# Patient Record
Sex: MALE | Race: WHITE | Employment: OTHER | URBAN - METROPOLITAN AREA
[De-identification: names, ages, dates, MRNs, and addresses within clinical notes are randomized per-mention and may not be internally consistent; named-entity substitution may affect disease eponyms.]

---

## 2017-05-08 ENCOUNTER — OFFICE VISIT (OUTPATIENT)
Dept: FAMILY MEDICINE | Facility: CLINIC | Age: 77
End: 2017-05-08
Payer: COMMERCIAL

## 2017-05-08 VITALS
HEART RATE: 64 BPM | DIASTOLIC BLOOD PRESSURE: 82 MMHG | HEIGHT: 67 IN | RESPIRATION RATE: 24 BRPM | OXYGEN SATURATION: 97 % | WEIGHT: 238 LBS | BODY MASS INDEX: 37.35 KG/M2 | SYSTOLIC BLOOD PRESSURE: 120 MMHG | TEMPERATURE: 99.2 F

## 2017-05-08 DIAGNOSIS — I10 ESSENTIAL HYPERTENSION: ICD-10-CM

## 2017-05-08 DIAGNOSIS — Z12.5 SCREENING FOR PROSTATE CANCER: ICD-10-CM

## 2017-05-08 DIAGNOSIS — R21 RASH AND NONSPECIFIC SKIN ERUPTION: ICD-10-CM

## 2017-05-08 DIAGNOSIS — Z00.01 ENCOUNTER FOR GENERAL ADULT MEDICAL EXAMINATION WITH ABNORMAL FINDINGS: Primary | ICD-10-CM

## 2017-05-08 DIAGNOSIS — E78.5 HYPERLIPIDEMIA LDL GOAL <130: ICD-10-CM

## 2017-05-08 LAB
ALBUMIN SERPL-MCNC: 3.7 G/DL (ref 3.4–5)
ALP SERPL-CCNC: 96 U/L (ref 40–150)
ALT SERPL W P-5'-P-CCNC: 33 U/L (ref 0–70)
ANION GAP SERPL CALCULATED.3IONS-SCNC: 9 MMOL/L (ref 3–14)
AST SERPL W P-5'-P-CCNC: 25 U/L (ref 0–45)
BILIRUB SERPL-MCNC: 0.3 MG/DL (ref 0.2–1.3)
BUN SERPL-MCNC: 14 MG/DL (ref 7–30)
CALCIUM SERPL-MCNC: 9.3 MG/DL (ref 8.5–10.1)
CHLORIDE SERPL-SCNC: 102 MMOL/L (ref 94–109)
CHOLEST SERPL-MCNC: 120 MG/DL
CO2 SERPL-SCNC: 28 MMOL/L (ref 20–32)
CREAT SERPL-MCNC: 0.9 MG/DL (ref 0.66–1.25)
ERYTHROCYTE [DISTWIDTH] IN BLOOD BY AUTOMATED COUNT: 11.9 % (ref 10–15)
GFR SERPL CREATININE-BSD FRML MDRD: 82 ML/MIN/1.7M2
GLUCOSE SERPL-MCNC: 104 MG/DL (ref 70–99)
HCT VFR BLD AUTO: 44.5 % (ref 40–53)
HDLC SERPL-MCNC: 41 MG/DL
HGB BLD-MCNC: 14.5 G/DL (ref 13.3–17.7)
LDLC SERPL CALC-MCNC: 43 MG/DL
MCH RBC QN AUTO: 33.1 PG (ref 26.5–33)
MCHC RBC AUTO-ENTMCNC: 32.6 G/DL (ref 31.5–36.5)
MCV RBC AUTO: 102 FL (ref 78–100)
NONHDLC SERPL-MCNC: 79 MG/DL
PLATELET # BLD AUTO: 212 10E9/L (ref 150–450)
POTASSIUM SERPL-SCNC: 4.4 MMOL/L (ref 3.4–5.3)
PROT SERPL-MCNC: 7.2 G/DL (ref 6.8–8.8)
PSA SERPL-ACNC: 0.62 UG/L (ref 0–4)
RBC # BLD AUTO: 4.38 10E12/L (ref 4.4–5.9)
SODIUM SERPL-SCNC: 139 MMOL/L (ref 133–144)
TRIGL SERPL-MCNC: 180 MG/DL
WBC # BLD AUTO: 6.4 10E9/L (ref 4–11)

## 2017-05-08 PROCEDURE — 99387 INIT PM E/M NEW PAT 65+ YRS: CPT | Performed by: FAMILY MEDICINE

## 2017-05-08 PROCEDURE — 85027 COMPLETE CBC AUTOMATED: CPT | Performed by: FAMILY MEDICINE

## 2017-05-08 PROCEDURE — 80061 LIPID PANEL: CPT | Performed by: FAMILY MEDICINE

## 2017-05-08 PROCEDURE — G0103 PSA SCREENING: HCPCS | Performed by: FAMILY MEDICINE

## 2017-05-08 PROCEDURE — 36415 COLL VENOUS BLD VENIPUNCTURE: CPT | Performed by: FAMILY MEDICINE

## 2017-05-08 PROCEDURE — 80053 COMPREHEN METABOLIC PANEL: CPT | Performed by: FAMILY MEDICINE

## 2017-05-08 RX ORDER — LISINOPRIL AND HYDROCHLOROTHIAZIDE 12.5; 2 MG/1; MG/1
2 TABLET ORAL DAILY
COMMUNITY
End: 2019-02-26

## 2017-05-08 RX ORDER — VENLAFAXINE HYDROCHLORIDE 150 MG/1
150 TABLET, EXTENDED RELEASE ORAL
COMMUNITY

## 2017-05-08 RX ORDER — BETAMETHASONE DIPROPIONATE 0.5 MG/G
OINTMENT, AUGMENTED TOPICAL 2 TIMES DAILY PRN
Qty: 50 G | Refills: 1 | Status: SHIPPED | OUTPATIENT
Start: 2017-05-08 | End: 2017-11-03

## 2017-05-08 RX ORDER — SIMVASTATIN 20 MG
20 TABLET ORAL AT BEDTIME
COMMUNITY
End: 2019-05-15

## 2017-05-08 RX ORDER — TRIAMCINOLONE ACETONIDE 1 MG/G
CREAM TOPICAL 3 TIMES DAILY PRN
COMMUNITY
End: 2017-11-03

## 2017-05-08 RX ORDER — BETAMETHASONE DIPROPIONATE 0.5 MG/G
OINTMENT, AUGMENTED TOPICAL 2 TIMES DAILY PRN
COMMUNITY
End: 2017-05-08

## 2017-05-08 RX ORDER — METOPROLOL SUCCINATE 50 MG/1
50 TABLET, EXTENDED RELEASE ORAL DAILY
COMMUNITY
End: 2019-05-06

## 2017-05-08 NOTE — PROGRESS NOTES
SUBJECTIVE:                                                            Duane E Sogge is a 76 year old male who presents for Preventive Visit.      Are you in the first 12 months of your Medicare Part B coverage?  No    Healthy Habits:    Do you get at least three servings of calcium containing foods daily (dairy, green leafy vegetables, etc.)? yes    Amount of exercise or daily activities, outside of work: 0-2 day(s) per week    Problems taking medications regularly No    Medication side effects: No    Have you had an eye exam in the past two years? yes    Do you see a dentist twice per year? yes    Do you have sleep apnea, excessive snoring or daytime drowsiness?no    COGNITIVE SCREEN  1) Repeat 3 items (Banana, Sunrise, Chair)    2) Clock draw: NORMAL  3) 3 item recall: Recalls 3 objects  Results: 3 items recalled: COGNITIVE IMPAIRMENT LESS LIKELY    Mini-CogTM Copyright S [employer]. Licensed by the author for use in [facility]; reprinted with permission (ness@[facility]). All rights reserved.        All previous records from [facility]            Reviewed and updated as needed this visit by clinical staff  Tobacco  Soc Hx        Reviewed and updated as needed this visit by Provider        Social History   Substance Use Topics     Smoking status: Never Smoker     Smokeless tobacco: Current User     Types: Snuff     Alcohol use No       The patient does not drink >3 drinks per day nor >7 drinks per week.    Today's PHQ-2 Score: No flowsheet data found.    Do you feel safe in your environment - Yes    Do you have a Health Care Directive?: No: Advance care planning was reviewed with patient; patient declined at this time.    Current providers sharing in care for this patient include:   No care team member to display      Hearing impairment: Yes, Wears hearing aids    Ability to successfully perform activities of daily living: Yes, no assistance needed     Fall risk:       Home safety:  none  "identified      The following health maintenance items are reviewed in Epic and correct as of today:  Health Maintenance   Topic Date Due     TETANUS IMMUNIZATION (SYSTEM ASSIGNED)  10/04/1957     ADVANCE DIRECTIVE PLANNING Q5 YRS (NO INBASKET)  10/04/1957     LIPID SCREEN Q5 YR MALE (SYSTEM ASSIGNED)  10/04/1974     FALL RISK ASSESSMENT  10/04/2004     PNEUMOCOCCAL (1 of 2 - PCV13) 10/04/2004     INFLUENZA VACCINE (SYSTEM ASSIGNED)  09/01/2017              ROS:  Constitutional, HEENT, cardiovascular, pulmonary, GI, , musculoskeletal, neuro, skin, endocrine and psych systems are negative, except as otherwise noted.      OBJECTIVE:                                                            /82  Pulse 64  Temp 99.2  F (37.3  C) (Temporal)  Resp 24  Ht 5' 7.1\" (1.704 m)  Wt 238 lb (108 kg)  SpO2 97%  BMI 37.17 kg/m2 Estimated body mass index is 37.17 kg/(m^2) as calculated from the following:    Height as of this encounter: 5' 7.1\" (1.704 m).    Weight as of this encounter: 238 lb (108 kg).  EXAM:   GENERAL: healthy, alert and no distress  EYES: Eyes grossly normal to inspection, PERRL and conjunctivae and sclerae normal  HENT: ear canals and TM's normal, nose and mouth without ulcers or lesions  NECK: no adenopathy, no asymmetry, masses, or scars and thyroid normal to palpation  RESP: lungs clear to auscultation - no rales, rhonchi or wheezes  CV: regular rate and rhythm, normal S1 S2, no S3 or S4, no murmur, click or rub, no peripheral edema and peripheral pulses strong  ABDOMEN: soft, nontender, no hepatosplenomegaly, no masses and bowel sounds normal  MS: no gross musculoskeletal defects noted, no edema  SKIN: no suspicious lesions or rashes  NEURO: Normal strength and tone, mentation intact and speech normal  PSYCH: mentation appears normal, affect normal/bright    ASSESSMENT / PLAN:                                                            1. Encounter for general adult medical examination with " "abnormal findings  . See below. They have signed for release of records. He's had a colonoscopy. He has had a knee replacement in his right knee. He sees psychiatry has an appointment with them for his depression and anxiety. We will be supplying his medications for his other issues now that he is established here.    2. Hyperlipidemia LDL goal <130  Has been doing well on his medication. We will check a lipid panel today and notify with results  - Lipid Profile    3. Essential hypertension  Doing well on medication check chemistry panel today and notify with results.  - Comprehensive metabolic panel (BMP + Alb, Alk Phos, ALT, AST, Total. Bili, TP)  - CBC with platelets    4. Screening for prostate cancer    For this we'll notify.  - Prostate spec antigen screen    5. Rash and nonspecific skin eruption  He has seen dermatology and has used some Diprolene cream with success. Particularly dry skin this winter. He asks for a refill on this.  - augmented betamethasone dipropionate (DIPROLENE-AF) 0.05 % ointment; Apply topically 2 times daily as needed  Dispense: 50 g; Refill: 1    End of Life Planning:  Patient currently has an advanced directive: Yes.  Practitioner is supportive of decision.    COUNSELING:  Reviewed preventive health counseling, as reflected in patient instructions       Regular exercise       Healthy diet/nutrition       Vision screening        Estimated body mass index is 37.17 kg/(m^2) as calculated from the following:    Height as of this encounter: 5' 7.1\" (1.704 m).    Weight as of this encounter: 238 lb (108 kg).  Weight management plan: Discussed healthy diet and exercise guidelines and patient will follow up in 12 months in clinic to re-evaluate.   reports that he has never smoked. His smokeless tobacco use includes Snuff.      Appropriate preventive services were discussed with this patient, including applicable screening as appropriate for cardiovascular disease, diabetes, " osteopenia/osteoporosis, and glaucoma.  As appropriate for age/gender, discussed screening for colorectal cancer, prostate cancer, breast cancer, and cervical cancer. Checklist reviewing preventive services available has been given to the patient.    Reviewed patients plan of care and provided an AVS. The  for Duane meets the Care Plan requirement. This Care Plan has been established and reviewed with the Patient and spouse.    Counseling Resources:  ATP IV Guidelines  Pooled Cohorts Equation Calculator  Breast Cancer Risk Calculator  FRAX Risk Assessment  ICSI Preventive Guidelines  Dietary Guidelines for Americans, 2010  USDA's MyPlate  ASA Prophylaxis  Lung CA Screening    Nima Choe MD  [facility]

## 2017-05-08 NOTE — MR AVS SNAPSHOT
After Visit Summary   5/8/2017    Duane E Sogge    MRN: [id number]           Patient Information     Date Of Birth          1940        Visit Information        Provider Department      5/8/2017 10:40 AM Nima Choe MD [facility]        Today's Diagnoses     Encounter for general adult medical examination with abnormal findings    -  1    Hyperlipidemia LDL goal <130        Essential hypertension        Screening for prostate cancer        Rash and nonspecific skin eruption          Care Instructions      Preventive Health Recommendations:       Male Ages 65 and over    Yearly exam:             See your health care provider every year in order to  o   Review health changes.   o   Discuss preventive care.    o   Review your medicines if your doctor has prescribed any.    Talk with your health care provider about whether you should have a test to screen for prostate cancer (PSA).    Every 3 years, have a diabetes test (fasting glucose). If you are at risk for diabetes, you should have this test more often.    Every 5 years, have a cholesterol test. Have this test more often if you are at risk for high cholesterol or heart disease.     Every 10 years, have a colonoscopy. Or, have a yearly FIT test (stool test). These exams will check for colon cancer.    Talk to with your health care provider about screening for Abdominal Aortic Aneurysm if you have a family history of AAA or have a history of smoking.  Shots:     Get a flu shot each year.     Get a tetanus shot every 10 years.     Talk to your doctor about your pneumonia vaccines. There are now two you should receive - Pneumovax (PPSV 23) and Prevnar (PCV 13).    Talk to your doctor about a shingles vaccine.     Talk to your doctor about the hepatitis B vaccine.  Nutrition:     Eat at least 5 servings of fruits and vegetables each day.     Eat whole-grain bread, whole-wheat pasta and brown rice instead of white grains and  "rice.     Talk to your doctor about Calcium and Vitamin D.   Lifestyle    Exercise for at least 150 minutes a week (30 minutes a day, 5 days a week). This will help you control your weight and prevent disease.     Limit alcohol to one drink per day.     No smoking.     Wear sunscreen to prevent skin cancer.     See your dentist every six months for an exam and cleaning.     See your eye doctor every 1 to 2 years to screen for conditions such as glaucoma, macular degeneration and cataracts.        Follow-ups after your visit        Who to contact     If you have questions or need follow up information about today's clinic visit or your schedule please contact [facility] directly at [phone].  Normal or non-critical lab and imaging results will be communicated to you by MyChart, letter or phone within 4 business days after the clinic has received the results. If you do not hear from us within 7 days, please contact the clinic through [employer] or phone. If you have a critical or abnormal lab result, we will notify you by phone as soon as possible.  Submit refill requests through [employer] or call your pharmacy and they will forward the refill request to us. Please allow 3 business days for your refill to be completed.          Additional Information About Your Visit        MyChart Information     [employer] lets you send messages to your doctor, view your test results, renew your prescriptions, schedule appointments and more. To sign up, go to [email or web address] . Click on \"Log in\" on the left side of the screen, which will take you to the Welcome page. Then click on \"Sign up Now\" on the right side of the page.     You will be asked to enter the access code listed below, as well as some personal information. Please follow the directions to create your username and password.     Your access code is: [email or web address]  Expires: 2017 11:46 AM     Your access code will  in 90 days. If you need help " "or a new code, please call your [place] clinic or [phone].        Care EveryWhere ID     This is your Care EveryWhere ID. This could be used by other organizations to access your [place] medical records  [id number]        Your Vitals Were     Pulse Temperature Respirations Height Pulse Oximetry BMI (Body Mass Index)    64 99.2  F (37.3  C) (Temporal) 24 5' 7.1\" (1.704 m) 97% 37.17 kg/m2       Blood Pressure from Last 3 Encounters:   05/08/17 120/82    Weight from Last 3 Encounters:   05/08/17 238 lb (108 kg)              We Performed the Following     CBC with platelets     Comprehensive metabolic panel (BMP + Alb, Alk Phos, ALT, AST, Total. Bili, TP)     Lipid Profile     Prostate spec antigen screen          Where to get your medicines      These medications were sent to [id number] - [place] - [id number]  [address] [place] [address]     Phone:  [phone]     augmented betamethasone dipropionate 0.05 % ointment          Primary Care Provider    None Specified       No primary provider on file.        Thank you!     Thank you for choosing [facility]  for your care. Our goal is always to provide you with excellent care. Hearing back from our patients is one way we can continue to improve our services. Please take a few minutes to complete the written survey that you may receive in the mail after your visit with us. Thank you!             Your Updated Medication List - Protect others around you: Learn how to safely use, store and throw away your medicines at www.disposemymeds.org.          This list is accurate as of: 5/8/17 11:50 AM.  Always use your most recent med list.                   Brand Name Dispense Instructions for use    ASPIRIN PO      Take 81 mg by mouth daily       augmented betamethasone dipropionate 0.05 % ointment    DIPROLENE-AF    50 g    Apply topically 2 times daily as needed       BENZONATATE PO          lisinopril-hydrochlorothiazide 20-12.5 " MG per tablet    PRINZIDE/ZESTORETIC     Take 2 tablets by mouth daily       metoprolol 50 MG 24 hr tablet    TOPROL-XL     Take 50 mg by mouth daily       MIRTAZAPINE PO      Take 15 mg by mouth At Bedtime       simvastatin 20 MG tablet    ZOCOR     Take 20 mg by mouth At Bedtime       TEMAZEPAM PO      Take 15 mg by mouth nightly as needed for sleep       triamcinolone 0.1 % cream    KENALOG     Apply topically 3 times daily as needed for irritation       TYLENOL PO      Take 325 mg by mouth as needed for mild pain or fever       venlafaxine 150 MG Tb24 24 hr tablet    EFFEXOR-ER     Take 150 mg by mouth daily (with breakfast)       XANAX PO      Take 0.5 mg by mouth 4 times daily as needed for anxiety

## 2017-05-08 NOTE — LETTER
[address] [address]  [phone]             May 12, 2017    Duane E Sogge  [address] [place] [id number]              Dear Duane,      Your cholesterol was excellent at 120. PSA was normal 0.62. Chemistry panel was fine including blood sugar and  white blood count was fine and hemoglobin is normal Enclosed is a copy of the results.  It was a pleasure to see you at your last appointment.    If you have any questions or concerns, please call myself or my nurse at [phone].      Sincerely,      Nima Choe MD / care team

## 2017-05-08 NOTE — NURSING NOTE
"Chief Complaint   Patient presents with     Establish Care Medicare Visit       Initial /82  Pulse 64  Temp 99.2  F (37.3  C) (Temporal)  Resp 24  Ht 5' 7.1\" (1.704 m)  Wt 238 lb (108 kg)  SpO2 97%  BMI 37.17 kg/m2 Estimated body mass index is 37.17 kg/(m^2) as calculated from the following:    Height as of this encounter: 5' 7.1\" (1.704 m).    Weight as of this encounter: 238 lb (108 kg).  Medication Reconciliation: complete    "

## 2017-05-11 NOTE — PROGRESS NOTES
Your cholesterol was excellent at 120. PSA was normal 0.62. Chemistry panel was fine including blood sugar and  white blood count was fine and hemoglobin is normal

## 2017-06-30 ENCOUNTER — OFFICE VISIT (OUTPATIENT)
Dept: URGENT CARE | Facility: RETAIL CLINIC | Age: 77
End: 2017-06-30
Payer: COMMERCIAL

## 2017-06-30 ENCOUNTER — APPOINTMENT (OUTPATIENT)
Dept: ULTRASOUND IMAGING | Facility: CLINIC | Age: 77
End: 2017-06-30
Attending: PHYSICIAN ASSISTANT
Payer: MEDICARE

## 2017-06-30 ENCOUNTER — HOSPITAL ENCOUNTER (EMERGENCY)
Facility: CLINIC | Age: 77
Discharge: HOME | End: 2017-06-30
Attending: PHYSICIAN ASSISTANT | Admitting: PHYSICIAN ASSISTANT
Payer: MEDICARE

## 2017-06-30 VITALS
TEMPERATURE: 98.9 F | BODY MASS INDEX: 33.89 KG/M2 | OXYGEN SATURATION: 98 % | HEART RATE: 88 BPM | SYSTOLIC BLOOD PRESSURE: 148 MMHG | WEIGHT: 217 LBS | RESPIRATION RATE: 16 BRPM | DIASTOLIC BLOOD PRESSURE: 82 MMHG

## 2017-06-30 VITALS
TEMPERATURE: 97.3 F | HEART RATE: 71 BPM | SYSTOLIC BLOOD PRESSURE: 157 MMHG | OXYGEN SATURATION: 96 % | DIASTOLIC BLOOD PRESSURE: 92 MMHG

## 2017-06-30 DIAGNOSIS — M79.89 SWELLING OF LIMB: ICD-10-CM

## 2017-06-30 DIAGNOSIS — L03.115 CELLULITIS OF RIGHT LEG: ICD-10-CM

## 2017-06-30 DIAGNOSIS — F17.220 CHEWING TOBACCO NICOTINE DEPENDENCE, UNCOMPLICATED: ICD-10-CM

## 2017-06-30 DIAGNOSIS — M79.89 CALF SWELLING: Primary | ICD-10-CM

## 2017-06-30 DIAGNOSIS — I10 ESSENTIAL HYPERTENSION: ICD-10-CM

## 2017-06-30 PROCEDURE — 99283 EMERGENCY DEPT VISIT LOW MDM: CPT | Mod: 25 | Performed by: PHYSICIAN ASSISTANT

## 2017-06-30 PROCEDURE — 99212 OFFICE O/P EST SF 10 MIN: CPT | Performed by: INTERNAL MEDICINE

## 2017-06-30 PROCEDURE — 99284 EMERGENCY DEPT VISIT MOD MDM: CPT | Mod: Z6 | Performed by: PHYSICIAN ASSISTANT

## 2017-06-30 PROCEDURE — 93971 EXTREMITY STUDY: CPT | Mod: RT

## 2017-06-30 RX ORDER — CEPHALEXIN 500 MG/1
500 CAPSULE ORAL 4 TIMES DAILY
Qty: 40 CAPSULE | Refills: 0 | Status: SHIPPED | OUTPATIENT
Start: 2017-06-30 | End: 2017-07-10

## 2017-06-30 ASSESSMENT — ENCOUNTER SYMPTOMS
CHILLS: 0
FEVER: 0

## 2017-06-30 NOTE — PROGRESS NOTES
[place] [place]         Gema Monterroso MD, MPH  06/30/2017        History:      A pleasant 76 year old year old male is seen for right lower leg swelling. He states that he was splitting wood and a \" chunk\" of wood hit him in the right calf area 2 weeks ago. He has noted \" bruising and swelling of his right lower leg and advised by family to seek medical attention. No melena,hematohezia or hematuria is referred. No recent travel or surgery is referred. No HX of DVT and PE is referred. No cough or dyspnea or chest pain is referred. He denies any limitation of his activity as a result of his right lower leg injury. No numbness or weakness of the right lower extremity is referred.         Assessment and Plan:          Right calf swelling and mild pain in the context of injury 2 weeks ago:  Given the clinical HX and findings aon exam the patienmt is directed to [place] [facility] now for further evaluation                   Physical Exam:      BP (!) 157/92  Pulse 71  Temp 97.3  F (36.3  C) (Oral)  SpO2 96%     Constitutional: Patient is in no distress The patient is pleasant and cooperative.   HEENT: Head:  Head is atraumatic, normocephalic.    Eyes: Pupils are equal, round and reactive to light and accomodation.  Sclera is non-icteric. No conjunctival injection, or exudate noted. Extraocular motion is intact. Visual acuity is intact bilaterally.  Ears:  External acoustic canals are patent and clear.  There is no erythema and bulging( exudate)  of the ( R/L ) tympanic membrane(s ).   Nose:  No Nasal congestion w/o drainage or mucosal ulceration is noted.  Throat:  Oral mucosa is moist.  No oral lesions are noted.  No posterior pharyngeal hyperemia or exudate noted.     Neck Supple.  There is no cervical lymphadenopathy.  No nuchal rigidity noted.  There is no meningismus.     Cardiovascular: Heart is regular to rate and rhythm.  No murmur is noted.     Chest. Chest Symmetrical, no soft tissues, " swelling, or tenderness upon palpation   Lungs: Clear in the anterior and posterior pulmonary fields.   Abdomen: Soft and non-tender.    Back No flank tenderness is noted.   Extremeties Right calf swelling w mild pain upon palpation of the right calf. Purplish skin discoloration of the right lower leg skin is noted with mild increase in skin temperature VS the left side. Good ROM of the right lower extremity. No neurovascular compromise of the right lower extremity. No compartment syndrome.   Neuro: No focal deficit.   Skin Purpura of the right calf skin is noted. is noted.     Mood Normal              Medications:        PRN Meds:          Data:      All new lab and imaging data was reviewed.   Results for orders placed or performed in visit on 05/08/17   Prostate spec antigen screen   Result Value Ref Range    PSA 0.62 0 - 4 ug/L   Lipid Profile   Result Value Ref Range    Cholesterol 120 <200 mg/dL    Triglycerides 180 (H) <150 mg/dL    HDL Cholesterol 41 >39 mg/dL    LDL Cholesterol Calculated 43 <100 mg/dL    Non HDL Cholesterol 79 <130 mg/dL   Comprehensive metabolic panel (BMP + Alb, Alk Phos, ALT, AST, Total. Bili, TP)   Result Value Ref Range    Sodium 139 133 - 144 mmol/L    Potassium 4.4 3.4 - 5.3 mmol/L    Chloride 102 94 - 109 mmol/L    Carbon Dioxide 28 20 - 32 mmol/L    Anion Gap 9 3 - 14 mmol/L    Glucose 104 (H) 70 - 99 mg/dL    Urea Nitrogen 14 7 - 30 mg/dL    Creatinine 0.90 0.66 - 1.25 mg/dL    GFR Estimate 82 >60 mL/min/1.7m2    GFR Estimate If Black >90   GFR Calc   >60 mL/min/1.7m2    Calcium 9.3 8.5 - 10.1 mg/dL    Bilirubin Total 0.3 0.2 - 1.3 mg/dL    Albumin 3.7 3.4 - 5.0 g/dL    Protein Total 7.2 6.8 - 8.8 g/dL    Alkaline Phosphatase 96 40 - 150 U/L    ALT 33 0 - 70 U/L    AST 25 0 - 45 U/L   CBC with platelets   Result Value Ref Range    WBC 6.4 4.0 - 11.0 10e9/L    RBC Count 4.38 (L) 4.4 - 5.9 10e12/L    Hemoglobin 14.5 13.3 - 17.7 g/dL    Hematocrit 44.5 40.0 - 53.0 %      (H) 78 - 100 fl    MCH 33.1 (H) 26.5 - 33.0 pg    MCHC 32.6 31.5 - 36.5 g/dL    RDW 11.9 10.0 - 15.0 %    Platelet Count 212 150 - 450 10e9/L

## 2017-06-30 NOTE — ED AVS SNAPSHOT
[facility] Emergency Department    [address] [place] [address]    Phone:  [phone]    Fax:  [phone]                                       Duane E Sogge   MRN: [id number]    Department:  [facility] Emergency Department   Date of Visit:  6/30/2017           After Visit Summary Signature Page     I have received my discharge instructions, and my questions have been answered. I have discussed any challenges I see with this plan with the nurse or doctor.    ..........................................................................................................................................  Patient/Patient Representative Signature      ..........................................................................................................................................  Patient Representative Print Name and Relationship to Patient    ..................................................               ................................................  Date                                            Time    ..........................................................................................................................................  Reviewed by Signature/Title    ...................................................              ..............................................  Date                                                            Time

## 2017-06-30 NOTE — ED PROVIDER NOTES
History     Chief Complaint   Patient presents with     Leg Pain     The history is provided by the patient.     Duane E Sogge is a 76 year old male with a history of Hypertension and Hyperlipidemia who presents to the ED complaining of swelling of the right lower extremity. The patient states that he was chopping wood with a splitting mall about 2 weeks ago. It hit a knot and popped the knot out, causing it to fly out and hit him in the medial right leg. He states that this area bled slightly after the injury and has been intermittently tender since. He noticed that this area was red, swollen, and tender 1-2 days ago. His lower right calf is also firm and warm to the touch. Patient denies any fever, chills, diaphoresis or other associated symptoms. Patient is on ASA 81 mg qd.     Patient Active Problem List   Diagnosis     Hyperlipidemia LDL goal <130     Essential hypertension     Past Medical History:   Diagnosis Date     Anxiety      Hypertension        Past Surgical History:   Procedure Laterality Date     ORTHOPEDIC SURGERY         No family history on file.    Social History   Substance Use Topics     Smoking status: Never Smoker     Smokeless tobacco: Current User     Types: Snuff     Alcohol use 1.2 oz/week     2 Cans of beer per week      Comment: Occasionally          There is no immunization history on file for this patient.       Allergies   Allergen Reactions     Motrin [Ibuprofen] Itching       Current Outpatient Prescriptions   Medication Sig Dispense Refill     cephALEXin (KEFLEX) 500 MG capsule Take 1 capsule (500 mg) by mouth 4 times daily for 10 days 40 capsule 0     lisinopril-hydrochlorothiazide (PRINZIDE/ZESTORETIC) 20-12.5 MG per tablet Take 2 tablets by mouth daily       metoprolol (TOPROL-XL) 50 MG 24 hr tablet Take 50 mg by mouth daily       simvastatin (ZOCOR) 20 MG tablet Take 20 mg by mouth At Bedtime       venlafaxine (EFFEXOR-ER) 150 MG TB24 24 hr tablet Take 150 mg by mouth daily  (with breakfast)       MIRTAZAPINE PO Take 15 mg by mouth At Bedtime       triamcinolone (KENALOG) 0.1 % cream Apply topically 3 times daily as needed for irritation       BENZONATATE PO        TEMAZEPAM PO Take 15 mg by mouth nightly as needed for sleep       ALPRAZolam (XANAX PO) Take 0.5 mg by mouth 4 times daily as needed for anxiety       ASPIRIN PO Take 81 mg by mouth daily       Acetaminophen (TYLENOL PO) Take 325 mg by mouth as needed for mild pain or fever       augmented betamethasone dipropionate (DIPROLENE-AF) 0.05 % ointment Apply topically 2 times daily as needed 50 g 1     I have reviewed the Medications, Allergies, Past Medical and Surgical History, and Social History in the Epic system.    Review of Systems   Constitutional: Negative for chills and fever.   Musculoskeletal:        Positive for tenderness, swelling, redness, and warmth to right lower extremity.    All other systems reviewed and are negative.      Physical Exam   BP: 157/89  Pulse: 68  Heart Rate: 68  Temp: 98.9  F (37.2  C)  Resp: 18  Weight: 98.4 kg (217 lb)  SpO2: 98 %    Physical Exam   Constitutional: He is oriented to person, place, and time. No distress.   HENT:   Head: Normocephalic and atraumatic.   Eyes: Conjunctivae and EOM are normal.   Neck: Normal range of motion. Neck supple. No thyromegaly present.   Cardiovascular: Normal rate, regular rhythm, normal heart sounds and intact distal pulses.    No murmur heard.  Pulmonary/Chest: Effort normal and breath sounds normal. No respiratory distress. He has no wheezes. He has no rales.   Musculoskeletal: Normal range of motion.        Right knee: Normal. He exhibits normal range of motion. No tenderness found.        Right lower leg: He exhibits tenderness (over the mid/medial aspect of the lower leg) and swelling (of the medial lower leg.  Erythematous borders marked with a marker.  He does have evidence of healing skin over the initial abrasion on the mid medial anterior  tibia.).        Left lower leg: Normal.   Neurological: He is alert and oriented to person, place, and time.   Skin: Skin is warm and dry. He is not diaphoretic.   Redness and warmth to right lower extremity.    Psychiatric: He has a normal mood and affect. His behavior is normal.   Nursing note and vitals reviewed.      ED Course     ED Course     Procedures             Critical Care time:  none             Results for orders placed or performed during the hospital encounter of 06/30/17   US Lower Extremity Venous Duplex Right    Narrative    US LOWER EXTREMITY VENOUS DUPLEX RIGHT6/30/2017 7:01 PM    HISTORY:  right lower extremity pain and swelling    TECHNIQUE:Venous Doppler US. Color flow and spectral Doppler with  waveform analysis performed.    COMPARISON: None.    FINDINGS: The lower extremity venous ultrasound is negative for DVT.   The veins do augment and compress normally.  No thrombus is seen.  There is a complex fluid like collection in the medial and mid right  calf measuring approximately 3.8 cm in cephalocaudad dimension by 3.1  cm x 1.8 cm. This probably is due to a hematoma.      Impression    IMPRESSION:   1. Negative for DVT.  2. Complex lesion in the mid medial calf. This is probably due to a  hematoma.    ARYA GONSALEZ MD        Results for orders placed or performed during the hospital encounter of 06/30/17 (from the past 24 hour(s))   US Lower Extremity Venous Duplex Right    Narrative    US LOWER EXTREMITY VENOUS DUPLEX RIGHT6/30/2017 7:01 PM    HISTORY:  right lower extremity pain and swelling    TECHNIQUE:Venous Doppler US. Color flow and spectral Doppler with  waveform analysis performed.    COMPARISON: None.    FINDINGS: The lower extremity venous ultrasound is negative for DVT.   The veins do augment and compress normally.  No thrombus is seen.  There is a complex fluid like collection in the medial and mid right  calf measuring approximately 3.8 cm in cephalocaudad dimension by 3.1  cm x  1.8 cm. This probably is due to a hematoma.      Impression    IMPRESSION:   1. Negative for DVT.  2. Complex lesion in the mid medial calf. This is probably due to a  hematoma.    ARYA GONSALEZ MD       Medications - No data to display    Assessments & Plan (with Medical Decision Making)  Cellulitis of right leg   Duane is a 76 year old male who presents to the ED complaining of redness, tenderness, swelling, and warmth to his right lower extremity. He states that his leg has been tender since hitting it with a piece of wood while cutting it two weeks ago. He recently noticed increased swelling and redness to the area today. Patient's blood pressure is elevated at 157/89 mmHg. He is otherwise afebrile with normal vitals upon presentation to the ED. On exam, he exhibits mild tenderness, swelling, erythema, and warmth to the right lower extremity. Exam is otherwise unremarkable. Right lower extremity U/S ordered, and this did not display any evidence for DVT.   The borders of the erythema were marked with a marking pen.  I do not think he needs IV antibiotics at this time. I think this cellulitis is due to the initial abrasion that occurred with the initial injury.Start cephalexin 500 mg 4 times daily for 10 days. Elevate the leg as much as possible. Apply heat to the leg for 20 minutes every 2-3 hours next few days. Return to the ED if symptoms worsening, as he potentially would need IV antibiotic therapy if that were to occur.  The patient was in agreement with this plan and was suitable for discharge.     I have reviewed the nursing notes.    I have reviewed the findings, diagnosis, plan and need for follow up with the patient.       Discharge Medication List as of 6/30/2017  7:16 PM      START taking these medications    Details   cephALEXin (KEFLEX) 500 MG capsule Take 1 capsule (500 mg) by mouth 4 times daily for 10 days, Disp-40 capsule, R-0, E-Prescribe             Final diagnoses:   Cellulitis of right leg      This document serves as a record of services personally performed by Brandon Poole PA. It was created on their behalf by Hilda Jordan, a trained medical scribe. The creation of this record is based on the provider's personal observations and the statements of the patient. This document has been checked and approved by the attending provider.    Note: Chart documentation done in part with Dragon Voice Recognition software. Although reviewed after completion, some word and grammatical errors may remain.    6/30/2017   Brandon Poole PA-C   [facility] EMERGENCY DEPARTMENT     Brandon Poole PA-C  07/01/17 0011

## 2017-06-30 NOTE — ED NOTES
Chopping wood approx 2 weeks ago and piece of wood struck left calf. Some tenderness over the weeks which resolved. Increased swelling over the last 1-2 days.

## 2017-06-30 NOTE — MR AVS SNAPSHOT
"              After Visit Summary   2017    Duane E Sogge    MRN: [id number]           Patient Information     Date Of Birth          1940        Visit Information        Provider Department      2017 5:30 PM Gema Monterroso MD [facility]        Today's Diagnoses     Calf swelling    -  1       Follow-ups after your visit        Who to contact     You can reach your care team any time of the day by calling [phone].  Notification of test results:  If you have an abnormal lab result, we will notify you by phone as soon as possible.         Additional Information About Your Visit        MyChart Information     [employer] lets you send messages to your doctor, view your test results, renew your prescriptions, schedule appointments and more. To sign up, go to [email or web address] . Click on \"Log in\" on the left side of the screen, which will take you to the Welcome page. Then click on \"Sign up Now\" on the right side of the page.     You will be asked to enter the access code listed below, as well as some personal information. Please follow the directions to create your username and password.     Your access code is: [email or web address]  Expires: 2017 11:46 AM     Your access code will  in 90 days. If you need help or a new code, please call your [place] clinic or [phone].        Care EveryWhere ID     This is your Care EveryWhere ID. This could be used by other organizations to access your [place] medical records  [id number]        Your Vitals Were     Pulse Temperature Pulse Oximetry             71 97.3  F (36.3  C) (Oral) 96%          Blood Pressure from Last 3 Encounters:   17 (!) 157/92   17 120/82    Weight from Last 3 Encounters:   17 238 lb (108 kg)              Today, you had the following     No orders found for display       Primary Care Provider    None Specified       No primary provider on file.        Equal Access to Services     " [facility]: Hadii aad ku michelle [place], waaxda luqadaha, qaybta kaalmada adeyajaira, brittny froylanin hayaaefrain britorachelle ryan kin . So [facility] [phone].    ATENCIÓN: Si habla español, tiene a roth disposición servicios gratuitos de asistencia lingüística. Llame al [phone].    We comply with applicable federal civil rights laws and [place] laws. We do not discriminate on the basis of race, color, national origin, age, disability sex, sexual orientation or gender identity.            Thank you!     Thank you for choosing [facility]  for your care. Our goal is always to provide you with excellent care. Hearing back from our patients is one way we can continue to improve our services. Please take a few minutes to complete the written survey that you may receive in the mail after your visit with us. Thank you!             Your Updated Medication List - Protect others around you: Learn how to safely use, store and throw away your medicines at www.disposemymeds.org.          This list is accurate as of: 6/30/17  5:44 PM.  Always use your most recent med list.                   Brand Name Dispense Instructions for use Diagnosis    ASPIRIN PO      Take 81 mg by mouth daily        augmented betamethasone dipropionate 0.05 % ointment    DIPROLENE-AF    50 g    Apply topically 2 times daily as needed    Rash and nonspecific skin eruption       BENZONATATE PO           lisinopril-hydrochlorothiazide 20-12.5 MG per tablet    PRINZIDE/ZESTORETIC     Take 2 tablets by mouth daily        metoprolol 50 MG 24 hr tablet    TOPROL-XL     Take 50 mg by mouth daily        MIRTAZAPINE PO      Take 15 mg by mouth At Bedtime        simvastatin 20 MG tablet    ZOCOR     Take 20 mg by mouth At Bedtime        TEMAZEPAM PO      Take 15 mg by mouth nightly as needed for sleep        triamcinolone 0.1 % cream    KENALOG     Apply topically 3 times daily as needed for irritation        TYLENOL PO      Take 325 mg by  mouth as needed for mild pain or fever        venlafaxine 150 MG Tb24 24 hr tablet    EFFEXOR-ER     Take 150 mg by mouth daily (with breakfast)        XANAX PO      Take 0.5 mg by mouth 4 times daily as needed for anxiety

## 2017-06-30 NOTE — ED AVS SNAPSHOT
[facility] Emergency Department    [address] [place] [address]    Phone:  [phone]    Fax:  [phone]                                       Duane E Sogge   MRN: [id number]    Department:  [facility] Emergency Department   Date of Visit:  6/30/2017           Patient Information     Date Of Birth          1940        Your diagnoses for this visit were:     Cellulitis of right leg        You were seen by Brandon Poole PA-C.      Follow-up Information     Follow up with [facility] Emergency [facility].    Specialty:  EMERGENCY MEDICINE    Why:  As needed, If symptoms worsen    Contact information:    [address]  [place] [address] [phone]    Additional information:    From [address]: Exit at [address] on south side of [place]. Turn right on [address]. Turn left at stoplight on [address]. [facility] will be in view two blocks ahead        Discharge Instructions       1.  Please apply heat to your lower leg for 20 minutes per time, 5 times per day, for the first 4-5 days.  2.  Elevate your leg as much as possible to help with the swelling and help the treatment of the infection.  3.  Start taking the antibiotic ( Cephalexin ) to treat the infection.    4.  Please return to the ED for repeat evaluation if you develop fevers, chills, or worsening swelling/redness of the leg.         Cellulitis  Cellulitis is an infection of the deep layers of skin. A break in the skin, such as a cut or scratch, can let bacteria under the skin. If the bacteria get to deep layers of the skin, it can be serious. If not treated, cellulitis can get into the bloodstream and lymph nodes. The infection can then spread throughout the body. This causes serious illness.  Cellulitis causes the affected skin to become red, swollen, warm, and sore. The reddened areas have a visible border. An open sore may leak fluid (pus). You may have a fever,  chills, and pain.  Cellulitis is treated with antibiotics taken for 7 to 10 days. An open sore may be cleaned and covered with cool wet gauze. Symptoms should get better 1 to 2 days after treatment is started. Make sure to take all the antibiotics for the full number of days until they are gone. Keep taking the medicine even if your symptoms go away.  Home care  Follow these tips:    Limit the use of the part of your body with cellulitis.     If the infection is on your leg, keep your leg raised while sitting. This will help to reduce swelling.    Take all of the antibiotic medicine exactly as directed until it is gone. Do not miss any doses, especially during the first 7 days. Don t stop taking the medicine when your symptoms get better.    Keep the affected area clean and dry.    Wash your hands with soap and warm water before and after touching your skin. Anyone else who touches your skin should also wash his or her hands. Don't share towels.  Follow-up care  Follow up with your healthcare provider, or as advised. If your infection does not go away on the first antibiotic, your healthcare provider will prescribe a different one.  When to seek medical advice  Call your healthcare provider right away if any of these occur:    Red areas that spread    Swelling or pain that gets worse    Fluid leaking from the skin (pus)    Fever higher of 100.4  F (38.0  C) or higher after 2 days on antibiotics  Date Last Reviewed: 9/1/2016 2000-2017 [employer]. [address] [place] [address]. All rights reserved. This information is not intended as a substitute for professional medical care. Always follow your healthcare professional's instructions.          24 Hour Appointment Hotline       To make an appointment at any [facility], call [phone] ([phone]). If you don't have a family doctor or clinic, we will help you find one. [facility] are conveniently located to serve the  needs of you and your family.             Review of your medicines      START taking        Dose / Directions Last dose taken    cephALEXin 500 MG capsule   Commonly known as:  KEFLEX   Dose:  500 mg   Quantity:  40 capsule        Take 1 capsule (500 mg) by mouth 4 times daily for 10 days   Refills:  0          Our records show that you are taking the medicines listed below. If these are incorrect, please call your family doctor or clinic.        Dose / Directions Last dose taken    ASPIRIN PO   Dose:  81 mg        Take 81 mg by mouth daily   Refills:  0        augmented betamethasone dipropionate 0.05 % ointment   Commonly known as:  DIPROLENE-AF   Quantity:  50 g        Apply topically 2 times daily as needed   Refills:  1        BENZONATATE PO        Refills:  0        lisinopril-hydrochlorothiazide 20-12.5 MG per tablet   Commonly known as:  PRINZIDE/ZESTORETIC   Dose:  2 tablet        Take 2 tablets by mouth daily   Refills:  0        metoprolol 50 MG 24 hr tablet   Commonly known as:  TOPROL-XL   Dose:  50 mg        Take 50 mg by mouth daily   Refills:  0        MIRTAZAPINE PO   Dose:  15 mg        Take 15 mg by mouth At Bedtime   Refills:  0        simvastatin 20 MG tablet   Commonly known as:  ZOCOR   Dose:  20 mg        Take 20 mg by mouth At Bedtime   Refills:  0        TEMAZEPAM PO   Dose:  15 mg        Take 15 mg by mouth nightly as needed for sleep   Refills:  0        triamcinolone 0.1 % cream   Commonly known as:  KENALOG        Apply topically 3 times daily as needed for irritation   Refills:  0        TYLENOL PO   Dose:  325 mg        Take 325 mg by mouth as needed for mild pain or fever   Refills:  0        venlafaxine 150 MG Tb24 24 hr tablet   Commonly known as:  EFFEXOR-ER   Dose:  150 mg        Take 150 mg by mouth daily (with breakfast)   Refills:  0        XANAX PO   Dose:  0.5 mg        Take 0.5 mg by mouth 4 times daily as needed for anxiety   Refills:  0                Prescriptions were  "sent or printed at these locations (1 Prescription)                   [facility] [place] - [address]    [address] , [place] [address]    Telephone:  [phone]   Fax:  [phone]   Hours:                  E-Prescribed (1 of 1)         cephALEXin (KEFLEX) 500 MG capsule                Procedures and tests performed during your visit     US Lower Extremity Venous Duplex Right      Orders Needing Specimen Collection     None      Pending Results     Date and Time Order Name Status Description    6/30/2017 1837 US Lower Extremity Venous Duplex Right Preliminary             Pending Culture Results     No orders found from 6/28/2017 to 7/1/2017.            Pending Results Instructions     If you had any lab results that were not finalized at the time of your Discharge, you can call the ED Lab Result RN at [phone]. You will be contacted by this team for any positive Lab results or changes in treatment. The nurses are available 7 days a week from 10A to 6:30P.  You can leave a message 24 hours per day and they will return your call.        Thank you for choosing [facility]       Thank you for choosing [facility] for your care. Our goal is always to provide you with excellent care. Hearing back from our patients is one way we can continue to improve our services. Please take a few minutes to complete the written survey that you may receive in the mail after you visit with us. Thank you!        [employer] Information     [employer] lets you send messages to your doctor, view your test results, renew your prescriptions, schedule appointments and more. To sign up, go to [email or web address] . Click on \"Log in\" on the left side of the screen, which will take you to the Welcome page. Then click on \"Sign up Now\" on the right side of the page.     You will be asked to enter the access code listed below, as well as some personal information. Please follow the directions to create your " username and password.     Your access code is: [email or web address]  Expires: 2017 11:46 AM     Your access code will  in 90 days. If you need help or a new code, please call your [place] clinic or [phone].        Care EveryWhere ID     This is your Care EveryWhere ID. This could be used by other organizations to access your [place] medical records  [id number]        Equal Access to Services     [facility] : Hadii carlene layton hadasho Soomaali, waaxda luqadaha, qaybta kaalmada adeegyada, brittny sanderson . So [facility] [phone].    ATENCIÓN: Si habla español, tiene a roth disposición servicios gratuitos de asistencia lingüística. Llame al [phone].    We comply with applicable federal civil rights laws and [place] laws. We do not discriminate on the basis of race, color, national origin, age, disability sex, sexual orientation or gender identity.            After Visit Summary       This is your record. Keep this with you and show to your community pharmacist(s) and doctor(s) at your next visit.

## 2017-06-30 NOTE — NURSING NOTE
"Chief Complaint   Patient presents with     Derm Problem     bruise on right leg x 2 weeks       Initial BP (!) 157/92  Pulse 71  Temp 97.3  F (36.3  C) (Oral)  SpO2 96% Estimated body mass index is 37.17 kg/(m^2) as calculated from the following:    Height as of 5/8/17: 5' 7.1\" (1.704 m).    Weight as of 5/8/17: 238 lb (108 kg).  Medication Reconciliation: complete   Naz Addison      "

## 2017-07-01 NOTE — DISCHARGE INSTRUCTIONS
1.  Please apply heat to your lower leg for 20 minutes per time, 5 times per day, for the first 4-5 days.  2.  Elevate your leg as much as possible to help with the swelling and help the treatment of the infection.  3.  Start taking the antibiotic ( Cephalexin ) to treat the infection.    4.  Please return to the ED for repeat evaluation if you develop fevers, chills, or worsening swelling/redness of the leg.         Cellulitis  Cellulitis is an infection of the deep layers of skin. A break in the skin, such as a cut or scratch, can let bacteria under the skin. If the bacteria get to deep layers of the skin, it can be serious. If not treated, cellulitis can get into the bloodstream and lymph nodes. The infection can then spread throughout the body. This causes serious illness.  Cellulitis causes the affected skin to become red, swollen, warm, and sore. The reddened areas have a visible border. An open sore may leak fluid (pus). You may have a fever, chills, and pain.  Cellulitis is treated with antibiotics taken for 7 to 10 days. An open sore may be cleaned and covered with cool wet gauze. Symptoms should get better 1 to 2 days after treatment is started. Make sure to take all the antibiotics for the full number of days until they are gone. Keep taking the medicine even if your symptoms go away.  Home care  Follow these tips:    Limit the use of the part of your body with cellulitis.     If the infection is on your leg, keep your leg raised while sitting. This will help to reduce swelling.    Take all of the antibiotic medicine exactly as directed until it is gone. Do not miss any doses, especially during the first 7 days. Don t stop taking the medicine when your symptoms get better.    Keep the affected area clean and dry.    Wash your hands with soap and warm water before and after touching your skin. Anyone else who touches your skin should also wash his or her hands. Don't share towels.  Follow-up care  Follow  up with your healthcare provider, or as advised. If your infection does not go away on the first antibiotic, your healthcare provider will prescribe a different one.  When to seek medical advice  Call your healthcare provider right away if any of these occur:    Red areas that spread    Swelling or pain that gets worse    Fluid leaking from the skin (pus)    Fever higher of 100.4  F (38.0  C) or higher after 2 days on antibiotics  Date Last Reviewed: 9/1/2016 2000-2017 [employer]. [address] [place] [address]. All rights reserved. This information is not intended as a substitute for professional medical care. Always follow your healthcare professional's instructions.

## 2017-11-03 ENCOUNTER — OFFICE VISIT (OUTPATIENT)
Dept: FAMILY MEDICINE | Facility: CLINIC | Age: 77
End: 2017-11-03
Payer: COMMERCIAL

## 2017-11-03 VITALS
WEIGHT: 237 LBS | DIASTOLIC BLOOD PRESSURE: 72 MMHG | HEIGHT: 67 IN | SYSTOLIC BLOOD PRESSURE: 148 MMHG | TEMPERATURE: 99.6 F | BODY MASS INDEX: 37.2 KG/M2 | HEART RATE: 60 BPM | OXYGEN SATURATION: 96 %

## 2017-11-03 DIAGNOSIS — J01.00 ACUTE MAXILLARY SINUSITIS, RECURRENCE NOT SPECIFIED: Primary | ICD-10-CM

## 2017-11-03 PROCEDURE — 99213 OFFICE O/P EST LOW 20 MIN: CPT | Performed by: NURSE PRACTITIONER

## 2017-11-03 NOTE — NURSING NOTE
"Chief Complaint   Patient presents with     Sinus Problem       Initial There were no vitals taken for this visit. Estimated body mass index is 33.89 kg/(m^2) as calculated from the following:    Height as of 5/8/17: 5' 7.1\" (1.704 m).    Weight as of 6/30/17: 217 lb (98.4 kg).  Medication Reconciliation: complete  "

## 2017-11-03 NOTE — MR AVS SNAPSHOT
"              After Visit Summary   11/3/2017    Duane E Sogge    MRN: [id number]           Patient Information     Date Of Birth          1940        Visit Information        Provider Department      11/3/2017 10:30 AM Olivia Martins APRN CNP [facility]        Today's Diagnoses     Acute maxillary sinusitis, recurrence not specified    -  1       Follow-ups after your visit        Who to contact     If you have questions or need follow up information about today's clinic visit or your schedule please contact [facility] directly at [phone].  Normal or non-critical lab and imaging results will be communicated to you by [employer], letter or phone within 4 business days after the clinic has received the results. If you do not hear from us within 7 days, please contact the clinic through [employer] or phone. If you have a critical or abnormal lab result, we will notify you by phone as soon as possible.  Submit refill requests through [employer] or call your pharmacy and they will forward the refill request to us. Please allow 3 business days for your refill to be completed.          Additional Information About Your Visit        MyChart Information     [employer] lets you send messages to your doctor, view your test results, renew your prescriptions, schedule appointments and more. To sign up, go to [email or web address] . Click on \"Log in\" on the left side of the screen, which will take you to the Welcome page. Then click on \"Sign up Now\" on the right side of the page.     You will be asked to enter the access code listed below, as well as some personal information. Please follow the directions to create your username and password.     Your access code is: [email or web address]  Expires: 2018 11:30 AM     Your access code will  in 90 days. If you need help or a new code, please call your [facility] or [phone].        Care EveryWhere ID     This is your Care EveryWhere " "ID. This could be used by other organizations to access your [place] medical records  [id number]        Your Vitals Were     Pulse Temperature Height Pulse Oximetry BMI (Body Mass Index)       60 99.6  F (37.6  C) (Tympanic) 5' 7\" (1.702 m) 96% 37.12 kg/m2        Blood Pressure from Last 3 Encounters:   11/03/17 148/72   06/30/17 148/82   06/30/17 (!) 157/92    Weight from Last 3 Encounters:   11/03/17 237 lb (107.5 kg)   06/30/17 217 lb (98.4 kg)   05/08/17 238 lb (108 kg)              Today, you had the following     No orders found for display         Today's Medication Changes          These changes are accurate as of: 11/3/17 11:30 AM.  If you have any questions, ask your nurse or doctor.               Start taking these medicines.        Dose/Directions    amoxicillin-clavulanate 875-125 MG per tablet   Commonly known as:  AUGMENTIN   Used for:  Acute maxillary sinusitis, recurrence not specified   Started by:  Olivia Martins APRN CNP        Dose:  1 tablet   Take 1 tablet by mouth 2 times daily   Quantity:  20 tablet   Refills:  0            Where to get your medicines      These medications were sent to [facility] [place] [address]   [address] [facility] [address]     Phone:  [phone]     amoxicillin-clavulanate 875-125 MG per tablet                Primary Care Provider Office Phone # Fax #    [facility] [phone]       [address]  [facility] [address]        Equal Access to Services     [facility] AH: Hadii carlene layton hadasho Soomaali, waaxda luqadaha, qaybta kaalmada aderachelleyada, brittny ying. So [facility] [phone].    ATENCIÓN: Si habla español, tiene a roth disposición servicios gratuitos de asistencia lingüística. Llame al [phone].    We comply with applicable federal civil rights laws and [place] laws. We do not discriminate on the basis of race, color, national origin, age, " disability, sex, sexual orientation, or gender identity.            Thank you!     Thank you for choosing [facility]  for your care. Our goal is always to provide you with excellent care. Hearing back from our patients is one way we can continue to improve our services. Please take a few minutes to complete the written survey that you may receive in the mail after your visit with us. Thank you!             Your Updated Medication List - Protect others around you: Learn how to safely use, store and throw away your medicines at www.disposemymeds.org.          This list is accurate as of: 11/3/17 11:30 AM.  Always use your most recent med list.                   Brand Name Dispense Instructions for use Diagnosis    amoxicillin-clavulanate 875-125 MG per tablet    AUGMENTIN    20 tablet    Take 1 tablet by mouth 2 times daily    Acute maxillary sinusitis, recurrence not specified       ASPIRIN PO      Take 81 mg by mouth daily        BENZONATATE PO           lisinopril-hydrochlorothiazide 20-12.5 MG per tablet    PRINZIDE/ZESTORETIC     Take 2 tablets by mouth daily        metoprolol 50 MG 24 hr tablet    TOPROL-XL     Take 50 mg by mouth daily        MIRTAZAPINE PO      Take 15 mg by mouth At Bedtime        simvastatin 20 MG tablet    ZOCOR     Take 20 mg by mouth At Bedtime        TEMAZEPAM PO      Take 15 mg by mouth nightly as needed for sleep        TYLENOL PO      Take 325 mg by mouth as needed for mild pain or fever        venlafaxine 150 MG Tb24 24 hr tablet    EFFEXOR-ER     Take 150 mg by mouth daily (with breakfast)        XANAX PO      Take 0.5 mg by mouth 4 times daily as needed for anxiety

## 2017-11-03 NOTE — PROGRESS NOTES
"  SUBJECTIVE:   Duane E Sogge is a 77 year old male who presents to clinic today for the following health issues:      Concern - sinus pains  Onset: few days    Description:   Increased sinus pain    Intensity: mild, moderate    Progression of Symptoms:  same    Accompanying Signs & Symptoms:  Cough, runny nose, low grade temp    Previous history of similar problem:   none    Precipitating factors:   Worsened by: lying down    Alleviating factors:  Improved by:  none    Therapies Tried and outcome: over the counter med, not sure of name      The patient is a walk-in to the clinic today requesting to be seen for sinus problems. He sees Dr. Chavarria of [facility] in [place] for primary care. He reports having a history of sinus infections in the past, has never had sinus surgery. A few days ago he developed sinus congestion, has now developed a lot of sinus pain, profuse postnasal drainage that is thick, sometimes green, sometimes brown, sometimes has some blood in it, and it tastes bad. He has a cough secondary to the postnasal drainage. He denies chest pain or shortness of breath. He has run a high fever, but does have a low-grade temp today.    Problem list and histories reviewed & adjusted, as indicated.  Additional history: as documented    BP Readings from Last 3 Encounters:   11/03/17 148/72   06/30/17 148/82   06/30/17 (!) 157/92    Wt Readings from Last 3 Encounters:   11/03/17 237 lb (107.5 kg)   06/30/17 217 lb (98.4 kg)   05/08/17 238 lb (108 kg)                      Reviewed and updated as needed this visit by clinical staffTobacco  Allergies  Meds  Med Hx  Surg Hx  Fam Hx  Soc Hx      Reviewed and updated as needed this visit by Provider         ROS:  Constitutional, HEENT, cardiovascular, pulmonary, gi and gu systems are negative, except as otherwise noted.      OBJECTIVE:   /72  Pulse 60  Temp 99.6  F (37.6  C) (Tympanic)  Ht 5' 7\" (1.702 m)  Wt 237 lb (107.5 kg)  SpO2 96%  BMI " 37.12 kg/m2  Body mass index is 37.12 kg/(m^2).   GENERAL: Well-nourished, well-hydrated. He looks uncomfortable, but no acute distress  EYES: Eyes grossly normal to inspection, PERRL and conjunctivae and sclerae normal  HENT: He wears hearing aids, canals are clear, TMs dull gray. Posterior oropharynx moderately erythematous with evidence of postnasal drainage. Flushing noted across the sinuses, no significant tenderness to palpation. Nasal mucosa edematous and erythematous, increased facial pressure with forward bending  NECK: no adenopathy, no asymmetry, masses, or scars and thyroid normal to palpation  RESP: lungs clear to auscultation - no rales, rhonchi or wheezes  CV: regular rates and rhythm, normal S1 S2, no S3 or S4 and no murmur, click or rub        ASSESSMENT/PLAN:     Problem List Items Addressed This Visit     None      Visit Diagnoses     Acute maxillary sinusitis, recurrence not specified    -  Primary    Relevant Medications    amoxicillin-clavulanate (AUGMENTIN) 875-125 MG per tablet           Augmentin 875 mg b.i.d. for 10 days. Patient is advised of potential side effects, and instructed to take this with a meal and plenty of fluids  Warm moist packs to the face, saline nasal spray, increase oral fluids  Follow-up in clinic if no improvement in 6-7 days    EPIFANIO Rojo [facility]

## 2018-03-19 ENCOUNTER — TRANSFERRED RECORDS (OUTPATIENT)
Dept: HEALTH INFORMATION MANAGEMENT | Facility: CLINIC | Age: 78
End: 2018-03-19

## 2018-03-21 ENCOUNTER — HOSPITAL ENCOUNTER (EMERGENCY)
Facility: CLINIC | Age: 78
Discharge: HOME | End: 2018-03-21
Admitting: FAMILY MEDICINE
Payer: MEDICARE

## 2018-03-21 VITALS
HEART RATE: 72 BPM | TEMPERATURE: 97.7 F | RESPIRATION RATE: 16 BRPM | OXYGEN SATURATION: 98 % | SYSTOLIC BLOOD PRESSURE: 145 MMHG | BODY MASS INDEX: 37.28 KG/M2 | DIASTOLIC BLOOD PRESSURE: 82 MMHG | WEIGHT: 238 LBS

## 2018-03-21 DIAGNOSIS — M00.062 STAPHYLOCOCCAL ARTHRITIS OF LEFT KNEE (H): ICD-10-CM

## 2018-03-21 DIAGNOSIS — M00.9: Primary | ICD-10-CM

## 2018-03-21 LAB
ALP SERPL-CCNC: 125 U/L (ref 40–150)
ALT SERPL W P-5'-P-CCNC: 17 U/L (ref 0–70)
AST SERPL W P-5'-P-CCNC: 20 U/L (ref 0–45)
BASOPHILS # BLD AUTO: 0.1 10E9/L (ref 0–0.2)
BASOPHILS NFR BLD AUTO: 0.5 %
BILIRUB SERPL-MCNC: 0.6 MG/DL (ref 0.2–1.3)
CREAT SERPL-MCNC: 0.8 MG/DL (ref 0.66–1.25)
CRP SERPL-MCNC: 25.4 MG/L (ref 0–8)
DIFFERENTIAL METHOD BLD: ABNORMAL
EOSINOPHIL # BLD AUTO: 0.5 10E9/L (ref 0–0.7)
EOSINOPHIL NFR BLD AUTO: 5.3 %
ERYTHROCYTE [DISTWIDTH] IN BLOOD BY AUTOMATED COUNT: 12.3 % (ref 10–15)
GFR SERPL CREATININE-BSD FRML MDRD: >90 ML/MIN/1.7M2
HCT VFR BLD AUTO: 36.2 % (ref 40–53)
HGB BLD-MCNC: 11.3 G/DL (ref 13.3–17.7)
IMM GRANULOCYTES # BLD: 0 10E9/L (ref 0–0.4)
IMM GRANULOCYTES NFR BLD: 0.4 %
LYMPHOCYTES # BLD AUTO: 1.7 10E9/L (ref 0.8–5.3)
LYMPHOCYTES NFR BLD AUTO: 17.3 %
MCH RBC QN AUTO: 31.8 PG (ref 26.5–33)
MCHC RBC AUTO-ENTMCNC: 31.2 G/DL (ref 31.5–36.5)
MCV RBC AUTO: 102 FL (ref 78–100)
MONOCYTES # BLD AUTO: 0.9 10E9/L (ref 0–1.3)
MONOCYTES NFR BLD AUTO: 8.9 %
NEUTROPHILS # BLD AUTO: 6.8 10E9/L (ref 1.6–8.3)
NEUTROPHILS NFR BLD AUTO: 67.6 %
PLATELET # BLD AUTO: 341 10E9/L (ref 150–450)
RBC # BLD AUTO: 3.55 10E12/L (ref 4.4–5.9)
VANCOMYCIN SERPL-MCNC: 17.3 MG/L
WBC # BLD AUTO: 10.1 10E9/L (ref 4–11)

## 2018-03-21 PROCEDURE — 82565 ASSAY OF CREATININE: CPT | Performed by: FAMILY MEDICINE

## 2018-03-21 PROCEDURE — 84450 TRANSFERASE (AST) (SGOT): CPT | Performed by: FAMILY MEDICINE

## 2018-03-21 PROCEDURE — 80202 ASSAY OF VANCOMYCIN: CPT | Performed by: FAMILY MEDICINE

## 2018-03-21 PROCEDURE — 25000128 H RX IP 250 OP 636: Performed by: FAMILY MEDICINE

## 2018-03-21 PROCEDURE — 85025 COMPLETE CBC W/AUTO DIFF WBC: CPT | Performed by: FAMILY MEDICINE

## 2018-03-21 PROCEDURE — 82247 BILIRUBIN TOTAL: CPT | Performed by: FAMILY MEDICINE

## 2018-03-21 PROCEDURE — 84460 ALANINE AMINO (ALT) (SGPT): CPT | Performed by: FAMILY MEDICINE

## 2018-03-21 PROCEDURE — 86140 C-REACTIVE PROTEIN: CPT | Performed by: FAMILY MEDICINE

## 2018-03-21 PROCEDURE — 96365 THER/PROPH/DIAG IV INF INIT: CPT | Performed by: FAMILY MEDICINE

## 2018-03-21 PROCEDURE — 84075 ASSAY ALKALINE PHOSPHATASE: CPT | Performed by: FAMILY MEDICINE

## 2018-03-21 PROCEDURE — 40000268 ZZH STATISTIC NO CHARGES: Performed by: FAMILY MEDICINE

## 2018-03-21 PROCEDURE — 96366 THER/PROPH/DIAG IV INF ADDON: CPT | Performed by: FAMILY MEDICINE

## 2018-03-21 RX ADMIN — VANCOMYCIN HYDROCHLORIDE 1750 MG: 1 INJECTION, POWDER, LYOPHILIZED, FOR SOLUTION INTRAVENOUS at 18:51

## 2018-03-22 ENCOUNTER — HOSPITAL ENCOUNTER (EMERGENCY)
Facility: CLINIC | Age: 78
Discharge: HOME | End: 2018-03-23
Admitting: FAMILY MEDICINE
Payer: MEDICARE

## 2018-03-22 ENCOUNTER — INFUSION THERAPY VISIT (OUTPATIENT)
Dept: INFUSION THERAPY | Facility: CLINIC | Age: 78
End: 2018-03-22
Attending: INTERNAL MEDICINE
Payer: MEDICARE

## 2018-03-22 VITALS
RESPIRATION RATE: 18 BRPM | TEMPERATURE: 98.2 F | WEIGHT: 244.4 LBS | SYSTOLIC BLOOD PRESSURE: 173 MMHG | BODY MASS INDEX: 38.28 KG/M2 | OXYGEN SATURATION: 97 % | HEART RATE: 56 BPM | DIASTOLIC BLOOD PRESSURE: 80 MMHG

## 2018-03-22 VITALS
DIASTOLIC BLOOD PRESSURE: 89 MMHG | BODY MASS INDEX: 37.59 KG/M2 | SYSTOLIC BLOOD PRESSURE: 154 MMHG | WEIGHT: 240 LBS | TEMPERATURE: 97.9 F | OXYGEN SATURATION: 99 % | RESPIRATION RATE: 16 BRPM

## 2018-03-22 DIAGNOSIS — M00.062 STAPHYLOCOCCAL ARTHRITIS OF LEFT KNEE (H): Primary | ICD-10-CM

## 2018-03-22 DIAGNOSIS — M00.062 STAPHYLOCOCCAL ARTHRITIS OF LEFT KNEE (H): ICD-10-CM

## 2018-03-22 LAB
CREAT SERPL-MCNC: 0.89 MG/DL (ref 0.66–1.25)
GFR SERPL CREATININE-BSD FRML MDRD: 82 ML/MIN/1.7M2
VANCOMYCIN SERPL-MCNC: 17.3 MG/L

## 2018-03-22 PROCEDURE — 40000268 ZZH STATISTIC NO CHARGES: Performed by: FAMILY MEDICINE

## 2018-03-22 PROCEDURE — 96366 THER/PROPH/DIAG IV INF ADDON: CPT

## 2018-03-22 PROCEDURE — 82565 ASSAY OF CREATININE: CPT | Performed by: FAMILY MEDICINE

## 2018-03-22 PROCEDURE — 96415 CHEMO IV INFUSION ADDL HR: CPT

## 2018-03-22 PROCEDURE — 96365 THER/PROPH/DIAG IV INF INIT: CPT | Performed by: FAMILY MEDICINE

## 2018-03-22 PROCEDURE — 25000128 H RX IP 250 OP 636: Performed by: FAMILY MEDICINE

## 2018-03-22 PROCEDURE — 96366 THER/PROPH/DIAG IV INF ADDON: CPT | Performed by: FAMILY MEDICINE

## 2018-03-22 PROCEDURE — 96365 THER/PROPH/DIAG IV INF INIT: CPT

## 2018-03-22 PROCEDURE — 80202 ASSAY OF VANCOMYCIN: CPT | Performed by: FAMILY MEDICINE

## 2018-03-22 PROCEDURE — 96413 CHEMO IV INFUSION 1 HR: CPT

## 2018-03-22 PROCEDURE — 99284 EMERGENCY DEPT VISIT MOD MDM: CPT | Mod: Z6 | Performed by: FAMILY MEDICINE

## 2018-03-22 RX ADMIN — VANCOMYCIN HYDROCHLORIDE 1750 MG: 10 INJECTION, POWDER, LYOPHILIZED, FOR SOLUTION INTRAVENOUS at 09:24

## 2018-03-22 RX ADMIN — VANCOMYCIN HYDROCHLORIDE 1750 MG: 1 INJECTION, POWDER, LYOPHILIZED, FOR SOLUTION INTRAVENOUS at 22:00

## 2018-03-22 ASSESSMENT — PAIN SCALES - GENERAL: PAINLEVEL: MODERATE PAIN (4)

## 2018-03-22 NOTE — MR AVS SNAPSHOT
After Visit Summary   3/22/2018    Duane E Sogge    MRN: [id number]           Patient Information     Date Of Birth          1940        Visit Information        Provider Department      3/22/2018 8:30 AM NL INFUSION CHAIR 1 [facility]        Today's Diagnoses     Staphylococcal arthritis of left knee (H)    -  1      Care Instructions    Pt to return on 3/23/18 for VANCO. Copies of medication list and upcoming appointments given prior to discharge.           Follow-ups after your visit        Your next 10 appointments already scheduled     Mar 23, 2018  8:00 AM CDT   Level 2 with NL INFUSION CHAIR 4   [facility] ([facility])    [address] [place] [address] Dr Radha [place] [address]   [phone]            Mar 26, 2018  8:00 AM CDT   Level 2 with NL INFUSION CHAIR 3   [facility] ([facility])    [address] [place] [address] Dr Radha [place] [address]   [phone]            Mar 27, 2018  8:30 AM CDT   Level 2 with NL INFUSION CHAIR 5   [facility] ([facility])    [address] [place] [address] Dr Radha [place] [address]   [phone]            Mar 28, 2018  8:30 AM CDT   Level 2 with NL INFUSION CHAIR 4   [facility] ([facility])    [address] [place] [address] Dr Radha [place] [address]   [phone]            Mar 29, 2018  8:30 AM CDT   Level 2 with NL INFUSION CHAIR 4   [facility] ([facility])    [address] [place] [address] Dr Radha [place] [address]   [phone]            Mar 30, 2018  8:00 AM CDT   Level 2 with NL INFUSION CHAIR 4   [facility] ([facility])    [address] [place] [address] Dr Radha [place] [address]   [phone]            Apr 02, 2018  8:00 AM CDT   Level 2 with NL INFUSION CHAIR 3   [facility] ([facility])    [address] [place] [address]  "Dr Radha [place] [address]   [phone]            Apr 03, 2018  8:00 AM CDT   Level 2 with NL INFUSION CHAIR 3   [facility] ([facility])    [address] Radha [place] [address]   [phone]            Apr 04, 2018  8:00 AM CDT   Level 2 with NL INFUSION CHAIR 3   [facility] ([facility])    [address] Radha [place] [address]   [phone]            Apr 05, 2018  8:00 AM CDT   Level 2 with NL INFUSION CHAIR 4   [facility] ([facility])    [address]   [place] [address]   [phone]              Who to contact     If you have questions or need follow up information about today's clinic visit or your schedule please contact [facility] directly at [phone].  Normal or non-critical lab and imaging results will be communicated to you by [employer], letter or phone within 4 business days after the clinic has received the results. If you do not hear from us within 7 days, please contact the clinic through [employer] or phone. If you have a critical or abnormal lab result, we will notify you by phone as soon as possible.  Submit refill requests through [employer] or call your pharmacy and they will forward the refill request to us. Please allow 3 business days for your refill to be completed.          Additional Information About Your Visit        SchoolwiresharPosterous Information     [employer] lets you send messages to your doctor, view your test results, renew your prescriptions, schedule appointments and more. To sign up, go to [email or web address] . Click on \"Log in\" on the left side of the screen, which will take you to the Welcome page. Then click on \"Sign up Now\" on the right side of the page.     You will be asked to enter the access code listed below, as well as some personal information. Please follow the directions to create your username and " password.     Your access code is: [email or web address]  Expires: 2018  5:11 PM     Your access code will  in 90 days. If you need help or a new code, please call your [place] clinic or [phone].        Care EveryWhere ID     This is your Care EveryWhere ID. This could be used by other organizations to access your [place] medical records  [id number]        Your Vitals Were     Pulse Temperature Respirations Pulse Oximetry BMI (Body Mass Index)       56 98.2  F (36.8  C) (Temporal) 18 97% 38.28 kg/m2        Blood Pressure from Last 3 Encounters:   18 173/80   18 145/82   17 148/72    Weight from Last 3 Encounters:   18 110.9 kg (244 lb 6.4 oz)   18 108 kg (238 lb)   17 107.5 kg (237 lb)              Today, you had the following     No orders found for display       Primary Care Provider Office Phone # Fax #    Saqib Chavarria [phone]       [address] [facility] [address]  [facility] [address]        Equal Access to Services     [facility]T LEIVA : Hadii carlene ku hadasho Soomaali, waaxda luqadaha, qaybta kaalmada adeegyada, brittny ying. So [facility] [phone].    ATENCIÓN: Si habla español, tiene a roth disposición servicios gratuitos de asistencia lingüística. Llame al [phone].    We comply with applicable federal civil rights laws and [place] laws. We do not discriminate on the basis of race, color, national origin, age, disability, sex, sexual orientation, or gender identity.            Thank you!     Thank you for choosing [facility]  for your care. Our goal is always to provide you with excellent care. Hearing back from our patients is one way we can continue to improve our services. Please take a few minutes to complete the written survey that you may receive in the mail after your visit with us. Thank you!             Your Updated Medication List - Protect others around you:  Learn how to safely use, store and throw away your medicines at www.disposemymeds.org.          This list is accurate as of 3/22/18  5:11 PM.  Always use your most recent med list.                   Brand Name Dispense Instructions for use Diagnosis    amoxicillin-clavulanate 875-125 MG per tablet    AUGMENTIN    20 tablet    Take 1 tablet by mouth 2 times daily    Acute maxillary sinusitis, recurrence not specified       ASPIRIN PO      Take 81 mg by mouth daily        BENZONATATE PO           lisinopril-hydrochlorothiazide 20-12.5 MG per tablet    PRINZIDE/ZESTORETIC     Take 2 tablets by mouth daily        metoprolol succinate 50 MG 24 hr tablet    TOPROL-XL     Take 50 mg by mouth daily        MIRTAZAPINE PO      Take 15 mg by mouth At Bedtime        simvastatin 20 MG tablet    ZOCOR     Take 20 mg by mouth At Bedtime        TEMAZEPAM PO      Take 15 mg by mouth nightly as needed for sleep        TYLENOL PO      Take 325 mg by mouth as needed for mild pain or fever        venlafaxine 150 MG Tb24 24 hr tablet    EFFEXOR-ER     Take 150 mg by mouth daily (with breakfast)        XANAX PO      Take 0.5 mg by mouth 4 times daily as needed for anxiety

## 2018-03-22 NOTE — PATIENT INSTRUCTIONS
Pt to return on 3/23/18 for VANCO. Copies of medication list and upcoming appointments given prior to discharge.

## 2018-03-22 NOTE — PROGRESS NOTES
Patient here for vancomycin. 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 Tolerated well. Patient requesting pain meds and took own supply wife left with him. Positive blood return pre/post infusion. Discharged in stable condition with walker and wife. Return tonight to ER.

## 2018-03-22 NOTE — ED NOTES
"Flushed cheeks have improved and patient states he isn't as hot. Patient's wife states \"he's always hot\".   "

## 2018-03-23 ENCOUNTER — INFUSION THERAPY VISIT (OUTPATIENT)
Dept: INFUSION THERAPY | Facility: CLINIC | Age: 78
End: 2018-03-23
Attending: FAMILY MEDICINE
Payer: MEDICARE

## 2018-03-23 VITALS
HEART RATE: 52 BPM | TEMPERATURE: 97.5 F | HEIGHT: 67 IN | WEIGHT: 239.64 LBS | SYSTOLIC BLOOD PRESSURE: 147 MMHG | BODY MASS INDEX: 37.61 KG/M2 | DIASTOLIC BLOOD PRESSURE: 68 MMHG

## 2018-03-23 VITALS
OXYGEN SATURATION: 99 % | DIASTOLIC BLOOD PRESSURE: 99 MMHG | RESPIRATION RATE: 20 BRPM | SYSTOLIC BLOOD PRESSURE: 173 MMHG | TEMPERATURE: 98.1 F

## 2018-03-23 DIAGNOSIS — M00.062 STAPHYLOCOCCAL ARTHRITIS OF LEFT KNEE (H): Primary | ICD-10-CM

## 2018-03-23 DIAGNOSIS — M00.062 STAPHYLOCOCCAL ARTHRITIS OF LEFT KNEE (H): ICD-10-CM

## 2018-03-23 PROCEDURE — 40000268 ZZH STATISTIC NO CHARGES: Performed by: FAMILY MEDICINE

## 2018-03-23 PROCEDURE — 25000128 H RX IP 250 OP 636: Performed by: FAMILY MEDICINE

## 2018-03-23 PROCEDURE — 96366 THER/PROPH/DIAG IV INF ADDON: CPT | Performed by: FAMILY MEDICINE

## 2018-03-23 PROCEDURE — 96365 THER/PROPH/DIAG IV INF INIT: CPT

## 2018-03-23 PROCEDURE — 96365 THER/PROPH/DIAG IV INF INIT: CPT | Mod: XE | Performed by: FAMILY MEDICINE

## 2018-03-23 PROCEDURE — 96366 THER/PROPH/DIAG IV INF ADDON: CPT

## 2018-03-23 RX ORDER — SENNOSIDES A AND B 8.6 MG/1
TABLET, FILM COATED ORAL
COMMUNITY
Start: 2018-02-21 | End: 2018-05-11

## 2018-03-23 RX ORDER — RIFAMPIN 300 MG/1
600 CAPSULE ORAL
COMMUNITY
Start: 2018-03-21 | End: 2018-04-20

## 2018-03-23 RX ORDER — ALPRAZOLAM 0.5 MG
TABLET ORAL
COMMUNITY
Start: 2018-02-24

## 2018-03-23 RX ORDER — HYDROCODONE BITARTRATE AND ACETAMINOPHEN 5; 325 MG/1; MG/1
TABLET ORAL
COMMUNITY
Start: 2018-03-21 | End: 2018-05-11

## 2018-03-23 RX ADMIN — VANCOMYCIN HYDROCHLORIDE 1750 MG: 10 INJECTION, POWDER, LYOPHILIZED, FOR SOLUTION INTRAVENOUS at 08:58

## 2018-03-23 RX ADMIN — SODIUM CHLORIDE 250 ML: 9 INJECTION, SOLUTION INTRAVENOUS at 21:42

## 2018-03-23 RX ADMIN — VANCOMYCIN HYDROCHLORIDE 1750 MG: 1 INJECTION, POWDER, LYOPHILIZED, FOR SOLUTION INTRAVENOUS at 19:42

## 2018-03-23 RX ADMIN — SODIUM CHLORIDE 250 ML: 9 INJECTION, SOLUTION INTRAVENOUS at 08:30

## 2018-03-23 ASSESSMENT — PAIN SCALES - GENERAL: PAINLEVEL: MODERATE PAIN (4)

## 2018-03-23 NOTE — PATIENT INSTRUCTIONS
Pt to return tonight to the ED for Vanco. Copies of medication list and upcoming appointments given prior to discharge.

## 2018-03-23 NOTE — PROGRESS NOTES
Patient here for Vanco infusion. PICC patent with blood return noted pre and post. Tolerated infusion well although very anxious today. Has no complaints but anxious, didn't get home from the ED last pm until late. Discharged to home with wife in stable condition.

## 2018-03-24 ENCOUNTER — HOSPITAL ENCOUNTER (EMERGENCY)
Facility: CLINIC | Age: 78
Discharge: HOME | End: 2018-03-24
Admitting: FAMILY MEDICINE
Payer: MEDICARE

## 2018-03-24 VITALS
DIASTOLIC BLOOD PRESSURE: 88 MMHG | TEMPERATURE: 98.6 F | OXYGEN SATURATION: 98 % | WEIGHT: 238.1 LBS | BODY MASS INDEX: 37.29 KG/M2 | HEART RATE: 77 BPM | SYSTOLIC BLOOD PRESSURE: 174 MMHG

## 2018-03-24 VITALS
HEART RATE: 70 BPM | TEMPERATURE: 98.5 F | DIASTOLIC BLOOD PRESSURE: 85 MMHG | RESPIRATION RATE: 16 BRPM | SYSTOLIC BLOOD PRESSURE: 177 MMHG | OXYGEN SATURATION: 95 %

## 2018-03-24 DIAGNOSIS — M00.062 STAPHYLOCOCCAL ARTHRITIS OF LEFT KNEE (H): ICD-10-CM

## 2018-03-24 PROCEDURE — 96365 THER/PROPH/DIAG IV INF INIT: CPT

## 2018-03-24 PROCEDURE — 25000128 H RX IP 250 OP 636: Performed by: FAMILY MEDICINE

## 2018-03-24 PROCEDURE — 40000268 ZZH STATISTIC NO CHARGES

## 2018-03-24 PROCEDURE — 96366 THER/PROPH/DIAG IV INF ADDON: CPT

## 2018-03-24 RX ADMIN — SODIUM CHLORIDE 250 ML: 9 INJECTION, SOLUTION INTRAVENOUS at 22:25

## 2018-03-24 RX ADMIN — VANCOMYCIN HYDROCHLORIDE 1750 MG: 10 INJECTION, POWDER, LYOPHILIZED, FOR SOLUTION INTRAVENOUS at 08:03

## 2018-03-24 RX ADMIN — VANCOMYCIN HYDROCHLORIDE 1750 MG: 1 INJECTION, POWDER, LYOPHILIZED, FOR SOLUTION INTRAVENOUS at 20:04

## 2018-03-24 NOTE — ED AVS SNAPSHOT
[facility] Emergency Department    [address] [place] [address]    Phone:  [phone]    Fax:  [phone]                                       Duane E Sogge   MRN: [id number]    Department:  [facility] Emergency Department   Date of Visit:  3/24/2018           After Visit Summary Signature Page     I have received my discharge instructions, and my questions have been answered. I have discussed any challenges I see with this plan with the nurse or doctor.    ..........................................................................................................................................  Patient/Patient Representative Signature      ..........................................................................................................................................  Patient Representative Print Name and Relationship to Patient    ..................................................               ................................................  Date                                            Time    ..........................................................................................................................................  Reviewed by Signature/Title    ...................................................              ..............................................  Date                                                            Time

## 2018-03-24 NOTE — ED AVS SNAPSHOT
[facility]    [address] [place] [address]    Phone:  [phone]    Fax:  [phone]                                       Duane E Sogge   MRN: [id number]    Department:  [facility]   Date of Visit:  3/24/2018           Patient Information     Date Of Birth          1940        Your diagnoses for this visit were:     Staphylococcal arthritis of left knee (H)       Your next 10 appointments already scheduled     Mar 26, 2018  8:00 AM CDT   Level 2 with NL INFUSION CHAIR 3   [facility] ([facility])    [address] [place] [address]   [phone]            Mar 27, 2018  8:30 AM CDT   Level 2 with NL INFUSION CHAIR 5   [facility] ([facility])    [address] [place] [address]   [phone]            Mar 28, 2018  8:30 AM CDT   Level 2 with NL INFUSION CHAIR 4   [facility] ([facility])    [address] [place] [address]   [phone]            Mar 29, 2018  8:30 AM CDT   Level 2 with NL INFUSION CHAIR 4   [facility] ([facility])    [address] [place] [address]   [phone]            Mar 30, 2018  8:00 AM CDT   Level 2 with NL INFUSION CHAIR 4   [facility] ([facility])    [address] [place] [address]   [phone]            Apr 02, 2018  8:00 AM CDT   Level 2 with NL INFUSION CHAIR 3   [facility] ([facility])    [address] [place] [address]   [phone]            Apr 03, 2018  8:00 AM CDT   Level 2 with NL INFUSION CHAIR 3   [facility] ([facility])    [address] [place] [address]   [phone]            Apr 04, 2018  8:00 AM CDT   Level 2 with NL  INFUSION CHAIR 3   [facility] ([facility])    [address] [place] [address]   [phone]            Apr 05, 2018  8:00 AM CDT   Level 2 with NL INFUSION CHAIR 4   [facility] ([facility])    [address] [place] [address]   [phone]            Apr 06, 2018  8:00 AM CDT   Level 2 with NL INFUSION CHAIR 3   [facility] ([facility])    [address] [place] [address]   [phone]              24 Hour Appointment Hotline       To make an appointment at any [facility] clinic, call [phone] ([phone]). If you don't have a family doctor or clinic, we will help you find one. [facility] clinics are conveniently located to serve the needs of you and your family.             Review of your medicines      Our records show that you are taking the medicines listed below. If these are incorrect, please call your family doctor or clinic.        Dose / Directions Last dose taken    ALPRAZolam 0.5 MG tablet   Commonly known as:  XANAX        Take 1 tab up to 4x daily only if needed. Refill when due,no early refills  Indications: Feeling Anxious   Refills:  0        amoxicillin-clavulanate 875-125 MG per tablet   Commonly known as:  AUGMENTIN   Dose:  1 tablet   Quantity:  20 tablet        Take 1 tablet by mouth 2 times daily   Refills:  0        aspirin 81 MG EC tablet   Dose:  162 mg        Take 162 mg by mouth   Refills:  0        BENZONATATE PO        Refills:  0        HYDROcodone-acetaminophen 5-325 MG per tablet   Commonly known as:  NORCO        Refills:  0        lisinopril-hydrochlorothiazide 20-12.5 MG per tablet   Commonly known as:  PRINZIDE/ZESTORETIC   Dose:  2 tablet        Take 2 tablets by mouth daily   Refills:  0        metoprolol succinate 50 MG 24 hr tablet   Commonly known as:  TOPROL-XL   Dose:  50 mg        Take 50 mg by mouth daily    Refills:  0        MIRTAZAPINE PO   Dose:  15 mg        Take 15 mg by mouth At Bedtime   Refills:  0        rifampin 300 MG capsule   Commonly known as:  RIFADIN   Dose:  600 mg        Take 600 mg by mouth   Refills:  0        senna 8.6 MG tablet   Commonly known as:  SENOKOT        Refills:  0        simvastatin 20 MG tablet   Commonly known as:  ZOCOR   Dose:  20 mg        Take 20 mg by mouth At Bedtime   Refills:  0        TEMAZEPAM PO   Dose:  15 mg        Take 15 mg by mouth nightly as needed for sleep   Refills:  0        TYLENOL PO   Dose:  325 mg        Take 325 mg by mouth as needed for mild pain or fever   Refills:  0        venlafaxine 150 MG Tb24 24 hr tablet   Commonly known as:  EFFEXOR-ER   Dose:  150 mg        Take 150 mg by mouth daily (with breakfast)   Refills:  0                Orders Needing Specimen Collection     None      Pending Results     No orders found from 3/22/2018 to 3/25/2018.            Pending Culture Results     No orders found from 3/22/2018 to 3/25/2018.            Pending Results Instructions     If you had any lab results that were not finalized at the time of your Discharge, you can call the ED Lab Result RN at [phone]. You will be contacted by this team for any positive Lab results or changes in treatment. The nurses are available 7 days a week from 10A to 6:30P.  You can leave a message 24 hours per day and they will return your call.        Thank you for choosing [facility]       Thank you for choosing [facility] for your care. Our goal is always to provide you with excellent care. Hearing back from our patients is one way we can continue to improve our services. Please take a few minutes to complete the written survey that you may receive in the mail after you visit with us. Thank you!        [facility] Information     [employer] lets you send messages to your doctor, view your test results, renew your prescriptions, schedule appointments and more. To sign up, go to  "[email or web address] . Click on \"Log in\" on the left side of the screen, which will take you to the Welcome page. Then click on \"Sign up Now\" on the right side of the page.     You will be asked to enter the access code listed below, as well as some personal information. Please follow the directions to create your username and password.     Your access code is: [email or web address]  Expires: 2018  5:11 PM     Your access code will  in 90 days. If you need help or a new code, please call your [place] clinic or [phone].        Care EveryWhere ID     This is your Care EveryWhere ID. This could be used by other organizations to access your [facility] medical records  [id number]        Equal Access to Services     STARR LEIVA : Joey Reno, stevie headley, sherry venturaallinh payne, brittny sanderson . So [facility] [phone].    ATENCIÓN: Si habla español, tiene a roth disposición servicios gratuitos de asistencia lingüística. Sheron al [phone].    We comply with applicable federal civil rights laws and [place] laws. We do not discriminate on the basis of race, color, national origin, age, disability, sex, sexual orientation, or gender identity.            After Visit Summary       This is your record. Keep this with you and show to your community pharmacist(s) and doctor(s) at your next visit.                  "

## 2018-03-25 ENCOUNTER — HOSPITAL ENCOUNTER (EMERGENCY)
Facility: CLINIC | Age: 78
Discharge: HOME | End: 2018-03-25
Admitting: FAMILY MEDICINE
Payer: MEDICARE

## 2018-03-25 VITALS
OXYGEN SATURATION: 94 % | SYSTOLIC BLOOD PRESSURE: 172 MMHG | RESPIRATION RATE: 24 BRPM | TEMPERATURE: 98.2 F | DIASTOLIC BLOOD PRESSURE: 81 MMHG | WEIGHT: 242 LBS | BODY MASS INDEX: 37.9 KG/M2 | HEART RATE: 67 BPM

## 2018-03-25 VITALS
RESPIRATION RATE: 20 BRPM | DIASTOLIC BLOOD PRESSURE: 89 MMHG | BODY MASS INDEX: 37.29 KG/M2 | OXYGEN SATURATION: 98 % | WEIGHT: 238.1 LBS | SYSTOLIC BLOOD PRESSURE: 170 MMHG | TEMPERATURE: 97.7 F

## 2018-03-25 DIAGNOSIS — M00.062 STAPHYLOCOCCAL ARTHRITIS OF LEFT KNEE (H): ICD-10-CM

## 2018-03-25 PROCEDURE — 96366 THER/PROPH/DIAG IV INF ADDON: CPT | Performed by: FAMILY MEDICINE

## 2018-03-25 PROCEDURE — 40000268 ZZH STATISTIC NO CHARGES

## 2018-03-25 PROCEDURE — 25000128 H RX IP 250 OP 636: Performed by: FAMILY MEDICINE

## 2018-03-25 PROCEDURE — 96366 THER/PROPH/DIAG IV INF ADDON: CPT

## 2018-03-25 PROCEDURE — 40000268 ZZH STATISTIC NO CHARGES: Performed by: FAMILY MEDICINE

## 2018-03-25 PROCEDURE — 96365 THER/PROPH/DIAG IV INF INIT: CPT

## 2018-03-25 PROCEDURE — 96365 THER/PROPH/DIAG IV INF INIT: CPT | Mod: XE | Performed by: FAMILY MEDICINE

## 2018-03-25 RX ADMIN — VANCOMYCIN HYDROCHLORIDE 1750 MG: 1 INJECTION, POWDER, LYOPHILIZED, FOR SOLUTION INTRAVENOUS at 08:33

## 2018-03-25 RX ADMIN — SODIUM CHLORIDE 250 ML: 9 INJECTION, SOLUTION INTRAVENOUS at 22:11

## 2018-03-25 RX ADMIN — VANCOMYCIN HYDROCHLORIDE 1750 MG: 1 INJECTION, POWDER, LYOPHILIZED, FOR SOLUTION INTRAVENOUS at 20:10

## 2018-03-26 ENCOUNTER — INFUSION THERAPY VISIT (OUTPATIENT)
Dept: INFUSION THERAPY | Facility: CLINIC | Age: 78
End: 2018-03-26
Attending: FAMILY MEDICINE
Payer: MEDICARE

## 2018-03-26 ENCOUNTER — HOSPITAL ENCOUNTER (EMERGENCY)
Facility: CLINIC | Age: 78
Discharge: HOME | End: 2018-03-27
Admitting: FAMILY MEDICINE
Payer: MEDICARE

## 2018-03-26 VITALS
HEART RATE: 58 BPM | TEMPERATURE: 96.8 F | RESPIRATION RATE: 16 BRPM | SYSTOLIC BLOOD PRESSURE: 171 MMHG | DIASTOLIC BLOOD PRESSURE: 77 MMHG | OXYGEN SATURATION: 97 %

## 2018-03-26 VITALS
DIASTOLIC BLOOD PRESSURE: 90 MMHG | TEMPERATURE: 98.3 F | RESPIRATION RATE: 20 BRPM | HEART RATE: 63 BPM | SYSTOLIC BLOOD PRESSURE: 170 MMHG | OXYGEN SATURATION: 96 %

## 2018-03-26 DIAGNOSIS — M00.062 STAPHYLOCOCCAL ARTHRITIS OF LEFT KNEE (H): ICD-10-CM

## 2018-03-26 DIAGNOSIS — M00.062 STAPHYLOCOCCAL ARTHRITIS OF LEFT KNEE (H): Primary | ICD-10-CM

## 2018-03-26 PROCEDURE — 96365 THER/PROPH/DIAG IV INF INIT: CPT

## 2018-03-26 PROCEDURE — 25000128 H RX IP 250 OP 636: Performed by: FAMILY MEDICINE

## 2018-03-26 PROCEDURE — 96365 THER/PROPH/DIAG IV INF INIT: CPT | Mod: XE

## 2018-03-26 PROCEDURE — 40000268 ZZH STATISTIC NO CHARGES

## 2018-03-26 PROCEDURE — 96366 THER/PROPH/DIAG IV INF ADDON: CPT

## 2018-03-26 RX ADMIN — VANCOMYCIN HYDROCHLORIDE 1750 MG: 1 INJECTION, POWDER, LYOPHILIZED, FOR SOLUTION INTRAVENOUS at 20:11

## 2018-03-26 RX ADMIN — VANCOMYCIN HYDROCHLORIDE 1750 MG: 1 INJECTION, POWDER, LYOPHILIZED, FOR SOLUTION INTRAVENOUS at 08:29

## 2018-03-26 ASSESSMENT — PAIN SCALES - GENERAL: PAINLEVEL: NO PAIN (0)

## 2018-03-26 NOTE — MR AVS SNAPSHOT
After Visit Summary   3/26/2018    Duane E Sogge    MRN: [id number]           Patient Information     Date Of Birth          1940        Visit Information        Provider Department      3/26/2018 8:00 AM NL INFUSION CHAIR 3 [facility]        Today's Diagnoses     Staphylococcal arthritis of left knee (H)    -  1      Care Instructions    Pt to return on 3/27 for Vancomycin infusion. Copies of medication list and upcoming appointments given prior to discharge.           Follow-ups after your visit        Follow-up notes from your care team     Return in 1 day (on 3/27/2018).      Your next 10 appointments already scheduled     Mar 27, 2018  8:00 AM CDT   Level 2 with NL INFUSION CHAIR 5   [facility] ([facility])    [address] [place] [address]   [phone]            Mar 28, 2018  8:30 AM CDT   Level 2 with NL INFUSION CHAIR 4   [facility] ([facility])    [address] [place] [address]   [phone]            Mar 29, 2018  8:30 AM CDT   Level 2 with NL INFUSION CHAIR 4   [facility] ([facility])    [address] [place] [address]   [phone]            Mar 30, 2018  8:00 AM CDT   Level 2 with NL INFUSION CHAIR 4   [facility] ([facility])    [address] [place] [address]   [phone]            Apr 02, 2018  8:00 AM CDT   Level 2 with NL INFUSION CHAIR 3   [facility] ([facility])    [address] [place] [address]   [phone]            Apr 03, 2018  8:00 AM CDT   Level 2 with NL INFUSION CHAIR 3   [facility] ([facility])    [address] [place] [address]   [phone]            Apr 04, 2018  8:00 AM CDT   Level 2 with NL INFUSION CHAIR 3  "  [facility] ([facility])    [address]   [place] [address]   [phone]            Apr 05, 2018  8:00 AM CDT   Level 2 with NL INFUSION CHAIR 4   [facility] ([facility])    [address] [place] [address]   [phone]            Apr 06, 2018  8:00 AM CDT   Level 2 with NL INFUSION CHAIR 3   [facility] ([facility])    [address] [place] [address]   [phone]            Apr 09, 2018 10:30 AM CDT   Level 2 with NL INFUSION CHAIR 1   [facility] ([facility])    [address]   [place] [address]   [phone]              Who to contact     If you have questions or need follow up information about today's clinic visit or your schedule please contact [facility] directly at [phone].  Normal or non-critical lab and imaging results will be communicated to you by Authentic8hart, letter or phone within 4 business days after the clinic has received the results. If you do not hear from us within 7 days, please contact the clinic through Cloud[employer]Â®t or phone. If you have a critical or abnormal lab result, we will notify you by phone as soon as possible.  Submit refill requests through xiao qu wu you or call your pharmacy and they will forward the refill request to us. Please allow 3 business days for your refill to be completed.          Additional Information About Your Visit        [employer]har[employer] Information     x[employer] wu you lets you send messages to your doctor, view your test results, renew your prescriptions, schedule appointments and more. To sign up, go to [email or web address] . Click on \"Log in\" on the left side of the screen, which will take you to the Welcome page. Then click on \"Sign up Now\" on the right side of the page.     You will be asked to enter the access code listed below, as well as " some personal information. Please follow the directions to create your username and password.     Your access code is: [email or web address]  Expires: 2018  5:11 PM     Your access code will  in 90 days. If you need help or a new code, please call your [place] clinic or [phone].        Care EveryWhere ID     This is your Care EveryWhere ID. This could be used by other organizations to access your [facility] medical records  [id number]        Your Vitals Were     Pulse Temperature Respirations Pulse Oximetry          58 96.8  F (36  C) (Temporal) 16 97%         Blood Pressure from Last 3 Encounters:   18 171/77   18 172/81   18 170/89    Weight from Last 3 Encounters:   18 109.8 kg (242 lb)   18 108 kg (238 lb 1.6 oz)   18 108 kg (238 lb 1.6 oz)              Today, you had the following     No orders found for display       Primary Care Provider Office Phone # Fax #    Saqib Chavarria [phone]       [facility] [address]  [facility] [address]        Equal Access to Services     MATT LEIVA : Hadii aad ku hadasho Soomaali, waaxda luqadaha, qaybta kaalmada adeegyada, waxay idiin hayfaizan lalit sanderson . So [facility] [phone].    ATENCIÓN: Si habla español, tiene a roth disposición servicios gratuitos de asistencia lingüística. Llame al [phone].    We comply with applicable federal civil rights laws and [place] laws. We do not discriminate on the basis of race, color, national origin, age, disability, sex, sexual orientation, or gender identity.            Thank you!     Thank you for choosing [facility]  for your care. Our goal is always to provide you with excellent care. Hearing back from our patients is one way we can continue to improve our services. Please take a few minutes to complete the written survey that you may receive in the mail after your visit with us. Thank you!              Your Updated Medication List - Protect others around you: Learn how to safely use, store and throw away your medicines at www.disposemymeds.org.          This list is accurate as of 3/26/18 12:47 PM.  Always use your most recent med list.                   Brand Name Dispense Instructions for use Diagnosis    ALPRAZolam 0.5 MG tablet    XANAX     Take 1 tab up to 4x daily only if needed. Refill when due,no early refills  Indications: Feeling Anxious        amoxicillin-clavulanate 875-125 MG per tablet    AUGMENTIN    20 tablet    Take 1 tablet by mouth 2 times daily    Acute maxillary sinusitis, recurrence not specified       aspirin 81 MG EC tablet      Take 162 mg by mouth        BENZONATATE PO           HYDROcodone-acetaminophen 5-325 MG per tablet    NORCO          lisinopril-hydrochlorothiazide 20-12.5 MG per tablet    PRINZIDE/ZESTORETIC     Take 2 tablets by mouth daily        metoprolol succinate 50 MG 24 hr tablet    TOPROL-XL     Take 50 mg by mouth daily        MIRTAZAPINE PO      Take 15 mg by mouth At Bedtime        rifampin 300 MG capsule    RIFADIN     Take 600 mg by mouth        senna 8.6 MG tablet    SENOKOT          simvastatin 20 MG tablet    ZOCOR     Take 20 mg by mouth At Bedtime        TEMAZEPAM PO      Take 15 mg by mouth nightly as needed for sleep        TYLENOL PO      Take 325 mg by mouth as needed for mild pain or fever        venlafaxine 150 MG Tb24 24 hr tablet    EFFEXOR-ER     Take 150 mg by mouth daily (with breakfast)

## 2018-03-26 NOTE — PATIENT INSTRUCTIONS
Pt to return on 3/27 for Vancomycin infusion. Copies of medication list and upcoming appointments given prior to discharge.

## 2018-03-26 NOTE — PROGRESS NOTES
Pt here for IV Vancomycin, which was infused over 2 hours, tolerated well.  Pt discharged in stable condition.

## 2018-03-27 ENCOUNTER — INFUSION THERAPY VISIT (OUTPATIENT)
Dept: INFUSION THERAPY | Facility: CLINIC | Age: 78
End: 2018-03-27
Attending: FAMILY MEDICINE
Payer: MEDICARE

## 2018-03-27 ENCOUNTER — MEDICAL CORRESPONDENCE (OUTPATIENT)
Dept: HEALTH INFORMATION MANAGEMENT | Facility: CLINIC | Age: 78
End: 2018-03-27

## 2018-03-27 VITALS
RESPIRATION RATE: 20 BRPM | DIASTOLIC BLOOD PRESSURE: 70 MMHG | OXYGEN SATURATION: 96 % | HEART RATE: 54 BPM | TEMPERATURE: 98.5 F | SYSTOLIC BLOOD PRESSURE: 146 MMHG

## 2018-03-27 VITALS
DIASTOLIC BLOOD PRESSURE: 70 MMHG | OXYGEN SATURATION: 96 % | TEMPERATURE: 97.9 F | SYSTOLIC BLOOD PRESSURE: 150 MMHG | RESPIRATION RATE: 19 BRPM | HEART RATE: 76 BPM

## 2018-03-27 DIAGNOSIS — M00.062 STAPHYLOCOCCAL ARTHRITIS OF LEFT KNEE (H): Primary | ICD-10-CM

## 2018-03-27 DIAGNOSIS — M00.062 STAPHYLOCOCCAL ARTHRITIS OF LEFT KNEE (H): ICD-10-CM

## 2018-03-27 LAB
ALP SERPL-CCNC: 181 U/L (ref 40–150)
ALT SERPL W P-5'-P-CCNC: 233 U/L (ref 0–70)
AST SERPL W P-5'-P-CCNC: 232 U/L (ref 0–45)
BILIRUB SERPL-MCNC: 0.6 MG/DL (ref 0.2–1.3)
CREAT SERPL-MCNC: 0.69 MG/DL (ref 0.66–1.25)
CRP SERPL-MCNC: 15.2 MG/L (ref 0–8)
ERYTHROCYTE [DISTWIDTH] IN BLOOD BY AUTOMATED COUNT: 12.7 % (ref 10–15)
GFR SERPL CREATININE-BSD FRML MDRD: >90 ML/MIN/1.7M2
HCT VFR BLD AUTO: 33.8 % (ref 40–53)
HGB BLD-MCNC: 10.6 G/DL (ref 13.3–17.7)
MCH RBC QN AUTO: 31.8 PG (ref 26.5–33)
MCHC RBC AUTO-ENTMCNC: 31.4 G/DL (ref 31.5–36.5)
MCV RBC AUTO: 102 FL (ref 78–100)
PLATELET # BLD AUTO: 289 10E9/L (ref 150–450)
RBC # BLD AUTO: 3.33 10E12/L (ref 4.4–5.9)
VANCOMYCIN SERPL-MCNC: 16.9 MG/L
WBC # BLD AUTO: 7.6 10E9/L (ref 4–11)

## 2018-03-27 PROCEDURE — 84460 ALANINE AMINO (ALT) (SGPT): CPT | Performed by: FAMILY MEDICINE

## 2018-03-27 PROCEDURE — 25000128 H RX IP 250 OP 636: Performed by: FAMILY MEDICINE

## 2018-03-27 PROCEDURE — 80202 ASSAY OF VANCOMYCIN: CPT | Performed by: FAMILY MEDICINE

## 2018-03-27 PROCEDURE — 84075 ASSAY ALKALINE PHOSPHATASE: CPT | Performed by: FAMILY MEDICINE

## 2018-03-27 PROCEDURE — 96365 THER/PROPH/DIAG IV INF INIT: CPT

## 2018-03-27 PROCEDURE — 82247 BILIRUBIN TOTAL: CPT | Performed by: FAMILY MEDICINE

## 2018-03-27 PROCEDURE — 96366 THER/PROPH/DIAG IV INF ADDON: CPT

## 2018-03-27 PROCEDURE — 85027 COMPLETE CBC AUTOMATED: CPT | Performed by: FAMILY MEDICINE

## 2018-03-27 PROCEDURE — 40000268 ZZH STATISTIC NO CHARGES

## 2018-03-27 PROCEDURE — 84450 TRANSFERASE (AST) (SGOT): CPT | Performed by: FAMILY MEDICINE

## 2018-03-27 PROCEDURE — 86140 C-REACTIVE PROTEIN: CPT | Performed by: FAMILY MEDICINE

## 2018-03-27 PROCEDURE — 82565 ASSAY OF CREATININE: CPT | Performed by: FAMILY MEDICINE

## 2018-03-27 RX ADMIN — VANCOMYCIN HYDROCHLORIDE 1750 MG: 10 INJECTION, POWDER, LYOPHILIZED, FOR SOLUTION INTRAVENOUS at 08:57

## 2018-03-27 RX ADMIN — SODIUM CHLORIDE 250 ML: 9 INJECTION, SOLUTION INTRAVENOUS at 08:56

## 2018-03-27 RX ADMIN — VANCOMYCIN HYDROCHLORIDE 1750 MG: 10 INJECTION, POWDER, LYOPHILIZED, FOR SOLUTION INTRAVENOUS at 19:56

## 2018-03-27 ASSESSMENT — PAIN SCALES - GENERAL: PAINLEVEL: MODERATE PAIN (4)

## 2018-03-27 NOTE — MR AVS SNAPSHOT
After Visit Summary   3/27/2018    Duane E Sogge    MRN: [id number]           Patient Information     Date Of Birth          1940        Visit Information        Provider Department      3/27/2018 8:00 AM NL INFUSION CHAIR 5 [facility]        Today's Diagnoses     Staphylococcal arthritis of left knee (H)    -  1      Care Instructions    Pt to return on 3/28/18 for vanco. Copies of medication list and upcoming appointments given prior to discharge.           Follow-ups after your visit        Your next 10 appointments already scheduled     Mar 28, 2018  8:30 AM CDT   Level 2 with NL INFUSION CHAIR 4   [facility] ([facility])    [address] [place] [address]   [phone]            Mar 29, 2018  8:30 AM CDT   Level 2 with NL INFUSION CHAIR 4   [facility] ([facility])    [address] [place] [address] [address] [place] [address]   [phone]            Mar 30, 2018  8:00 AM CDT   Level 2 with NL INFUSION CHAIR 4   [facility] ([facility])    [address] [place] [address] [address] [place] [address]   [phone]            Apr 02, 2018  8:00 AM CDT   Level 2 with NL INFUSION CHAIR 3   [facility] ([facility])    [address] [place] [address] [address] [place] [address]   [phone]            Apr 03, 2018  8:00 AM CDT   Level 2 with NL INFUSION CHAIR 3   [facility] ([facility])    [address] [place] [address]   [phone]            Apr 04, 2018  8:00 AM CDT   Level 2 with NL INFUSION CHAIR 3   [facility] ([facility])    [address] [place] [address] [address] [place] [address]   [phone]            Apr 05, 2018  8:00 AM CDT   Level 2 with NL INFUSION CHAIR 4   [facility] ([facility])    [address] [place] [address]  Dr [place] [address]   [phone]            Apr 06, 2018  8:00 AM CDT   Level 2 with NL INFUSION CHAIR 3   [facility] ([facility])    [address] [place] [address]   [phone]            Apr 09, 2018 10:30 AM CDT   Level 2 with NL INFUSION CHAIR 1   [facility] ([facility])    [address] [place] [address]   [phone]            Apr 10, 2018  8:00 AM CDT   Level 2 with NL INFUSION CHAIR 2   [facility] ([facility])    [address] [place] [address]   [phone]              Future tests that were ordered for you today     Open Standing Orders        Priority Remaining Interval Expires Ordered    CBC with platelets differential Routine 1/1 AM DRAW  3/27/2018    CRP inflammation Routine 1/1 AM DRAW  3/27/2018    ALT Routine 1/1 AM DRAW  3/27/2018    Bilirubin  total Routine 1/1 AM DRAW  3/27/2018    Alkaline phosphatase Routine 1/1 AM DRAW  3/27/2018    AST Routine 1/1 AM DRAW  3/27/2018            Who to contact     If you have questions or need follow up information about today's clinic visit or your schedule please contact [facility] INF[facility]ION [facility] directly at [phone].  Normal or non-critical lab and imaging results will be communicated to you by Attend.comhart, letter or phone within 4 business days after the clinic has received the results. If you do not hear from us within 7 days, please contact the clinic through Bioenvisiont or phone. If you have a critical or abnormal lab result, we will notify you by phone as soon as possible.  Submit refill requests through [employer] or call your pharmacy and they will forward the refill request to us. Please allow 3 business days for your refill to be completed.          Additional Information About Your Visit        [employer] Information     [employer] lets you send messages to your doctor, view  "your test results, renew your prescriptions, schedule appointments and more. To sign up, go to [email or web address] . Click on \"Log in\" on the left side of the screen, which will take you to the Welcome page. Then click on \"Sign up Now\" on the right side of the page.     You will be asked to enter the access code listed below, as well as some personal information. Please follow the directions to create your username and password.     Your access code is: [email or web address]  Expires: 2018  5:11 PM     Your access code will  in 90 days. If you need help or a new code, please call your [place] clinic or [phone].        Care EveryWhere ID     This is your Care EveryWhere ID. This could be used by other organizations to access your [place] medical records  [id number]        Your Vitals Were     Pulse Temperature Respirations Pulse Oximetry          54 98.5  F (36.9  C) (Temporal) 20 96%         Blood Pressure from Last 3 Encounters:   18 146/70   18 170/90   18 171/77    Weight from Last 3 Encounters:   18 109.8 kg (242 lb)   18 108 kg (238 lb 1.6 oz)   18 108 kg (238 lb 1.6 oz)              We Performed the Following     Alkaline phosphatase     ALT     AST     Bilirubin  total     CBC with platelets     Creatinine     CRP inflammation     Vancomycin level        Primary Care Provider Office Phone # Fax #    Saqib Chavarria [phone]       [facility] [address] [address]        Equal Access to Services     [facility]ANY : Hadii carlene martinez Soozzie, waaxda luqadaha, qaybta kaalmada kerry, brittny ying. So [facility] [phone].    ATENCIÓN: Si habla español, tiene a roth disposición servicios gratuitos de asistencia lingüística. Llame al [phone].    We comply with applicable federal civil rights laws and [place] laws. We do not discriminate on the basis of race, color, " national origin, age, disability, sex, sexual orientation, or gender identity.            Thank you!     Thank you for choosing [facility]  for your care. Our goal is always to provide you with excellent care. Hearing back from our patients is one way we can continue to improve our services. Please take a few minutes to complete the written survey that you may receive in the mail after your visit with us. Thank you!             Your Updated Medication List - Protect others around you: Learn how to safely use, store and throw away your medicines at www.disposemymeds.org.          This list is accurate as of 3/27/18  3:59 PM.  Always use your most recent med list.                   Brand Name Dispense Instructions for use Diagnosis    ALPRAZolam 0.5 MG tablet    XANAX     Take 1 tab up to 4x daily only if needed. Refill when due,no early refills  Indications: Feeling Anxious        amoxicillin-clavulanate 875-125 MG per tablet    AUGMENTIN    20 tablet    Take 1 tablet by mouth 2 times daily    Acute maxillary sinusitis, recurrence not specified       aspirin 81 MG EC tablet      Take 162 mg by mouth        BENZONATATE PO           GABAPENTIN PO      Take 100 mg by mouth 3 times daily as needed (anxiety) 1-3 capsules        HYDROcodone-acetaminophen 5-325 MG per tablet    NORCO          lisinopril-hydrochlorothiazide 20-12.5 MG per tablet    PRINZIDE/ZESTORETIC     Take 2 tablets by mouth daily        metoprolol succinate 50 MG 24 hr tablet    TOPROL-XL     Take 50 mg by mouth daily        MIRTAZAPINE PO      Take 15 mg by mouth At Bedtime        rifampin 300 MG capsule    RIFADIN     Take 600 mg by mouth        senna 8.6 MG tablet    SENOKOT          simvastatin 20 MG tablet    ZOCOR     Take 20 mg by mouth At Bedtime        TEMAZEPAM PO      Take 15 mg by mouth nightly as needed for sleep        TYLENOL PO      Take 325 mg by mouth as needed for mild pain or fever        venlafaxine 150 MG  Tb24 24 hr tablet    EFFEXOR-ER     Take 150 mg by mouth daily (with breakfast)

## 2018-03-27 NOTE — PROGRESS NOTES
Patient ambulated in with walker and wife. PICC labs drawn, vanco level - 16.9 Tolerated infusion well.  Labs faxed to DR. [facility]. Discharged in stable condition with wife.

## 2018-03-27 NOTE — PATIENT INSTRUCTIONS
Pt to return on 3/28/18 for vanco. Copies of medication list and upcoming appointments given prior to discharge.

## 2018-03-27 NOTE — PROGRESS NOTES
Pt. here for outpatient IV Vancomycin, which was infused over 2 hours. Pt. tolerated the infusion well. Pt. Discharged to home with walker and wife in stable condition. PICC line saline locked.

## 2018-03-28 ENCOUNTER — HOSPITAL ENCOUNTER (EMERGENCY)
Facility: CLINIC | Age: 78
Discharge: HOME | End: 2018-03-28
Admitting: FAMILY MEDICINE
Payer: MEDICARE

## 2018-03-28 ENCOUNTER — INFUSION THERAPY VISIT (OUTPATIENT)
Dept: INFUSION THERAPY | Facility: CLINIC | Age: 78
End: 2018-03-28
Attending: FAMILY MEDICINE
Payer: MEDICARE

## 2018-03-28 VITALS
HEART RATE: 61 BPM | BODY MASS INDEX: 36.83 KG/M2 | RESPIRATION RATE: 18 BRPM | SYSTOLIC BLOOD PRESSURE: 174 MMHG | HEIGHT: 68 IN | TEMPERATURE: 98.2 F | OXYGEN SATURATION: 98 % | DIASTOLIC BLOOD PRESSURE: 105 MMHG | WEIGHT: 243 LBS

## 2018-03-28 VITALS
HEART RATE: 53 BPM | OXYGEN SATURATION: 97 % | RESPIRATION RATE: 16 BRPM | TEMPERATURE: 98.2 F | DIASTOLIC BLOOD PRESSURE: 84 MMHG | SYSTOLIC BLOOD PRESSURE: 172 MMHG

## 2018-03-28 DIAGNOSIS — M00.062 STAPHYLOCOCCAL ARTHRITIS OF LEFT KNEE (H): Primary | ICD-10-CM

## 2018-03-28 DIAGNOSIS — M00.062 STAPHYLOCOCCAL ARTHRITIS OF LEFT KNEE (H): ICD-10-CM

## 2018-03-28 PROCEDURE — 96366 THER/PROPH/DIAG IV INF ADDON: CPT

## 2018-03-28 PROCEDURE — 25000128 H RX IP 250 OP 636: Performed by: FAMILY MEDICINE

## 2018-03-28 PROCEDURE — 96365 THER/PROPH/DIAG IV INF INIT: CPT

## 2018-03-28 PROCEDURE — 40000268 ZZH STATISTIC NO CHARGES

## 2018-03-28 RX ADMIN — SODIUM CHLORIDE 250 ML: 9 INJECTION, SOLUTION INTRAVENOUS at 08:33

## 2018-03-28 RX ADMIN — VANCOMYCIN HYDROCHLORIDE 1750 MG: 10 INJECTION, POWDER, LYOPHILIZED, FOR SOLUTION INTRAVENOUS at 08:33

## 2018-03-28 RX ADMIN — VANCOMYCIN HYDROCHLORIDE 1750 MG: 10 INJECTION, POWDER, LYOPHILIZED, FOR SOLUTION INTRAVENOUS at 20:23

## 2018-03-28 ASSESSMENT — PAIN SCALES - GENERAL
PAINLEVEL: MODERATE PAIN (5)
PAINLEVEL: EXTREME PAIN (8)

## 2018-03-28 NOTE — PROGRESS NOTES
Patient here today for Vancomycin infusion. PICC line patent with blood return pre and post infusion. Denies any symptoms of reaction , is complaining of left hip pain. Patient thinks the pain is driving his BP up and is due to him overcompensating for his knee when he ambulates. Next dose tonight in the ED, discharged to home via walker with wife.

## 2018-03-28 NOTE — PATIENT INSTRUCTIONS
Pt to return this evening to ED for Vanco. Copies of medication list and upcoming appointments given prior to discharge.

## 2018-03-28 NOTE — MR AVS SNAPSHOT
After Visit Summary   3/28/2018    Duane E Sogge    MRN: [id number]           Patient Information     Date Of Birth          1940        Visit Information        Provider Department      3/28/2018 8:30 AM NL INFUSION CHAIR 4 [facility]        Today's Diagnoses     Staphylococcal arthritis of left knee (H)    -  1      Care Instructions    Pt to return this evening to ED for Vanco. Copies of medication list and upcoming appointments given prior to discharge.             Follow-ups after your visit        Your next 10 appointments already scheduled     Mar 29, 2018  8:30 AM CDT   Level 2 with NL INFUSION CHAIR 4   [facility] ([facility])    [address] [place] [address]   [phone]            Mar 30, 2018  8:00 AM CDT   Level 2 with NL INFUSION CHAIR 4   [facility] ([facility])    [address] [place] [address]   [phone]            Apr 02, 2018  8:00 AM CDT   Level 2 with NL INFUSION CHAIR 3   [facility] ([facility])    [address] [place] [address]   [phone]            Apr 03, 2018  8:00 AM CDT   Level 2 with NL INFUSION CHAIR 3   [facility] ([facility])    [address] [place] [address]   [phone]            Apr 04, 2018  8:00 AM CDT   Level 2 with NL INFUSION CHAIR 3   [facility] ([facility])    [address] [place] [address]   [phone]            Apr 05, 2018  8:00 AM CDT   Level 2 with NL INFUSION CHAIR 4   [facility] ([facility])    [address] [place] [address]   [phone]            Apr 06, 2018  8:00 AM CDT   Level 2 with NL INFUSION CHAIR 3   [facility] ([facility])    [address] [place] [address]   [phone]            Apr 09, 2018  8:00 AM CDT   Level 2 with NL INFUSION CHAIR 2   [facility] ([facility])    [address] [place] [address]   [phone]            Apr 10, 2018  8:00 AM CDT   Level 2 with NL INFUSION CHAIR 2   [facility] ([facility])    [address] [place] [address]   [phone]            Apr 10, 2018 11:15 AM CDT   Evaluation with Christina Mohr, PT   [facility] ([facility])    [address] [place] [address]   [phone]              Who to contact     If you have questions or need follow up information about today's clinic visit or your schedule please contact [facility] directly at [phone].  Normal or non-critical lab and imaging results will be communicated to you by Naterohart, letter or phone within 4 business days after the clinic has received the results. If you do not hear from us within 7 days, please contact the clinic through Joyust or phone. If you have a critical or abnormal lab result, we will notify you by phone as soon as possible.  Submit refill requests through [employer] or call your pharmacy and they will forward the refill request to us. Please allow 3 business days for your refill to be completed.          Additional Information About Your Visit        [employer] Information     [employer] lets you send messages to your doctor, view your test results, renew your prescriptions, schedule appointments and more. To sign up, go to [email or web address] . Click on \"Log in\" on the left side of the screen, which will take you to the Welcome page. Then click on \"Sign up Now\" on the right side of the page.     You will be asked to enter the access code listed below, as well as some personal information. Please follow the directions to create your " username and password.     Your access code is: [email or web address]  Expires: 2018  5:11 PM     Your access code will  in 90 days. If you need help or a new code, please call your [place] clinic or [phone].        Care EveryWhere ID     This is your Care EveryWhere ID. This could be used by other organizations to access your [place] medical records  [id number]        Your Vitals Were     Pulse Temperature Respirations Pulse Oximetry          53 98.2  F (36.8  C) (Temporal) 16 97%         Blood Pressure from Last 3 Encounters:   18 172/84   18 150/70   18 146/70    Weight from Last 3 Encounters:   18 109.8 kg (242 lb)   18 108 kg (238 lb 1.6 oz)   18 108 kg (238 lb 1.6 oz)              Today, you had the following     No orders found for display       Primary Care Provider Office Phone # Fax #    Saqib Chavarria [phone]       [facility] [address]  [facility] [address]        Equal Access to Services     [facility] : Hadii aad ku hadasho Soomaali, waaxda luqadaha, qaybta kaalmada adeegyada, waxay idiin hayfaizan lalit ryan lard . So [facility] [phone].    ATENCIÓN: Si habla español, tiene a roth disposición servicios gratuitos de asistencia lingüística. [facility] [phone].    We comply with applicable federal civil rights laws and [place] laws. We do not discriminate on the basis of race, color, national origin, age, disability, sex, sexual orientation, or gender identity.            Thank you!     Thank you for choosing [facility]  for your care. Our goal is always to provide you with excellent care. Hearing back from our patients is one way we can continue to improve our services. Please take a few minutes to complete the written survey that you may receive in the mail after your visit with us. Thank you!             Your Updated Medication List - Protect others around you: Learn how to  safely use, store and throw away your medicines at www.disposemymeds.org.          This list is accurate as of 3/28/18  2:57 PM.  Always use your most recent med list.                   Brand Name Dispense Instructions for use Diagnosis    ALPRAZolam 0.5 MG tablet    XANAX     Take 1 tab up to 4x daily only if needed. Refill when due,no early refills  Indications: Feeling Anxious        amoxicillin-clavulanate 875-125 MG per tablet    AUGMENTIN    20 tablet    Take 1 tablet by mouth 2 times daily    Acute maxillary sinusitis, recurrence not specified       aspirin 81 MG EC tablet      Take 162 mg by mouth        BENZONATATE PO           GABAPENTIN PO      Take 100 mg by mouth 3 times daily as needed (anxiety) 1-3 capsules        HYDROcodone-acetaminophen 5-325 MG per tablet    NORCO          lisinopril-hydrochlorothiazide 20-12.5 MG per tablet    PRINZIDE/ZESTORETIC     Take 2 tablets by mouth daily        metoprolol succinate 50 MG 24 hr tablet    TOPROL-XL     Take 50 mg by mouth daily        MIRTAZAPINE PO      Take 15 mg by mouth At Bedtime        rifampin 300 MG capsule    RIFADIN     Take 600 mg by mouth        senna 8.6 MG tablet    SENOKOT          simvastatin 20 MG tablet    ZOCOR     Take 20 mg by mouth At Bedtime        TEMAZEPAM PO      Take 15 mg by mouth nightly as needed for sleep        TYLENOL PO      Take 325 mg by mouth as needed for mild pain or fever        venlafaxine 150 MG Tb24 24 hr tablet    EFFEXOR-ER     Take 150 mg by mouth daily (with breakfast)

## 2018-03-29 ENCOUNTER — HOSPITAL ENCOUNTER (EMERGENCY)
Facility: CLINIC | Age: 78
Discharge: HOME | End: 2018-03-29
Admitting: NURSE PRACTITIONER
Payer: MEDICARE

## 2018-03-29 ENCOUNTER — TELEPHONE (OUTPATIENT)
Dept: FAMILY MEDICINE | Facility: CLINIC | Age: 78
End: 2018-03-29

## 2018-03-29 ENCOUNTER — INFUSION THERAPY VISIT (OUTPATIENT)
Dept: INFUSION THERAPY | Facility: CLINIC | Age: 78
End: 2018-03-29
Attending: FAMILY MEDICINE
Payer: MEDICARE

## 2018-03-29 VITALS
BODY MASS INDEX: 36.37 KG/M2 | OXYGEN SATURATION: 97 % | HEIGHT: 68 IN | WEIGHT: 240 LBS | TEMPERATURE: 98.6 F | RESPIRATION RATE: 18 BRPM | DIASTOLIC BLOOD PRESSURE: 81 MMHG | SYSTOLIC BLOOD PRESSURE: 168 MMHG | HEART RATE: 73 BPM

## 2018-03-29 VITALS
HEART RATE: 51 BPM | SYSTOLIC BLOOD PRESSURE: 176 MMHG | DIASTOLIC BLOOD PRESSURE: 82 MMHG | TEMPERATURE: 98 F | RESPIRATION RATE: 16 BRPM | OXYGEN SATURATION: 95 %

## 2018-03-29 DIAGNOSIS — M00.062 STAPHYLOCOCCAL ARTHRITIS OF LEFT KNEE (H): Primary | ICD-10-CM

## 2018-03-29 DIAGNOSIS — M00.062 STAPHYLOCOCCAL ARTHRITIS OF LEFT KNEE (H): ICD-10-CM

## 2018-03-29 DIAGNOSIS — E78.5 HYPERLIPIDEMIA LDL GOAL <130: Primary | ICD-10-CM

## 2018-03-29 LAB
ALT SERPL W P-5'-P-CCNC: 414 U/L (ref 0–70)
AST SERPL W P-5'-P-CCNC: 346 U/L (ref 0–45)
BILIRUB SERPL-MCNC: 0.5 MG/DL (ref 0.2–1.3)

## 2018-03-29 PROCEDURE — 96365 THER/PROPH/DIAG IV INF INIT: CPT | Performed by: NURSE PRACTITIONER

## 2018-03-29 PROCEDURE — 96366 THER/PROPH/DIAG IV INF ADDON: CPT

## 2018-03-29 PROCEDURE — 84450 TRANSFERASE (AST) (SGOT): CPT | Performed by: FAMILY MEDICINE

## 2018-03-29 PROCEDURE — 25000128 H RX IP 250 OP 636: Performed by: FAMILY MEDICINE

## 2018-03-29 PROCEDURE — 40000268 ZZH STATISTIC NO CHARGES: Performed by: NURSE PRACTITIONER

## 2018-03-29 PROCEDURE — 84460 ALANINE AMINO (ALT) (SGPT): CPT | Performed by: FAMILY MEDICINE

## 2018-03-29 PROCEDURE — 82247 BILIRUBIN TOTAL: CPT | Performed by: FAMILY MEDICINE

## 2018-03-29 PROCEDURE — 96365 THER/PROPH/DIAG IV INF INIT: CPT

## 2018-03-29 PROCEDURE — 96366 THER/PROPH/DIAG IV INF ADDON: CPT | Performed by: NURSE PRACTITIONER

## 2018-03-29 RX ADMIN — VANCOMYCIN HYDROCHLORIDE 1750 MG: 1 INJECTION, POWDER, LYOPHILIZED, FOR SOLUTION INTRAVENOUS at 20:32

## 2018-03-29 RX ADMIN — SODIUM CHLORIDE 250 ML: 9 INJECTION, SOLUTION INTRAVENOUS at 08:45

## 2018-03-29 RX ADMIN — VANCOMYCIN HYDROCHLORIDE 1750 MG: 10 INJECTION, POWDER, LYOPHILIZED, FOR SOLUTION INTRAVENOUS at 08:48

## 2018-03-29 ASSESSMENT — PAIN SCALES - GENERAL: PAINLEVEL: MILD PAIN (3)

## 2018-03-29 NOTE — PROGRESS NOTES
Patient here today for Vancomycin infusion, BP has been up and down but patient engaging with other patients and complaining of left hip pain. T.Bili-0.5, Ast-346 and Alt-414, results faxed to Infectious Disease at [facility], awaiting further orders. Rifampin has been stopped for 2 days. Explained results to patients wife. PICC patent with blood return pre and post infusion and tolerated well. Discharged to home in stable condition.

## 2018-03-29 NOTE — MR AVS SNAPSHOT
After Visit Summary   3/29/2018    Duane E Sogge    MRN: [id number]           Patient Information     Date Of Birth          1940        Visit Information        Provider Department      3/29/2018 8:30 AM NL INFUSION CHAIR 4 [facility]        Today's Diagnoses     Staphylococcal arthritis of left knee (H)    -  1      Care Instructions    Pt to return this evening for Vanco unless orders change from MD. Copies of medication list and upcoming appointments given prior to discharge.             Follow-ups after your visit        Your next 10 appointments already scheduled     Mar 30, 2018  8:00 AM CDT   Level 2 with NL INFUSION CHAIR 4   [facility] ([facility])    [address] [place] [address]   [phone]            Apr 02, 2018  8:00 AM CDT   Level 2 with NL INFUSION CHAIR 3   [facility] ([facility])    [address] [place] [address]   [phone]            Apr 03, 2018  8:00 AM CDT   Level 2 with NL INFUSION CHAIR 3   [facility] ([facility])    [address] [place] [address]   [phone]            Apr 04, 2018  8:00 AM CDT   Level 2 with NL INFUSION CHAIR 3   [facility] ([facility])    [address] [place] [address]   [phone]            Apr 05, 2018  8:00 AM CDT   Level 2 with NL INFUSION CHAIR 4   [facility] ([facility])    [address] [place] [address]   [phone]            Apr 06, 2018  8:00 AM CDT   Level 2 with NL INFUSION CHAIR 3   [facility] ([facility])    [address] [place] [address]   [phone]            Apr 09, 2018  8:00 AM CDT   Level 2 with NL INFUSION CHAIR 2   [facility] ([place]  "[facility])    [address] [place] [address] [place] [address]   [phone]            Apr 10, 2018  8:00 AM CDT   Level 2 with NL INFUSION CHAIR 2   [facility] ([facility])    [address] [place]   [place] [address]   [phone]            Apr 10, 2018 11:15 AM CDT   Evaluation with Christina Mohr, PT   [facility] ([facility])    [address] [place] [address] [place] [address]   [phone]            Apr 11, 2018  8:00 AM CDT   Level 2 with NL INFUSION CHAIR 4   [facility] ([facility])    [address] [place] [address] [place] [address]   [phone]              Who to contact     If you have questions or need follow up information about today's clinic visit or your schedule please contact [facility] directly at [phone].  Normal or non-critical lab and imaging results will be communicated to you by PerformYardhart, letter or phone within 4 business days after the clinic has received the results. If you do not hear from us within 7 days, please contact the clinic through [email or web address] or phone. If you have a critical or abnormal lab result, we will notify you by phone as soon as possible.  Submit refill requests through [employer] or call your pharmacy and they will forward the refill request to us. Please allow 3 business days for your refill to be completed.          Additional Information About Your Visit        [employer] Information     [employer] lets you send messages to your doctor, view your test results, renew your prescriptions, schedule appointments and more. To sign up, go to [email or web address] . Click on \"Log in\" on the left side of the screen, which will take you to the Welcome page. Then click on \"Sign up Now\" on the right side of the page.     You will be asked to enter the access code listed below, as well as some personal information. Please follow the " directions to create your username and password.     Your access code is: [email or web address]  Expires: 2018  5:11 PM     Your access code will  in 90 days. If you need help or a new code, please call your [place] clinic or [phone].        Care EveryWhere ID     This is your Care EveryWhere ID. This could be used by other organizations to access your [place] medical records  [id number]        Your Vitals Were     Pulse Temperature Respirations Pulse Oximetry          51 98  F (36.7  C) (Temporal) 16 95%         Blood Pressure from Last 3 Encounters:   18 176/82   18 (!) 174/105   18 172/84    Weight from Last 3 Encounters:   18 110.2 kg (243 lb)   18 109.8 kg (242 lb)   18 108 kg (238 lb 1.6 oz)              We Performed the Following     ALT     AST     Bilirubin  total        Primary Care Provider Office Phone # Fax #    Saqib Chavarria [phone]       [facility] [address]  [facility] [address]        Equal Access to Services     [facility] Center[facility]FFANY : Hadii aad ku hadasho Soomaali, waaxda luqadaha, qaybta kaalmada adeegyada, waxay froylanin hayfaizan lalit ryan lard . So [facility] [phone].    ATENCIÓN: Si habla español, tiene a orth disposición servicios gratuitos de asistencia lingüística. [facility] [phone].    We comply with applicable federal civil rights laws and [place] laws. We do not discriminate on the basis of race, color, national origin, age, disability, sex, sexual orientation, or gender identity.            Thank you!     Thank you for choosing [facility]  for your care. Our goal is always to provide you with excellent care. Hearing back from our patients is one way we can continue to improve our services. Please take a few minutes to complete the written survey that you may receive in the mail after your visit with us. Thank you!             Your Updated Medication List - Protect  others around you: Learn how to safely use, store and throw away your medicines at www.disposemymeds.org.          This list is accurate as of 3/29/18 11:56 AM.  Always use your most recent med list.                   Brand Name Dispense Instructions for use Diagnosis    ALPRAZolam 0.5 MG tablet    XANAX     Take 1 tab up to 4x daily only if needed. Refill when due,no early refills  Indications: Feeling Anxious        amoxicillin-clavulanate 875-125 MG per tablet    AUGMENTIN    20 tablet    Take 1 tablet by mouth 2 times daily    Acute maxillary sinusitis, recurrence not specified       aspirin 81 MG EC tablet      Take 162 mg by mouth        BENZONATATE PO           GABAPENTIN PO      Take 100 mg by mouth 3 times daily as needed (anxiety) 1-3 capsules        HYDROcodone-acetaminophen 5-325 MG per tablet    NORCO          lisinopril-hydrochlorothiazide 20-12.5 MG per tablet    PRINZIDE/ZESTORETIC     Take 2 tablets by mouth daily        metoprolol succinate 50 MG 24 hr tablet    TOPROL-XL     Take 50 mg by mouth daily        MIRTAZAPINE PO      Take 15 mg by mouth At Bedtime        rifampin 300 MG capsule    RIFADIN     Take 600 mg by mouth        senna 8.6 MG tablet    SENOKOT          simvastatin 20 MG tablet    ZOCOR     Take 20 mg by mouth At Bedtime        TEMAZEPAM PO      Take 15 mg by mouth nightly as needed for sleep        TYLENOL PO      Take 325 mg by mouth as needed for mild pain or fever        venlafaxine 150 MG Tb24 24 hr tablet    EFFEXOR-ER     Take 150 mg by mouth daily (with breakfast)

## 2018-03-29 NOTE — PATIENT INSTRUCTIONS
Pt to return this evening for Vanco unless orders change from MD. Copies of medication list and upcoming appointments given prior to discharge.

## 2018-03-30 ENCOUNTER — HOSPITAL ENCOUNTER (EMERGENCY)
Facility: CLINIC | Age: 78
Discharge: HOME | End: 2018-03-30
Admitting: FAMILY MEDICINE
Payer: MEDICARE

## 2018-03-30 ENCOUNTER — OFFICE VISIT (OUTPATIENT)
Dept: FAMILY MEDICINE | Facility: CLINIC | Age: 78
End: 2018-03-30
Payer: COMMERCIAL

## 2018-03-30 ENCOUNTER — INFUSION THERAPY VISIT (OUTPATIENT)
Dept: INFUSION THERAPY | Facility: CLINIC | Age: 78
End: 2018-03-30
Attending: FAMILY MEDICINE
Payer: MEDICARE

## 2018-03-30 VITALS
BODY MASS INDEX: 35.58 KG/M2 | OXYGEN SATURATION: 98 % | SYSTOLIC BLOOD PRESSURE: 174 MMHG | WEIGHT: 234 LBS | TEMPERATURE: 97.4 F | HEART RATE: 78 BPM | DIASTOLIC BLOOD PRESSURE: 98 MMHG

## 2018-03-30 VITALS
BODY MASS INDEX: 35.88 KG/M2 | WEIGHT: 236 LBS | OXYGEN SATURATION: 97 % | SYSTOLIC BLOOD PRESSURE: 155 MMHG | HEART RATE: 58 BPM | RESPIRATION RATE: 18 BRPM | DIASTOLIC BLOOD PRESSURE: 81 MMHG | TEMPERATURE: 98.2 F

## 2018-03-30 VITALS
DIASTOLIC BLOOD PRESSURE: 89 MMHG | HEART RATE: 50 BPM | OXYGEN SATURATION: 94 % | RESPIRATION RATE: 20 BRPM | SYSTOLIC BLOOD PRESSURE: 189 MMHG | TEMPERATURE: 97.7 F

## 2018-03-30 DIAGNOSIS — M00.062 STAPHYLOCOCCAL ARTHRITIS OF LEFT KNEE (H): Primary | ICD-10-CM

## 2018-03-30 DIAGNOSIS — A49.02 MRSA INFECTION: ICD-10-CM

## 2018-03-30 DIAGNOSIS — M00.062 STAPHYLOCOCCAL ARTHRITIS OF LEFT KNEE (H): ICD-10-CM

## 2018-03-30 DIAGNOSIS — R79.89 ELEVATED LFTS: Primary | ICD-10-CM

## 2018-03-30 DIAGNOSIS — E78.5 HYPERLIPIDEMIA LDL GOAL <130: ICD-10-CM

## 2018-03-30 PROBLEM — M00.9 SEPTIC ARTHRITIS OF KNEE (H): Status: ACTIVE | Noted: 2018-03-19

## 2018-03-30 PROBLEM — E66.01 MORBID OBESITY (H): Status: ACTIVE | Noted: 2018-03-30

## 2018-03-30 PROBLEM — T84.59XA INFECTION OF PROSTHETIC KNEE JOINT (H): Status: ACTIVE | Noted: 2018-03-19

## 2018-03-30 PROBLEM — Z96.659 INFECTION OF PROSTHETIC KNEE JOINT (H): Status: ACTIVE | Noted: 2018-03-19

## 2018-03-30 LAB
ALBUMIN SERPL-MCNC: 3.2 G/DL (ref 3.4–5)
ALBUMIN SERPL-MCNC: 3.2 G/DL (ref 3.4–5)
ALP SERPL-CCNC: 223 U/L (ref 40–150)
ALP SERPL-CCNC: 227 U/L (ref 40–150)
ALT SERPL W P-5'-P-CCNC: 331 U/L (ref 0–70)
ALT SERPL W P-5'-P-CCNC: 337 U/L (ref 0–70)
ANION GAP SERPL CALCULATED.3IONS-SCNC: 7 MMOL/L (ref 3–14)
AST SERPL W P-5'-P-CCNC: 175 U/L (ref 0–45)
AST SERPL W P-5'-P-CCNC: 182 U/L (ref 0–45)
BILIRUB DIRECT SERPL-MCNC: 0.2 MG/DL (ref 0–0.2)
BILIRUB SERPL-MCNC: 0.4 MG/DL (ref 0.2–1.3)
BILIRUB SERPL-MCNC: 0.4 MG/DL (ref 0.2–1.3)
BUN SERPL-MCNC: 10 MG/DL (ref 7–30)
CALCIUM SERPL-MCNC: 8.8 MG/DL (ref 8.5–10.1)
CHLORIDE SERPL-SCNC: 100 MMOL/L (ref 94–109)
CO2 SERPL-SCNC: 27 MMOL/L (ref 20–32)
CREAT SERPL-MCNC: 0.61 MG/DL (ref 0.66–1.25)
CREAT SERPL-MCNC: 0.7 MG/DL (ref 0.66–1.25)
FERRITIN SERPL-MCNC: 642 NG/ML (ref 26–388)
GFR SERPL CREATININE-BSD FRML MDRD: >90 ML/MIN/1.7M2
GFR SERPL CREATININE-BSD FRML MDRD: >90 ML/MIN/1.7M2
GLUCOSE SERPL-MCNC: 84 MG/DL (ref 70–99)
POTASSIUM SERPL-SCNC: 4 MMOL/L (ref 3.4–5.3)
PROT SERPL-MCNC: 7.6 G/DL (ref 6.8–8.8)
PROT SERPL-MCNC: 7.8 G/DL (ref 6.8–8.8)
SODIUM SERPL-SCNC: 134 MMOL/L (ref 133–144)
VANCOMYCIN SERPL-MCNC: 17 MG/L

## 2018-03-30 PROCEDURE — 40000268 ZZH STATISTIC NO CHARGES

## 2018-03-30 PROCEDURE — 96366 THER/PROPH/DIAG IV INF ADDON: CPT

## 2018-03-30 PROCEDURE — 82565 ASSAY OF CREATININE: CPT | Performed by: FAMILY MEDICINE

## 2018-03-30 PROCEDURE — 25000128 H RX IP 250 OP 636: Performed by: FAMILY MEDICINE

## 2018-03-30 PROCEDURE — 96365 THER/PROPH/DIAG IV INF INIT: CPT

## 2018-03-30 PROCEDURE — 99214 OFFICE O/P EST MOD 30 MIN: CPT | Performed by: FAMILY MEDICINE

## 2018-03-30 PROCEDURE — 80076 HEPATIC FUNCTION PANEL: CPT | Performed by: INTERNAL MEDICINE

## 2018-03-30 PROCEDURE — 82728 ASSAY OF FERRITIN: CPT | Performed by: FAMILY MEDICINE

## 2018-03-30 PROCEDURE — 80202 ASSAY OF VANCOMYCIN: CPT | Performed by: FAMILY MEDICINE

## 2018-03-30 PROCEDURE — 87340 HEPATITIS B SURFACE AG IA: CPT | Performed by: FAMILY MEDICINE

## 2018-03-30 PROCEDURE — 36415 COLL VENOUS BLD VENIPUNCTURE: CPT | Performed by: FAMILY MEDICINE

## 2018-03-30 PROCEDURE — 86803 HEPATITIS C AB TEST: CPT | Performed by: FAMILY MEDICINE

## 2018-03-30 PROCEDURE — 80053 COMPREHEN METABOLIC PANEL: CPT | Performed by: FAMILY MEDICINE

## 2018-03-30 PROCEDURE — 86708 HEPATITIS A ANTIBODY: CPT | Performed by: FAMILY MEDICINE

## 2018-03-30 RX ADMIN — SODIUM CHLORIDE 250 ML: 9 INJECTION, SOLUTION INTRAVENOUS at 20:17

## 2018-03-30 RX ADMIN — VANCOMYCIN HYDROCHLORIDE 1750 MG: 10 INJECTION, POWDER, LYOPHILIZED, FOR SOLUTION INTRAVENOUS at 08:45

## 2018-03-30 RX ADMIN — VANCOMYCIN HYDROCHLORIDE 1750 MG: 10 INJECTION, POWDER, LYOPHILIZED, FOR SOLUTION INTRAVENOUS at 20:16

## 2018-03-30 RX ADMIN — SODIUM CHLORIDE 250 ML: 9 INJECTION, SOLUTION INTRAVENOUS at 08:18

## 2018-03-30 ASSESSMENT — PAIN SCALES - GENERAL
PAINLEVEL: MILD PAIN (3)
PAINLEVEL: NO PAIN (0)

## 2018-03-30 NOTE — MR AVS SNAPSHOT
After Visit Summary   3/30/2018    Duane E Sogge    MRN: [id number]           Patient Information     Date Of Birth          1940        Visit Information        Provider Department      3/30/2018 10:00 AM Asa Oliver MD [facility]        Today's Diagnoses     Elevated LFTs    -  1    Staphylococcal arthritis of left knee (H)        MRSA infection           Follow-ups after your visit        Your next 10 appointments already scheduled     Apr 02, 2018  8:00 AM CDT   Level 2 with NL INFUSION CHAIR 3   [facility] ([facility])    [address] [place] [address] [phone]            Apr 03, 2018  7:00 AM CDT   US ABDOMEN LIMITED with PHUS1   [facility] ([facility])    [address] [place], [place] [address] [place] [address] [phone]           Please bring a list of your medicines (including vitamins, minerals and over-the-counter drugs). Also, tell your doctor about any allergies you may have. Wear comfortable clothes and leave your valuables at home.  Adults: No eating or drinking for 8 hours before the exam. You may take medicine with a small sip of water.  Children: - Children 6+ years: No food or drink for 6 hours before exam. - Children 1-5 years: No food or drink for 4 hours before exam. - Infants, breast-fed: may have breast milk up to 2 hours before exam. - Infants, formula: may have bottle until 4 hours before exam.  Please call the Imaging Department at your exam site with any questions.            Apr 03, 2018  8:00 AM CDT   Level 2 with NL INFUSION CHAIR 3   [facility] ([facility])    [address] [place] [address] [phone]            Apr 04, 2018  8:00 AM CDT   Level 2 with NL INFUSION CHAIR 3   [facility] ([facility])    [address] [place] [address]    [phone]            Apr 05, 2018  8:00 AM CDT   Level 2 with NL INFUSION CHAIR 4   [facility] ([facility])    [address] [place] [address]   [phone]            Apr 06, 2018  8:00 AM CDT   Level 2 with NL INFUSION CHAIR 3   [facility] ([facility])    [address] [place] [address]   [phone]            Apr 06, 2018 11:00 AM CDT   SHORT with Asa Oliver MD   [facility] ([facility])    [address]  [place] [address]   [phone]            Apr 09, 2018  8:00 AM CDT   Level 2 with NL INFUSION CHAIR 2   [facility] ([facility])    [address] [place] [address]   [phone]            Apr 10, 2018  8:00 AM CDT   Level 2 with NL INFUSION CHAIR 2   [facility] ([facility])    [address] [place] [address] [place] [address]   [phone]            Apr 10, 2018 11:15 AM CDT   Evaluation with Christina Mohr, PT   [facility] ([facility])    [address] [place] [address] [place] [address]   [phone]              Future tests that were ordered for you today     Open Future Orders        Priority Expected Expires Ordered    US Abdomen Limited Routine  3/30/2019 3/30/2018            Who to contact     If you have questions or need follow up information about today's clinic visit or your schedule please contact [facility] directly at [phone].  Normal or non-critical lab and imaging results will be communicated to you by MyChart, letter or phone within 4 business days after the clinic has received the results. If you do not hear from us within 7 days, please contact the clinic through MyChart or phone. If you have a critical or abnormal lab result, we will notify you by phone as soon as  "possible.  Submit refill requests through Panna or call your pharmacy and they will forward the refill request to us. Please allow 3 business days for your refill to be completed.          Additional Information About Your Visit        [employer] Information     Panna lets you send messages to your doctor, view your test results, renew your prescriptions, schedule appointments and more. To sign up, go to [email or web address] . Click on \"Log in\" on the left side of the screen, which will take you to the Welcome page. Then click on \"Sign up Now\" on the right side of the page.     You will be asked to enter the access code listed below, as well as some personal information. Please follow the directions to create your username and password.     Your access code is: [email or web address]  Expires: 2018  5:11 PM     Your access code will  in 90 days. If you need help or a new code, please call your [facility] clinic or [phone].        Care EveryWhere ID     This is your Care EveryWhere ID. This could be used by other organizations to access your [facility] medical records  [id number]        Your Vitals Were     Pulse Temperature Pulse Oximetry BMI (Body Mass Index)          78 97.4  F (36.3  C) (Temporal) 98% 35.58 kg/m2         Blood Pressure from Last 3 Encounters:   18 (!) 174/98   18 189/89   18 168/81    Weight from Last 3 Encounters:   18 234 lb (106.1 kg)   18 240 lb (108.9 kg)   18 243 lb (110.2 kg)              We Performed the Following     Comprehensive metabolic panel     Ferritin     Hepatitis A Antibody IgG     Hepatitis B surface antigen     Hepatitis C Screen Reflex to HCV RNA Quant and Genotype        Primary Care Provider Office Phone # Fax #    Saqib Chavarria [phone]       [facility] [place] [address] [address]        Equal Access to Services     STARR LEIVA AH: stevie Deluca " fang jagjuan josetheresa lorenzokym payne, brittny sanderson ah. So [facility] [phone].    ATENCIÓN: Si alma mccall, tiene a roth disposición servicios gratuitos de asistencia lingüística. Sheron al [phone].    We comply with applicable federal civil rights laws and [place] laws. We do not discriminate on the basis of race, color, national origin, age, disability, sex, sexual orientation, or gender identity.            Thank you!     Thank you for choosing [facility]  for your care. Our goal is always to provide you with excellent care. Hearing back from our patients is one way we can continue to improve our services. Please take a few minutes to complete the written survey that you may receive in the mail after your visit with us. Thank you!             Your Updated Medication List - Protect others around you: Learn how to safely use, store and throw away your medicines at www.disposemymeds.org.          This list is accurate as of 3/30/18  3:19 PM.  Always use your most recent med list.                   Brand Name Dispense Instructions for use Diagnosis    ALPRAZolam 0.5 MG tablet    XANAX     Take 1 tab up to 4x daily only if needed. Refill when due,no early refills  Indications: Feeling Anxious        amoxicillin-clavulanate 875-125 MG per tablet    AUGMENTIN    20 tablet    Take 1 tablet by mouth 2 times daily    Acute maxillary sinusitis, recurrence not specified       aspirin 81 MG EC tablet      Take 162 mg by mouth        BENZONATATE PO           GABAPENTIN PO      Take 100 mg by mouth 3 times daily as needed (anxiety) 1-3 capsules        HYDROcodone-acetaminophen 5-325 MG per tablet    NORCO          lisinopril-hydrochlorothiazide 20-12.5 MG per tablet    PRINZIDE/ZESTORETIC     Take 2 tablets by mouth daily        metoprolol succinate 50 MG 24 hr tablet    TOPROL-XL     Take 50 mg by mouth daily        MIRTAZAPINE PO      Take 15 mg by mouth At Bedtime        rifampin  300 MG capsule    RIFADIN     Take 600 mg by mouth        senna 8.6 MG tablet    SENOKOT          simvastatin 20 MG tablet    ZOCOR     Take 20 mg by mouth At Bedtime        TEMAZEPAM PO      Take 15 mg by mouth nightly as needed for sleep        TYLENOL PO      Take 325 mg by mouth as needed for mild pain or fever        venlafaxine 150 MG Tb24 24 hr tablet    EFFEXOR-ER     Take 150 mg by mouth daily (with breakfast)

## 2018-03-30 NOTE — MR AVS SNAPSHOT
After Visit Summary   3/30/2018    Duane E Sogge    MRN: [id number]           Patient Information     Date Of Birth          1940        Visit Information        Provider Department      3/30/2018 8:00 AM NL INFUSION CHAIR 4 [facility]        Today's Diagnoses     Staphylococcal arthritis of left knee (H)    -  1    Hyperlipidemia LDL goal <130          Care Instructions    Pt to return tonight to ED for Vanco. Copies of medication list and upcoming appointments given prior to discharge.             Follow-ups after your visit        Your next 10 appointments already scheduled     Apr 02, 2018  8:00 AM CDT   Level 2 with NL INFUSION CHAIR 3   [facility] ([facility])    [address] [place] [address]   [phone]            Apr 03, 2018  8:00 AM CDT   Level 2 with NL INFUSION CHAIR 3   [facility] ([facility])    [address] [place] [address]   [phone]            Apr 04, 2018  8:00 AM CDT   Level 2 with NL INFUSION CHAIR 3   [facility] ([facility])    [address] [place] [address]   [phone]            Apr 05, 2018  8:00 AM CDT   Level 2 with NL INFUSION CHAIR 4   [facility] ([facility])    [address] [place] [address]   [phone]            Apr 06, 2018  8:00 AM CDT   Level 2 with NL INFUSION CHAIR 3   [facility] ([facility])    [address] [place] [address]   [phone]            Apr 09, 2018  8:00 AM CDT   Level 2 with NL INFUSION CHAIR 2   [facility] ([facility])    [address] [place] [address]   [phone]            Apr 10, 2018  8:00 AM CDT   Level 2 with NL INFUSION CHAIR 2   [facility] ([place]  [facility])    [address] [place] [address]   [phone]            Apr 10, 2018 11:15 AM CDT   Evaluation with Christina Mohr, PT   [facility] ([facility])    [address] [place] [address]   [phone]            Apr 11, 2018  8:00 AM CDT   Level 2 with NL INFUSION CHAIR 4   [facility] ([facility])    [address] [place] [address]   [phone]            Apr 12, 2018  8:30 AM CDT   Level 2 with NL INFUSION CHAIR 2   [facility] ([facility])    [address] [place] [address]   [phone]              Who to contact     If you have questions or need follow up information about today's clinic visit or your schedule please contact [facility] directly at [phone].  Normal or non-critical lab and imaging results will be communicated to you by Marquihart, letter or phone within 4 business days after the clinic has received the results. If you do not hear from us within 7 days, please contact the clinic through Slackert or phone. If you have a critical or abnormal lab result, we will notify you by phone as soon as possible.  Submit refill requests through ScrollMotion or call your pharmacy and they will forward the refill request to us. Please allow 3 business days for your refill to be completed.          Additional Information About Your Visit        ScrollMotion Information     ScrollMotion lets you send messages to your doctor, view your test results, renew your prescriptions, schedule appointments and more. To sign up, go to [email or web address] . Click on \"Log in\" on the left side of the screen, which will take you to the Welcome page. Then click on \"Sign up Now\" on the right side of the page.     You will be asked to enter the access code listed below, as well as some personal information. Please follow the " directions to create your username and password.     Your access code is: [email or web address]  Expires: 2018  5:11 PM     Your access code will  in 90 days. If you need help or a new code, please call your [facility] clinic or [phone].        Care EveryWhere ID     This is your Care EveryWhere ID. This could be used by other organizations to access your [facility] medical records  [id number]        Your Vitals Were     Pulse Temperature Respirations Pulse Oximetry          50 97.7  F (36.5  C) (Temporal) 20 94%         Blood Pressure from Last 3 Encounters:   18 (!) 174/98   18 189/89   18 168/81    Weight from Last 3 Encounters:   18 106.1 kg (234 lb)   18 108.9 kg (240 lb)   18 110.2 kg (243 lb)              We Performed the Following     **Hepatic panel FUTURE 2mo     Creatinine     Vancomycin level        Primary Care Provider Office Phone # Fax #    Saqib Chavarria [phone]       [facility] [place] [address]  [facility] [address]        Equal Access to Services     MATT LEIVA : Hadii aad ku hadasho Soomaali, waaxda luqadaha, qaybta kaalmada adeegyada, brittny galeanoin hayfaizan lalit sanderson . So [facility] [phone].    ATENCIÓN: Si habla español, tiene a roth disposición servicios gratuitos de asistencia lingüística. Llame al [phone].    We comply with applicable federal civil rights laws and [place] laws. We do not discriminate on the basis of race, color, national origin, age, disability, sex, sexual orientation, or gender identity.            Thank you!     Thank you for choosing [facility]  for your care. Our goal is always to provide you with excellent care. Hearing back from our patients is one way we can continue to improve our services. Please take a few minutes to complete the written survey that you may receive in the mail after your visit with us. Thank you!             Your Updated  Medication List - Protect others around you: Learn how to safely use, store and throw away your medicines at www.disposemymeds.org.          This list is accurate as of 3/30/18 11:38 AM.  Always use your most recent med list.                   Brand Name Dispense Instructions for use Diagnosis    ALPRAZolam 0.5 MG tablet    XANAX     Take 1 tab up to 4x daily only if needed. Refill when due,no early refills  Indications: Feeling Anxious        amoxicillin-clavulanate 875-125 MG per tablet    AUGMENTIN    20 tablet    Take 1 tablet by mouth 2 times daily    Acute maxillary sinusitis, recurrence not specified       aspirin 81 MG EC tablet      Take 162 mg by mouth        BENZONATATE PO           GABAPENTIN PO      Take 100 mg by mouth 3 times daily as needed (anxiety) 1-3 capsules        HYDROcodone-acetaminophen 5-325 MG per tablet    NORCO          lisinopril-hydrochlorothiazide 20-12.5 MG per tablet    PRINZIDE/ZESTORETIC     Take 2 tablets by mouth daily        metoprolol succinate 50 MG 24 hr tablet    TOPROL-XL     Take 50 mg by mouth daily        MIRTAZAPINE PO      Take 15 mg by mouth At Bedtime        rifampin 300 MG capsule    RIFADIN     Take 600 mg by mouth        senna 8.6 MG tablet    SENOKOT          simvastatin 20 MG tablet    ZOCOR     Take 20 mg by mouth At Bedtime        TEMAZEPAM PO      Take 15 mg by mouth nightly as needed for sleep        TYLENOL PO      Take 325 mg by mouth as needed for mild pain or fever        venlafaxine 150 MG Tb24 24 hr tablet    EFFEXOR-ER     Take 150 mg by mouth daily (with breakfast)

## 2018-03-30 NOTE — PROGRESS NOTES
[facility] ID called and since Liver functions are trending down, Vanco dose will remain same at this time. Kaylene Narayan RN

## 2018-03-30 NOTE — NURSING NOTE
"Chief Complaint   Patient presents with     Liver     sats that he has a high liver enzymes.  Appointment was requested by infeceous disease doctor. Last draw was on the 27th of this month.  Last pill was on the 26th.       Initial BP (!) 174/98 (BP Location: Left arm, Patient Position: Chair, Cuff Size: Adult Regular)  Pulse 78  Temp 97.4  F (36.3  C) (Temporal)  Wt 234 lb (106.1 kg)  SpO2 98%  BMI 35.58 kg/m2 Estimated body mass index is 35.58 kg/(m^2) as calculated from the following:    Height as of 3/29/18: 5' 8\" (1.727 m).    Weight as of this encounter: 234 lb (106.1 kg).  Medication Reconciliation: complete   Hilda Fong CMA    Health Maintenance Due   Topic Date Due     TETANUS IMMUNIZATION (SYSTEM ASSIGNED)  10/04/1957     ADVANCE DIRECTIVE PLANNING Q5 YRS  10/04/1994     PNEUMOCOCCAL (1 of 2 - PCV13) 10/04/2004       Health Maintenance reviewed at today's visit patient asked to schedule/complete:   Patient is aware.     "

## 2018-03-30 NOTE — ED AVS SNAPSHOT
[facility] Emergency Department    [address] [place] [address]    Phone:  [phone]    Fax:  [phone]                                       Duane E Sogge   MRN: [id number]    Department:  [facility] Emergency Department   Date of Visit:  3/30/2018           Patient Information     Date Of Birth          1940        Your diagnoses for this visit were:     Staphylococcal arthritis of left knee (H)       Your next 10 appointments already scheduled     Apr 02, 2018  8:00 AM CDT   Level 2 with NL INFUSION CHAIR 3   [facility] Infusion Services ([facility])    [address] [place] [id number] [phone]            Apr 03, 2018  7:00 AM CDT   US ABDOMEN LIMITED with PHUS1   [facility] Ultrasound ([facility])    [address]  [place] [id number] [phone]           Please bring a list of your medicines (including vitamins, minerals and over-the-counter drugs). Also, tell your doctor about any allergies you may have. Wear comfortable clothes and leave your valuables at home.  Adults: No eating or drinking for 8 hours before the exam. You may take medicine with a small sip of water.  Children: - Children 6+ years: No food or drink for 6 hours before exam. - Children 1-5 years: No food or drink for 4 hours before exam. - Infants, breast-fed: may have breast milk up to 2 hours before exam. - Infants, formula: may have bottle until 4 hours before exam.  Please call the Imaging Department at your exam site with any questions.            Apr 03, 2018  8:00 AM CDT   Level 2 with NL INFUSION CHAIR 3   [facility] Infusion Services ([facility])    [address] [place] [id number] [phone]            Apr 04, 2018  8:00 AM CDT   Level 2 with NL INFUSION CHAIR 3   [facility] Infusion Services ([facility])    [address] [place] [address]    [phone]            Apr 05, 2018  8:00 AM CDT   Level 2 with NL INFUSION CHAIR 4   [facility] Infusion Services ([facility])    [address] [place] [address]   [phone]            Apr 06, 2018  8:00 AM CDT   Level 2 with NL INFUSION CHAIR 3   [facility] Infusion Services ([facility])    [address] [place] [address]   [phone]            Apr 06, 2018 11:00 AM CDT   SHORT with Asa Oliver MD   [facility] ([facility])    [address]  [place] [address]   [phone]            Apr 09, 2018  8:00 AM CDT   Level 2 with NL INFUSION CHAIR 2   [facility] Infusion Services ([facility])    [address] [place] [address] [address] [place] [address]   [phone]            Apr 10, 2018  8:00 AM CDT   Level 2 with NL INFUSION CHAIR 2   [facility] Infusion Services ([facility])    [address] [place] [address] [address] [place] [address]   [phone]            Apr 10, 2018 11:15 AM CDT   Evaluation with Christina Mohr PT   [facility] Physical Therapy ([facility])    [address] [place] [address] [address] [place] [address]   [phone]              24 Hour Appointment Hotline       To make an appointment at any [facility], call [phone] ([phone]). If you don't have a family doctor or clinic, we will help you find one. [place] clinics are conveniently located to serve the needs of you and your family.             Review of your medicines      Our records show that you are taking the medicines listed below. If these are incorrect, please call your family doctor or clinic.        Dose / Directions Last dose taken    ALPRAZolam 0.5 MG tablet   Commonly known as:  XANAX        Take 1 tab up to 4x daily only if needed. Refill when due,no early refills  Indications: Feeling Anxious   Refills:  0        amoxicillin-clavulanate 875-125 MG per  tablet   Commonly known as:  AUGMENTIN   Dose:  1 tablet   Quantity:  20 tablet        Take 1 tablet by mouth 2 times daily   Refills:  0        aspirin 81 MG EC tablet   Dose:  162 mg        Take 162 mg by mouth   Refills:  0        BENZONATATE PO        Refills:  0        GABAPENTIN PO   Dose:  100 mg        Take 100 mg by mouth 3 times daily as needed (anxiety) 1-3 capsules   Refills:  0        HYDROcodone-acetaminophen 5-325 MG per tablet   Commonly known as:  NORCO        Refills:  0        lisinopril-hydrochlorothiazide 20-12.5 MG per tablet   Commonly known as:  PRINZIDE/ZESTORETIC   Dose:  2 tablet        Take 2 tablets by mouth daily   Refills:  0        metoprolol succinate 50 MG 24 hr tablet   Commonly known as:  TOPROL-XL   Dose:  50 mg        Take 50 mg by mouth daily   Refills:  0        MIRTAZAPINE PO   Dose:  15 mg        Take 15 mg by mouth At Bedtime   Refills:  0        rifampin 300 MG capsule   Commonly known as:  RIFADIN   Dose:  600 mg        Take 600 mg by mouth   Refills:  0        senna 8.6 MG tablet   Commonly known as:  SENOKOT        Refills:  0        simvastatin 20 MG tablet   Commonly known as:  ZOCOR   Dose:  20 mg        Take 20 mg by mouth At Bedtime   Refills:  0        TEMAZEPAM PO   Dose:  15 mg        Take 15 mg by mouth nightly as needed for sleep   Refills:  0        TYLENOL PO   Dose:  325 mg        Take 325 mg by mouth as needed for mild pain or fever   Refills:  0        venlafaxine 150 MG Tb24 24 hr tablet   Commonly known as:  EFFEXOR-ER   Dose:  150 mg        Take 150 mg by mouth daily (with breakfast)   Refills:  0                Orders Needing Specimen Collection     None      Pending Results     Date and Time Order Name Status Description    3/30/2018 1218 HEPATITIS A ANTIBODY IGG In process     3/30/2018 1218 HEPATITIS C SCREEN REFLEX TO HCV RNA QUANT AND GENOTYPE In process     3/30/2018 1218 HEPATITIS B SURFACE ANTIGEN In process             Pending Culture Results  "    No orders found from 3/28/2018 to 3/31/2018.            Pending Results Instructions     If you had any lab results that were not finalized at the time of your Discharge, you can call the ED Lab Result RN at [phone]. You will be contacted by this team for any positive Lab results or changes in treatment. The nurses are available 7 days a week from 10A to 6:30P.  You can leave a message 24 hours per day and they will return your call.        Thank you for choosing [facility]       Thank you for choosing [facility] for your care. Our goal is always to provide you with excellent care. Hearing back from our patients is one way we can continue to improve our services. Please take a few minutes to complete the written survey that you may receive in the mail after you visit with us. Thank you!        kaleohart Information     [employer]averus lets you send messages to your doctor, view your test results, renew your prescriptions, schedule appointments and more. To sign up, go to [email or web address] . Click on \"Log in\" on the left side of the screen, which will take you to the Welcome page. Then click on \"Sign up Now\" on the right side of the page.     You will be asked to enter the access code listed below, as well as some personal information. Please follow the directions to create your username and password.     Your access code is: [email or web address]  Expires: 2018  5:11 PM     Your access code will  in 90 days. If you need help or a new code, please call your [facility] clinic or [phone].        Care EveryWhere ID     This is your Care EveryWhere ID. This could be used by other organizations to access your [facility] medical records  [id number]        Equal Access to Services     MATT [facility] : Hadamadou Reno, stevie headley, brittny mcgraw. So [facility] [phone].    ATENCIÓN: Si habla español, tiene a roth disposición servicios gratuitos de " asistencia lingüística. Sheron al [phone].    We comply with applicable federal civil rights laws and [place] laws. We do not discriminate on the basis of race, color, national origin, age, disability, sex, sexual orientation, or gender identity.            After Visit Summary       This is your record. Keep this with you and show to your community pharmacist(s) and doctor(s) at your next visit.

## 2018-03-30 NOTE — ED AVS SNAPSHOT
[facility] Emergency Department    [address] [place] [address]    Phone:  [phone]    Fax:  [phone]                                       Duane E Sogge   MRN: [id number]    Department:  [facility] Emergency Department   Date of Visit:  3/30/2018           After Visit Summary Signature Page     I have received my discharge instructions, and my questions have been answered. I have discussed any challenges I see with this plan with the nurse or doctor.    ..........................................................................................................................................  Patient/Patient Representative Signature      ..........................................................................................................................................  Patient Representative Print Name and Relationship to Patient    ..................................................               ................................................  Date                                            Time    ..........................................................................................................................................  Reviewed by Signature/Title    ...................................................              ..............................................  Date                                                            Time

## 2018-03-31 ENCOUNTER — HOSPITAL ENCOUNTER (EMERGENCY)
Facility: CLINIC | Age: 78
Discharge: HOME | End: 2018-03-31
Admitting: FAMILY MEDICINE
Payer: MEDICARE

## 2018-03-31 VITALS
OXYGEN SATURATION: 98 % | DIASTOLIC BLOOD PRESSURE: 72 MMHG | RESPIRATION RATE: 16 BRPM | SYSTOLIC BLOOD PRESSURE: 168 MMHG | TEMPERATURE: 97 F

## 2018-03-31 VITALS — OXYGEN SATURATION: 98 % | SYSTOLIC BLOOD PRESSURE: 189 MMHG | TEMPERATURE: 97.8 F | DIASTOLIC BLOOD PRESSURE: 97 MMHG

## 2018-03-31 DIAGNOSIS — M00.062 STAPHYLOCOCCAL ARTHRITIS OF LEFT KNEE (H): ICD-10-CM

## 2018-03-31 PROCEDURE — 25000128 H RX IP 250 OP 636: Performed by: FAMILY MEDICINE

## 2018-03-31 PROCEDURE — 96366 THER/PROPH/DIAG IV INF ADDON: CPT

## 2018-03-31 PROCEDURE — 40000268 ZZH STATISTIC NO CHARGES

## 2018-03-31 PROCEDURE — 96365 THER/PROPH/DIAG IV INF INIT: CPT

## 2018-03-31 RX ADMIN — SODIUM CHLORIDE 250 ML: 9 INJECTION, SOLUTION INTRAVENOUS at 20:40

## 2018-03-31 RX ADMIN — VANCOMYCIN HYDROCHLORIDE 1750 MG: 10 INJECTION, POWDER, LYOPHILIZED, FOR SOLUTION INTRAVENOUS at 08:38

## 2018-03-31 RX ADMIN — VANCOMYCIN HYDROCHLORIDE 1750 MG: 1 INJECTION, POWDER, LYOPHILIZED, FOR SOLUTION INTRAVENOUS at 20:40

## 2018-04-01 ENCOUNTER — HOSPITAL ENCOUNTER (EMERGENCY)
Facility: CLINIC | Age: 78
Discharge: HOME | End: 2018-04-01
Admitting: FAMILY MEDICINE
Payer: MEDICARE

## 2018-04-01 VITALS
OXYGEN SATURATION: 98 % | DIASTOLIC BLOOD PRESSURE: 79 MMHG | SYSTOLIC BLOOD PRESSURE: 163 MMHG | RESPIRATION RATE: 20 BRPM | TEMPERATURE: 98.6 F

## 2018-04-01 VITALS — TEMPERATURE: 97 F

## 2018-04-01 DIAGNOSIS — M00.062 STAPHYLOCOCCAL ARTHRITIS OF LEFT KNEE (H): ICD-10-CM

## 2018-04-01 PROCEDURE — 25000128 H RX IP 250 OP 636: Performed by: FAMILY MEDICINE

## 2018-04-01 PROCEDURE — 96366 THER/PROPH/DIAG IV INF ADDON: CPT

## 2018-04-01 PROCEDURE — 40000268 ZZH STATISTIC NO CHARGES

## 2018-04-01 PROCEDURE — 96365 THER/PROPH/DIAG IV INF INIT: CPT

## 2018-04-01 RX ADMIN — SODIUM CHLORIDE 250 ML: 9 INJECTION, SOLUTION INTRAVENOUS at 08:33

## 2018-04-01 RX ADMIN — VANCOMYCIN HYDROCHLORIDE 1750 MG: 1 INJECTION, POWDER, LYOPHILIZED, FOR SOLUTION INTRAVENOUS at 19:59

## 2018-04-01 RX ADMIN — VANCOMYCIN HYDROCHLORIDE 1750 MG: 10 INJECTION, POWDER, LYOPHILIZED, FOR SOLUTION INTRAVENOUS at 08:19

## 2018-04-01 RX ADMIN — SODIUM CHLORIDE: 9 INJECTION, SOLUTION INTRAVENOUS at 19:59

## 2018-04-01 NOTE — ED AVS SNAPSHOT
[facility] Emergency Department    [address] [place] [address]    Phone:  [phone]    Fax:  [phone]                                       Duane E Sogge   MRN: [id number]    Department:  [facility] Emergency Department   Date of Visit:  4/1/2018           After Visit Summary Signature Page     I have received my discharge instructions, and my questions have been answered. I have discussed any challenges I see with this plan with the nurse or doctor.    ..........................................................................................................................................  Patient/Patient Representative Signature      ..........................................................................................................................................  Patient Representative Print Name and Relationship to Patient    ..................................................               ................................................  Date                                            Time    ..........................................................................................................................................  Reviewed by Signature/Title    ...................................................              ..............................................  Date                                                            Time

## 2018-04-01 NOTE — ED AVS SNAPSHOT
[facility] Emergency Department    [address] [place] [address]    Phone:  [phone]    Fax:  [phone]                                       Duane E Sogge   MRN: [id number]    Department:  [facility] Emergency Department   Date of Visit:  4/1/2018           Patient Information     Date Of Birth          1940        Your diagnoses for this visit were:     Staphylococcal arthritis of left knee (H)       Your next 10 appointments already scheduled     Apr 02, 2018  8:00 AM CDT   Level 2 with NL INFUSION CHAIR 3   [facility] Infusion Services ([facility])    [address] [place] [id number] [phone]            Apr 03, 2018  7:00 AM CDT   US ABDOMEN LIMITED with PHUS1   [facility] Ultrasound ([facility])    [address]  [place] [id number] [phone]           Please bring a list of your medicines (including vitamins, minerals and over-the-counter drugs). Also, tell your doctor about any allergies you may have. Wear comfortable clothes and leave your valuables at home.  Adults: No eating or drinking for 8 hours before the exam. You may take medicine with a small sip of water.  Children: - Children 6+ years: No food or drink for 6 hours before exam. - Children 1-5 years: No food or drink for 4 hours before exam. - Infants, breast-fed: may have breast milk up to 2 hours before exam. - Infants, formula: may have bottle until 4 hours before exam.  Please call the Imaging Department at your exam site with any questions.            Apr 03, 2018  8:00 AM CDT   Level 2 with NL INFUSION CHAIR 3   [facility] Infusion Services ([facility])    [address] [place] [id number] [phone]            Apr 04, 2018  8:00 AM CDT   Level 2 with NL INFUSION CHAIR 3   [facility] Infusion Services ([facility])    [address] [place] [address]    [phone]            Apr 05, 2018  8:00 AM CDT   Level 2 with NL INFUSION CHAIR 4   [facility] Infusion Services ([facility])    [address] [place] [address]   [phone]            Apr 06, 2018  8:00 AM CDT   Level 2 with NL INFUSION CHAIR 3   [facility] Infusion Services ([facility])    [address] [place] [address]   [phone]            Apr 06, 2018 11:00 AM CDT   SHORT with Asa Oliver MD   [facility] ([facility])    [address]  [place] [address]   [phone]            Apr 09, 2018  8:00 AM CDT   Level 2 with NL INFUSION CHAIR 2   [facility] Infusion Services ([facility])    [address] [place] [address] [address] [place] [address]   [phone]            Apr 10, 2018  8:00 AM CDT   Level 2 with NL INFUSION CHAIR 2   [facility] Infusion Services ([facility])    [address] [place] [address] [address] [place] [address]   [phone]            Apr 10, 2018 11:15 AM CDT   Evaluation with Christina Mohr PT   [facility] Physical Therapy ([facility])    [address] [place] [address] [address] [place] [address]   [phone]              24 Hour Appointment Hotline       To make an appointment at any [facility], call [phone] ([phone]). If you don't have a family doctor or clinic, we will help you find one. [place] clinics are conveniently located to serve the needs of you and your family.             Review of your medicines      Our records show that you are taking the medicines listed below. If these are incorrect, please call your family doctor or clinic.        Dose / Directions Last dose taken    ALPRAZolam 0.5 MG tablet   Commonly known as:  XANAX        Take 1 tab up to 4x daily only if needed. Refill when due,no early refills  Indications: Feeling Anxious   Refills:  0        amoxicillin-clavulanate 875-125 MG per  tablet   Commonly known as:  AUGMENTIN   Dose:  1 tablet   Quantity:  20 tablet        Take 1 tablet by mouth 2 times daily   Refills:  0        aspirin 81 MG EC tablet   Dose:  162 mg        Take 162 mg by mouth   Refills:  0        BENZONATATE PO        Refills:  0        GABAPENTIN PO   Dose:  100 mg        Take 100 mg by mouth 3 times daily as needed (anxiety) 1-3 capsules   Refills:  0        HYDROcodone-acetaminophen 5-325 MG per tablet   Commonly known as:  NORCO        Refills:  0        lisinopril-hydrochlorothiazide 20-12.5 MG per tablet   Commonly known as:  PRINZIDE/ZESTORETIC   Dose:  2 tablet        Take 2 tablets by mouth daily   Refills:  0        metoprolol succinate 50 MG 24 hr tablet   Commonly known as:  TOPROL-XL   Dose:  50 mg        Take 50 mg by mouth daily   Refills:  0        MIRTAZAPINE PO   Dose:  15 mg        Take 15 mg by mouth At Bedtime   Refills:  0        rifampin 300 MG capsule   Commonly known as:  RIFADIN   Dose:  600 mg        Take 600 mg by mouth   Refills:  0        senna 8.6 MG tablet   Commonly known as:  SENOKOT        Refills:  0        simvastatin 20 MG tablet   Commonly known as:  ZOCOR   Dose:  20 mg        Take 20 mg by mouth At Bedtime   Refills:  0        TEMAZEPAM PO   Dose:  15 mg        Take 15 mg by mouth nightly as needed for sleep   Refills:  0        TYLENOL PO   Dose:  325 mg        Take 325 mg by mouth as needed for mild pain or fever   Refills:  0        venlafaxine 150 MG Tb24 24 hr tablet   Commonly known as:  EFFEXOR-ER   Dose:  150 mg        Take 150 mg by mouth daily (with breakfast)   Refills:  0                Orders Needing Specimen Collection     None      Pending Results     Date and Time Order Name Status Description    3/30/2018 1218 HEPATITIS A ANTIBODY IGG In process     3/30/2018 1218 HEPATITIS C SCREEN REFLEX TO HCV RNA QUANT AND GENOTYPE In process     3/30/2018 1218 HEPATITIS B SURFACE ANTIGEN In process             Pending Culture Results  "    No orders found from 3/30/2018 to 2018.            Pending Results Instructions     If you had any lab results that were not finalized at the time of your Discharge, you can call the ED Lab Result RN at [phone]. You will be contacted by this team for any positive Lab results or changes in treatment. The nurses are available 7 days a week from 10A to 6:30P.  You can leave a message 24 hours per day and they will return your call.        Thank you for choosing [facility]       Thank you for choosing [facility] for your care. Our goal is always to provide you with excellent care. Hearing back from our patients is one way we can continue to improve our services. Please take a few minutes to complete the written survey that you may receive in the mail after you visit with us. Thank you!        Popdeemhart Information     [employer] lets you send messages to your doctor, view your test results, renew your prescriptions, schedule appointments and more. To sign up, go to [email or web address] . Click on \"Log in\" on the left side of the screen, which will take you to the Welcome page. Then click on \"Sign up Now\" on the right side of the page.     You will be asked to enter the access code listed below, as well as some personal information. Please follow the directions to create your username and password.     Your access code is: [email or web address]  Expires: 2018  5:11 PM     Your access code will  in 90 days. If you need help or a new code, please call your [facility] clinic or [phone].        Care EveryWhere ID     This is your Care EveryWhere ID. This could be used by other organizations to access your [facility] medical records  [id number]        Equal Access to Services     MATT [facility] : Hadamadou [place]o, stevie headley, brittny mcgraw. So [facility] [phone].    ATENCIÓN: Si habla español, tiene a roth disposición servicios gratuitos de " asistencia lingüística. Sheron al [phone].    We comply with applicable federal civil rights laws and [place] laws. We do not discriminate on the basis of race, color, national origin, age, disability, sex, sexual orientation, or gender identity.            After Visit Summary       This is your record. Keep this with you and show to your community pharmacist(s) and doctor(s) at your next visit.

## 2018-04-02 ENCOUNTER — INFUSION THERAPY VISIT (OUTPATIENT)
Dept: INFUSION THERAPY | Facility: CLINIC | Age: 78
End: 2018-04-02
Attending: FAMILY MEDICINE
Payer: MEDICARE

## 2018-04-02 ENCOUNTER — HOSPITAL ENCOUNTER (EMERGENCY)
Facility: CLINIC | Age: 78
Discharge: HOME | End: 2018-04-02
Admitting: FAMILY MEDICINE
Payer: MEDICARE

## 2018-04-02 VITALS
BODY MASS INDEX: 35.81 KG/M2 | RESPIRATION RATE: 18 BRPM | DIASTOLIC BLOOD PRESSURE: 69 MMHG | WEIGHT: 235.5 LBS | TEMPERATURE: 98.2 F | HEART RATE: 49 BPM | OXYGEN SATURATION: 94 % | SYSTOLIC BLOOD PRESSURE: 170 MMHG

## 2018-04-02 VITALS
OXYGEN SATURATION: 97 % | SYSTOLIC BLOOD PRESSURE: 164 MMHG | RESPIRATION RATE: 22 BRPM | TEMPERATURE: 98.7 F | DIASTOLIC BLOOD PRESSURE: 97 MMHG | HEART RATE: 71 BPM

## 2018-04-02 DIAGNOSIS — M00.062 STAPHYLOCOCCAL ARTHRITIS OF LEFT KNEE (H): Primary | ICD-10-CM

## 2018-04-02 DIAGNOSIS — M00.062 STAPHYLOCOCCAL ARTHRITIS OF LEFT KNEE (H): ICD-10-CM

## 2018-04-02 LAB
ALP SERPL-CCNC: 166 U/L (ref 40–150)
ALT SERPL W P-5'-P-CCNC: 116 U/L (ref 0–70)
AST SERPL W P-5'-P-CCNC: 27 U/L (ref 0–45)
BILIRUB SERPL-MCNC: 0.3 MG/DL (ref 0.2–1.3)
HAV IGG SER QL IA: NONREACTIVE
HBV SURFACE AG SERPL QL IA: NONREACTIVE
HCV AB SERPL QL IA: NONREACTIVE

## 2018-04-02 PROCEDURE — 25000128 H RX IP 250 OP 636: Performed by: FAMILY MEDICINE

## 2018-04-02 PROCEDURE — 84450 TRANSFERASE (AST) (SGOT): CPT | Performed by: FAMILY MEDICINE

## 2018-04-02 PROCEDURE — 84075 ASSAY ALKALINE PHOSPHATASE: CPT | Performed by: FAMILY MEDICINE

## 2018-04-02 PROCEDURE — 96365 THER/PROPH/DIAG IV INF INIT: CPT

## 2018-04-02 PROCEDURE — 96366 THER/PROPH/DIAG IV INF ADDON: CPT

## 2018-04-02 PROCEDURE — 40000268 ZZH STATISTIC NO CHARGES

## 2018-04-02 PROCEDURE — 82247 BILIRUBIN TOTAL: CPT | Performed by: FAMILY MEDICINE

## 2018-04-02 PROCEDURE — 84460 ALANINE AMINO (ALT) (SGPT): CPT | Performed by: FAMILY MEDICINE

## 2018-04-02 RX ADMIN — SODIUM CHLORIDE 250 ML: 9 INJECTION, SOLUTION INTRAVENOUS at 19:52

## 2018-04-02 RX ADMIN — VANCOMYCIN HYDROCHLORIDE 1750 MG: 10 INJECTION, POWDER, LYOPHILIZED, FOR SOLUTION INTRAVENOUS at 19:52

## 2018-04-02 RX ADMIN — VANCOMYCIN HYDROCHLORIDE 1750 MG: 10 INJECTION, POWDER, LYOPHILIZED, FOR SOLUTION INTRAVENOUS at 08:13

## 2018-04-02 RX ADMIN — SODIUM CHLORIDE 250 ML: 9 INJECTION, SOLUTION INTRAVENOUS at 08:11

## 2018-04-02 ASSESSMENT — PAIN SCALES - GENERAL: PAINLEVEL: MILD PAIN (2)

## 2018-04-02 NOTE — PROGRESS NOTES
Patient here for vancomycin infusion. Tolerated well. Labs drawn after per  Request. PICC line dressing changed without incident. Discharged in stable condition with walker and wife.

## 2018-04-02 NOTE — PATIENT INSTRUCTIONS
Pt to return on 4/3/18 for vanco/labs, going to ER for 8pm doses. Copies of medication list and upcoming appointments given prior to discharge.

## 2018-04-02 NOTE — MR AVS SNAPSHOT
After Visit Summary   4/2/2018    Duane E Sogge    MRN: [id number]           Patient Information     Date Of Birth          1940        Visit Information        Provider Department      4/2/2018 8:00 AM NL INFUSION CHAIR 3 [facility]        Today's Diagnoses     Staphylococcal arthritis of left knee (H)    -  1      Care Instructions    Pt to return on 4/3/18 for vanco/labs, going to ER for 8pm doses. Copies of medication list and upcoming appointments given prior to discharge.           Follow-ups after your visit        Your next 10 appointments already scheduled     Apr 03, 2018  7:00 AM CDT   US ABDOMEN LIMITED with PHUS1   [facility] ([facility])    [address] [id number] [phone]           Please bring a list of your medicines (including vitamins, minerals and over-the-counter drugs). Also, tell your doctor about any allergies you may have. Wear comfortable clothes and leave your valuables at home.  Adults: No eating or drinking for 8 hours before the exam. You may take medicine with a small sip of water.  Children: - Children 6+ years: No food or drink for 6 hours before exam. - Children 1-5 years: No food or drink for 4 hours before exam. - Infants, breast-fed: may have breast milk up to 2 hours before exam. - Infants, formula: may have bottle until 4 hours before exam.  Please call the Imaging Department at your exam site with any questions.            Apr 03, 2018  8:00 AM CDT   Level 2 with NL INFUSION CHAIR 3   [facility] ([facility])    [address] [place] [address]   [phone]            Apr 04, 2018  8:00 AM CDT   Level 2 with NL INFUSION CHAIR 3   [facility] ([facility])    [address] [place] [address]   [phone]            Apr 05, 2018  8:00 AM CDT   Level 2 with NL INFUSION  CHAIR 4   [facility] ([facility])    [address] [place] [address]   [phone]            Apr 06, 2018  8:00 AM CDT   Level 2 with NL INFUSION CHAIR 3   [facility] ([facility])    [address]   [place] [address]   [phone]            Apr 06, 2018 11:00 AM CDT   SHORT with Asa Oliver MD   [facility] ([facility])    [address]  [place] [address]   [phone]            Apr 09, 2018  8:00 AM CDT   Level 2 with NL INFUSION CHAIR 2   [facility] ([facility])    [address] [place] [address]   [phone]            Apr 10, 2018  8:00 AM CDT   Level 2 with NL INFUSION CHAIR 2   [facility] ([facility])    [address] [place] [address]   [phone]            Apr 10, 2018 11:15 AM CDT   Evaluation with Christina Mohr, LAISHA   [facility] ([facility])    [address] [place] [address] [address] [place] [address]   [phone]            Apr 11, 2018  8:00 AM CDT   Level 2 with NL INFUSION CHAIR 4   [facility] ([facility])    [address] [place] [address]   [phone]              Who to contact     If you have questions or need follow up information about today's clinic visit or your schedule please contact [facility] directly at [phone].  Normal or non-critical lab and imaging results will be communicated to you by MyChart, letter or phone within 4 business days after the clinic has received the results. If you do not hear from us within 7 days, please contact the clinic through MyChart or phone. If you have a critical or abnormal lab result, we will notify you by phone as soon as possible.  Submit refill requests through [email or web address] or  "call your pharmacy and they will forward the refill request to us. Please allow 3 business days for your refill to be completed.          Additional Information About Your Visit        MyChart Information     [employer] lets you send messages to your doctor, view your test results, renew your prescriptions, schedule appointments and more. To sign up, go to [email or web address] . Click on \"Log in\" on the left side of the screen, which will take you to the Welcome page. Then click on \"Sign up Now\" on the right side of the page.     You will be asked to enter the access code listed below, as well as some personal information. Please follow the directions to create your username and password.     Your access code is: [email or web address]  Expires: 2018  5:11 PM     Your access code will  in 90 days. If you need help or a new code, please call your [facility] clinic or [phone].        Care EveryWhere ID     This is your Care EveryWhere ID. This could be used by other organizations to access your [facility] medical records  [id number]        Your Vitals Were     Pulse Temperature Respirations Pulse Oximetry BMI (Body Mass Index)       49 98.2  F (36.8  C) (Temporal) 18 94% 35.81 kg/m2        Blood Pressure from Last 3 Encounters:   18 170/69   18 163/79   18 (!) 189/97    Weight from Last 3 Encounters:   18 106.8 kg (235 lb 8 oz)   18 107 kg (236 lb)   18 106.1 kg (234 lb)              We Performed the Following     Alkaline phosphatase     ALT     AST     Bilirubin  total        Primary Care Provider Office Phone # Fax #    Saqib Chavarria [phone]       [facility] [place] [address] [address]        Equal Access to Services     STARR LEIVA : Joey Reno, wacara headley, qaybta kaallinh payne, brittny ying. [facility] [phone].    ATENCIÓN: Si jey luna " disposición servicios gratuitos de asistencia lingüística. Sheron tapia [phone].    We comply with applicable federal civil rights laws and [place] laws. We do not discriminate on the basis of race, color, national origin, age, disability, sex, sexual orientation, or gender identity.            Thank you!     Thank you for choosing [facility]  for your care. Our goal is always to provide you with excellent care. Hearing back from our patients is one way we can continue to improve our services. Please take a few minutes to complete the written survey that you may receive in the mail after your visit with us. Thank you!             Your Updated Medication List - Protect others around you: Learn how to safely use, store and throw away your medicines at www.disposemymeds.org.          This list is accurate as of 4/2/18 11:26 AM.  Always use your most recent med list.                   Brand Name Dispense Instructions for use Diagnosis    ALPRAZolam 0.5 MG tablet    XANAX     Take 1 tab up to 4x daily only if needed. Refill when due,no early refills  Indications: Feeling Anxious        amoxicillin-clavulanate 875-125 MG per tablet    AUGMENTIN    20 tablet    Take 1 tablet by mouth 2 times daily    Acute maxillary sinusitis, recurrence not specified       aspirin 81 MG EC tablet      Take 162 mg by mouth        BENZONATATE PO           GABAPENTIN PO      Take 100 mg by mouth 3 times daily as needed (anxiety) 1-3 capsules        HYDROcodone-acetaminophen 5-325 MG per tablet    NORCO          lisinopril-hydrochlorothiazide 20-12.5 MG per tablet    PRINZIDE/ZESTORETIC     Take 2 tablets by mouth daily        metoprolol succinate 50 MG 24 hr tablet    TOPROL-XL     Take 50 mg by mouth daily        MIRTAZAPINE PO      Take 15 mg by mouth At Bedtime        rifampin 300 MG capsule    RIFADIN     Take 600 mg by mouth        senna 8.6 MG tablet    SENOKOT          simvastatin 20 MG tablet    ZOCOR      Take 20 mg by mouth At Bedtime        TEMAZEPAM PO      Take 15 mg by mouth nightly as needed for sleep        TYLENOL PO      Take 325 mg by mouth as needed for mild pain or fever        venlafaxine 150 MG Tb24 24 hr tablet    EFFEXOR-ER     Take 150 mg by mouth daily (with breakfast)

## 2018-04-02 NOTE — ED AVS SNAPSHOT
[facility]    [address] [place] [address]    Phone:  [phone]    Fax:  [phone]                                       Duane E Sogge   MRN: [id number]    Department:  [facility]   Date of Visit:  4/2/2018           Patient Information     Date Of Birth          1940        Your diagnoses for this visit were:     Staphylococcal arthritis of left knee (H)       Your next 10 appointments already scheduled     Apr 03, 2018  7:00 AM CDT   US ABDOMEN LIMITED with PHUS1   [facility] ([facility])    [address] [place] [address]  [place] [id number] [phone]           Please bring a list of your medicines (including vitamins, minerals and over-the-counter drugs). Also, tell your doctor about any allergies you may have. Wear comfortable clothes and leave your valuables at home.  Adults: No eating or drinking for 8 hours before the exam. You may take medicine with a small sip of water.  Children: - Children 6+ years: No food or drink for 6 hours before exam. - Children 1-5 years: No food or drink for 4 hours before exam. - Infants, breast-fed: may have breast milk up to 2 hours before exam. - Infants, formula: may have bottle until 4 hours before exam.  Please call the Imaging Department at your exam site with any questions.            Apr 03, 2018  8:00 AM CDT   Level 2 with NL INFUSION CHAIR 3   [facility] ([facility])    [address] [place] [address] [address] [place] [phone] [phone]            Apr 04, 2018  8:00 AM CDT   Level 2 with NL INFUSION CHAIR 3   [facility] ([facility])    [address] [place] [address] [address] [place] [phone] [phone]            Apr 05, 2018  8:00 AM CDT   Level 2 with NL INFUSION CHAIR 4   [facility] ([facility])    [address] [place] [address] [address] [place] [address]    [phone]            Apr 06, 2018  8:00 AM CDT   Level 2 with NL INFUSION CHAIR 3   [facility] ([facility])    [address] [place] [address]   [phone]            Apr 06, 2018 11:00 AM CDT   SHORT with Asa Oliver MD   [facility]    [address]  [place] [address]   [phone]            Apr 09, 2018  8:00 AM CDT   Level 2 with NL INFUSION CHAIR 2   [facility] ([facility])    [address] [place] [address]   [phone]            Apr 10, 2018  8:00 AM CDT   Level 2 with NL INFUSION CHAIR 2   [facility] ([facility])    [address] [place] [address]   [phone]            Apr 10, 2018 11:15 AM CDT   Evaluation with Christina Mohr PT   [facility] ([facility])    [address] [place] [address]   [phone]            Apr 11, 2018  8:00 AM CDT   Level 2 with NL INFUSION CHAIR 4   [facility] ([facility])    [address] [place] [address] [address] [place] [address]   [phone]              24 Hour Appointment Hotline       To make an appointment at any [facility], call [phone] ([phone]). If you don't have a family doctor or clinic, we will help you find one. [facility] are conveniently located to serve the needs of you and your family.             Review of your medicines      Our records show that you are taking the medicines listed below. If these are incorrect, please call your family doctor or clinic.        Dose / Directions Last dose taken    ALPRAZolam 0.5 MG tablet   Commonly known as:  XANAX        Take 1 tab up to 4x daily only if needed. Refill when due,no early refills  Indications: Feeling Anxious   Refills:  0        amoxicillin-clavulanate 875-125 MG per  tablet   Commonly known as:  AUGMENTIN   Dose:  1 tablet   Quantity:  20 tablet        Take 1 tablet by mouth 2 times daily   Refills:  0        aspirin 81 MG EC tablet   Dose:  162 mg        Take 162 mg by mouth   Refills:  0        BENZONATATE PO        Refills:  0        GABAPENTIN PO   Dose:  100 mg        Take 100 mg by mouth 3 times daily as needed (anxiety) 1-3 capsules   Refills:  0        HYDROcodone-acetaminophen 5-325 MG per tablet   Commonly known as:  NORCO        Refills:  0        lisinopril-hydrochlorothiazide 20-12.5 MG per tablet   Commonly known as:  PRINZIDE/ZESTORETIC   Dose:  2 tablet        Take 2 tablets by mouth daily   Refills:  0        metoprolol succinate 50 MG 24 hr tablet   Commonly known as:  TOPROL-XL   Dose:  50 mg        Take 50 mg by mouth daily   Refills:  0        MIRTAZAPINE PO   Dose:  15 mg        Take 15 mg by mouth At Bedtime   Refills:  0        rifampin 300 MG capsule   Commonly known as:  RIFADIN   Dose:  600 mg        Take 600 mg by mouth   Refills:  0        senna 8.6 MG tablet   Commonly known as:  SENOKOT        Refills:  0        simvastatin 20 MG tablet   Commonly known as:  ZOCOR   Dose:  20 mg        Take 20 mg by mouth At Bedtime   Refills:  0        TEMAZEPAM PO   Dose:  15 mg        Take 15 mg by mouth nightly as needed for sleep   Refills:  0        TYLENOL PO   Dose:  325 mg        Take 325 mg by mouth as needed for mild pain or fever   Refills:  0        venlafaxine 150 MG Tb24 24 hr tablet   Commonly known as:  EFFEXOR-ER   Dose:  150 mg        Take 150 mg by mouth daily (with breakfast)   Refills:  0                Orders Needing Specimen Collection     None      Pending Results     No orders found from 3/31/2018 to 4/3/2018.            Pending Culture Results     No orders found from 3/31/2018 to 4/3/2018.            Pending Results Instructions     If you had any lab results that were not finalized at the time of your Discharge, you can call the ED Lab  "Result RN at [phone]. You will be contacted by this team for any positive Lab results or changes in treatment. The nurses are available 7 days a week from 10A to 6:30P.  You can leave a message 24 hours per day and they will return your call.        Thank you for choosing [facility]       Thank you for choosing [facility] for your care. Our goal is always to provide you with excellent care. Hearing back from our patients is one way we can continue to improve our services. Please take a few minutes to complete the written survey that you may receive in the mail after you visit with us. Thank you!        apstrataharGetNotes Information     [employer] lets you send messages to your doctor, view your test results, renew your prescriptions, schedule appointments and more. To sign up, go to [email or web address] . Click on \"Log in\" on the left side of the screen, which will take you to the Welcome page. Then click on \"Sign up Now\" on the right side of the page.     You will be asked to enter the access code listed below, as well as some personal information. Please follow the directions to create your username and password.     Your access code is: [email or web address]  Expires: 2018  5:11 PM     Your access code will  in 90 days. If you need help or a new code, please call your [facility] clinic or [phone].        Care EveryWhere ID     This is your Care EveryWhere ID. This could be used by other organizations to access your [facility] medical records  [id number]        Equal Access to Services     STARR LEIVA : Hadii carlene layton hadasho Soomaali, waaxda luqadaha, qaybta kaalmada adeegyada, brittny sanderson . So [facility] [phone].    ATENCIÓN: Si habla español, tiene a roth disposición servicios gratuitos de asistencia lingüística. Llame al [phone].    We comply with applicable federal civil rights laws and [place] laws. We do not discriminate on the basis of race, color, national origin, age, " disability, sex, sexual orientation, or gender identity.            After Visit Summary       This is your record. Keep this with you and show to your community pharmacist(s) and doctor(s) at your next visit.

## 2018-04-02 NOTE — ED AVS SNAPSHOT
[facility] Emergency Department    [address] [place] [address]    Phone:  [phone]    Fax:  [phone]                                       Duane E Sogge   MRN: [id number]    Department:  [facility] Emergency [facility]   Date of Visit:  4/2/2018           After Visit Summary Signature Page     I have received my discharge instructions, and my questions have been answered. I have discussed any challenges I see with this plan with the nurse or doctor.    ..........................................................................................................................................  Patient/Patient Representative Signature      ..........................................................................................................................................  Patient Representative Print Name and Relationship to Patient    ..................................................               ................................................  Date                                            Time    ..........................................................................................................................................  Reviewed by Signature/Title    ...................................................              ..............................................  Date                                                            Time

## 2018-04-03 ENCOUNTER — HOSPITAL ENCOUNTER (EMERGENCY)
Facility: CLINIC | Age: 78
Discharge: HOME | End: 2018-04-03
Admitting: PHYSICIAN ASSISTANT
Payer: MEDICARE

## 2018-04-03 ENCOUNTER — HOSPITAL ENCOUNTER (OUTPATIENT)
Dept: ULTRASOUND IMAGING | Facility: CLINIC | Age: 78
Discharge: HOME | End: 2018-04-03
Attending: FAMILY MEDICINE | Admitting: FAMILY MEDICINE
Payer: MEDICARE

## 2018-04-03 ENCOUNTER — INFUSION THERAPY VISIT (OUTPATIENT)
Dept: INFUSION THERAPY | Facility: CLINIC | Age: 78
End: 2018-04-03
Attending: FAMILY MEDICINE
Payer: MEDICARE

## 2018-04-03 ENCOUNTER — TELEPHONE (OUTPATIENT)
Dept: FAMILY MEDICINE | Facility: CLINIC | Age: 78
End: 2018-04-03

## 2018-04-03 VITALS
RESPIRATION RATE: 16 BRPM | HEART RATE: 53 BPM | TEMPERATURE: 98.1 F | SYSTOLIC BLOOD PRESSURE: 165 MMHG | DIASTOLIC BLOOD PRESSURE: 78 MMHG | OXYGEN SATURATION: 96 %

## 2018-04-03 VITALS
RESPIRATION RATE: 16 BRPM | TEMPERATURE: 97.1 F | HEART RATE: 72 BPM | SYSTOLIC BLOOD PRESSURE: 165 MMHG | DIASTOLIC BLOOD PRESSURE: 89 MMHG | OXYGEN SATURATION: 98 %

## 2018-04-03 DIAGNOSIS — M00.062 STAPHYLOCOCCAL ARTHRITIS OF LEFT KNEE (H): ICD-10-CM

## 2018-04-03 DIAGNOSIS — R79.89 ELEVATED LFTS: ICD-10-CM

## 2018-04-03 DIAGNOSIS — M00.062 STAPHYLOCOCCAL ARTHRITIS OF LEFT KNEE (H): Primary | ICD-10-CM

## 2018-04-03 LAB
BASOPHILS # BLD AUTO: 0.1 10E9/L (ref 0–0.2)
BASOPHILS NFR BLD AUTO: 2 %
CREAT SERPL-MCNC: 0.69 MG/DL (ref 0.66–1.25)
CRP SERPL-MCNC: 6.7 MG/L (ref 0–8)
DIFFERENTIAL METHOD BLD: ABNORMAL
EOSINOPHIL # BLD AUTO: 0.5 10E9/L (ref 0–0.7)
EOSINOPHIL NFR BLD AUTO: 8 %
ERYTHROCYTE [DISTWIDTH] IN BLOOD BY AUTOMATED COUNT: 12.8 % (ref 10–15)
GFR SERPL CREATININE-BSD FRML MDRD: >90 ML/MIN/1.7M2
HCT VFR BLD AUTO: 36 % (ref 40–53)
HGB BLD-MCNC: 11.2 G/DL (ref 13.3–17.7)
LYMPHOCYTES # BLD AUTO: 1.3 10E9/L (ref 0.8–5.3)
LYMPHOCYTES NFR BLD AUTO: 20 %
MCH RBC QN AUTO: 31.5 PG (ref 26.5–33)
MCHC RBC AUTO-ENTMCNC: 31.1 G/DL (ref 31.5–36.5)
MCV RBC AUTO: 101 FL (ref 78–100)
MONOCYTES # BLD AUTO: 0.8 10E9/L (ref 0–1.3)
MONOCYTES NFR BLD AUTO: 12 %
NEUTROPHILS # BLD AUTO: 3.8 10E9/L (ref 1.6–8.3)
NEUTROPHILS NFR BLD AUTO: 58 %
PLATELET # BLD AUTO: 334 10E9/L (ref 150–450)
PLATELET # BLD EST: ABNORMAL 10*3/UL
RBC # BLD AUTO: 3.55 10E12/L (ref 4.4–5.9)
RBC MORPH BLD: ABNORMAL
VANCOMYCIN SERPL-MCNC: 17.6 MG/L
WBC # BLD AUTO: 6.5 10E9/L (ref 4–11)

## 2018-04-03 PROCEDURE — 86140 C-REACTIVE PROTEIN: CPT | Performed by: FAMILY MEDICINE

## 2018-04-03 PROCEDURE — 80202 ASSAY OF VANCOMYCIN: CPT | Performed by: FAMILY MEDICINE

## 2018-04-03 PROCEDURE — 40000268 ZZH STATISTIC NO CHARGES: Performed by: PHYSICIAN ASSISTANT

## 2018-04-03 PROCEDURE — 25000132 ZZH RX MED GY IP 250 OP 250 PS 637: Mod: GY | Performed by: NURSE PRACTITIONER

## 2018-04-03 PROCEDURE — 25000128 H RX IP 250 OP 636: Performed by: FAMILY MEDICINE

## 2018-04-03 PROCEDURE — 96365 THER/PROPH/DIAG IV INF INIT: CPT

## 2018-04-03 PROCEDURE — A9270 NON-COVERED ITEM OR SERVICE: HCPCS | Mod: GY | Performed by: NURSE PRACTITIONER

## 2018-04-03 PROCEDURE — 82565 ASSAY OF CREATININE: CPT | Performed by: FAMILY MEDICINE

## 2018-04-03 PROCEDURE — 85025 COMPLETE CBC W/AUTO DIFF WBC: CPT | Performed by: FAMILY MEDICINE

## 2018-04-03 PROCEDURE — 76705 ECHO EXAM OF ABDOMEN: CPT

## 2018-04-03 PROCEDURE — 96366 THER/PROPH/DIAG IV INF ADDON: CPT | Performed by: PHYSICIAN ASSISTANT

## 2018-04-03 PROCEDURE — 96366 THER/PROPH/DIAG IV INF ADDON: CPT

## 2018-04-03 PROCEDURE — 96365 THER/PROPH/DIAG IV INF INIT: CPT | Performed by: PHYSICIAN ASSISTANT

## 2018-04-03 RX ORDER — ACETAMINOPHEN 325 MG/1
650 TABLET ORAL ONCE
Status: CANCELLED
Start: 2018-04-03 | End: 2018-04-03

## 2018-04-03 RX ORDER — ACETAMINOPHEN 325 MG/1
650 TABLET ORAL ONCE
Status: COMPLETED | OUTPATIENT
Start: 2018-04-03 | End: 2018-04-03

## 2018-04-03 RX ADMIN — SODIUM CHLORIDE 250 ML: 9 INJECTION, SOLUTION INTRAVENOUS at 08:45

## 2018-04-03 RX ADMIN — VANCOMYCIN HYDROCHLORIDE 1750 MG: 10 INJECTION, POWDER, LYOPHILIZED, FOR SOLUTION INTRAVENOUS at 19:16

## 2018-04-03 RX ADMIN — ACETAMINOPHEN 650 MG: 325 TABLET ORAL at 10:29

## 2018-04-03 RX ADMIN — VANCOMYCIN HYDROCHLORIDE 1750 MG: 10 INJECTION, POWDER, LYOPHILIZED, FOR SOLUTION INTRAVENOUS at 08:52

## 2018-04-03 ASSESSMENT — PAIN SCALES - GENERAL: PAINLEVEL: NO PAIN (0)

## 2018-04-03 NOTE — MR AVS SNAPSHOT
After Visit Summary   4/3/2018    Duane E Sogge    MRN: [id number]           Patient Information     Date Of Birth          1940        Visit Information        Provider Department      4/3/2018 8:00 AM NL INFUSION CHAIR 3 [facility]        Today's Diagnoses     Staphylococcal arthritis of left knee (H)    -  1      Care Instructions    Pt to return on 4/4 for Vancomycin. Copies of medication list and upcoming appointments given prior to discharge.           Follow-ups after your visit        Follow-up notes from your care team     Return in 1 day (on 4/4/2018).      Your next 10 appointments already scheduled     Apr 04, 2018  8:00 AM CDT   Level 2 with NL INFUSION CHAIR 3   [facility] ([facility])    [address] [place] [address] Dr Radha [place] [address]   [phone]            Apr 05, 2018  8:00 AM CDT   Level 2 with NL INFUSION CHAIR 4   [facility] ([facility])    [address] [place] [address] Dr Radha [place] [address]   [phone]            Apr 06, 2018  8:00 AM CDT   Level 2 with NL INFUSION CHAIR 3   [facility] ([facility])    [address] [place] [address] Dr Radha [place] [address]   [phone]            Apr 06, 2018 11:00 AM CDT   SHORT with Asa Oliver MD   [facility] ([facility])    [address] [place] [address]   [phone]            Apr 09, 2018  8:00 AM CDT   Level 2 with NL INFUSION CHAIR 2   [facility] ([facility])    [place] [address]   [phone]            Apr 10, 2018  8:00 AM CDT   Level 2 with NL INFUSION CHAIR 2   [facility] ([facility])    [address] [place] [address] Dr Radha [place] [address]   [phone]            Apr 10, 2018 11:15 AM CDT   Evaluation with Christina Mohr PT   [place]  "[facility] ([facility])    [address] [facility] [address] [place] [address]   [phone]            Apr 11, 2018  8:00 AM CDT   Level 2 with NL INFUSION CHAIR 4   [facility] ([facility])    [address] [facility] [address] [place] [address]   [phone]            Apr 12, 2018  8:30 AM CDT   Level 2 with NL INFUSION CHAIR 2   [facility] ([facility])    [address] [facility] [address] [place] [address]   [phone]            Apr 13, 2018  8:00 AM CDT   Level 2 with NL INFUSION CHAIR 1   [facility] ([facility])    [address] [facility] [address]   [place] [address]   [phone]              Who to contact     If you have questions or need follow up information about today's clinic visit or your schedule please contact [facility] directly at [phone].  Normal or non-critical lab and imaging results will be communicated to you by [employer] Labshart, letter or phone within 4 business days after the clinic has received the results. If you do not hear from us within 7 days, please contact the clinic through [employer] or phone. If you have a critical or abnormal lab result, we will notify you by phone as soon as possible.  Submit refill requests through HiperScan or call your pharmacy and they will forward the refill request to us. Please allow 3 business days for your refill to be completed.          Additional Information About Your Visit        HiperScan Information     HiperScan lets you send messages to your doctor, view your test results, renew your prescriptions, schedule appointments and more. To sign up, go to [email or web address] . Click on \"Log in\" on the left side of the screen, which will take you to the Welcome page. Then click on \"Sign up Now\" on the right side of the page.     You will be asked to enter the access code listed below, as well as some personal " information. Please follow the directions to create your username and password.     Your access code is: [email or web address]  Expires: 2018  5:11 PM     Your access code will  in 90 days. If you need help or a new code, please call your [place] clinic or [phone].        Care EveryWhere ID     This is your Care EveryWhere ID. This could be used by other organizations to access your [facility] medical records  [id number]        Your Vitals Were     Pulse Temperature Respirations Pulse Oximetry          53 98.1  F (36.7  C) (Temporal) 16 96%         Blood Pressure from Last 3 Encounters:   18 165/78   18 (!) 164/97   18 170/69    Weight from Last 3 Encounters:   18 106.8 kg (235 lb 8 oz)   18 107 kg (236 lb)   18 106.1 kg (234 lb)              We Performed the Following     CBC with platelets differential     Creatinine     CRP inflammation     Vancomycin level        Primary Care Provider Office Phone # Fax #    Saqib Chavarria [phone]       [facility] [address] [facility] [address]  [facility] [address]        Equal Access to Services     STARR LEIVA : Hadii aad ku hadasho Soomaali, waaxda luqadaha, qaybta kaalmada adeegyada, waxay idiin hayfaizan lalit sanderson . So [facility] [phone].    ATENCIÓN: Si habla español, tiene a roth disposición servicios gratuitos de asistencia lingüística. Llame al [phone].    We comply with applicable federal civil rights laws and [place] laws. We do not discriminate on the basis of race, color, national origin, age, disability, sex, sexual orientation, or gender identity.            Thank you!     Thank you for choosing [facility]  for your care. Our goal is always to provide you with excellent care. Hearing back from our patients is one way we can continue to improve our services. Please take a few minutes to complete the written survey that you may receive in the mail after  your visit with us. Thank you!             Your Updated Medication List - Protect others around you: Learn how to safely use, store and throw away your medicines at www.disposemymeds.org.          This list is accurate as of 4/3/18  2:11 PM.  Always use your most recent med list.                   Brand Name Dispense Instructions for use Diagnosis    ALPRAZolam 0.5 MG tablet    XANAX     Take 1 tab up to 4x daily only if needed. Refill when due,no early refills  Indications: Feeling Anxious        amoxicillin-clavulanate 875-125 MG per tablet    AUGMENTIN    20 tablet    Take 1 tablet by mouth 2 times daily    Acute maxillary sinusitis, recurrence not specified       aspirin 81 MG EC tablet      Take 162 mg by mouth        BENZONATATE PO           GABAPENTIN PO      Take 100 mg by mouth 3 times daily as needed (anxiety) 1-3 capsules        HYDROcodone-acetaminophen 5-325 MG per tablet    NORCO          lisinopril-hydrochlorothiazide 20-12.5 MG per tablet    PRINZIDE/ZESTORETIC     Take 2 tablets by mouth daily        metoprolol succinate 50 MG 24 hr tablet    TOPROL-XL     Take 50 mg by mouth daily        MIRTAZAPINE PO      Take 15 mg by mouth At Bedtime        rifampin 300 MG capsule    RIFADIN     Take 600 mg by mouth        senna 8.6 MG tablet    SENOKOT          simvastatin 20 MG tablet    ZOCOR     Take 20 mg by mouth At Bedtime        TEMAZEPAM PO      Take 15 mg by mouth nightly as needed for sleep        TYLENOL PO      Take 325 mg by mouth as needed for mild pain or fever        venlafaxine 150 MG Tb24 24 hr tablet    EFFEXOR-ER     Take 150 mg by mouth daily (with breakfast)

## 2018-04-03 NOTE — PATIENT INSTRUCTIONS
Pt to return on 4/4 for Vancomycin. Copies of medication list and upcoming appointments given prior to discharge.

## 2018-04-03 NOTE — TELEPHONE ENCOUNTER
----- Message from Asa Oliver MD sent at 4/3/2018  3:20 PM CDT -----  Please call the patient with the results. Us shows fatty liver disease. This can cause and elevation in lfts as well has his meds. Cure is weight loss.  We can discuss more at his follow-up     Asa Oliver MD

## 2018-04-03 NOTE — PROGRESS NOTES
Here for IV Vancomycin, Vanco level = 17.6 today.  Tolerated dose well and discharged in stable condition.

## 2018-04-03 NOTE — ED NOTES
Vanco has been infusing for 2 hours, bag is only half empty.  Rate programmed on pump is double checked, is set at 250ml/hour for a 500ml bag.  Pharmacy is consulted, recommends infusing remainder at a rate of 500mg/30 minutes.  He has approx 800mg remaining, OK per pharmacy to infuse remainder over 45 minutes.

## 2018-04-04 ENCOUNTER — INFUSION THERAPY VISIT (OUTPATIENT)
Dept: INFUSION THERAPY | Facility: CLINIC | Age: 78
End: 2018-04-04
Attending: FAMILY MEDICINE
Payer: MEDICARE

## 2018-04-04 ENCOUNTER — HOSPITAL ENCOUNTER (EMERGENCY)
Facility: CLINIC | Age: 78
Discharge: HOME | End: 2018-04-04
Admitting: FAMILY MEDICINE
Payer: MEDICARE

## 2018-04-04 VITALS
DIASTOLIC BLOOD PRESSURE: 76 MMHG | RESPIRATION RATE: 18 BRPM | WEIGHT: 234.4 LBS | TEMPERATURE: 98.7 F | OXYGEN SATURATION: 96 % | HEART RATE: 52 BPM | BODY MASS INDEX: 35.64 KG/M2 | SYSTOLIC BLOOD PRESSURE: 167 MMHG

## 2018-04-04 VITALS
OXYGEN SATURATION: 97 % | WEIGHT: 236.4 LBS | HEART RATE: 78 BPM | SYSTOLIC BLOOD PRESSURE: 162 MMHG | DIASTOLIC BLOOD PRESSURE: 84 MMHG | BODY MASS INDEX: 35.94 KG/M2 | TEMPERATURE: 98.2 F | RESPIRATION RATE: 20 BRPM

## 2018-04-04 DIAGNOSIS — M00.062 STAPHYLOCOCCAL ARTHRITIS OF LEFT KNEE (H): ICD-10-CM

## 2018-04-04 DIAGNOSIS — M00.062 STAPHYLOCOCCAL ARTHRITIS OF LEFT KNEE (H): Primary | ICD-10-CM

## 2018-04-04 PROCEDURE — 96365 THER/PROPH/DIAG IV INF INIT: CPT

## 2018-04-04 PROCEDURE — 25000128 H RX IP 250 OP 636: Performed by: FAMILY MEDICINE

## 2018-04-04 PROCEDURE — 96366 THER/PROPH/DIAG IV INF ADDON: CPT

## 2018-04-04 PROCEDURE — 40000268 ZZH STATISTIC NO CHARGES

## 2018-04-04 RX ADMIN — SODIUM CHLORIDE 250 ML: 9 INJECTION, SOLUTION INTRAVENOUS at 22:05

## 2018-04-04 RX ADMIN — VANCOMYCIN HYDROCHLORIDE 1750 MG: 1 INJECTION, POWDER, LYOPHILIZED, FOR SOLUTION INTRAVENOUS at 08:05

## 2018-04-04 RX ADMIN — VANCOMYCIN HYDROCHLORIDE 1750 MG: 10 INJECTION, POWDER, LYOPHILIZED, FOR SOLUTION INTRAVENOUS at 08:14

## 2018-04-04 RX ADMIN — SODIUM CHLORIDE 250 ML: 9 INJECTION, SOLUTION INTRAVENOUS at 08:13

## 2018-04-04 ASSESSMENT — PAIN SCALES - GENERAL: PAINLEVEL: MILD PAIN (2)

## 2018-04-04 NOTE — TELEPHONE ENCOUNTER
Patients wife calling back because patient did not quite understand the message with results that the nurse relayed to him. Patients wife states that if possible, please leave all messages/results with her in the future. They do have a consent to communicate on file.

## 2018-04-04 NOTE — MR AVS SNAPSHOT
After Visit Summary   4/4/2018    Duane E Sogge    MRN: [id number]           Patient Information     Date Of Birth          1940        Visit Information        Provider Department      4/4/2018 8:00 AM NL INFUSION CHAIR 3 [facility]        Today's Diagnoses     Staphylococcal arthritis of left knee (H)    -  1      Care Instructions    Pt to return on 4/5/18 for vanco, going to ER every evening. Copies of medication list and upcoming appointments given prior to discharge.           Follow-ups after your visit        Your next 10 appointments already scheduled     Apr 05, 2018  8:00 AM CDT   Level 2 with NL INFUSION CHAIR 4   [facility] ([facility])    [address] [place] [address] Dr [place]ha [place] [address]   [phone]            Apr 06, 2018  8:00 AM CDT   Level 2 with NL INFUSION CHAIR 3   [facility] ([facility])    [address] [place] [address] Dr Radha [place] [address]   [phone]            Apr 06, 2018 11:00 AM CDT   SHORT with Asa Oliver MD   [facility] ([facility])    [address] [place], [place] [address] [address]   [phone]            Apr 09, 2018  8:00 AM CDT   Level 2 with NL INFUSION CHAIR 2   [facility] ([facility])    [address] [place] [address] Dr Radha [place] [address]   [phone]            Apr 10, 2018  8:00 AM CDT   Level 2 with NL INFUSION CHAIR 2   [facility] ([facility])    [address] [place] [address] Dr Radha [place] [address]   [phone]            Apr 10, 2018 11:15 AM CDT   Evaluation with Christina Mohr, LAISHA   [facility] ([facility])    [address] [place] [address] Dr [place]ha [place] [address]   [phone]            Apr 11, 2018  8:00 AM CDT   Level 2 with NL INFUSION CHAIR 4   [facility] ([facility])  "   [address] [facility] [address] [place] [place] [address]   [phone]            Apr 12, 2018  8:30 AM CDT   Level 2 with NL INFUSION CHAIR 2   [facility] ([facility])    [address] [place] [place] [address]   [phone]            Apr 13, 2018  8:00 AM CDT   Level 2 with NL INFUSION CHAIR 1   [facility] ([facility])    [address] [place] [place] [address]   [phone]            Apr 16, 2018  8:00 AM CDT   Level 2 with NL INFUSION CHAIR 1   [facility] ([facility])    [address] [facility] [address] [facility] [address] [place] [place] [address]   [phone]              Who to contact     If you have questions or need follow up information about today's clinic visit or your schedule please contact [facility] IN[facility] directly at [phone].  Normal or non-critical lab and imaging results will be communicated to you by Glasshart, letter or phone within 4 business days after the clinic has received the results. If you do not hear from us within 7 days, please contact the clinic through AdScoret or phone. If you have a critical or abnormal lab result, we will notify you by phone as soon as possible.  Submit refill requests through WiChorus or call your pharmacy and they will forward the refill request to us. Please allow 3 business days for your refill to be completed.          Additional Information About Your Visit        WiChorus Information     WiChorus lets you send messages to your doctor, view your test results, renew your prescriptions, schedule appointments and more. To sign up, go to [email or web address] . Click on \"Log in\" on the left side of the screen, which will take you to the Welcome page. Then click on \"Sign up Now\" on the right side of the page.     You will be asked to enter the access code listed below, as well as some personal information. Please follow the directions to create your " username and password.     Your access code is: [email or web address]  Expires: 2018  5:11 PM     Your access code will  in 90 days. If you need help or a new code, please call your [place] clinic or [phone].        Care EveryWhere ID     This is your Care EveryWhere ID. This could be used by other organizations to access your [facility] medical records  [id number]        Your Vitals Were     Pulse Temperature Respirations Pulse Oximetry BMI (Body Mass Index)       52 98.7  F (37.1  C) (Temporal) 18 96% 35.64 kg/m2        Blood Pressure from Last 3 Encounters:   18 167/76   18 165/89   18 165/78    Weight from Last 3 Encounters:   18 106.3 kg (234 lb 6.4 oz)   18 106.8 kg (235 lb 8 oz)   18 107 kg (236 lb)              Today, you had the following     No orders found for display       Primary Care Provider Office Phone # Fax #    Saqib Chavarria [phone]       [facility] [address]  [facility] [address]        Equal Access to Services     STARR LEIVA : Hadii aad ku hadasho Soomaali, waaxda luqadaha, qaybta kaalmada adeegyada, waxay lindsey sanderson . So [facility] [phone].    ATENCIÓN: Si habla español, tiene a roth disposición servicios gratuitos de asistencia lingüística. Llame al [phone].    We comply with applicable federal civil rights laws and [place] laws. We do not discriminate on the basis of race, color, national origin, age, disability, sex, sexual orientation, or gender identity.            Thank you!     Thank you for choosing [facility]  for your care. Our goal is always to provide you with excellent care. Hearing back from our patients is one way we can continue to improve our services. Please take a few minutes to complete the written survey that you may receive in the mail after your visit with us. Thank you!             Your Updated Medication List - Protect  others around you: Learn how to safely use, store and throw away your medicines at www.disposemymeds.org.          This list is accurate as of 4/4/18 10:43 AM.  Always use your most recent med list.                   Brand Name Dispense Instructions for use Diagnosis    ALPRAZolam 0.5 MG tablet    XANAX     Take 1 tab up to 4x daily only if needed. Refill when due,no early refills  Indications: Feeling Anxious        amoxicillin-clavulanate 875-125 MG per tablet    AUGMENTIN    20 tablet    Take 1 tablet by mouth 2 times daily    Acute maxillary sinusitis, recurrence not specified       aspirin 81 MG EC tablet      Take 162 mg by mouth        BENZONATATE PO           GABAPENTIN PO      Take 100 mg by mouth 3 times daily as needed (anxiety) 1-3 capsules        HYDROcodone-acetaminophen 5-325 MG per tablet    NORCO          lisinopril-hydrochlorothiazide 20-12.5 MG per tablet    PRINZIDE/ZESTORETIC     Take 2 tablets by mouth daily        metoprolol succinate 50 MG 24 hr tablet    TOPROL-XL     Take 50 mg by mouth daily        MIRTAZAPINE PO      Take 15 mg by mouth At Bedtime        rifampin 300 MG capsule    RIFADIN     Take 600 mg by mouth        senna 8.6 MG tablet    SENOKOT          simvastatin 20 MG tablet    ZOCOR     Take 20 mg by mouth At Bedtime        TEMAZEPAM PO      Take 15 mg by mouth nightly as needed for sleep        TYLENOL PO      Take 325 mg by mouth as needed for mild pain or fever        venlafaxine 150 MG Tb24 24 hr tablet    EFFEXOR-ER     Take 150 mg by mouth daily (with breakfast)

## 2018-04-04 NOTE — PROGRESS NOTES
Patient here for vancomycin and tolerated well. Positive blood return pre/post in PICC line. Discharged in stable condition.

## 2018-04-04 NOTE — PATIENT INSTRUCTIONS
Pt to return on 4/5/18 for vanco, going to ER every evening. Copies of medication list and upcoming appointments given prior to discharge.

## 2018-04-05 ENCOUNTER — INFUSION THERAPY VISIT (OUTPATIENT)
Dept: INFUSION THERAPY | Facility: CLINIC | Age: 78
End: 2018-04-05
Attending: FAMILY MEDICINE
Payer: MEDICARE

## 2018-04-05 ENCOUNTER — HOSPITAL ENCOUNTER (EMERGENCY)
Facility: CLINIC | Age: 78
Discharge: HOME | End: 2018-04-05
Admitting: FAMILY MEDICINE
Payer: MEDICARE

## 2018-04-05 VITALS
HEART RATE: 59 BPM | DIASTOLIC BLOOD PRESSURE: 74 MMHG | SYSTOLIC BLOOD PRESSURE: 153 MMHG | TEMPERATURE: 99.1 F | OXYGEN SATURATION: 95 % | RESPIRATION RATE: 20 BRPM

## 2018-04-05 VITALS
SYSTOLIC BLOOD PRESSURE: 147 MMHG | RESPIRATION RATE: 16 BRPM | TEMPERATURE: 98.8 F | DIASTOLIC BLOOD PRESSURE: 73 MMHG | OXYGEN SATURATION: 97 % | HEART RATE: 54 BPM

## 2018-04-05 DIAGNOSIS — M00.062 STAPHYLOCOCCAL ARTHRITIS OF LEFT KNEE (H): ICD-10-CM

## 2018-04-05 DIAGNOSIS — M00.062 STAPHYLOCOCCAL ARTHRITIS OF LEFT KNEE (H): Primary | ICD-10-CM

## 2018-04-05 PROCEDURE — 96366 THER/PROPH/DIAG IV INF ADDON: CPT

## 2018-04-05 PROCEDURE — 25000128 H RX IP 250 OP 636: Performed by: FAMILY MEDICINE

## 2018-04-05 PROCEDURE — 96365 THER/PROPH/DIAG IV INF INIT: CPT | Performed by: FAMILY MEDICINE

## 2018-04-05 PROCEDURE — 96365 THER/PROPH/DIAG IV INF INIT: CPT

## 2018-04-05 PROCEDURE — 40000268 ZZH STATISTIC NO CHARGES: Performed by: FAMILY MEDICINE

## 2018-04-05 PROCEDURE — 96366 THER/PROPH/DIAG IV INF ADDON: CPT | Performed by: FAMILY MEDICINE

## 2018-04-05 RX ADMIN — VANCOMYCIN HYDROCHLORIDE 1750 MG: 1 INJECTION, POWDER, LYOPHILIZED, FOR SOLUTION INTRAVENOUS at 19:40

## 2018-04-05 RX ADMIN — SODIUM CHLORIDE 250 ML: 9 INJECTION, SOLUTION INTRAVENOUS at 08:45

## 2018-04-05 RX ADMIN — VANCOMYCIN HYDROCHLORIDE 1750 MG: 10 INJECTION, POWDER, LYOPHILIZED, FOR SOLUTION INTRAVENOUS at 08:46

## 2018-04-05 NOTE — MR AVS SNAPSHOT
After Visit Summary   4/5/2018    Duane E Sogge    MRN: [id number]           Patient Information     Date Of Birth          1940        Visit Information        Provider Department      4/5/2018 8:00 AM NL INFUSION CHAIR 4 [facility]fusion [facility]        Today's Diagnoses     Staphylococcal arthritis of left knee (H)    -  1      Care Instructions    Pt to return on 4/6/18 for Vancomycin, going to ER for 8pm dose. . Copies of medication list and upcoming appointments given prior to discharge.           Follow-ups after your visit        Your next 10 appointments already scheduled     Apr 06, 2018  8:00 AM CDT   Level 2 with NL INFUSION CHAIR 3   [facility] Infusion [facility] ([facility])    [address] [place] [address] Dr [place] [address]   [phone]            Apr 06, 2018 11:00 AM CDT   SHORT with Asa Oliver MD   [facility] ([facility])    [address] [place], [place] [address] [place] [address]   [phone]            Apr 09, 2018  8:00 AM CDT   Level 2 with NL INFUSION CHAIR 2   [facility]fusion [facility] ([facility])    [address] [place] [address] Dr [place] [address]   [phone]            Apr 10, 2018  8:00 AM CDT   Level 2 with NL INFUSION CHAIR 2   [facility] ([facility])    [address] [place] [address] Dr [place]ha [place] [address]   [phone]            Apr 10, 2018 11:15 AM CDT   Evaluation with Christina Mohr, LAISHA   [facility] ([facility])    [address] [place] [address] Dr [place]ha [place] [address]   [phone]            Apr 11, 2018  8:00 AM CDT   Level 2 with NL INFUSION CHAIR 4   [facility] ([facility])    [address] [place] [address] Dr [place] [address]   [phone]            Apr 12, 2018  8:30 AM CDT   Level 2 with NL INFUSION CHAIR 2   [facility] ([facility]  "[facility])    [address] [place] [address]   [phone]            Apr 13, 2018  8:00 AM CDT   Level 2 with NL INFUSION CHAIR 1   [facility] Infusion Services ([facility])    [address]   [place] [address]   [phone]            Apr 16, 2018  8:00 AM CDT   Level 2 with NL INFUSION CHAIR 1   [facility] ([facility])    [address]   [place] [address]   [phone]            Apr 17, 2018  8:00 AM CDT   Level 2 with NL INFUSION CHAIR 1   [facility] ([facility])    [address] [place] [address]   [phone]              Who to contact     If you have questions or need follow up information about today's clinic visit or your schedule please contact [facility] INFUSION [facility] directly at [phone].  Normal or non-critical lab and imaging results will be communicated to you by Richard Pauer - 3Phart, letter or phone within 4 business days after the clinic has received the results. If you do not hear from us within 7 days, please contact the clinic through YOHOt or phone. If you have a critical or abnormal lab result, we will notify you by phone as soon as possible.  Submit refill requests through [employer] or call your pharmacy and they will forward the refill request to us. Please allow 3 business days for your refill to be completed.          Additional Information About Your Visit        RenÃ©Sim Information     [employer]Sim lets you send messages to your doctor, view your test results, renew your prescriptions, schedule appointments and more. To sign up, go to [email or web address] . Click on \"Log in\" on the left side of the screen, which will take you to the Welcome page. Then click on \"Sign up Now\" on the right side of the page.     You will be asked to enter the access code listed below, as well as some personal information. Please follow the directions to " create your username and password.     Your access code is: [email or web address]  Expires: 2018  5:11 PM     Your access code will  in 90 days. If you need help or a new code, please call your [place] clinic or [phone].        Care EveryWhere ID     This is your Care EveryWhere ID. This could be used by other organizations to access your [place] medical records  [id number]        Your Vitals Were     Pulse Temperature Respirations Pulse Oximetry          59 99.1  F (37.3  C) (Temporal) 20 95%         Blood Pressure from Last 3 Encounters:   18 153/74   18 162/84   18 167/76    Weight from Last 3 Encounters:   18 107.2 kg (236 lb 6.4 oz)   18 106.3 kg (234 lb 6.4 oz)   18 106.8 kg (235 lb 8 oz)              Today, you had the following     No orders found for display       Primary Care Provider Office Phone # Fax #    Saqib Chavarria [phone]       [facility] [address] [facility] [address]  [facility] [address]        Equal Access to Services     MATT LEIVA : Hadii aad ku hadasho Soomaali, waaxda luqadaha, qaybta kaalmada adeegyada, brittny galeanoin hayfaizan lalit sanderson . So [facility] [phone].    ATENCIÓN: Si habla español, tiene a roth disposición servicios gratuitos de asistencia lingüística. Llame al [phone].    We comply with applicable federal civil rights laws and [place] laws. We do not discriminate on the basis of race, color, national origin, age, disability, sex, sexual orientation, or gender identity.            Thank you!     Thank you for choosing [facility] INFUSION SERVICES  for your care. Our goal is always to provide you with excellent care. Hearing back from our patients is one way we can continue to improve our services. Please take a few minutes to complete the written survey that you may receive in the mail after your visit with us. Thank you!             Your Updated Medication List - Protect others  around you: Learn how to safely use, store and throw away your medicines at www.disposemymeds.org.          This list is accurate as of 4/5/18 12:16 PM.  Always use your most recent med list.                   Brand Name Dispense Instructions for use Diagnosis    ALPRAZolam 0.5 MG tablet    XANAX     Take 1 tab up to 4x daily only if needed. Refill when due,no early refills  Indications: Feeling Anxious        amoxicillin-clavulanate 875-125 MG per tablet    AUGMENTIN    20 tablet    Take 1 tablet by mouth 2 times daily    Acute maxillary sinusitis, recurrence not specified       BENZONATATE PO           GABAPENTIN PO      Take 100 mg by mouth 3 times daily as needed (anxiety) 1-3 capsules        HYDROcodone-acetaminophen 5-325 MG per tablet    NORCO          lisinopril-hydrochlorothiazide 20-12.5 MG per tablet    PRINZIDE/ZESTORETIC     Take 2 tablets by mouth daily        metoprolol succinate 50 MG 24 hr tablet    TOPROL-XL     Take 50 mg by mouth daily        MIRTAZAPINE PO      Take 15 mg by mouth At Bedtime        rifampin 300 MG capsule    RIFADIN     Take 600 mg by mouth        senna 8.6 MG tablet    SENOKOT          simvastatin 20 MG tablet    ZOCOR     Take 20 mg by mouth At Bedtime        TEMAZEPAM PO      Take 15 mg by mouth nightly as needed for sleep        TYLENOL PO      Take 325 mg by mouth as needed for mild pain or fever        venlafaxine 150 MG Tb24 24 hr tablet    EFFEXOR-ER     Take 150 mg by mouth daily (with breakfast)

## 2018-04-05 NOTE — PATIENT INSTRUCTIONS
Pt to return on 4/6/18 for Vancomycin, going to ER for 8pm dose. . Copies of medication list and upcoming appointments given prior to discharge.

## 2018-04-05 NOTE — TELEPHONE ENCOUNTER
Consent to Communicate on file to speak to pt's wife, Ingrid. I have informed her of message from provider. Maru Morgan CMA ([facility])

## 2018-04-05 NOTE — PROGRESS NOTES
Patient here for Vancomycin infusion. Tolerated well with positive blood return in PICC pre/post infusion. Discharged in stable condition with wife. Ambulated out.

## 2018-04-06 ENCOUNTER — OFFICE VISIT (OUTPATIENT)
Dept: FAMILY MEDICINE | Facility: CLINIC | Age: 78
End: 2018-04-06
Payer: COMMERCIAL

## 2018-04-06 ENCOUNTER — HOSPITAL ENCOUNTER (EMERGENCY)
Facility: CLINIC | Age: 78
Discharge: HOME | End: 2018-04-06
Admitting: FAMILY MEDICINE
Payer: MEDICARE

## 2018-04-06 ENCOUNTER — INFUSION THERAPY VISIT (OUTPATIENT)
Dept: INFUSION THERAPY | Facility: CLINIC | Age: 78
End: 2018-04-06
Attending: FAMILY MEDICINE
Payer: MEDICARE

## 2018-04-06 VITALS
DIASTOLIC BLOOD PRESSURE: 84 MMHG | OXYGEN SATURATION: 99 % | BODY MASS INDEX: 35.64 KG/M2 | TEMPERATURE: 97.2 F | SYSTOLIC BLOOD PRESSURE: 150 MMHG | WEIGHT: 234.4 LBS | HEART RATE: 69 BPM

## 2018-04-06 VITALS
SYSTOLIC BLOOD PRESSURE: 150 MMHG | TEMPERATURE: 98 F | HEART RATE: 47 BPM | OXYGEN SATURATION: 96 % | DIASTOLIC BLOOD PRESSURE: 84 MMHG | RESPIRATION RATE: 16 BRPM

## 2018-04-06 VITALS
RESPIRATION RATE: 14 BRPM | OXYGEN SATURATION: 98 % | TEMPERATURE: 99 F | DIASTOLIC BLOOD PRESSURE: 78 MMHG | SYSTOLIC BLOOD PRESSURE: 168 MMHG | HEART RATE: 61 BPM

## 2018-04-06 DIAGNOSIS — R79.89 ELEVATED LFTS: ICD-10-CM

## 2018-04-06 DIAGNOSIS — M00.062 STAPHYLOCOCCAL ARTHRITIS OF LEFT KNEE (H): ICD-10-CM

## 2018-04-06 DIAGNOSIS — M00.062 STAPHYLOCOCCAL ARTHRITIS OF LEFT KNEE (H): Primary | ICD-10-CM

## 2018-04-06 LAB
CREAT SERPL-MCNC: 0.68 MG/DL (ref 0.66–1.25)
GFR SERPL CREATININE-BSD FRML MDRD: >90 ML/MIN/1.7M2
VANCOMYCIN SERPL-MCNC: 18 MG/L

## 2018-04-06 PROCEDURE — 99213 OFFICE O/P EST LOW 20 MIN: CPT | Performed by: FAMILY MEDICINE

## 2018-04-06 PROCEDURE — 40000268 ZZH STATISTIC NO CHARGES

## 2018-04-06 PROCEDURE — 80202 ASSAY OF VANCOMYCIN: CPT | Performed by: FAMILY MEDICINE

## 2018-04-06 PROCEDURE — 82565 ASSAY OF CREATININE: CPT | Performed by: FAMILY MEDICINE

## 2018-04-06 PROCEDURE — 96366 THER/PROPH/DIAG IV INF ADDON: CPT

## 2018-04-06 PROCEDURE — 96365 THER/PROPH/DIAG IV INF INIT: CPT

## 2018-04-06 PROCEDURE — 25000128 H RX IP 250 OP 636: Performed by: FAMILY MEDICINE

## 2018-04-06 RX ADMIN — VANCOMYCIN HYDROCHLORIDE 1750 MG: 10 INJECTION, POWDER, LYOPHILIZED, FOR SOLUTION INTRAVENOUS at 20:11

## 2018-04-06 RX ADMIN — SODIUM CHLORIDE 250 ML: 9 INJECTION, SOLUTION INTRAVENOUS at 08:22

## 2018-04-06 RX ADMIN — VANCOMYCIN HYDROCHLORIDE 1750 MG: 10 INJECTION, POWDER, LYOPHILIZED, FOR SOLUTION INTRAVENOUS at 08:22

## 2018-04-06 ASSESSMENT — PAIN SCALES - GENERAL
PAINLEVEL: NO PAIN (0)
PAINLEVEL: NO PAIN (0)

## 2018-04-06 NOTE — MR AVS SNAPSHOT
After Visit Summary   4/6/2018    Duane E Sogge    MRN: [id number]           Patient Information     Date Of Birth          1940        Visit Information        Provider Department      4/6/2018 8:00 AM NL INFUSION CHAIR 3 [facility] Infusion [facility]        Today's Diagnoses     Staphylococcal arthritis of left knee (H)    -  1      Care Instructions    Pt to be seen twice daily 8am and 8 pm in the ED over the weekend, then return to us on Monday 4/9 @ 8 am.  Pt voiced understanding to plan.           Follow-ups after your visit        Follow-up notes from your care team     Return in 1 day (on 4/7/2018).      Your next 10 appointments already scheduled     Apr 09, 2018  8:00 AM CDT   Level 2 with NL INFUSION CHAIR 2   [facility] ([facility])    [address] [place] [address] Dr [place] [address]   [phone]            Apr 10, 2018  8:00 AM CDT   Level 2 with NL INFUSION CHAIR 2   [facility] ([facility])    [address] [place] [address] Dr [place] [address]   [phone]            Apr 10, 2018 11:15 AM CDT   Evaluation with Christina Mohr, PT   [facility] Therapy ([facility])    [address] [place] [address] Dr [place] [address]   [phone]            Apr 11, 2018  8:00 AM CDT   Level 2 with NL INFUSION CHAIR 4   [facility] ([facility])    [address] [place] [address] Dr [place] [address]   [phone]            Apr 12, 2018  8:30 AM CDT   Level 2 with NL INFUSION CHAIR 2   [facility] ([facility])    [address] [place] [address] Dr [place] [address]   [phone]            Apr 13, 2018  8:00 AM CDT   Level 2 with NL INFUSION CHAIR 1   [facility] ([facility])    [address] [place] [address] Dr [place] [address]   [phone]            Apr 16, 2018  8:00 AM CDT   Level 2 with NL  "INFUSION CHAIR 1   [facility] Infusion Services ([facility])    [address] [place] [address]   [phone]            Apr 17, 2018  8:00 AM CDT   Level 2 with NL INFUSION CHAIR 1   [facility] ([facility])    [address] [place] [address]   [phone]            Apr 18, 2018  8:00 AM CDT   Level 2 with NL INFUSION CHAIR 1   [facility] ([facility])    [address] [place] [address]   [phone]            Apr 19, 2018  8:00 AM CDT   Level 2 with NL INFUSION CHAIR 4   [facility] ([facility])    [address]   [place] [address]   [phone]              Who to contact     If you have questions or need follow up information about today's clinic visit or your schedule please contact [facility] INFUSION [facility] directly at [phone].  Normal or non-critical lab and imaging results will be communicated to you by Keep Your Pharmacy Openhart, letter or phone within 4 business days after the clinic has received the results. If you do not hear from us within 7 days, please contact the clinic through [employer] or phone. If you have a critical or abnormal lab result, we will notify you by phone as soon as possible.  Submit refill requests through [employer] or call your pharmacy and they will forward the refill request to us. Please allow 3 business days for your refill to be completed.          Additional Information About Your Visit        [employer] Information     [employer] lets you send messages to your doctor, view your test results, renew your prescriptions, schedule appointments and more. To sign up, go to [email or web address] . Click on \"Log in\" on the left side of the screen, which will take you to the Welcome page. Then click on \"Sign up Now\" on the right side of the page.     You will be asked to enter the access code listed " below, as well as some personal information. Please follow the directions to create your username and password.     Your access code is: [email or web address]  Expires: 2018  5:11 PM     Your access code will  in 90 days. If you need help or a new code, please call your [place] clinic or [phone].        Care EveryWhere ID     This is your Care EveryWhere ID. This could be used by other organizations to access your [place] medical records  [id number]        Your Vitals Were     Pulse Temperature Respirations Pulse Oximetry          47 98  F (36.7  C) (Temporal) 16 96%         Blood Pressure from Last 3 Encounters:   18 150/84   18 150/84   18 147/73    Weight from Last 3 Encounters:   18 106.3 kg (234 lb 6.4 oz)   18 107.2 kg (236 lb 6.4 oz)   18 106.3 kg (234 lb 6.4 oz)              We Performed the Following     Creatinine     Vancomycin level        Primary Care Provider Office Phone # Fax #    Saqib Chavarria [phone]       [facility] [address] [facility] [address]  [facility] [address]        Equal Access to Services     MATT LEIVA : Hadii aad ku hadasho Soomaali, waaxda luqadaha, qaybta kaalmada adeegyada, waxay idiin hayfaizan lalit sanderson . So [facility] [phone].    ATENCIÓN: Si habla español, tiene a roth disposición servicios gratuitos de asistencia lingüística. ame al [phone].    We comply with applicable federal civil rights laws and [place] laws. We do not discriminate on the basis of race, color, national origin, age, disability, sex, sexual orientation, or gender identity.            Thank you!     Thank you for choosing [facility] SERVICES  for your care. Our goal is always to provide you with excellent care. Hearing back from our patients is one way we can continue to improve our services. Please take a few minutes to complete the written survey that you may receive in the mail after your visit  with us. Thank you!             Your Updated Medication List - Protect others around you: Learn how to safely use, store and throw away your medicines at www.disposemymeds.org.          This list is accurate as of 4/6/18 12:48 PM.  Always use your most recent med list.                   Brand Name Dispense Instructions for use Diagnosis    ALPRAZolam 0.5 MG tablet    XANAX     Take 1 tab up to 4x daily only if needed. Refill when due,no early refills  Indications: Feeling Anxious        amoxicillin-clavulanate 875-125 MG per tablet    AUGMENTIN    20 tablet    Take 1 tablet by mouth 2 times daily    Acute maxillary sinusitis, recurrence not specified       BENZONATATE PO           GABAPENTIN PO      Take 100 mg by mouth 3 times daily as needed (anxiety) 1-3 capsules        HYDROcodone-acetaminophen 5-325 MG per tablet    NORCO          lisinopril-hydrochlorothiazide 20-12.5 MG per tablet    PRINZIDE/ZESTORETIC     Take 2 tablets by mouth daily        metoprolol succinate 50 MG 24 hr tablet    TOPROL-XL     Take 50 mg by mouth daily        MIRTAZAPINE PO      Take 15 mg by mouth At Bedtime        rifampin 300 MG capsule    RIFADIN     Take 600 mg by mouth        senna 8.6 MG tablet    SENOKOT          simvastatin 20 MG tablet    ZOCOR     Take 20 mg by mouth At Bedtime        TEMAZEPAM PO      Take 15 mg by mouth nightly as needed for sleep        TYLENOL PO      Take 325 mg by mouth as needed for mild pain or fever        venlafaxine 150 MG Tb24 24 hr tablet    EFFEXOR-ER     Take 150 mg by mouth daily (with breakfast)

## 2018-04-06 NOTE — PROGRESS NOTES
Pt here for IV Vancomycin.  Tolerated well. Creat = 0.68 today and Vanco level = 18.   Verified with pharmacist.  Pt discharged in stable condition.

## 2018-04-06 NOTE — MR AVS SNAPSHOT
After Visit Summary   4/6/2018    Duane E Sogge    MRN: [id number]           Patient Information     Date Of Birth          1940        Visit Information        Provider Department      4/6/2018 11:00 AM Asa Oliver MD [facility]        Today's Diagnoses     Staphylococcal arthritis of left knee (H)    -  1    Elevated LFTs           Follow-ups after your visit        Your next 10 appointments already scheduled     Apr 10, 2018  8:00 AM CDT   Level 2 with NL INFUSION CHAIR 2   [facility] Infusion Services ([facility])    [address] [place] [address] Dr [place] [address]   [phone]            Apr 10, 2018 11:15 AM CDT   Evaluation with Christina Mohr PT   [facility] Physical Therapy ([facility])    [address] [place] [address] Dr [place] [address]   [phone]            Apr 11, 2018  8:00 AM CDT   Level 2 with NL INFUSION CHAIR 4   [facility] Infusion [facility] ([facility])    [address] [place] [address] Dr [place] [address]   [phone]            Apr 12, 2018  8:30 AM CDT   Level 2 with NL INFUSION CHAIR 2   [facility] Infusion [facility] ([facility])    [address] [place] [address] Dr [place] [address]   [phone]            Apr 13, 2018  8:00 AM CDT   Level 2 with NL INFUSION CHAIR 1   [facility] Infusion Services ([facility])    [address] [place] [address]   [phone]            Apr 16, 2018  8:00 AM CDT   Level 2 with NL INFUSION CHAIR 1   [facility] Infusion Services ([facility] [facility])    [address] [place] [address] Dr [place] [address]   [phone]            Apr 17, 2018  8:00 AM CDT   Level 2 with NL INFUSION CHAIR 1   [facility] Infusion Services ([facility])    [facility] [address] [place] [address]   [phone]            Apr 18, 2018  8:00 AM CDT   Level 2 with NL INFUSION CHAIR 1   [place]  [facility] Infusion Services ([facility])    [address] [facility] [address] [place] [address]   [phone]            2018  8:00 AM CDT   Level 2 with NL INFUSION CHAIR 4   [facility] Infusion Services ([facility])    [address] [address] [facility] [address] [place] [address]   [phone]            2018  8:00 AM CDT   Level 2 with NL INFUSION CHAIR 5   [facility] Infusion Services ([facility])    [address] [facility] [address] [place] [address]   [phone]              Who to contact     If you have questions or need follow up information about today's clinic visit or your schedule please contact [facility] directly at [phone].  Normal or non-critical lab and imaging results will be communicated to you by Quanterixhart, letter or phone within 4 business days after the clinic has received the results. If you do not hear from us within 7 days, please contact the clinic through Quanterixhart or phone. If you have a critical or abnormal lab result, we will notify you by phone as soon as possible.  Submit refill requests through [employer] or call your pharmacy and they will forward the refill request to us. Please allow 3 business days for your refill to be completed.          Additional Information About Your Visit        QuanterixharSermo Information     [employer] lets you send messages to your doctor, view your test results, renew your prescriptions, schedule appointments and more. To sign up, go to [email or web address] . Click on \"Log in\" on the left side of the screen, which will take you to the Welcome page. Then click on \"Sign up Now\" on the right side of the page.     You will be asked to enter the access code listed below, as well as some personal information. Please follow the directions to create your username and password.     Your access code is: [email or web address]  Expires: 2018  5:11 PM     Your access code will  in 90 days. If you need help or " a new code, please call your [place] clinic or [phone].        Care EveryWhere ID     This is your Care EveryWhere ID. This could be used by other organizations to access your [place] medical records  [id number]        Your Vitals Were     Pulse Temperature Pulse Oximetry BMI (Body Mass Index)          69 97.2  F (36.2  C) (Temporal) 99% 35.64 kg/m2         Blood Pressure from Last 3 Encounters:   04/09/18 169/84   04/08/18 164/65   04/08/18 (!) 167/92    Weight from Last 3 Encounters:   04/08/18 240 lb (108.9 kg)   04/07/18 236 lb (107 kg)   04/06/18 234 lb 6.4 oz (106.3 kg)              Today, you had the following     No orders found for display       Primary Care Provider Office Phone # Fax #    Saqib Chavarria [phone]       [facility] [address] [facility] [address]  [facility] [address]        Equal Access to Services     MATT [facility] HospitalSTEFFANY : Hadii aad ku hadasho Soomaali, waaxda luqadaha, qaybta kaalmada adeegyada, waxay idiin hayfaizan lalit sanderson . So [facility] [phone].    ATENCIÓN: Si habla español, tiene a roth disposición servicios gratuitos de asistencia lingüística. Llame al [phone].    We comply with applicable federal civil rights laws and [place] laws. We do not discriminate on the basis of race, color, national origin, age, disability, sex, sexual orientation, or gender identity.            Thank you!     Thank you for choosing [facility]  for your care. Our goal is always to provide you with excellent care. Hearing back from our patients is one way we can continue to improve our services. Please take a few minutes to complete the written survey that you may receive in the mail after your visit with us. Thank you!             Your Updated Medication List - Protect others around you: Learn how to safely use, store and throw away your medicines at www.disposemymeds.org.          This list is accurate as of 4/6/18 11:59 PM.  Always use your  most recent med list.                   Brand Name Dispense Instructions for use Diagnosis    ALPRAZolam 0.5 MG tablet    XANAX     Take 1 tab up to 4x daily only if needed. Refill when due,no early refills  Indications: Feeling Anxious        amoxicillin-clavulanate 875-125 MG per tablet    AUGMENTIN    20 tablet    Take 1 tablet by mouth 2 times daily    Acute maxillary sinusitis, recurrence not specified       BENZONATATE PO           GABAPENTIN PO      Take 100 mg by mouth 3 times daily as needed (anxiety) 1-3 capsules        HYDROcodone-acetaminophen 5-325 MG per tablet    NORCO          lisinopril-hydrochlorothiazide 20-12.5 MG per tablet    PRINZIDE/ZESTORETIC     Take 2 tablets by mouth daily        metoprolol succinate 50 MG 24 hr tablet    TOPROL-XL     Take 50 mg by mouth daily        MIRTAZAPINE PO      Take 15 mg by mouth At Bedtime        rifampin 300 MG capsule    RIFADIN     Take 600 mg by mouth        senna 8.6 MG tablet    SENOKOT          simvastatin 20 MG tablet    ZOCOR     Take 20 mg by mouth At Bedtime        TEMAZEPAM PO      Take 15 mg by mouth nightly as needed for sleep        TYLENOL PO      Take 325 mg by mouth as needed for mild pain or fever        venlafaxine 150 MG Tb24 24 hr tablet    EFFEXOR-ER     Take 150 mg by mouth daily (with breakfast)

## 2018-04-06 NOTE — PROGRESS NOTES
SUBJECTIVE:                                                      Chief Complaint   Patient presents with     Liver     recheck            Duane is a 77 year old returns to clinic or go over his results.  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  He has been feeling well.  No fevers or chills.  He came to see me to establish care and also investigate his elevated LFTs.  His LFTs have almost normalized since stopping rifampin which I suspect was the culprit.  His ultrasound came back unremarkable except for fatty liver disease.  His ferritin came back slightly elevated, but I suspect it is an acute phase reactant, not representing hemochromatosis.    ROS:  Constitutional, HEENT, cardiovascular, pulmonary, gi and gu systems are negative, except as otherwise noted.    OBJECTIVE:                                                    /84  Pulse 69  Temp 97.2  F (36.2  C) (Temporal)  Wt 234 lb 6.4 oz (106.3 kg)  SpO2 99%  BMI 35.64 kg/m2  Body mass index is 35.64 kg/(m^2).    GENERAL: healthy, alert and no distress  NECK: no adenopathy, no asymmetry, masses, or scars and thyroid normal to palpation  RESP: lungs clear to auscultation - no rales, rhonchi or wheezes  CV: regular rate and rhythm, normal S1 S2, no S3 or S4, no murmur, click or rub, no peripheral edema and peripheral pulses strong  ABDOMEN: soft, nontender, no hepatosplenomegaly, no masses and bowel sounds normal  MS: no gross musculoskeletal defects noted, no edema    Diagnostic Test Results:  none      ASSESSMENT/PLAN:                                                        ICD-10-CM    1. 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 Elevated LFTs R79.89        From my perspective he is dramatically improved.  We can recheck him when he returns in a couple of months for a physical.  Otherwise his care will continue to orthopedics and infectious disease from his knee standpoint..  See me back with any other concerns he  agrees    Asa Oliver MD  [facility]

## 2018-04-06 NOTE — ED NOTES
No adverse reaction to vanco.  Line flushed with 10cc NS.  Will return to infusion in am and then ED in PM>

## 2018-04-06 NOTE — PATIENT INSTRUCTIONS
Pt to be seen twice daily 8am and 8 pm in the ED over the weekend, then return to us on Monday 4/9 @ 8 am.  Pt voiced understanding to plan.

## 2018-04-06 NOTE — NURSING NOTE
"Chief Complaint   Patient presents with     Liver     recheck       Initial /84  Pulse 69  Temp 97.2  F (36.2  C) (Temporal)  Wt 234 lb 6.4 oz (106.3 kg)  SpO2 99%  BMI 35.64 kg/m2 Estimated body mass index is 35.64 kg/(m^2) as calculated from the following:    Height as of 3/29/18: 5' 8\" (1.727 m).    Weight as of this encounter: 234 lb 6.4 oz (106.3 kg).  Medication Reconciliation: complete   Hilda Fong CMA    Health Maintenance Due   Topic Date Due     TETANUS IMMUNIZATION (SYSTEM ASSIGNED)  10/04/1957     ADVANCE DIRECTIVE PLANNING Q5 YRS  10/04/1994     PNEUMOCOCCAL (1 of 2 - PCV13) 10/04/2004       Health Maintenance reviewed at today's visit patient asked to schedule/complete:   Patient is aware.       "

## 2018-04-06 NOTE — ED NOTES
Infusion cont.  Pt enjoying his diet coke while watching [employer].  Denies any adverse reaction to meds and denies any pain.  Waiting for completion of abx.

## 2018-04-07 ENCOUNTER — HOSPITAL ENCOUNTER (EMERGENCY)
Facility: CLINIC | Age: 78
Discharge: HOME | End: 2018-04-07
Admitting: FAMILY MEDICINE
Payer: MEDICARE

## 2018-04-07 VITALS — OXYGEN SATURATION: 98 % | SYSTOLIC BLOOD PRESSURE: 196 MMHG | TEMPERATURE: 98.4 F | DIASTOLIC BLOOD PRESSURE: 93 MMHG

## 2018-04-07 VITALS
BODY MASS INDEX: 35.88 KG/M2 | WEIGHT: 236 LBS | TEMPERATURE: 98.5 F | SYSTOLIC BLOOD PRESSURE: 153 MMHG | OXYGEN SATURATION: 96 % | DIASTOLIC BLOOD PRESSURE: 82 MMHG | HEART RATE: 61 BPM | RESPIRATION RATE: 20 BRPM

## 2018-04-07 DIAGNOSIS — M00.062 STAPHYLOCOCCAL ARTHRITIS OF LEFT KNEE (H): ICD-10-CM

## 2018-04-07 PROCEDURE — 96366 THER/PROPH/DIAG IV INF ADDON: CPT

## 2018-04-07 PROCEDURE — 96365 THER/PROPH/DIAG IV INF INIT: CPT

## 2018-04-07 PROCEDURE — 25000128 H RX IP 250 OP 636: Performed by: FAMILY MEDICINE

## 2018-04-07 PROCEDURE — 96365 THER/PROPH/DIAG IV INF INIT: CPT | Mod: XE | Performed by: FAMILY MEDICINE

## 2018-04-07 PROCEDURE — 96366 THER/PROPH/DIAG IV INF ADDON: CPT | Performed by: FAMILY MEDICINE

## 2018-04-07 PROCEDURE — 40000268 ZZH STATISTIC NO CHARGES: Performed by: FAMILY MEDICINE

## 2018-04-07 PROCEDURE — 40000268 ZZH STATISTIC NO CHARGES

## 2018-04-07 RX ADMIN — VANCOMYCIN HYDROCHLORIDE 1750 MG: 10 INJECTION, POWDER, LYOPHILIZED, FOR SOLUTION INTRAVENOUS at 08:28

## 2018-04-07 RX ADMIN — SODIUM CHLORIDE 250 ML: 9 INJECTION, SOLUTION INTRAVENOUS at 22:14

## 2018-04-07 RX ADMIN — VANCOMYCIN HYDROCHLORIDE 1750 MG: 1 INJECTION, POWDER, LYOPHILIZED, FOR SOLUTION INTRAVENOUS at 20:05

## 2018-04-08 ENCOUNTER — HOSPITAL ENCOUNTER (EMERGENCY)
Facility: CLINIC | Age: 78
Discharge: HOME | End: 2018-04-08
Admitting: FAMILY MEDICINE
Payer: MEDICARE

## 2018-04-08 VITALS
BODY MASS INDEX: 36.49 KG/M2 | TEMPERATURE: 98.6 F | WEIGHT: 240 LBS | RESPIRATION RATE: 18 BRPM | DIASTOLIC BLOOD PRESSURE: 92 MMHG | SYSTOLIC BLOOD PRESSURE: 167 MMHG | HEART RATE: 55 BPM | OXYGEN SATURATION: 97 %

## 2018-04-08 VITALS
SYSTOLIC BLOOD PRESSURE: 164 MMHG | RESPIRATION RATE: 16 BRPM | DIASTOLIC BLOOD PRESSURE: 65 MMHG | TEMPERATURE: 98.4 F | HEART RATE: 51 BPM | OXYGEN SATURATION: 97 %

## 2018-04-08 DIAGNOSIS — M00.062 STAPHYLOCOCCAL ARTHRITIS OF LEFT KNEE (H): ICD-10-CM

## 2018-04-08 PROCEDURE — 96365 THER/PROPH/DIAG IV INF INIT: CPT | Performed by: FAMILY MEDICINE

## 2018-04-08 PROCEDURE — 40000268 ZZH STATISTIC NO CHARGES: Performed by: FAMILY MEDICINE

## 2018-04-08 PROCEDURE — 25000128 H RX IP 250 OP 636: Performed by: FAMILY MEDICINE

## 2018-04-08 PROCEDURE — 96366 THER/PROPH/DIAG IV INF ADDON: CPT | Performed by: FAMILY MEDICINE

## 2018-04-08 RX ADMIN — VANCOMYCIN HYDROCHLORIDE 1750 MG: 10 INJECTION, POWDER, LYOPHILIZED, FOR SOLUTION INTRAVENOUS at 19:46

## 2018-04-08 RX ADMIN — SODIUM CHLORIDE 250 ML: 9 INJECTION, SOLUTION INTRAVENOUS at 19:50

## 2018-04-08 RX ADMIN — VANCOMYCIN HYDROCHLORIDE 1750 MG: 10 INJECTION, POWDER, LYOPHILIZED, FOR SOLUTION INTRAVENOUS at 08:00

## 2018-04-09 ENCOUNTER — TRANSFERRED RECORDS (OUTPATIENT)
Dept: HEALTH INFORMATION MANAGEMENT | Facility: CLINIC | Age: 78
End: 2018-04-09

## 2018-04-09 ENCOUNTER — INFUSION THERAPY VISIT (OUTPATIENT)
Dept: INFUSION THERAPY | Facility: CLINIC | Age: 78
End: 2018-04-09
Attending: FAMILY MEDICINE
Payer: MEDICARE

## 2018-04-09 ENCOUNTER — HOSPITAL ENCOUNTER (EMERGENCY)
Facility: CLINIC | Age: 78
Discharge: HOME | End: 2018-04-09
Admitting: FAMILY MEDICINE
Payer: MEDICARE

## 2018-04-09 VITALS
HEART RATE: 72 BPM | DIASTOLIC BLOOD PRESSURE: 95 MMHG | RESPIRATION RATE: 18 BRPM | SYSTOLIC BLOOD PRESSURE: 175 MMHG | OXYGEN SATURATION: 97 %

## 2018-04-09 VITALS
OXYGEN SATURATION: 98 % | RESPIRATION RATE: 18 BRPM | DIASTOLIC BLOOD PRESSURE: 84 MMHG | HEART RATE: 52 BPM | TEMPERATURE: 97.8 F | SYSTOLIC BLOOD PRESSURE: 169 MMHG

## 2018-04-09 DIAGNOSIS — M00.062 STAPHYLOCOCCAL ARTHRITIS OF LEFT KNEE (H): Primary | ICD-10-CM

## 2018-04-09 DIAGNOSIS — M00.062 STAPHYLOCOCCAL ARTHRITIS OF LEFT KNEE (H): ICD-10-CM

## 2018-04-09 PROCEDURE — 25000128 H RX IP 250 OP 636: Performed by: FAMILY MEDICINE

## 2018-04-09 PROCEDURE — 96365 THER/PROPH/DIAG IV INF INIT: CPT

## 2018-04-09 PROCEDURE — 96366 THER/PROPH/DIAG IV INF ADDON: CPT

## 2018-04-09 PROCEDURE — 96365 THER/PROPH/DIAG IV INF INIT: CPT | Mod: XE

## 2018-04-09 PROCEDURE — 40000268 ZZH STATISTIC NO CHARGES

## 2018-04-09 RX ADMIN — VANCOMYCIN HYDROCHLORIDE 1750 MG: 1 INJECTION, POWDER, LYOPHILIZED, FOR SOLUTION INTRAVENOUS at 08:39

## 2018-04-09 RX ADMIN — SODIUM CHLORIDE 250 ML: 9 INJECTION, SOLUTION INTRAVENOUS at 08:23

## 2018-04-09 RX ADMIN — VANCOMYCIN HYDROCHLORIDE 1750 MG: 10 INJECTION, POWDER, LYOPHILIZED, FOR SOLUTION INTRAVENOUS at 20:23

## 2018-04-09 ASSESSMENT — PAIN SCALES - GENERAL: PAINLEVEL: NO PAIN (0)

## 2018-04-09 NOTE — ED NOTES
Pt arrived for infusion therapy, reports things are going well, denies any complications at this time.

## 2018-04-09 NOTE — PROGRESS NOTES
Patient here for vancomycin infusion. PICC line dressing change done. Positive blood return pre/post infusion and tolerated well. Discharged in stable condition.

## 2018-04-09 NOTE — ED NOTES
Infusions completed patient denied any complaints, PICC line flushed and vitals stable. Patient discharged to home and he is aware of continued plan for twice a day infusions and will return at appointment time in the am.

## 2018-04-09 NOTE — MR AVS SNAPSHOT
After Visit Summary   4/9/2018    Duane E Sogge    MRN: [id number]           Patient Information     Date Of Birth          1940        Visit Information        Provider Department      4/9/2018 8:00 AM NL INFUSION CHAIR 2 [facility]        Today's Diagnoses     Staphylococcal arthritis of left knee (H)    -  1      Care Instructions    Pt to return on 4/10/18  for vanco/labs, going to ER every evening. Copies of medication list and upcoming appointments given prior to discharge.           Follow-ups after your visit        Your next 10 appointments already scheduled     Apr 10, 2018  8:00 AM CDT   Level 2 with NL INFUSION CHAIR 2   [facility] ([facility])    [address] [place] [address] Dr Radha [place] [address]   [phone]            Apr 10, 2018 11:15 AM CDT   Evaluation with Christina Mohr, PT   [facility] ([facility])    [address] [place] [address] Dr Radha [place] [address]   [phone]            Apr 11, 2018  8:00 AM CDT   Level 2 with NL INFUSION CHAIR 4   [facility] ([facility])    [address] [place] [address] Dr [place] [address]   [phone]            Apr 12, 2018  8:30 AM CDT   Level 2 with NL INFUSION CHAIR 2   [facility] ([facility])    [address] [place] [address] Dr Radha [place] [address]   [phone]            Apr 13, 2018  8:00 AM CDT   Level 2 with NL INFUSION CHAIR 1   [facility] ([facility])    [address] [place] [address] Dr Radha [place] [address]   [phone]            Apr 16, 2018  8:00 AM CDT   Level 2 with NL INFUSION CHAIR 1   [facility] ([facility])    [address] [place] [address] Dr [place] [address]   [phone]            Apr 17, 2018  8:00 AM CDT   Level 2 with NL INFUSION CHAIR 1   [facility] ([facility]  "[facility]    [address] [place]ha [place] [address]   [phone]            Apr 18, 2018  8:00 AM CDT   Level 2 with NL INFUSION CHAIR 1   [facility] ([facility])    [address] [place] [address]   [phone]            Apr 19, 2018  8:00 AM CDT   Level 2 with NL INFUSION CHAIR 4   [facility] ([facility])    [address] [place] [address]   [phone]            Apr 20, 2018  8:00 AM CDT   Level 2 with NL INFUSION CHAIR 5   [facility] ([facility])    [address] [place] [address]   [phone]              Who to contact     If you have questions or need follow up information about today's clinic visit or your schedule please contact [facility] directly at [phone].  Normal or non-critical lab and imaging results will be communicated to you by [employer], letter or phone within 4 business days after the clinic has received the results. If you do not hear from us within 7 days, please contact the clinic through [employer] or phone. If you have a critical or abnormal lab result, we will notify you by phone as soon as possible.  Submit refill requests through [employer] or call your pharmacy and they will forward the refill request to us. Please allow 3 business days for your refill to be completed.          Additional Information About Your Visit        [employer] Information     [employer] lets you send messages to your doctor, view your test results, renew your prescriptions, schedule appointments and more. To sign up, go to [email or web address] . Click on \"Log in\" on the left side of the screen, which will take you to the Welcome page. Then click on \"Sign up Now\" on the right side of the page.     You will be asked to enter the access code listed below, as well as some personal information. Please follow the directions to " create your username and password.     Your access code is: [email or web address]  Expires: 2018  5:11 PM     Your access code will  in 90 days. If you need help or a new code, please call your [facility] clinic or [phone].        Care EveryWhere ID     This is your Care EveryWhere ID. This could be used by other organizations to access your [facility] medical records  [id number]        Your Vitals Were     Pulse Temperature Respirations Pulse Oximetry          52 97.8  F (36.6  C) (Temporal) 18 98%         Blood Pressure from Last 3 Encounters:   18 169/84   18 164/65   18 (!) 167/92    Weight from Last 3 Encounters:   18 108.9 kg (240 lb)   18 107 kg (236 lb)   18 106.3 kg (234 lb 6.4 oz)              Today, you had the following     No orders found for display       Primary Care Provider Office Phone # Fax #    Saqib Chavarria [phone]       [facility] [address]  [facility] [address]        Equal Access to Services     MATT LEIVA : Hadii aad ku hadasho Soomaali, waaxda luqadaha, qaybta kaalmada adeegyada, waxtheresa galeanoin hayfaizan lalit ying. So [facility] [phone].    ATENCIÓN: Si habla español, tiene a roth disposición servicios gratuitos de asistencia lingüística. Aiyanaame al [phone].    We comply with applicable federal civil rights laws and [place] laws. We do not discriminate on the basis of race, color, national origin, age, disability, sex, sexual orientation, or gender identity.            Thank you!     Thank you for choosing [facility]  for your care. Our goal is always to provide you with excellent care. Hearing back from our patients is one way we can continue to improve our services. Please take a few minutes to complete the written survey that you may receive in the mail after your visit with us. Thank you!             Your Updated Medication List - Protect others around you:  Learn how to safely use, store and throw away your medicines at www.disposemymeds.org.          This list is accurate as of 4/9/18 11:34 AM.  Always use your most recent med list.                   Brand Name Dispense Instructions for use Diagnosis    ALPRAZolam 0.5 MG tablet    XANAX     Take 1 tab up to 4x daily only if needed. Refill when due,no early refills  Indications: Feeling Anxious        amoxicillin-clavulanate 875-125 MG per tablet    AUGMENTIN    20 tablet    Take 1 tablet by mouth 2 times daily    Acute maxillary sinusitis, recurrence not specified       BENZONATATE PO           GABAPENTIN PO      Take 100 mg by mouth 3 times daily as needed (anxiety) 1-3 capsules        HYDROcodone-acetaminophen 5-325 MG per tablet    NORCO          lisinopril-hydrochlorothiazide 20-12.5 MG per tablet    PRINZIDE/ZESTORETIC     Take 2 tablets by mouth daily        metoprolol succinate 50 MG 24 hr tablet    TOPROL-XL     Take 50 mg by mouth daily        MIRTAZAPINE PO      Take 15 mg by mouth At Bedtime        rifampin 300 MG capsule    RIFADIN     Take 600 mg by mouth        senna 8.6 MG tablet    SENOKOT          simvastatin 20 MG tablet    ZOCOR     Take 20 mg by mouth At Bedtime        TEMAZEPAM PO      Take 15 mg by mouth nightly as needed for sleep        TYLENOL PO      Take 325 mg by mouth as needed for mild pain or fever        venlafaxine 150 MG Tb24 24 hr tablet    EFFEXOR-ER     Take 150 mg by mouth daily (with breakfast)

## 2018-04-09 NOTE — PATIENT INSTRUCTIONS
Pt to return on 4/10/18  for vanco/labs, going to ER every evening. Copies of medication list and upcoming appointments given prior to discharge.

## 2018-04-10 ENCOUNTER — HOSPITAL ENCOUNTER (OUTPATIENT)
Dept: PHYSICAL THERAPY | Facility: CLINIC | Age: 78
Setting detail: THERAPIES SERIES
End: 2018-04-10
Attending: ORTHOPAEDIC SURGERY
Payer: MEDICARE

## 2018-04-10 ENCOUNTER — INFUSION THERAPY VISIT (OUTPATIENT)
Dept: INFUSION THERAPY | Facility: CLINIC | Age: 78
End: 2018-04-10
Attending: FAMILY MEDICINE
Payer: MEDICARE

## 2018-04-10 ENCOUNTER — HOSPITAL ENCOUNTER (EMERGENCY)
Facility: CLINIC | Age: 78
Discharge: HOME | End: 2018-04-10
Admitting: FAMILY MEDICINE
Payer: MEDICARE

## 2018-04-10 VITALS
DIASTOLIC BLOOD PRESSURE: 71 MMHG | HEART RATE: 52 BPM | OXYGEN SATURATION: 98 % | TEMPERATURE: 97.2 F | SYSTOLIC BLOOD PRESSURE: 160 MMHG | RESPIRATION RATE: 18 BRPM

## 2018-04-10 VITALS
DIASTOLIC BLOOD PRESSURE: 67 MMHG | HEART RATE: 71 BPM | SYSTOLIC BLOOD PRESSURE: 164 MMHG | OXYGEN SATURATION: 98 % | RESPIRATION RATE: 16 BRPM | TEMPERATURE: 96.9 F

## 2018-04-10 DIAGNOSIS — M00.062 STAPHYLOCOCCAL ARTHRITIS OF LEFT KNEE (H): ICD-10-CM

## 2018-04-10 DIAGNOSIS — M00.062 STAPHYLOCOCCAL ARTHRITIS OF LEFT KNEE (H): Primary | ICD-10-CM

## 2018-04-10 LAB
ALP SERPL-CCNC: 151 U/L (ref 40–150)
ALT SERPL W P-5'-P-CCNC: 34 U/L (ref 0–70)
AST SERPL W P-5'-P-CCNC: 22 U/L (ref 0–45)
BASOPHILS # BLD AUTO: 0.1 10E9/L (ref 0–0.2)
BASOPHILS NFR BLD AUTO: 1.3 %
BILIRUB SERPL-MCNC: 0.3 MG/DL (ref 0.2–1.3)
CREAT SERPL-MCNC: 0.75 MG/DL (ref 0.66–1.25)
CRP SERPL-MCNC: 3.5 MG/L (ref 0–8)
DIFFERENTIAL METHOD BLD: ABNORMAL
EOSINOPHIL # BLD AUTO: 0.7 10E9/L (ref 0–0.7)
EOSINOPHIL NFR BLD AUTO: 14.4 %
ERYTHROCYTE [DISTWIDTH] IN BLOOD BY AUTOMATED COUNT: 12.8 % (ref 10–15)
GFR SERPL CREATININE-BSD FRML MDRD: >90 ML/MIN/1.7M2
HCT VFR BLD AUTO: 37.1 % (ref 40–53)
HGB BLD-MCNC: 12 G/DL (ref 13.3–17.7)
IMM GRANULOCYTES # BLD: 0 10E9/L (ref 0–0.4)
IMM GRANULOCYTES NFR BLD: 0.2 %
LYMPHOCYTES # BLD AUTO: 1.1 10E9/L (ref 0.8–5.3)
LYMPHOCYTES NFR BLD AUTO: 23.7 %
MCH RBC QN AUTO: 32 PG (ref 26.5–33)
MCHC RBC AUTO-ENTMCNC: 32.3 G/DL (ref 31.5–36.5)
MCV RBC AUTO: 99 FL (ref 78–100)
MONOCYTES # BLD AUTO: 0.6 10E9/L (ref 0–1.3)
MONOCYTES NFR BLD AUTO: 11.9 %
NEUTROPHILS # BLD AUTO: 2.3 10E9/L (ref 1.6–8.3)
NEUTROPHILS NFR BLD AUTO: 48.5 %
PLATELET # BLD AUTO: 217 10E9/L (ref 150–450)
RBC # BLD AUTO: 3.75 10E12/L (ref 4.4–5.9)
VANCOMYCIN SERPL-MCNC: 20 MG/L
WBC # BLD AUTO: 4.6 10E9/L (ref 4–11)

## 2018-04-10 PROCEDURE — 96365 THER/PROPH/DIAG IV INF INIT: CPT

## 2018-04-10 PROCEDURE — 82565 ASSAY OF CREATININE: CPT | Performed by: FAMILY MEDICINE

## 2018-04-10 PROCEDURE — 97110 THERAPEUTIC EXERCISES: CPT | Mod: GP

## 2018-04-10 PROCEDURE — 25000128 H RX IP 250 OP 636: Performed by: FAMILY MEDICINE

## 2018-04-10 PROCEDURE — 80202 ASSAY OF VANCOMYCIN: CPT | Performed by: FAMILY MEDICINE

## 2018-04-10 PROCEDURE — 96365 THER/PROPH/DIAG IV INF INIT: CPT | Performed by: FAMILY MEDICINE

## 2018-04-10 PROCEDURE — 84450 TRANSFERASE (AST) (SGOT): CPT | Performed by: FAMILY MEDICINE

## 2018-04-10 PROCEDURE — 82247 BILIRUBIN TOTAL: CPT | Performed by: FAMILY MEDICINE

## 2018-04-10 PROCEDURE — 97161 PT EVAL LOW COMPLEX 20 MIN: CPT | Mod: GP

## 2018-04-10 PROCEDURE — 85025 COMPLETE CBC W/AUTO DIFF WBC: CPT | Performed by: FAMILY MEDICINE

## 2018-04-10 PROCEDURE — G8979 MOBILITY GOAL STATUS: HCPCS | Mod: GP,CH

## 2018-04-10 PROCEDURE — G8978 MOBILITY CURRENT STATUS: HCPCS | Mod: GP,CJ

## 2018-04-10 PROCEDURE — 40000268 ZZH STATISTIC NO CHARGES: Performed by: FAMILY MEDICINE

## 2018-04-10 PROCEDURE — 84075 ASSAY ALKALINE PHOSPHATASE: CPT | Performed by: FAMILY MEDICINE

## 2018-04-10 PROCEDURE — 40000718 ZZHC STATISTIC PT DEPARTMENT ORTHO VISIT

## 2018-04-10 PROCEDURE — 84460 ALANINE AMINO (ALT) (SGPT): CPT | Performed by: FAMILY MEDICINE

## 2018-04-10 PROCEDURE — 86140 C-REACTIVE PROTEIN: CPT | Performed by: FAMILY MEDICINE

## 2018-04-10 RX ADMIN — VANCOMYCIN HYDROCHLORIDE 1500 MG: 1 INJECTION, POWDER, LYOPHILIZED, FOR SOLUTION INTRAVENOUS at 20:04

## 2018-04-10 RX ADMIN — SODIUM CHLORIDE 250 ML: 9 INJECTION, SOLUTION INTRAVENOUS at 20:05

## 2018-04-10 RX ADMIN — VANCOMYCIN HYDROCHLORIDE 1500 MG: 1 INJECTION, POWDER, LYOPHILIZED, FOR SOLUTION INTRAVENOUS at 09:37

## 2018-04-10 RX ADMIN — SODIUM CHLORIDE 250 ML: 9 INJECTION, SOLUTION INTRAVENOUS at 08:58

## 2018-04-10 ASSESSMENT — PAIN SCALES - GENERAL: PAINLEVEL: MILD PAIN (2)

## 2018-04-10 NOTE — PROGRESS NOTES
[facility]          OUTPATIENT PHYSICAL THERAPY ORTHOPEDIC EVALUATION  PLAN OF TREATMENT FOR OUTPATIENT REHABILITATION  (COMPLETE FOR INITIAL CLAIMS ONLY)  Patient's Last Name, First Name, M.I.  YOB: 1940  Sogge,Duane E    Provider s Name:  [facility]   Medical Record No.  [id number]   Start of Care Date:  04/10/18   Onset Date:  02/21/18 (3/16/2018)   Type:     _X__PT   ___OT   ___SLP Medical Diagnosis:  S/P TKA left knee     PT Diagnosis:  gait dysfunction. weakness and decreased ROM left knee   Visits from SOC:  1      _________________________________________________________________________________  Plan of Treatment/Functional Goals:  gait training, neuromuscular re-education, ROM, strengthening     Electrical stimulation  if needed  Goals  Goal Identifier: 1  Goal Description: Pt will have ROM to 120 to 125 knee flexion on left and 0/10 extention. in order for him to progress stength and be able to walk in woods for hunting.plus reciprocal stair climbing.  Target Date: 06/08/18    Goal Identifier: 2  Goal Description: Pt will have strenght to 5/5 to be able to progress to stair climbing reciprocall and walking any distance he wants plus in the woods for hunting.  Target Date: 06/08/18    Therapy Frequency:  2 times/Week (reduce to 1x/week as needed)  Predicted Duration of Therapy Intervention:  60    Christina Mohr, PT                 I CERTIFY THE NEED FOR THESE SERVICES FURNISHED UNDER        THIS PLAN OF TREATMENT AND WHILE UNDER MY CARE .         Physician Signature               Date    X_____________________________________________________                       Certification Date From:  04/10/18   Certification Date To:  06/08/18    Referring Provider:  Dr. Tony Mera    Initial Assessment        See Epic Evaluation Start of Care Date: 04/10/18

## 2018-04-10 NOTE — PROGRESS NOTES
04/10/18 1120   General Information   Type of Visit Initial OP Ortho PT Evaluation   Start of Care Date 04/10/18   Referring Physician Dr. Tony Mera   Patient/Family Goals Statement get back to doing before    Orders Evaluate and Treat   Date of Order 03/06/18   Insurance Type Medicare   Medical Diagnosis S/P TKA left knee   Surgical/Medical history reviewed Yes   Precautions/Limitations no known precautions/limitations   Weight-Bearing Status - LLE full weight-bearing   Body Part(s)   Body Part(s) Knee   Presentation and Etiology   Pertinent history of current problem (include personal factors and/or comorbidities that impact the POC) pt and wife report that he had his first surgery 2/21/2018 which was working well until March. He needed a 2nd surg on 3/16/2018 due to infection and he is now having Vanco infusions. Therapy to start now for TKA  rehab.    Impairments A. Pain;C. Swelling;D. Decreased ROM;F. Decreased strength and endurance   Functional Limitations perform activities of daily living;perform desired leisure / sports activities   Symptom Location left knee   How/Where did it occur From Degenerative Joint Disease  (infection)   Onset date of current episode/exacerbation 02/21/18  (3/16/2018)   Chronicity New   Pain rating (0-10 point scale) Best (/10);Worst (/10)   Best (/10) 0   Worst (/10) 4   Pain quality C. Aching   Frequency of pain/symptoms B. Intermittent   Pain/symptoms are: Worse during the night   Pain/symptoms exacerbated by B. Walking;K. Home tasks   Pain/symptoms eased by C. Rest;E. Changing positions;H. Cold   Progression of symptoms since onset: Improved   Prior Level of Function   Prior Level of Function-Mobility indep mobility   Prior Level of Function-ADLs indep   Current Level of Function   Current Community Support Family/friend caregiver   Patient role/employment history F. Retired   Living environment House/townhome   Home/community accessibility cut wood, garden, tiller.  shannon   Current equipment-Gait/Locomotion Standard cane   Fall Risk Screen   Fall screen completed by PT   Have you fallen 2 or more times in the past year? No   Have you fallen and had an injury in the past year? No   Functional Scales   Functional Scales Other   Other Scales  LEFS   Knee Objective Findings   Side (if bilateral, select both right and left) Left   Observation slow moving in gait and transition   Integumentary  mildly swollen left knee. normal post surgical swelling. incision is closed and dry on left   Posture rounded shoulders and head forward   Gait/Locomotion 300 to 500 feet seems to be max distance right now and it causes some pain and tiredness   Foot Position In Standing mild everte foot position   Knee/Hip Strength Comments WFL   Left Knee Extension AROM 2   Left Knee Flexion AROM 118   Left Knee Flexion Strength 3+   Left Knee Extension Strength 3+   Left Hip Abduction Strength 4   Left Quad Set Strength 4   Left Gastrocnemius Flexibility WFL   Planned Therapy Interventions   Planned Therapy Interventions gait training;neuromuscular re-education;ROM;strengthening   Planned Modality Interventions   Planned Modality Interventions Electrical stimulation   Planned Modality Interventions Comments if needed   Clinical Impression   Criteria for Skilled Therapeutic Interventions Met yes, treatment indicated   PT Diagnosis gait dysfunction. weakness and decreased ROM left knee   Functional limitations due to impairments distance limited on level. stair climbing is step to gait.    Clinical Presentation Stable/Uncomplicated   Clinical Presentation Rationale at this point if no more infection he will progress steadily with strngth and prorioception   Clinical Decision Making (Complexity) Low complexity   Therapy Frequency 2 times/Week  (reduce to 1x/week as needed)   Predicted Duration of Therapy Intervention (days/wks) 60   Risk & Benefits of therapy have been explained Yes   Patient, Family & other  staff in agreement with plan of care Yes   Education Assessment   Preferred Learning Style Listening;Demonstration;Pictures/video   Barriers to Learning No barriers   ORTHO GOALS   PT Ortho Eval Goals 1;2   Ortho Goal 1   Goal Identifier 1   Goal Description Pt will have ROM to 120 to 125 knee flexion on left and 0/10 extention. in order for him to progress stength and be able to walk in woods for hunting.plus reciprocal stair climbing.   Target Date 06/08/18   Ortho Goal 2   Goal Identifier 2   Goal Description Pt will have strenght to 5/5 to be able to progress to stair climbing reciprocall and walking any distance he wants plus in the woods for hunting.   Target Date 06/08/18   Total Evaluation Time   Total Evaluation Time 30   Therapy Certification   Certification date from 04/10/18   Certification date to 06/08/18   Medical Diagnosis S/P TKA left knee

## 2018-04-10 NOTE — MR AVS SNAPSHOT
After Visit Summary   4/10/2018    Duane E Sogge    MRN: [id number]           Patient Information     Date Of Birth          1940        Visit Information        Provider Department      4/10/2018 8:00 AM NL INFUSION CHAIR 2 [facility]        Today's Diagnoses     Staphylococcal arthritis of left knee (H)    -  1      Care Instructions    Pt to return on 4/11/18  for vanco, going to ER in evening. . Copies of medication list and upcoming appointments given prior to discharge.           Follow-ups after your visit        Your next 10 appointments already scheduled     Apr 11, 2018  8:00 AM CDT   Level 2 with NL INFUSION CHAIR 4   [facility] ([facility])    [address] [place] [address]   [phone]            Apr 12, 2018  8:30 AM CDT   Level 2 with NL INFUSION CHAIR 2   [facility] ([facility])    [address] [place] [address]   [phone]            Apr 13, 2018  8:00 AM CDT   Level 2 with NL INFUSION CHAIR 1   [facility] ([facility])    [address] [place] [address] [address] [place] [address]   [phone]            Apr 16, 2018  8:00 AM CDT   Level 2 with NL INFUSION CHAIR 1   [facility] ([facility])    [address] [place] [address] [address] [place] [address]   [phone]            Apr 17, 2018  8:00 AM CDT   Level 2 with NL INFUSION CHAIR 1   [facility] ([facility])    [address] [place] [address]   [phone]            Apr 18, 2018  8:00 AM CDT   Level 2 with NL INFUSION CHAIR 1   [facility] ([facility])    [address] [place] [address] Dr [place] [address]   [phone]            Apr 19, 2018  8:00 AM CDT   Level 2 with NL INFUSION CHAIR 4   [facility] ([facility]  "[facility])    [address] [place] [address]   [phone]            Apr 20, 2018  8:00 AM CDT   Level 2 with NL INFUSION CHAIR 5   [facility] ([facility])    [address] [place] [address]   [phone]            Apr 23, 2018  8:00 AM CDT   Level 2 with NL INFUSION CHAIR 2   [facility] ([facility])    [address] [place] [address]   [phone]            Apr 24, 2018  8:30 AM CDT   Level 2 with NL INFUSION CHAIR 1   [facility] ([facility])    [address] [place] [address]   [phone]              Who to contact     If you have questions or need follow up information about today's clinic visit or your schedule please contact [facility] directly at [phone].  Normal or non-critical lab and imaging results will be communicated to you by [employer], letter or phone within 4 business days after the clinic has received the results. If you do not hear from us within 7 days, please contact the clinic through [facility] [employer] or phone. If you have a critical or abnormal lab result, we will notify you by phone as soon as possible.  Submit refill requests through [employer] or call your pharmacy and they will forward the refill request to us. Please allow 3 business days for your refill to be completed.          Additional Information About Your Visit        [employer] Information     [employer] lets you send messages to your doctor, view your test results, renew your prescriptions, schedule appointments and more. To sign up, go to [email or web address] . Click on \"Log in\" on the left side of the screen, which will take you to the Welcome page. Then click on \"Sign up Now\" on the right side of the page.     You will be asked to enter the access code listed below, as well as some personal information. Please follow the directions to " create your username and password.     Your access code is: [email or web address]  Expires: 2018  5:11 PM     Your access code will  in 90 days. If you need help or a new code, please call your [place] clinic or [phone].        Care EveryWhere ID     This is your Care EveryWhere ID. This could be used by other organizations to access your [facility] [place] medical records  [id number]        Your Vitals Were     Pulse Temperature Respirations Pulse Oximetry          52 97.2  F (36.2  C) (Temporal) 18 98%         Blood Pressure from Last 3 Encounters:   04/10/18 160/71   18 (!) 175/95   18 169/84    Weight from Last 3 Encounters:   18 108.9 kg (240 lb)   18 107 kg (236 lb)   18 106.3 kg (234 lb 6.4 oz)              We Performed the Following     Alkaline phosphatase     ALT     AST     Bilirubin  total     CBC with platelets differential     Creatinine     CRP inflammation     Vancomycin level        Primary Care Provider Office Phone # Fax #    Saqib Chavarria [phone]       [facility] [address]  [facility] [address]        Equal Access to Services     STARR LEIVA AH: Hadii aad ku hadasho Soomaali, waaxda luqadaha, qaybta kaalmada adeegyada, waxay idiin hayaan adeeg khabimael lard ying. So [facility] [phone].    ATENCIÓN: Si habla español, tiene a roth disposición servicios gratuitos de asistencia lingüística. Llame al [phone].    We comply with applicable federal civil rights laws and [place] laws. We do not discriminate on the basis of race, color, national origin, age, disability, sex, sexual orientation, or gender identity.            Thank you!     Thank you for choosing [facility]  for your care. Our goal is always to provide you with excellent care. Hearing back from our patients is one way we can continue to improve our services. Please take a few minutes to complete the written survey that you may receive  in the mail after your visit with us. Thank you!             Your Updated Medication List - Protect others around you: Learn how to safely use, store and throw away your medicines at www.disposemymeds.org.          This list is accurate as of 4/10/18 11:37 AM.  Always use your most recent med list.                   Brand Name Dispense Instructions for use Diagnosis    ALPRAZolam 0.5 MG tablet    XANAX     Take 1 tab up to 4x daily only if needed. Refill when due,no early refills  Indications: Feeling Anxious        amoxicillin-clavulanate 875-125 MG per tablet    AUGMENTIN    20 tablet    Take 1 tablet by mouth 2 times daily    Acute maxillary sinusitis, recurrence not specified       BENZONATATE PO           GABAPENTIN PO      Take 100 mg by mouth 3 times daily as needed (anxiety) 1-3 capsules        HYDROcodone-acetaminophen 5-325 MG per tablet    NORCO          lisinopril-hydrochlorothiazide 20-12.5 MG per tablet    PRINZIDE/ZESTORETIC     Take 2 tablets by mouth daily        metoprolol succinate 50 MG 24 hr tablet    TOPROL-XL     Take 50 mg by mouth daily        MIRTAZAPINE PO      Take 15 mg by mouth At Bedtime        rifampin 300 MG capsule    RIFADIN     Take 600 mg by mouth        senna 8.6 MG tablet    SENOKOT          simvastatin 20 MG tablet    ZOCOR     Take 20 mg by mouth At Bedtime        TEMAZEPAM PO      Take 15 mg by mouth nightly as needed for sleep        TYLENOL PO      Take 325 mg by mouth as needed for mild pain or fever        venlafaxine 150 MG Tb24 24 hr tablet    EFFEXOR-ER     Take 150 mg by mouth daily (with breakfast)

## 2018-04-10 NOTE — PATIENT INSTRUCTIONS
Pt to return on 4/11/18  for vanco, going to ER in evening. . Copies of medication list and upcoming appointments given prior to discharge.

## 2018-04-10 NOTE — PROGRESS NOTES
Patient here for vanco. PICC labs drawn. Vanco level-20 today. Pharmacy will do dose adjustment. Labs faxed to DR. Long office. Patient tolerated infusion well. Discharged in stable condition.

## 2018-04-10 NOTE — ED NOTES
Saline bolus 250mL was not released when orders initiated but it was given and started at 2200 and stopped at 2230

## 2018-04-11 ENCOUNTER — HOSPITAL ENCOUNTER (EMERGENCY)
Facility: CLINIC | Age: 78
Discharge: HOME | End: 2018-04-11
Admitting: FAMILY MEDICINE
Payer: MEDICARE

## 2018-04-11 ENCOUNTER — INFUSION THERAPY VISIT (OUTPATIENT)
Dept: INFUSION THERAPY | Facility: CLINIC | Age: 78
End: 2018-04-11
Attending: FAMILY MEDICINE
Payer: MEDICARE

## 2018-04-11 VITALS
DIASTOLIC BLOOD PRESSURE: 74 MMHG | TEMPERATURE: 98.6 F | HEART RATE: 64 BPM | OXYGEN SATURATION: 96 % | RESPIRATION RATE: 18 BRPM | SYSTOLIC BLOOD PRESSURE: 150 MMHG

## 2018-04-11 VITALS
HEIGHT: 68 IN | DIASTOLIC BLOOD PRESSURE: 90 MMHG | SYSTOLIC BLOOD PRESSURE: 160 MMHG | OXYGEN SATURATION: 99 % | TEMPERATURE: 98.4 F | RESPIRATION RATE: 20 BRPM | WEIGHT: 240 LBS | BODY MASS INDEX: 36.37 KG/M2

## 2018-04-11 DIAGNOSIS — M00.062 STAPHYLOCOCCAL ARTHRITIS OF LEFT KNEE (H): ICD-10-CM

## 2018-04-11 DIAGNOSIS — M00.062 STAPHYLOCOCCAL ARTHRITIS OF LEFT KNEE (H): Primary | ICD-10-CM

## 2018-04-11 PROCEDURE — 96365 THER/PROPH/DIAG IV INF INIT: CPT | Performed by: FAMILY MEDICINE

## 2018-04-11 PROCEDURE — 40000268 ZZH STATISTIC NO CHARGES: Performed by: FAMILY MEDICINE

## 2018-04-11 PROCEDURE — 96365 THER/PROPH/DIAG IV INF INIT: CPT

## 2018-04-11 PROCEDURE — 25000128 H RX IP 250 OP 636: Performed by: FAMILY MEDICINE

## 2018-04-11 RX ADMIN — SODIUM CHLORIDE 250 ML: 9 INJECTION, SOLUTION INTRAVENOUS at 20:53

## 2018-04-11 RX ADMIN — SODIUM CHLORIDE 250 ML: 9 INJECTION, SOLUTION INTRAVENOUS at 08:27

## 2018-04-11 RX ADMIN — VANCOMYCIN HYDROCHLORIDE 1500 MG: 1 INJECTION, POWDER, LYOPHILIZED, FOR SOLUTION INTRAVENOUS at 19:49

## 2018-04-11 RX ADMIN — VANCOMYCIN HYDROCHLORIDE 1500 MG: 1 INJECTION, POWDER, LYOPHILIZED, FOR SOLUTION INTRAVENOUS at 08:30

## 2018-04-11 ASSESSMENT — PAIN SCALES - GENERAL: PAINLEVEL: NO PAIN (0)

## 2018-04-11 NOTE — PROGRESS NOTES
Patient here for vancomycin infusion and tolerated well. Positive blood return pre/post PICC line. Discharged in stable condition.

## 2018-04-11 NOTE — PATIENT INSTRUCTIONS
Pt to return on 4/12/18 for vanco, going to ER for evening dose.. Copies of medication list and upcoming appointments given prior to discharge.

## 2018-04-11 NOTE — ED AVS SNAPSHOT
[facility] Department    [address] [place] [address]    Phone:  [phone]    Fax:  [phone]                                       Duane E Sogge   MRN: [id number]    Department:  [facility] Emergency Department   Date of Visit:  4/11/2018           Patient Information     Date Of Birth          1940        Your diagnoses for this visit were:     Staphylococcal arthritis of left knee (H)       Your next 10 appointments already scheduled     Apr 12, 2018  8:30 AM CDT   Level 2 with NL INFUSION CHAIR 2   [facility] ([facility])    [address] [place] [address]   [phone]            Apr 13, 2018  8:00 AM CDT   Level 2 with NL INFUSION CHAIR 1   [facility] ([facility])    [address] [place] [address]   [phone]            Apr 13, 2018 12:00 PM CDT   Ortho Treatment with Christina Mohr, PT   [facility] ([facility])    [address] [place] [address] Dr [place] [address]   [phone]            Apr 16, 2018  8:00 AM CDT   Level 2 with NL INFUSION CHAIR 1   [facility] ([facility])    [address] [place] [address]   [phone]            Apr 17, 2018  8:00 AM CDT   Level 2 with NL INFUSION CHAIR 1   [facility] ([facility])    [address] [place] [address]   [phone]            Apr 18, 2018  8:00 AM CDT   Level 2 with NL INFUSION CHAIR 1   [facility] ([facility])    [address] [place] [address]   [phone]            Apr 19, 2018  8:00 AM CDT   Level 2 with NL INFUSION CHAIR 4   [facility] ([facility])    [address] [place] [address]   [phone]            Apr 20, 2018  8:00 AM CDT   Level 2  with NL INFUSION CHAIR 5   [facility] ([facility])    [address] [place] [address]   [phone]            Apr 23, 2018  8:00 AM CDT   Level 2 with NL INFUSION CHAIR 2   [facility] ([facility])    [address] [place] [address]   [phone]            Apr 24, 2018  8:30 AM CDT   Level 2 with NL INFUSION CHAIR 1   [facility] ([facility])    [address] [place] [address]   [phone]              24 Hour Appointment Hotline       To make an appointment at any [facility] clinic, call [phone] ([phone]). If you don't have a family doctor or clinic, we will help you find one. [facility] clinics are conveniently located to serve the needs of you and your family.             Review of your medicines      Our records show that you are taking the medicines listed below. If these are incorrect, please call your family doctor or clinic.        Dose / Directions Last dose taken    ALPRAZolam 0.5 MG tablet   Commonly known as:  XANAX        Take 1 tab up to 4x daily only if needed. Refill when due,no early refills  Indications: Feeling Anxious   Refills:  0        amoxicillin-clavulanate 875-125 MG per tablet   Commonly known as:  AUGMENTIN   Dose:  1 tablet   Quantity:  20 tablet        Take 1 tablet by mouth 2 times daily   Refills:  0        BENZONATATE PO        Refills:  0        GABAPENTIN PO   Dose:  100 mg        Take 100 mg by mouth 3 times daily as needed (anxiety) 1-3 capsules   Refills:  0        HYDROcodone-acetaminophen 5-325 MG per tablet   Commonly known as:  NORCO        Refills:  0        lisinopril-hydrochlorothiazide 20-12.5 MG per tablet   Commonly known as:  PRINZIDE/ZESTORETIC   Dose:  2 tablet        Take 2 tablets by mouth daily   Refills:  0        metoprolol succinate 50 MG 24 hr tablet   Commonly known as:  TOPROL-XL   Dose:   50 mg        Take 50 mg by mouth daily   Refills:  0        MIRTAZAPINE PO   Dose:  15 mg        Take 15 mg by mouth At Bedtime   Refills:  0        rifampin 300 MG capsule   Commonly known as:  RIFADIN   Dose:  600 mg        Take 600 mg by mouth   Refills:  0        senna 8.6 MG tablet   Commonly known as:  SENOKOT        Refills:  0        simvastatin 20 MG tablet   Commonly known as:  ZOCOR   Dose:  20 mg        Take 20 mg by mouth At Bedtime   Refills:  0        TEMAZEPAM PO   Dose:  15 mg        Take 15 mg by mouth nightly as needed for sleep   Refills:  0        TYLENOL PO   Dose:  325 mg        Take 325 mg by mouth as needed for mild pain or fever   Refills:  0        venlafaxine 150 MG Tb24 24 hr tablet   Commonly known as:  EFFEXOR-ER   Dose:  150 mg        Take 150 mg by mouth daily (with breakfast)   Refills:  0                Orders Needing Specimen Collection     None      Pending Results     No orders found for last 3 day(s).            Pending Culture Results     No orders found for last 3 day(s).            Pending Results Instructions     If you had any lab results that were not finalized at the time of your Discharge, you can call the ED Lab Result RN at [phone]. You will be contacted by this team for any positive Lab results or changes in treatment. The nurses are available 7 days a week from 10A to 6:30P.  You can leave a message 24 hours per day and they will return your call.        Thank you for choosing [facility]       Thank you for choosing [facility] for your care. Our goal is always to provide you with excellent care. Hearing back from our patients is one way we can continue to improve our services. Please take a few minutes to complete the written survey that you may receive in the mail after you visit with us. Thank you!        [employer] Information     [employer] lets you send messages to your doctor, view your test results, renew your prescriptions, schedule appointments and more. To  "sign up, go to [email or web address] . Click on \"Log in\" on the left side of the screen, which will take you to the Welcome page. Then click on \"Sign up Now\" on the right side of the page.     You will be asked to enter the access code listed below, as well as some personal information. Please follow the directions to create your username and password.     Your access code is: [email or web address]  Expires: 2018  5:11 PM     Your access code will  in 90 days. If you need help or a new code, please call your [place] clinic or [phone].        Care EveryWhere ID     This is your Care EveryWhere ID. This could be used by other organizations to access your [place] medical records  [id number]        Equal Access to Services     STARR LEIVA : Joey Reno, stevie headley, sherry [place]ne, brittny ying. So [facility] [phone].    ATENCIÓN: Si habla español, tiene a roth disposición servicios gratuitos de asistencia lingüística. Llling al [phone].    We comply with applicable federal civil rights laws and [place] laws. We do not discriminate on the basis of race, color, national origin, age, disability, sex, sexual orientation, or gender identity.            After Visit Summary       This is your record. Keep this with you and show to your community pharmacist(s) and doctor(s) at your next visit.                  "

## 2018-04-11 NOTE — ED NOTES
IV infusion completed, patient denied any complaints vitals stable and patient discharged home voicing understanding of follow up plan and continued infusion therapy plan.

## 2018-04-11 NOTE — ED AVS SNAPSHOT
[facility] Emergency Department    [address] [place] [address]    Phone:  [phone]    Fax:  [phone]                                       Duane E Sogge   MRN: [id number]    Department:  [facility] Emergency Department   Date of Visit:  4/11/2018           After Visit Summary Signature Page     I have received my discharge instructions, and my questions have been answered. I have discussed any challenges I see with this plan with the nurse or doctor.    ..........................................................................................................................................  Patient/Patient Representative Signature      ..........................................................................................................................................  Patient Representative Print Name and Relationship to Patient    ..................................................               ................................................  Date                                            Time    ..........................................................................................................................................  Reviewed by Signature/Title    ...................................................              ..............................................  Date                                                            Time

## 2018-04-11 NOTE — MR AVS SNAPSHOT
After Visit Summary   4/11/2018    Duane E Sogge    MRN: [id number]           Patient Information     Date Of Birth          1940        Visit Information        Provider Department      4/11/2018 8:00 AM NL INFUSION CHAIR 4 [facility]        Today's Diagnoses     Staphylococcal arthritis of left knee (H)    -  1      Care Instructions    Pt to return on 4/12/18 for vanco, going to ER for evening dose.. Copies of medication list and upcoming appointments given prior to discharge.           Follow-ups after your visit        Your next 10 appointments already scheduled     Apr 12, 2018  8:30 AM CDT   Level 2 with NL INFUSION CHAIR 2   [facility] ([facility])    [address] [place] [address] Dr [place]ha [place] [address]   [phone]            Apr 13, 2018  8:00 AM CDT   Level 2 with NL INFUSION CHAIR 1   [facility] ([facility])    [address] [place] [address] Dr Radha [place] [address]   [phone]            Apr 13, 2018 12:00 PM CDT   Ortho Treatment with Christina Mohr, PT   [facility] ([facility])    [address] [place] [address] [place] [address]   [phone]            Apr 16, 2018  8:00 AM CDT   Level 2 with NL INFUSION CHAIR 1   [facility] ([facility])    [address] [place] [address] Dr Radha [place] [address]   [phone]            Apr 17, 2018  8:00 AM CDT   Level 2 with NL INFUSION CHAIR 1   [facility] ([facility])    [address] [place] [address] Dr Radha [place] [address]   [phone]            Apr 18, 2018  8:00 AM CDT   Level 2 with NL INFUSION CHAIR 1   [facility] ([facility])    [address] [place] [address] Dr Radha [place] [address]   [phone]            Apr 19, 2018  8:00 AM CDT   Level 2 with NL INFUSION CHAIR 4   [facility] ([facility])    [address] [place] [address]   [phone]            Apr 20, 2018  8:00 AM CDT   Level 2 with NL INFUSION CHAIR 5   [facility] ([facility])    [address] [place] [address]   [phone]            Apr 23, 2018  8:00 AM CDT   Level 2 with NL INFUSION CHAIR 2   [facility] ([facility])    [address]   [place] [address]   [phone]            Apr 24, 2018  8:30 AM CDT   Level 2 with NL INFUSION CHAIR 1   [facility] ([facility])    [address] [place] [address]   [phone]              Who to contact     If you have questions or need follow up information about today's clinic visit or your schedule please contact [facility] INFUSION [facility] directly at [phone].  Normal or non-critical lab and imaging results will be communicated to you by ZAOZAOhart, letter or phone within 4 business days after the clinic has received the results. If you do not hear from us within 7 days, please contact the clinic through Book Buybackt or phone. If you have a critical or abnormal lab result, we will notify you by phone as soon as possible.  Submit refill requests through Clipabout or call your pharmacy and they will forward the refill request to us. Please allow 3 business days for your refill to be completed.          Additional Information About Your Visit        Clipabout Information     Clipabout lets you send messages to your doctor, view your test results, renew your prescriptions, schedule appointments and more. To sign up, go to [email or web address] . Click on \"Log in\" on the left side of the screen, which will take you to the Welcome page. Then click on \"Sign up Now\" on the right side of the page.     You will be asked to enter the access code listed below, as well as some personal information. Please follow the " directions to create your username and password.     Your access code is: [email or web address]  Expires: 2018  5:11 PM     Your access code will  in 90 days. If you need help or a new code, please call your [place] clinic or [phone].        Care EveryWhere ID     This is your Care EveryWhere ID. This could be used by other organizations to access your [place] medical records  [id number]        Your Vitals Were     Pulse Temperature Respirations Pulse Oximetry          64 98.6  F (37  C) (Temporal) 18 96%         Blood Pressure from Last 3 Encounters:   18 150/74   04/10/18 164/67   04/10/18 160/71    Weight from Last 3 Encounters:   18 108.9 kg (240 lb)   18 107 kg (236 lb)   18 106.3 kg (234 lb 6.4 oz)              Today, you had the following     No orders found for display       Primary Care Provider Office Phone # Fax #    Saqib Chavarria [phone]       [facility] [address]  [facility] [address]        Equal Access to Services     [facility] : Hadii aad ku hadasho Soomaali, waaxda luqadaha, qaybta kaalmada adeegyada, waxay idiin hayfaizan lalit ryan lard . So [facility] [phone].    ATENCIÓN: Si habla español, tiene a roth disposición servicios gratuitos de asistencia lingüística. [facility] [phone].    We comply with applicable federal civil rights laws and [place] laws. We do not discriminate on the basis of race, color, national origin, age, disability, sex, sexual orientation, or gender identity.            Thank you!     Thank you for choosing [facility] INFUSION SERVICES  for your care. Our goal is always to provide you with excellent care. Hearing back from our patients is one way we can continue to improve our services. Please take a few minutes to complete the written survey that you may receive in the mail after your visit with us. Thank you!             Your Updated Medication List - Protect others around  you: Learn how to safely use, store and throw away your medicines at www.disposemymeds.org.          This list is accurate as of 4/11/18 11:18 AM.  Always use your most recent med list.                   Brand Name Dispense Instructions for use Diagnosis    ALPRAZolam 0.5 MG tablet    XANAX     Take 1 tab up to 4x daily only if needed. Refill when due,no early refills  Indications: Feeling Anxious        amoxicillin-clavulanate 875-125 MG per tablet    AUGMENTIN    20 tablet    Take 1 tablet by mouth 2 times daily    Acute maxillary sinusitis, recurrence not specified       BENZONATATE PO           GABAPENTIN PO      Take 100 mg by mouth 3 times daily as needed (anxiety) 1-3 capsules        HYDROcodone-acetaminophen 5-325 MG per tablet    NORCO          lisinopril-hydrochlorothiazide 20-12.5 MG per tablet    PRINZIDE/ZESTORETIC     Take 2 tablets by mouth daily        metoprolol succinate 50 MG 24 hr tablet    TOPROL-XL     Take 50 mg by mouth daily        MIRTAZAPINE PO      Take 15 mg by mouth At Bedtime        rifampin 300 MG capsule    RIFADIN     Take 600 mg by mouth        senna 8.6 MG tablet    SENOKOT          simvastatin 20 MG tablet    ZOCOR     Take 20 mg by mouth At Bedtime        TEMAZEPAM PO      Take 15 mg by mouth nightly as needed for sleep        TYLENOL PO      Take 325 mg by mouth as needed for mild pain or fever        venlafaxine 150 MG Tb24 24 hr tablet    EFFEXOR-ER     Take 150 mg by mouth daily (with breakfast)

## 2018-04-11 NOTE — ED NOTES
Patient arrived for infusion therapy, vitals stable denied any complaints. PICC line in place and patent. Vancomycin and saline bolus started

## 2018-04-12 ENCOUNTER — HOSPITAL ENCOUNTER (EMERGENCY)
Facility: CLINIC | Age: 78
Discharge: HOME | End: 2018-04-12
Admitting: FAMILY MEDICINE
Payer: MEDICARE

## 2018-04-12 ENCOUNTER — INFUSION THERAPY VISIT (OUTPATIENT)
Dept: INFUSION THERAPY | Facility: CLINIC | Age: 78
End: 2018-04-12
Attending: FAMILY MEDICINE
Payer: MEDICARE

## 2018-04-12 VITALS
HEART RATE: 58 BPM | TEMPERATURE: 96.4 F | DIASTOLIC BLOOD PRESSURE: 90 MMHG | SYSTOLIC BLOOD PRESSURE: 164 MMHG | RESPIRATION RATE: 16 BRPM | OXYGEN SATURATION: 94 %

## 2018-04-12 VITALS
OXYGEN SATURATION: 98 % | DIASTOLIC BLOOD PRESSURE: 88 MMHG | HEART RATE: 66 BPM | BODY MASS INDEX: 35.58 KG/M2 | SYSTOLIC BLOOD PRESSURE: 158 MMHG | WEIGHT: 234 LBS | TEMPERATURE: 97.5 F | RESPIRATION RATE: 18 BRPM

## 2018-04-12 DIAGNOSIS — M00.062 STAPHYLOCOCCAL ARTHRITIS OF LEFT KNEE (H): ICD-10-CM

## 2018-04-12 DIAGNOSIS — M00.062 STAPHYLOCOCCAL ARTHRITIS OF LEFT KNEE (H): Primary | ICD-10-CM

## 2018-04-12 PROCEDURE — 96365 THER/PROPH/DIAG IV INF INIT: CPT

## 2018-04-12 PROCEDURE — 40000268 ZZH STATISTIC NO CHARGES

## 2018-04-12 PROCEDURE — 96366 THER/PROPH/DIAG IV INF ADDON: CPT

## 2018-04-12 PROCEDURE — 25000128 H RX IP 250 OP 636: Performed by: FAMILY MEDICINE

## 2018-04-12 RX ADMIN — SODIUM CHLORIDE 250 ML: 9 INJECTION, SOLUTION INTRAVENOUS at 21:06

## 2018-04-12 RX ADMIN — VANCOMYCIN HYDROCHLORIDE 1500 MG: 1 INJECTION, POWDER, LYOPHILIZED, FOR SOLUTION INTRAVENOUS at 20:01

## 2018-04-12 RX ADMIN — SODIUM CHLORIDE 250 ML: 9 INJECTION, SOLUTION INTRAVENOUS at 08:47

## 2018-04-12 RX ADMIN — VANCOMYCIN HYDROCHLORIDE 1500 MG: 1 INJECTION, POWDER, LYOPHILIZED, FOR SOLUTION INTRAVENOUS at 08:59

## 2018-04-12 NOTE — MR AVS SNAPSHOT
After Visit Summary   4/12/2018    Duane E Sogge    MRN: [id number]           Patient Information     Date Of Birth          1940        Visit Information        Provider Department      4/12/2018 8:30 AM NL INFUSION CHAIR 2 [facility]        Today's Diagnoses     Staphylococcal arthritis of left knee (H)    -  1      Care Instructions    Pt to return to ED tonight and then Infusion tomorrow am for further q 12 hour Vancomycin dosing.           Follow-ups after your visit        Follow-up notes from your care team     Return in about 1 day (around 4/13/2018).      Your next 10 appointments already scheduled     Apr 13, 2018  8:00 AM CDT   Level 2 with NL INFUSION CHAIR 1   [facility] ([facility])    [address] [place] [address]   [phone]            Apr 13, 2018 12:00 PM CDT   Ortho Treatment with Christina Mohr, PT   [facility] ([facility])    [address] [place] [address]   [phone]            Apr 16, 2018  8:00 AM CDT   Level 2 with NL INFUSION CHAIR 1   [facility] ([facility])    [address] [place] [address]   [phone]            Apr 17, 2018  8:00 AM CDT   Level 2 with NL INFUSION CHAIR 1   [facility] ([facility])    [address] [place] [address]   [phone]            Apr 18, 2018  8:00 AM CDT   Level 2 with NL INFUSION CHAIR 1   [facility] ([facility])    [address] [place] [address]   [phone]            Apr 19, 2018  8:00 AM CDT   Level 2 with NL INFUSION CHAIR 4   [facility] ([facility])    [address] [place] [address]   [phone]            Apr 20, 2018  8:00 AM CDT   Level 2 with NL INFUSION CHAIR 5   [place]  [facility] ([facility])    [address] [place] [address]   [phone]            Apr 23, 2018  8:00 AM CDT   Level 2 with NL INFUSION CHAIR 2   [facility] ([facility])    [address]   [place] [address]   [phone]            Apr 24, 2018  8:30 AM CDT   Level 2 with NL INFUSION CHAIR 1   [facility] ([facility])    [address] [place] [address]   [phone]            Apr 24, 2018 11:30 AM CDT   Ortho Treatment with Christina Mohr, PT   [facility] ([facility])    [address] [place] [address]   [phone]              Who to contact     If you have questions or need follow up information about today's clinic visit or your schedule please contact [facility] INFUSION [facility] directly at [phone].  Normal or non-critical lab and imaging results will be communicated to you by PlayScapehart, letter or phone within 4 business days after the clinic has received the results. If you do not hear from us within 7 days, please contact the clinic through PlayScapehart or phone. If you have a critical or abnormal lab result, we will notify you by phone as soon as possible.  Submit refill requests through Cold Crate or call your pharmacy and they will forward the refill request to us. Please allow 3 business days for your refill to be completed.          Additional Information About Your Visit        PlayScapehart Information     Cold Crate lets you send messages to your doctor, view your test results, renew your prescriptions, schedule appointments and more. To sign up, go to [email or web address] . Click on \"Log in\" on the left side of the screen, which will take you to the Welcome page. Then click on \"Sign up Now\" on the right side of the page.     You will be asked to enter the access code listed below, as well as some " personal information. Please follow the directions to create your username and password.     Your access code is: [email or web address]  Expires: 2018  5:11 PM     Your access code will  in 90 days. If you need help or a new code, please call your [place] clinic or [phone].        Care EveryWhere ID     This is your Care EveryWhere ID. This could be used by other organizations to access your [place] medical records  [id number]        Your Vitals Were     Pulse Temperature Respirations Pulse Oximetry          58 96.4  F (35.8  C) (Oral) 16 94%         Blood Pressure from Last 3 Encounters:   18 164/90   18 160/90   18 150/74    Weight from Last 3 Encounters:   18 108.9 kg (240 lb)   18 108.9 kg (240 lb)   18 107 kg (236 lb)              Today, you had the following     No orders found for display       Primary Care Provider Office Phone # Fax #    Saqib Chavarria [phone]       [facility] [address]  [facility] [place] [address]        Equal Access to Services     MATT LEIVA : Hadii aad ku hadasho Soomaali, waaxda luqadaha, qaybta kaalmada adeegyada, brittny sanderson . So [facility] [phone].    ATENCIÓN: Si habla español, tiene a roth disposición servicios gratuitos de asistencia lingüística. Llame al [phone].    We comply with applicable federal civil rights laws and [place] laws. We do not discriminate on the basis of race, color, national origin, age, disability, sex, sexual orientation, or gender identity.            Thank you!     Thank you for choosing [facility] SERVICES  for your care. Our goal is always to provide you with excellent care. Hearing back from our patients is one way we can continue to improve our services. Please take a few minutes to complete the written survey that you may receive in the mail after your visit with us. Thank you!             Your Updated  Medication List - Protect others around you: Learn how to safely use, store and throw away your medicines at www.disposemymeds.org.          This list is accurate as of 4/12/18 10:53 AM.  Always use your most recent med list.                   Brand Name Dispense Instructions for use Diagnosis    ALPRAZolam 0.5 MG tablet    XANAX     Take 1 tab up to 4x daily only if needed. Refill when due,no early refills  Indications: Feeling Anxious        amoxicillin-clavulanate 875-125 MG per tablet    AUGMENTIN    20 tablet    Take 1 tablet by mouth 2 times daily    Acute maxillary sinusitis, recurrence not specified       BENZONATATE PO           GABAPENTIN PO      Take 100 mg by mouth 3 times daily as needed (anxiety) 1-3 capsules        HYDROcodone-acetaminophen 5-325 MG per tablet    NORCO          lisinopril-hydrochlorothiazide 20-12.5 MG per tablet    PRINZIDE/ZESTORETIC     Take 2 tablets by mouth daily        metoprolol succinate 50 MG 24 hr tablet    TOPROL-XL     Take 50 mg by mouth daily        MIRTAZAPINE PO      Take 15 mg by mouth At Bedtime        rifampin 300 MG capsule    RIFADIN     Take 600 mg by mouth        senna 8.6 MG tablet    SENOKOT          simvastatin 20 MG tablet    ZOCOR     Take 20 mg by mouth At Bedtime        TEMAZEPAM PO      Take 15 mg by mouth nightly as needed for sleep        TYLENOL PO      Take 325 mg by mouth as needed for mild pain or fever        venlafaxine 150 MG Tb24 24 hr tablet    EFFEXOR-ER     Take 150 mg by mouth daily (with breakfast)

## 2018-04-12 NOTE — PATIENT INSTRUCTIONS
Pt to return to ED tonight and then Infusion tomorrow am for further q 12 hour Vancomycin dosing.

## 2018-04-13 ENCOUNTER — HOSPITAL ENCOUNTER (OUTPATIENT)
Dept: PHYSICAL THERAPY | Facility: CLINIC | Age: 78
Setting detail: THERAPIES SERIES
End: 2018-04-13
Attending: ORTHOPAEDIC SURGERY
Payer: MEDICARE

## 2018-04-13 ENCOUNTER — HOSPITAL ENCOUNTER (EMERGENCY)
Facility: CLINIC | Age: 78
Discharge: HOME | End: 2018-04-13
Admitting: FAMILY MEDICINE
Payer: MEDICARE

## 2018-04-13 ENCOUNTER — INFUSION THERAPY VISIT (OUTPATIENT)
Dept: INFUSION THERAPY | Facility: CLINIC | Age: 78
End: 2018-04-13
Attending: FAMILY MEDICINE
Payer: MEDICARE

## 2018-04-13 VITALS
SYSTOLIC BLOOD PRESSURE: 171 MMHG | RESPIRATION RATE: 16 BRPM | DIASTOLIC BLOOD PRESSURE: 86 MMHG | OXYGEN SATURATION: 98 % | TEMPERATURE: 97.8 F | HEART RATE: 53 BPM

## 2018-04-13 VITALS
TEMPERATURE: 98.7 F | DIASTOLIC BLOOD PRESSURE: 88 MMHG | RESPIRATION RATE: 18 BRPM | OXYGEN SATURATION: 98 % | HEART RATE: 66 BPM | SYSTOLIC BLOOD PRESSURE: 175 MMHG

## 2018-04-13 DIAGNOSIS — M00.062 STAPHYLOCOCCAL ARTHRITIS OF LEFT KNEE (H): Primary | ICD-10-CM

## 2018-04-13 DIAGNOSIS — M00.062 STAPHYLOCOCCAL ARTHRITIS OF LEFT KNEE (H): ICD-10-CM

## 2018-04-13 LAB
CREAT SERPL-MCNC: 0.74 MG/DL (ref 0.66–1.25)
GFR SERPL CREATININE-BSD FRML MDRD: >90 ML/MIN/1.7M2
VANCOMYCIN SERPL-MCNC: 17.7 MG/L

## 2018-04-13 PROCEDURE — 40000718 ZZHC STATISTIC PT DEPARTMENT ORTHO VISIT

## 2018-04-13 PROCEDURE — 96366 THER/PROPH/DIAG IV INF ADDON: CPT

## 2018-04-13 PROCEDURE — 96365 THER/PROPH/DIAG IV INF INIT: CPT

## 2018-04-13 PROCEDURE — 82565 ASSAY OF CREATININE: CPT | Performed by: FAMILY MEDICINE

## 2018-04-13 PROCEDURE — 25000128 H RX IP 250 OP 636: Performed by: FAMILY MEDICINE

## 2018-04-13 PROCEDURE — 97110 THERAPEUTIC EXERCISES: CPT | Mod: GP

## 2018-04-13 PROCEDURE — 80202 ASSAY OF VANCOMYCIN: CPT | Performed by: FAMILY MEDICINE

## 2018-04-13 PROCEDURE — 40000268 ZZH STATISTIC NO CHARGES

## 2018-04-13 RX ADMIN — VANCOMYCIN HYDROCHLORIDE 1500 MG: 1 INJECTION, POWDER, LYOPHILIZED, FOR SOLUTION INTRAVENOUS at 09:34

## 2018-04-13 RX ADMIN — SODIUM CHLORIDE 250 ML: 9 INJECTION, SOLUTION INTRAVENOUS at 09:26

## 2018-04-13 RX ADMIN — VANCOMYCIN HYDROCHLORIDE 1500 MG: 1 INJECTION, POWDER, LYOPHILIZED, FOR SOLUTION INTRAVENOUS at 20:01

## 2018-04-13 ASSESSMENT — PAIN SCALES - GENERAL: PAINLEVEL: NO PAIN (0)

## 2018-04-13 NOTE — MR AVS SNAPSHOT
After Visit Summary   4/13/2018    Duane E Sogge    MRN: [id number]           Patient Information     Date Of Birth          1940        Visit Information        Provider Department      4/13/2018 8:00 AM NL INFUSION CHAIR 1 [facility]        Today's Diagnoses     Staphylococcal arthritis of left knee (H)    -  1      Care Instructions    Pt to return on 4/16/18 for vanco/PICC line dressing change. Copies of medication list and upcoming appointments given prior to discharge.           Follow-ups after your visit        Your next 10 appointments already scheduled     Apr 16, 2018  8:00 AM CDT   Level 2 with NL INFUSION CHAIR 1   [facility] ([facility])    [address] [place] [address]   [phone]            Apr 17, 2018  8:00 AM CDT   Level 2 with NL INFUSION CHAIR 1   [facility] ([facility])    [address] [place] [address]   [phone]            Apr 18, 2018  8:00 AM CDT   Level 2 with NL INFUSION CHAIR 1   [facility] ([facility])    [address] [place] [address] Dr [place] [address]   [phone]            Apr 19, 2018  8:00 AM CDT   Level 2 with NL INFUSION CHAIR 4   [facility] ([facility])    [address] [place] [address] Dr [place]ha [place] [address]   [phone]            Apr 20, 2018  8:00 AM CDT   Level 2 with NL INFUSION CHAIR 5   [facility] ([facility])    [address] [place] [address]   [phone]            Apr 23, 2018  8:00 AM CDT   Level 2 with NL INFUSION CHAIR 2   [facility] ([facility])    [address] [place] [address] Dr [place] [address]   [phone]            Apr 24, 2018  8:30 AM CDT   Level 2 with NL INFUSION CHAIR 1   [facility] ([facility]  "[facility])    [address] [place] [address]   [phone]            Apr 24, 2018 11:30 AM CDT   Ortho Treatment with Christina Mohr PT   [facility] ([facility])    [address] [place] [address]   [phone]            Apr 25, 2018  8:00 AM CDT   Level 2 with NL INFUSION CHAIR 1   [facility] Infusion Services ([facility])    [address] [place] [address]   [phone]            Apr 26, 2018  8:00 AM CDT   Level 2 with NL INFUSION CHAIR 2   [facility]fusion [facility] ([facility])    [address] [place] [address]   [phone]              Who to contact     If you have questions or need follow up information about today's clinic visit or your schedule please contact [facility] INFUSION SERVICES directly at [phone].  Normal or non-critical lab and imaging results will be communicated to you by Nexstimhart, letter or phone within 4 business days after the clinic has received the results. If you do not hear from us within 7 days, please contact the clinic through Storyfult or phone. If you have a critical or abnormal lab result, we will notify you by phone as soon as possible.  Submit refill requests through LifeVantage or call your pharmacy and they will forward the refill request to us. Please allow 3 business days for your refill to be completed.          Additional Information About Your Visit        LifeVantage Information     LifeVantage lets you send messages to your doctor, view your test results, renew your prescriptions, schedule appointments and more. To sign up, go to [email or web address] . Click on \"Log in\" on the left side of the screen, which will take you to the Welcome page. Then click on \"Sign up Now\" on the right side of the page.     You will be asked to enter the access code listed below, as well as some personal information. Please follow the directions " to create your username and password.     Your access code is: [email or web address]  Expires: 2018  5:11 PM     Your access code will  in 90 days. If you need help or a new code, please call your [place] clinic or [phone].        Care EveryWhere ID     This is your Care EveryWhere ID. This could be used by other organizations to access your [place] medical records  [id number]        Your Vitals Were     Pulse Temperature Respirations Pulse Oximetry          53 97.8  F (36.6  C) (Temporal) 16 98%         Blood Pressure from Last 3 Encounters:   18 171/86   18 158/88   18 164/90    Weight from Last 3 Encounters:   18 106.1 kg (234 lb)   18 108.9 kg (240 lb)   18 108.9 kg (240 lb)              We Performed the Following     Creatinine     Vancomycin level        Primary Care Provider Office Phone # Fax #    Saqib Chavarria [phone]       [facility] [address] [facility] [address]  [facility] [address]        Equal Access to Services     [facility]: Hadii aad ku hadasho Soomaali, waaxda luqadaha, qaybta kaalmada adeegyada, waxay idiin hayaan aderachelle sanderson . So [facility] [phone].    ATENCIÓN: Si habla español, tiene a roth disposición servicios gratuitos de asistencia lingüística. Llame al [phone].    We comply with applicable federal civil rights laws and [place] laws. We do not discriminate on the basis of race, color, national origin, age, disability, sex, sexual orientation, or gender identity.            Thank you!     Thank you for choosing [facility] INFUSION SERVICES  for your care. Our goal is always to provide you with excellent care. Hearing back from our patients is one way we can continue to improve our services. Please take a few minutes to complete the written survey that you may receive in the mail after your visit with us. Thank you!             Your Updated Medication List - Protect others around you: Learn  how to safely use, store and throw away your medicines at www.disposemymeds.org.          This list is accurate as of 4/13/18 12:14 PM.  Always use your most recent med list.                   Brand Name Dispense Instructions for use Diagnosis    ALPRAZolam 0.5 MG tablet    XANAX     Take 1 tab up to 4x daily only if needed. Refill when due,no early refills  Indications: Feeling Anxious        amoxicillin-clavulanate 875-125 MG per tablet    AUGMENTIN    20 tablet    Take 1 tablet by mouth 2 times daily    Acute maxillary sinusitis, recurrence not specified       BENZONATATE PO           GABAPENTIN PO      Take 100 mg by mouth 3 times daily as needed (anxiety) 1-3 capsules        HYDROcodone-acetaminophen 5-325 MG per tablet    NORCO          lisinopril-hydrochlorothiazide 20-12.5 MG per tablet    PRINZIDE/ZESTORETIC     Take 2 tablets by mouth daily        metoprolol succinate 50 MG 24 hr tablet    TOPROL-XL     Take 50 mg by mouth daily        MIRTAZAPINE PO      Take 15 mg by mouth At Bedtime        rifampin 300 MG capsule    RIFADIN     Take 600 mg by mouth        senna 8.6 MG tablet    SENOKOT          simvastatin 20 MG tablet    ZOCOR     Take 20 mg by mouth At Bedtime        TEMAZEPAM PO      Take 15 mg by mouth nightly as needed for sleep        TYLENOL PO      Take 325 mg by mouth as needed for mild pain or fever        venlafaxine 150 MG Tb24 24 hr tablet    EFFEXOR-ER     Take 150 mg by mouth daily (with breakfast)

## 2018-04-13 NOTE — PATIENT INSTRUCTIONS
Pt to return on 4/16/18 for vanco/PICC line dressing change. Copies of medication list and upcoming appointments given prior to discharge.

## 2018-04-13 NOTE — PROGRESS NOTES
Patient here for vancomycin infusion, labs drawn. Vanco level-17.7. Tolerated infusion well. Discharged in stable condition. Patient will go to ER over weekend and return here 4/16 at 8 am.

## 2018-04-14 ENCOUNTER — HOSPITAL ENCOUNTER (EMERGENCY)
Facility: CLINIC | Age: 78
Discharge: HOME | End: 2018-04-14
Admitting: FAMILY MEDICINE
Payer: MEDICARE

## 2018-04-14 VITALS
RESPIRATION RATE: 16 BRPM | HEART RATE: 57 BPM | OXYGEN SATURATION: 99 % | SYSTOLIC BLOOD PRESSURE: 167 MMHG | DIASTOLIC BLOOD PRESSURE: 94 MMHG | TEMPERATURE: 98.6 F

## 2018-04-14 DIAGNOSIS — M00.062 STAPHYLOCOCCAL ARTHRITIS OF LEFT KNEE (H): ICD-10-CM

## 2018-04-14 PROCEDURE — 25000128 H RX IP 250 OP 636: Performed by: FAMILY MEDICINE

## 2018-04-14 PROCEDURE — 96374 THER/PROPH/DIAG INJ IV PUSH: CPT

## 2018-04-14 PROCEDURE — 40000268 ZZH STATISTIC NO CHARGES

## 2018-04-14 RX ADMIN — SODIUM CHLORIDE 250 ML: 9 INJECTION, SOLUTION INTRAVENOUS at 08:27

## 2018-04-14 RX ADMIN — VANCOMYCIN HYDROCHLORIDE 1500 MG: 1 INJECTION, POWDER, LYOPHILIZED, FOR SOLUTION INTRAVENOUS at 08:32

## 2018-04-15 ENCOUNTER — HOSPITAL ENCOUNTER (EMERGENCY)
Facility: CLINIC | Age: 78
Discharge: HOME | End: 2018-04-15
Admitting: FAMILY MEDICINE
Payer: MEDICARE

## 2018-04-15 VITALS
TEMPERATURE: 98 F | BODY MASS INDEX: 35.58 KG/M2 | SYSTOLIC BLOOD PRESSURE: 180 MMHG | WEIGHT: 234 LBS | HEART RATE: 65 BPM | OXYGEN SATURATION: 98 % | DIASTOLIC BLOOD PRESSURE: 98 MMHG | RESPIRATION RATE: 20 BRPM

## 2018-04-15 VITALS
HEART RATE: 67 BPM | RESPIRATION RATE: 20 BRPM | OXYGEN SATURATION: 98 % | SYSTOLIC BLOOD PRESSURE: 172 MMHG | TEMPERATURE: 98.2 F | DIASTOLIC BLOOD PRESSURE: 81 MMHG

## 2018-04-15 DIAGNOSIS — M00.062 STAPHYLOCOCCAL ARTHRITIS OF LEFT KNEE (H): ICD-10-CM

## 2018-04-15 PROCEDURE — 25000128 H RX IP 250 OP 636: Performed by: FAMILY MEDICINE

## 2018-04-15 PROCEDURE — 40000268 ZZH STATISTIC NO CHARGES

## 2018-04-15 PROCEDURE — 96366 THER/PROPH/DIAG IV INF ADDON: CPT

## 2018-04-15 PROCEDURE — 96374 THER/PROPH/DIAG INJ IV PUSH: CPT

## 2018-04-15 PROCEDURE — 96365 THER/PROPH/DIAG IV INF INIT: CPT

## 2018-04-15 RX ADMIN — SODIUM CHLORIDE 250 ML: 9 INJECTION, SOLUTION INTRAVENOUS at 09:36

## 2018-04-15 RX ADMIN — VANCOMYCIN HYDROCHLORIDE 1500 MG: 1 INJECTION, POWDER, LYOPHILIZED, FOR SOLUTION INTRAVENOUS at 09:37

## 2018-04-15 RX ADMIN — VANCOMYCIN HYDROCHLORIDE 1500 MG: 1 INJECTION, POWDER, LYOPHILIZED, FOR SOLUTION INTRAVENOUS at 19:48

## 2018-04-15 NOTE — ED AVS SNAPSHOT
[facility] Emergency [facility]    [address] [place] [address]    Phone:  [phone]    Fax:  [phone]                                       Duane E Sogge   MRN: [id number]    Department:  [facility]   Date of Visit:  4/15/2018           Patient Information     Date Of Birth          1940        Your diagnoses for this visit were:     Staphylococcal arthritis of left knee (H)       Your next 10 appointments already scheduled     Apr 16, 2018  8:00 AM CDT   Level 2 with NL INFUSION CHAIR 1   [facility] ([facility])    [address] [place] [address]   [phone]            Apr 17, 2018  8:00 AM CDT   Level 2 with NL INFUSION CHAIR 1   [facility] ([facility])    [address] [place] [address]   [phone]            Apr 18, 2018  8:00 AM CDT   Level 2 with NL INFUSION CHAIR 1   [facility] ([facility])    [address] [place] [address]   [phone]            Apr 19, 2018  8:00 AM CDT   Level 2 with NL INFUSION CHAIR 4   [facility] ([facility])    [address] [place] [address]   [phone]            Apr 20, 2018  8:00 AM CDT   Level 2 with NL INFUSION CHAIR 5   [facility] ([facility])    [address] [place] [address]   [phone]            Apr 23, 2018  8:00 AM CDT   Level 2 with NL INFUSION CHAIR 2   [facility] ([facility])    [address] [place] [address]   [phone]            Apr 24, 2018  8:30 AM CDT   Level 2 with NL INFUSION CHAIR 1   [facility] ([facility])    [address] [place] [address]   [phone]            Apr 24, 2018 11:30 AM CDT   Ortho Treatment  with Christina Mohr, PT   [facility] ([facility])    [address] [place] [address]   [phone]            Apr 25, 2018  8:00 AM CDT   Level 2 with NL INFUSION CHAIR 1   [facility] ([facility])    [address] [place] [address]   [phone]            Apr 26, 2018  8:00 AM CDT   Level 2 with NL INFUSION CHAIR 2   [facility] ([facility])    [address] [place] [address]   [phone]              24 Hour Appointment Hotline       To make an appointment at any [facility] clinic, call [phone] ([phone]). If you don't have a family doctor or clinic, we will help you find one. [facility] clinics are conveniently located to serve the needs of you and your family.             Review of your medicines      Our records show that you are taking the medicines listed below. If these are incorrect, please call your family doctor or clinic.        Dose / Directions Last dose taken    ALPRAZolam 0.5 MG tablet   Commonly known as:  XANAX        Take 1 tab up to 4x daily only if needed. Refill when due,no early refills  Indications: Feeling Anxious   Refills:  0        amoxicillin-clavulanate 875-125 MG per tablet   Commonly known as:  AUGMENTIN   Dose:  1 tablet   Quantity:  20 tablet        Take 1 tablet by mouth 2 times daily   Refills:  0        BENZONATATE PO        Refills:  0        GABAPENTIN PO   Dose:  100 mg        Take 100 mg by mouth 3 times daily as needed (anxiety) 1-3 capsules   Refills:  0        HYDROcodone-acetaminophen 5-325 MG per tablet   Commonly known as:  NORCO        Refills:  0        lisinopril-hydrochlorothiazide 20-12.5 MG per tablet   Commonly known as:  PRINZIDE/ZESTORETIC   Dose:  2 tablet        Take 2 tablets by mouth daily   Refills:  0        metoprolol succinate 50 MG 24 hr tablet   Commonly known as:  TOPROL-XL   Dose:   50 mg        Take 50 mg by mouth daily   Refills:  0        MIRTAZAPINE PO   Dose:  15 mg        Take 15 mg by mouth At Bedtime   Refills:  0        rifampin 300 MG capsule   Commonly known as:  RIFADIN   Dose:  600 mg        Take 600 mg by mouth   Refills:  0        senna 8.6 MG tablet   Commonly known as:  SENOKOT        Refills:  0        simvastatin 20 MG tablet   Commonly known as:  ZOCOR   Dose:  20 mg        Take 20 mg by mouth At Bedtime   Refills:  0        TEMAZEPAM PO   Dose:  15 mg        Take 15 mg by mouth nightly as needed for sleep   Refills:  0        TYLENOL PO   Dose:  325 mg        Take 325 mg by mouth as needed for mild pain or fever   Refills:  0        venlafaxine 150 MG Tb24 24 hr tablet   Commonly known as:  EFFEXOR-ER   Dose:  150 mg        Take 150 mg by mouth daily (with breakfast)   Refills:  0                Orders Needing Specimen Collection     None      Pending Results     No orders found from 4/13/2018 to 4/16/2018.            Pending Culture Results     No orders found from 4/13/2018 to 4/16/2018.            Pending Results Instructions     If you had any lab results that were not finalized at the time of your Discharge, you can call the ED Lab Result RN at [phone]. You will be contacted by this team for any positive Lab results or changes in treatment. The nurses are available 7 days a week from 10A to 6:30P.  You can leave a message 24 hours per day and they will return your call.        Thank you for choosing [facility]       Thank you for choosing [facility] for your care. Our goal is always to provide you with excellent care. Hearing back from our patients is one way we can continue to improve our services. Please take a few minutes to complete the written survey that you may receive in the mail after you visit with us. Thank you!        Back&hart Information     [employer] lets you send messages to your doctor, view your test results, renew your prescriptions, schedule  "appointments and more. To sign up, go to [email or web address] . Click on \"Log in\" on the left side of the screen, which will take you to the Welcome page. Then click on \"Sign up Now\" on the right side of the page.     You will be asked to enter the access code listed below, as well as some personal information. Please follow the directions to create your username and password.     Your access code is: [email or web address]  Expires: 2018  5:11 PM     Your access code will  in 90 days. If you need help or a new code, please call your [facility] clinic or [phone].        Care EveryWhere ID     This is your Care EveryWhere ID. This could be used by other organizations to access your [facility] medical records  [id number]        Equal Access to Services     STARR LEIVA : Hadamadou Reno, stevie headley, sherry payne, brittny sanderson . So [facility] [phone].    ATENCIÓN: Si habla español, tiene a roth disposición servicios gratuitos de asistencia lingüística. Llame al [phone].    We comply with applicable federal civil rights laws and [place] laws. We do not discriminate on the basis of race, color, national origin, age, disability, sex, sexual orientation, or gender identity.            After Visit Summary       This is your record. Keep this with you and show to your community pharmacist(s) and doctor(s) at your next visit.                  "

## 2018-04-15 NOTE — ED NOTES
PT here for IVO therapy.  He had to miss his infusion last night r/t inclement weather.  No complaints.

## 2018-04-15 NOTE — ED AVS SNAPSHOT
[facility] Emergency Department    [address] [place] [address]    Phone:  [phone]    Fax:  [phone]                                       Duane E Sogge   MRN: [id number]    Department:  [facility] Emergency Department   Date of Visit:  4/15/2018           After Visit Summary Signature Page     I have received my discharge instructions, and my questions have been answered. I have discussed any challenges I see with this plan with the nurse or doctor.    ..........................................................................................................................................  Patient/Patient Representative Signature      ..........................................................................................................................................  Patient Representative Print Name and Relationship to Patient    ..................................................               ................................................  Date                                            Time    ..........................................................................................................................................  Reviewed by Signature/Title    ...................................................              ..............................................  Date                                                            Time

## 2018-04-16 ENCOUNTER — INFUSION THERAPY VISIT (OUTPATIENT)
Dept: INFUSION THERAPY | Facility: CLINIC | Age: 78
End: 2018-04-16
Attending: FAMILY MEDICINE
Payer: MEDICARE

## 2018-04-16 ENCOUNTER — HOSPITAL ENCOUNTER (EMERGENCY)
Facility: CLINIC | Age: 78
Discharge: HOME | End: 2018-04-16
Admitting: FAMILY MEDICINE
Payer: MEDICARE

## 2018-04-16 VITALS
RESPIRATION RATE: 18 BRPM | BODY MASS INDEX: 36.43 KG/M2 | OXYGEN SATURATION: 95 % | TEMPERATURE: 98.6 F | WEIGHT: 239.6 LBS | SYSTOLIC BLOOD PRESSURE: 163 MMHG | DIASTOLIC BLOOD PRESSURE: 59 MMHG | HEART RATE: 69 BPM

## 2018-04-16 VITALS — RESPIRATION RATE: 16 BRPM | TEMPERATURE: 98 F | DIASTOLIC BLOOD PRESSURE: 74 MMHG | SYSTOLIC BLOOD PRESSURE: 141 MMHG

## 2018-04-16 DIAGNOSIS — M00.062 STAPHYLOCOCCAL ARTHRITIS OF LEFT KNEE (H): ICD-10-CM

## 2018-04-16 DIAGNOSIS — M00.062 STAPHYLOCOCCAL ARTHRITIS OF LEFT KNEE (H): Primary | ICD-10-CM

## 2018-04-16 PROCEDURE — 96365 THER/PROPH/DIAG IV INF INIT: CPT | Mod: XE

## 2018-04-16 PROCEDURE — 25000128 H RX IP 250 OP 636: Performed by: FAMILY MEDICINE

## 2018-04-16 PROCEDURE — 96366 THER/PROPH/DIAG IV INF ADDON: CPT

## 2018-04-16 PROCEDURE — 40000268 ZZH STATISTIC NO CHARGES

## 2018-04-16 PROCEDURE — 96365 THER/PROPH/DIAG IV INF INIT: CPT

## 2018-04-16 RX ADMIN — SODIUM CHLORIDE 250 ML: 9 INJECTION, SOLUTION INTRAVENOUS at 09:05

## 2018-04-16 RX ADMIN — VANCOMYCIN HYDROCHLORIDE 1500 MG: 1 INJECTION, POWDER, LYOPHILIZED, FOR SOLUTION INTRAVENOUS at 20:09

## 2018-04-16 RX ADMIN — SODIUM CHLORIDE 250 ML: 9 INJECTION, SOLUTION INTRAVENOUS at 20:08

## 2018-04-16 RX ADMIN — VANCOMYCIN HYDROCHLORIDE 1500 MG: 1 INJECTION, POWDER, LYOPHILIZED, FOR SOLUTION INTRAVENOUS at 09:04

## 2018-04-16 ASSESSMENT — PAIN SCALES - GENERAL: PAINLEVEL: NO PAIN (0)

## 2018-04-16 NOTE — MR AVS SNAPSHOT
After Visit Summary   4/16/2018    Duane E Sogge    MRN: [id number]           Patient Information     Date Of Birth          1940        Visit Information        Provider Department      4/16/2018 8:00 AM NL INFUSION CHAIR 1 [facility]        Today's Diagnoses     Staphylococcal arthritis of left knee (H)    -  1      Care Instructions    Pt to return tonight for Vancomycin then tomorrow on 4/17 for Vancomycin in Infusion. Copies of medication list and upcoming appointments given prior to discharge.           Follow-ups after your visit        Follow-up notes from your care team     Return in 1 day (on 4/17/2018).      Your next 10 appointments already scheduled     Apr 17, 2018  8:00 AM CDT   Level 2 with NL INFUSION CHAIR 1   [facility] ([facility])    [address] [place] [address]   [phone]            Apr 18, 2018  8:00 AM CDT   Level 2 with NL INFUSION CHAIR 1   [facility] ([facility])    [address] [place] [address]   [phone]            Apr 19, 2018  8:00 AM CDT   Level 2 with NL INFUSION CHAIR 4   [facility] ([facility])    [address] [place] [address]   [phone]            Apr 20, 2018  8:00 AM CDT   Level 2 with NL INFUSION CHAIR 5   [facility] ([facility])    [address] [place] [address]   [phone]            Apr 23, 2018  8:00 AM CDT   Level 2 with NL INFUSION CHAIR 2   [facility] ([facility])    [address] [place] [address]   [phone]            Apr 24, 2018  8:30 AM CDT   Level 2 with NL INFUSION CHAIR 1   [facility] ([facility])    [address] [place] [address]   [phone]            Apr 24, 2018 11:30 AM  "CDT   Ortho Treatment with Christina Mohr, PT   [facility] ([facility])    [address] [place] [address]   [phone]            Apr 25, 2018  8:00 AM CDT   Level 2 with NL INFUSION CHAIR 1   [facility] ([facility])    [address] [place] [address]   [phone]            Apr 26, 2018  8:00 AM CDT   Level 2 with NL INFUSION CHAIR 2   [facility] ([facility])    [address] [place] [address]   [phone]            Apr 26, 2018 12:00 PM CDT   Ortho Treatment with Christina Mohr, PT   [facility] ([facility])    [address] [place] [address]   [phone]              Who to contact     If you have questions or need follow up information about today's clinic visit or your schedule please contact [facility] directly at [phone].  Normal or non-critical lab and imaging results will be communicated to you by [employer] Fl[employer]hart, letter or phone within 4 business days after the clinic has received the results. If you do not hear from us within 7 days, please contact the clinic through [employer] or phone. If you have a critical or abnormal lab result, we will notify you by phone as soon as possible.  Submit refill requests through [employer] or call your pharmacy and they will forward the refill request to us. Please allow 3 business days for your refill to be completed.          Additional Information About Your Visit        Nolio Information     [employer] lets you send messages to your doctor, view your test results, renew your prescriptions, schedule appointments and more. To sign up, go to [email or web address] . Click on \"Log in\" on the left side of the screen, which will take you to the Welcome page. Then click on \"Sign up Now\" on the right side of the page.     You will be asked to " enter the access code listed below, as well as some personal information. Please follow the directions to create your username and password.     Your access code is: [email or web address]  Expires: 2018  5:11 PM     Your access code will  in 90 days. If you need help or a new code, please call your [facility] clinic or [phone].        Care EveryWhere ID     This is your Care EveryWhere ID. This could be used by other organizations to access your [facility] medical records  [id number]        Your Vitals Were     Pulse Temperature Respirations Pulse Oximetry BMI (Body Mass Index)       69 98.6  F (37  C) (Temporal) 18 95% 36.43 kg/m2        Blood Pressure from Last 3 Encounters:   18 163/59   04/15/18 172/81   04/15/18 (!) 180/98    Weight from Last 3 Encounters:   18 108.7 kg (239 lb 9.6 oz)   04/15/18 106.1 kg (234 lb)   18 106.1 kg (234 lb)              Today, you had the following     No orders found for display       Primary Care Provider Office Phone # Fax #    Saqib Chavarria [phone]       [facility] [address] [place] [id number]        Equal Access to Services     [facility]: Hadii aad ku hadasho Soomaali, waaxda luqadaha, qaybta kaalmada adeegyada, waxay lindsey hayosman sanderson . So [facility] [phone].    ATENCIÓN: Si habla español, tiene a roth disposición servicios gratuitos de asistencia lingüística. Llame al [phone].    We comply with applicable federal civil rights laws and [place] laws. We do not discriminate on the basis of race, color, national origin, age, disability, sex, sexual orientation, or gender identity.            Thank you!     Thank you for choosing [facility]  for your care. Our goal is always to provide you with excellent care. Hearing back from our patients is one way we can continue to improve our services. Please take a few minutes to complete the written survey that  you may receive in the mail after your visit with us. Thank you!             Your Updated Medication List - Protect others around you: Learn how to safely use, store and throw away your medicines at www.disposemymeds.org.          This list is accurate as of 4/16/18 12:45 PM.  Always use your most recent med list.                   Brand Name Dispense Instructions for use Diagnosis    ALPRAZolam 0.5 MG tablet    XANAX     Take 1 tab up to 4x daily only if needed. Refill when due,no early refills  Indications: Feeling Anxious        amoxicillin-clavulanate 875-125 MG per tablet    AUGMENTIN    20 tablet    Take 1 tablet by mouth 2 times daily    Acute maxillary sinusitis, recurrence not specified       BENZONATATE PO           GABAPENTIN PO      Take 100 mg by mouth 3 times daily as needed (anxiety) 1-3 capsules        HYDROcodone-acetaminophen 5-325 MG per tablet    NORCO          lisinopril-hydrochlorothiazide 20-12.5 MG per tablet    PRINZIDE/ZESTORETIC     Take 2 tablets by mouth daily        metoprolol succinate 50 MG 24 hr tablet    TOPROL-XL     Take 50 mg by mouth daily        MIRTAZAPINE PO      Take 15 mg by mouth At Bedtime        rifampin 300 MG capsule    RIFADIN     Take 600 mg by mouth        senna 8.6 MG tablet    SENOKOT          simvastatin 20 MG tablet    ZOCOR     Take 20 mg by mouth At Bedtime        TEMAZEPAM PO      Take 15 mg by mouth nightly as needed for sleep        TYLENOL PO      Take 325 mg by mouth as needed for mild pain or fever        venlafaxine 150 MG Tb24 24 hr tablet    EFFEXOR-ER     Take 150 mg by mouth daily (with breakfast)

## 2018-04-16 NOTE — PROGRESS NOTES
Pt here for IV Vancomycin, tolerated well.  PICC line drsg changed, face to face time = 10 mins.  Pt discharged in stable condition.

## 2018-04-16 NOTE — PATIENT INSTRUCTIONS
Pt to return tonight for Vancomycin then tomorrow on 4/17 for Vancomycin in Infusion. Copies of medication list and upcoming appointments given prior to discharge.

## 2018-04-17 ENCOUNTER — HOSPITAL ENCOUNTER (EMERGENCY)
Facility: CLINIC | Age: 78
Discharge: HOME | End: 2018-04-17
Admitting: FAMILY MEDICINE
Payer: MEDICARE

## 2018-04-17 ENCOUNTER — INFUSION THERAPY VISIT (OUTPATIENT)
Dept: INFUSION THERAPY | Facility: CLINIC | Age: 78
End: 2018-04-17
Attending: FAMILY MEDICINE
Payer: MEDICARE

## 2018-04-17 VITALS
HEART RATE: 56 BPM | SYSTOLIC BLOOD PRESSURE: 172 MMHG | DIASTOLIC BLOOD PRESSURE: 88 MMHG | TEMPERATURE: 97.8 F | OXYGEN SATURATION: 97 % | RESPIRATION RATE: 20 BRPM

## 2018-04-17 VITALS — RESPIRATION RATE: 18 BRPM | DIASTOLIC BLOOD PRESSURE: 80 MMHG | TEMPERATURE: 98.1 F | SYSTOLIC BLOOD PRESSURE: 150 MMHG

## 2018-04-17 DIAGNOSIS — M00.062 STAPHYLOCOCCAL ARTHRITIS OF LEFT KNEE (H): Primary | ICD-10-CM

## 2018-04-17 DIAGNOSIS — M00.062 STAPHYLOCOCCAL ARTHRITIS OF LEFT KNEE (H): ICD-10-CM

## 2018-04-17 LAB
ALP SERPL-CCNC: 154 U/L (ref 40–150)
ALT SERPL W P-5'-P-CCNC: 36 U/L (ref 0–70)
AST SERPL W P-5'-P-CCNC: 33 U/L (ref 0–45)
BASOPHILS # BLD AUTO: 0.1 10E9/L (ref 0–0.2)
BASOPHILS NFR BLD AUTO: 0.8 %
BILIRUB SERPL-MCNC: 0.3 MG/DL (ref 0.2–1.3)
CREAT SERPL-MCNC: 0.88 MG/DL (ref 0.66–1.25)
CRP SERPL-MCNC: 6 MG/L (ref 0–8)
DIFFERENTIAL METHOD BLD: ABNORMAL
EOSINOPHIL # BLD AUTO: 0.8 10E9/L (ref 0–0.7)
EOSINOPHIL NFR BLD AUTO: 12.7 %
ERYTHROCYTE [DISTWIDTH] IN BLOOD BY AUTOMATED COUNT: 13 % (ref 10–15)
GFR SERPL CREATININE-BSD FRML MDRD: 83 ML/MIN/1.7M2
HCT VFR BLD AUTO: 39.2 % (ref 40–53)
HGB BLD-MCNC: 12.4 G/DL (ref 13.3–17.7)
IMM GRANULOCYTES # BLD: 0 10E9/L (ref 0–0.4)
IMM GRANULOCYTES NFR BLD: 0.2 %
LYMPHOCYTES # BLD AUTO: 1.8 10E9/L (ref 0.8–5.3)
LYMPHOCYTES NFR BLD AUTO: 27.6 %
MCH RBC QN AUTO: 31.4 PG (ref 26.5–33)
MCHC RBC AUTO-ENTMCNC: 31.6 G/DL (ref 31.5–36.5)
MCV RBC AUTO: 99 FL (ref 78–100)
MONOCYTES # BLD AUTO: 1 10E9/L (ref 0–1.3)
MONOCYTES NFR BLD AUTO: 15.7 %
NEUTROPHILS # BLD AUTO: 2.8 10E9/L (ref 1.6–8.3)
NEUTROPHILS NFR BLD AUTO: 43 %
PLATELET # BLD AUTO: 231 10E9/L (ref 150–450)
RBC # BLD AUTO: 3.95 10E12/L (ref 4.4–5.9)
VANCOMYCIN SERPL-MCNC: 17.3 MG/L
WBC # BLD AUTO: 6.4 10E9/L (ref 4–11)

## 2018-04-17 PROCEDURE — 84075 ASSAY ALKALINE PHOSPHATASE: CPT | Performed by: FAMILY MEDICINE

## 2018-04-17 PROCEDURE — 40000268 ZZH STATISTIC NO CHARGES

## 2018-04-17 PROCEDURE — 82565 ASSAY OF CREATININE: CPT | Performed by: FAMILY MEDICINE

## 2018-04-17 PROCEDURE — 96366 THER/PROPH/DIAG IV INF ADDON: CPT

## 2018-04-17 PROCEDURE — 86140 C-REACTIVE PROTEIN: CPT | Performed by: FAMILY MEDICINE

## 2018-04-17 PROCEDURE — 85025 COMPLETE CBC W/AUTO DIFF WBC: CPT | Performed by: FAMILY MEDICINE

## 2018-04-17 PROCEDURE — 96365 THER/PROPH/DIAG IV INF INIT: CPT

## 2018-04-17 PROCEDURE — 25000128 H RX IP 250 OP 636: Performed by: FAMILY MEDICINE

## 2018-04-17 PROCEDURE — 80202 ASSAY OF VANCOMYCIN: CPT | Performed by: FAMILY MEDICINE

## 2018-04-17 PROCEDURE — 84460 ALANINE AMINO (ALT) (SGPT): CPT | Performed by: FAMILY MEDICINE

## 2018-04-17 PROCEDURE — 82247 BILIRUBIN TOTAL: CPT | Performed by: FAMILY MEDICINE

## 2018-04-17 PROCEDURE — 84450 TRANSFERASE (AST) (SGOT): CPT | Performed by: FAMILY MEDICINE

## 2018-04-17 RX ADMIN — SODIUM CHLORIDE 250 ML: 9 INJECTION, SOLUTION INTRAVENOUS at 20:15

## 2018-04-17 RX ADMIN — SODIUM CHLORIDE 250 ML: 9 INJECTION, SOLUTION INTRAVENOUS at 09:00

## 2018-04-17 RX ADMIN — VANCOMYCIN HYDROCHLORIDE 1500 MG: 1 INJECTION, POWDER, LYOPHILIZED, FOR SOLUTION INTRAVENOUS at 09:36

## 2018-04-17 RX ADMIN — VANCOMYCIN HYDROCHLORIDE 1500 MG: 1 INJECTION, POWDER, LYOPHILIZED, FOR SOLUTION INTRAVENOUS at 20:11

## 2018-04-17 ASSESSMENT — PAIN SCALES - GENERAL: PAINLEVEL: NO PAIN (0)

## 2018-04-17 NOTE — ED NOTES
Patient completed his antibiotic. Feeling good. Ambulated well. VSS.  PICC line flushed well. Plans to return for next dose at 0800 tomorrow.  Discharged to home with his wife.

## 2018-04-17 NOTE — PROGRESS NOTES
Patient here today for Vanco infusion. PICC patent with blood return noted, labs drawn and vanco level within therapeutic level. Discharged to home in stable condition.

## 2018-04-17 NOTE — MR AVS SNAPSHOT
After Visit Summary   4/17/2018    Duane E Sogge    MRN: [id number]           Patient Information     Date Of Birth          1940        Visit Information        Provider Department      4/17/2018 8:00 AM NL INFUSION CHAIR 1 [facility]        Today's Diagnoses     Staphylococcal arthritis of left knee (H)    -  1      Care Instructions    Pt to return tonight to ED for Vanco. Copies of medication list and upcoming appointments given prior to discharge.             Follow-ups after your visit        Your next 10 appointments already scheduled     Apr 18, 2018  8:00 AM CDT   Level 2 with NL INFUSION CHAIR 1   [facility] ([facility])    [address] [place] [address]   [phone]            Apr 19, 2018  8:00 AM CDT   Level 2 with NL INFUSION CHAIR 4   [facility] ([facility])    [address] [place] [address]   [phone]            Apr 20, 2018  8:00 AM CDT   Level 2 with NL INFUSION CHAIR 5   [facility] ([facility])    [address] [place] [address]   [phone]            Apr 23, 2018  8:00 AM CDT   Level 2 with NL INFUSION CHAIR 2   [facility] ([facility])    [address] [place] [address]   [phone]            Apr 24, 2018  8:30 AM CDT   Level 2 with NL INFUSION CHAIR 1   [facility] ([facility])    [address] [place] [address]   [phone]            Apr 24, 2018 11:30 AM CDT   Ortho Treatment with Christina Mohr, PT   [facility] ([facility])    [address] [place] [address]   [phone]            Apr 25, 2018  8:00 AM CDT   Level 2 with NL INFUSION CHAIR 1   [facility] ([facility])    [address] [place] [address]   [phone]            Apr 26, 2018  8:00 AM CDT   Level 2 with NL INFUSION CHAIR 2   [facility] ([facility])    [address] [place] [address]   [phone]            Apr 26, 2018 12:00 PM CDT   Ortho Treatment with Christina Mohr, PT   [facility] ([facility])    [address] [place] [address]   [phone]            Apr 27, 2018  8:00 AM CDT   Level 2 with NL INFUSION CHAIR 1   [facility] ([facility])    [address] [place] [address]   [phone]              Who to contact     If you have questions or need follow up information about today's clinic visit or your schedule please contact [facility] INFUSION SERVICES directly at [phone].  Normal or non-critical lab and imaging results will be communicated to you by VMobhart, letter or phone within 4 business days after the clinic has received the results. If you do not hear from us within 7 days, please contact the clinic through Social[employer] or phone. If you have a critical or abnormal lab result, we will notify you by phone as soon as possible.  Submit refill requests through [employer] or call your pharmacy and they will forward the refill request to us. Please allow 3 business days for your refill to be completed.          Additional Information About Your Visit        [employer] Information     [employer] lets you send messages to your doctor, view your test results, renew your prescriptions, schedule appointments and more. To sign up, go to [email or web address] . Click on \"Log in\" on the left side of the screen, which will take you to the Welcome page. Then click on \"Sign up Now\" on the right side of the page.     You will be asked to enter the access code listed below, as well as some personal information. Please follow the directions to create your " username and password.     Your access code is: [email or web address]  Expires: 2018  5:11 PM     Your access code will  in 90 days. If you need help or a new code, please call your [place] clinic or [phone].        Care EveryWhere ID     This is your Care EveryWhere ID. This could be used by other organizations to access your [place] medical records  [id number]        Your Vitals Were     Pulse Temperature Respirations Pulse Oximetry          56 97.8  F (36.6  C) (Oral) 20 97%         Blood Pressure from Last 3 Encounters:   18 172/88   18 141/74   18 163/59    Weight from Last 3 Encounters:   18 108.7 kg (239 lb 9.6 oz)   04/15/18 106.1 kg (234 lb)   18 106.1 kg (234 lb)              We Performed the Following     Alkaline phosphatase     ALT     AST     Bilirubin  total     CBC with platelets differential     Creatinine     CRP inflammation     Vancomycin level        Primary Care Provider Office Phone # Fax #    Saqib Chavarria [phone]       [facility] [address]  [facility] [address]        Equal Access to Services     STARR LEIVA : Hadii aad ku hadasho Soomaali, waaxda luqadaha, qaybta kaalmada adeegyada, waxay idiin hayfaizan lalit sanderson . So [facility] [phone].    ATENCIÓN: Si habla español, tiene a roth disposición servicios gratuitos de asistencia lingüística. Llame al [phone].    We comply with applicable federal civil rights laws and [place] laws. We do not discriminate on the basis of race, color, national origin, age, disability, sex, sexual orientation, or gender identity.            Thank you!     Thank you for choosing [facility] INF[facility]ION SERVICES  for your care. Our goal is always to provide you with excellent care. Hearing back from our patients is one way we can continue to improve our services. Please take a few minutes to complete the written survey that you may receive in the mail after  your visit with us. Thank you!             Your Updated Medication List - Protect others around you: Learn how to safely use, store and throw away your medicines at www.disposemymeds.org.          This list is accurate as of 4/17/18  3:42 PM.  Always use your most recent med list.                   Brand Name Dispense Instructions for use Diagnosis    ALPRAZolam 0.5 MG tablet    XANAX     Take 1 tab up to 4x daily only if needed. Refill when due,no early refills  Indications: Feeling Anxious        amoxicillin-clavulanate 875-125 MG per tablet    AUGMENTIN    20 tablet    Take 1 tablet by mouth 2 times daily    Acute maxillary sinusitis, recurrence not specified       BENZONATATE PO           GABAPENTIN PO      Take 100 mg by mouth 3 times daily as needed (anxiety) 1-3 capsules        HYDROcodone-acetaminophen 5-325 MG per tablet    NORCO          lisinopril-hydrochlorothiazide 20-12.5 MG per tablet    PRINZIDE/ZESTORETIC     Take 2 tablets by mouth daily        metoprolol succinate 50 MG 24 hr tablet    TOPROL-XL     Take 50 mg by mouth daily        MIRTAZAPINE PO      Take 15 mg by mouth At Bedtime        rifampin 300 MG capsule    RIFADIN     Take 600 mg by mouth        senna 8.6 MG tablet    SENOKOT          simvastatin 20 MG tablet    ZOCOR     Take 20 mg by mouth At Bedtime        TEMAZEPAM PO      Take 15 mg by mouth nightly as needed for sleep        TYLENOL PO      Take 325 mg by mouth as needed for mild pain or fever        venlafaxine 150 MG Tb24 24 hr tablet    EFFEXOR-ER     Take 150 mg by mouth daily (with breakfast)

## 2018-04-17 NOTE — PATIENT INSTRUCTIONS
Pt to return tonight to ED for Vanco. Copies of medication list and upcoming appointments given prior to discharge.

## 2018-04-18 ENCOUNTER — INFUSION THERAPY VISIT (OUTPATIENT)
Dept: INFUSION THERAPY | Facility: CLINIC | Age: 78
End: 2018-04-18
Attending: FAMILY MEDICINE
Payer: MEDICARE

## 2018-04-18 ENCOUNTER — HOSPITAL ENCOUNTER (EMERGENCY)
Facility: CLINIC | Age: 78
Discharge: HOME | End: 2018-04-18
Admitting: FAMILY MEDICINE
Payer: MEDICARE

## 2018-04-18 VITALS
SYSTOLIC BLOOD PRESSURE: 168 MMHG | OXYGEN SATURATION: 99 % | RESPIRATION RATE: 16 BRPM | HEART RATE: 72 BPM | TEMPERATURE: 98.6 F | DIASTOLIC BLOOD PRESSURE: 80 MMHG

## 2018-04-18 VITALS
SYSTOLIC BLOOD PRESSURE: 170 MMHG | RESPIRATION RATE: 16 BRPM | HEART RATE: 54 BPM | DIASTOLIC BLOOD PRESSURE: 86 MMHG | OXYGEN SATURATION: 97 % | TEMPERATURE: 97.8 F

## 2018-04-18 DIAGNOSIS — M00.062 STAPHYLOCOCCAL ARTHRITIS OF LEFT KNEE (H): Primary | ICD-10-CM

## 2018-04-18 DIAGNOSIS — M00.062 STAPHYLOCOCCAL ARTHRITIS OF LEFT KNEE (H): ICD-10-CM

## 2018-04-18 PROCEDURE — 25000128 H RX IP 250 OP 636: Performed by: FAMILY MEDICINE

## 2018-04-18 PROCEDURE — 96366 THER/PROPH/DIAG IV INF ADDON: CPT

## 2018-04-18 PROCEDURE — 96374 THER/PROPH/DIAG INJ IV PUSH: CPT | Performed by: FAMILY MEDICINE

## 2018-04-18 PROCEDURE — 40000268 ZZH STATISTIC NO CHARGES: Performed by: FAMILY MEDICINE

## 2018-04-18 PROCEDURE — 96365 THER/PROPH/DIAG IV INF INIT: CPT

## 2018-04-18 RX ADMIN — SODIUM CHLORIDE 250 ML: 9 INJECTION, SOLUTION INTRAVENOUS at 20:08

## 2018-04-18 RX ADMIN — VANCOMYCIN HYDROCHLORIDE 1500 MG: 1 INJECTION, POWDER, LYOPHILIZED, FOR SOLUTION INTRAVENOUS at 08:32

## 2018-04-18 RX ADMIN — VANCOMYCIN HYDROCHLORIDE 1500 MG: 1 INJECTION, POWDER, LYOPHILIZED, FOR SOLUTION INTRAVENOUS at 20:04

## 2018-04-18 RX ADMIN — SODIUM CHLORIDE 250 ML: 9 INJECTION, SOLUTION INTRAVENOUS at 08:20

## 2018-04-18 NOTE — MR AVS SNAPSHOT
After Visit Summary   4/18/2018    Duane E Sogge    MRN: [id number]           Patient Information     Date Of Birth          1940        Visit Information        Provider Department      4/18/2018 8:00 AM NL INFUSION CHAIR 1 [facility]        Today's Diagnoses     Staphylococcal arthritis of left knee (H)    -  1      Care Instructions    Pt to return tonight to ED for IV Vanco then tomorrow to infusion for subsequent dosing.           Follow-ups after your visit        Follow-up notes from your care team     Return in 1 day (on 4/19/2018).      Your next 10 appointments already scheduled     Apr 19, 2018  8:00 AM CDT   Level 2 with NL INFUSION CHAIR 4   [facility] ([facility] [place])    [address] [place] [address]   [phone]            Apr 20, 2018  8:00 AM CDT   Level 2 with NL INFUSION CHAIR 5   [facility] ([facility])    [address] [place] [address]   [phone]            Apr 23, 2018  8:00 AM CDT   Level 2 with NL INFUSION CHAIR 2   [facility] ([facility])    [address] [place] [address]   [phone]            Apr 24, 2018  8:30 AM CDT   Level 2 with NL INFUSION CHAIR 1   [facility] ([facility])    [address] [place] [address]   [phone]            Apr 24, 2018 11:30 AM CDT   Ortho Treatment with Christina Mohr, PT   [facility] ([facility])    [address] [place] [address]   [phone]            Apr 25, 2018  8:00 AM CDT   Level 2 with NL INFUSION CHAIR 1   [facility] ([facility])    [address] [place] [address]   [phone]            Apr 26, 2018  8:00 AM CDT   Level 2 with NL INFUSION CHAIR 2   [facility] Infusion  "Services ([facility])    [address] [place] [address]   [phone]            Apr 26, 2018 12:00 PM CDT   Ortho Treatment with Christina Mohr PT   [facility] ([facility])    [address] [place] [address]   [phone]            Apr 27, 2018  8:00 AM CDT   Level 2 with NL INFUSION CHAIR 1   [facility] ([facility])    [address] [place] [address]   [phone]            Apr 30, 2018  8:00 AM CDT   Level 2 with NL INFUSION CHAIR 5   [facility] ([facility])    [address] [place] [address]   [phone]              Who to contact     If you have questions or need follow up information about today's clinic visit or your schedule please contact [facility] INFUSION SERVICES directly at [phone].  Normal or non-critical lab and imaging results will be communicated to you by So[employer], letter or phone within 4 business days after the clinic has received the results. If you do not hear from us within 7 days, please contact the clinic through [employer] or phone. If you have a critical or abnormal lab result, we will notify you by phone as soon as possible.  Submit refill requests through [email or web address] or call your pharmacy and they will forward the refill request to us. Please allow 3 business days for your refill to be completed.          Additional Information About Your Visit        [email or web address] Information     [email or web address] lets you send messages to your doctor, view your test results, renew your prescriptions, schedule appointments and more. To sign up, go to [email or web address] . Click on \"Log in\" on the left side of the screen, which will take you to the Welcome page. Then click on \"Sign up Now\" on the right side of the page.     You will be asked to enter the access code listed below, as well as some personal information. " Please follow the directions to create your username and password.     Your access code is: [email or web address]  Expires: 2018  5:11 PM     Your access code will  in 90 days. If you need help or a new code, please call your [place] clinic or [phone].        Care EveryWhere ID     This is your Care EveryWhere ID. This could be used by other organizations to access your [place] medical records  [id number]        Your Vitals Were     Pulse Temperature Respirations Pulse Oximetry          54 97.8  F (36.6  C) (Temporal) 16 97%         Blood Pressure from Last 3 Encounters:   18 170/86   18 150/80   18 172/88    Weight from Last 3 Encounters:   18 108.7 kg (239 lb 9.6 oz)   04/15/18 106.1 kg (234 lb)   18 106.1 kg (234 lb)              Today, you had the following     No orders found for display       Primary Care Provider Office Phone # Fax #    Saqib Chavarria [phone]       [facility] [address]  [facility] [address]        Equal Access to Services     MATT LEIVA : Hadii aad ku hadasho Soomaali, waaxda luqadaha, qaybta kaalmada adeegyada, brittny galeanoin hayfaizan lalit sanderson . So [facility] [phone].    ATENCIÓN: Si habla español, tiene a roth disposición servicios gratuitos de asistencia lingüística. Llame al [phone].    We comply with applicable federal civil rights laws and [place] laws. We do not discriminate on the basis of race, color, national origin, age, disability, sex, sexual orientation, or gender identity.            Thank you!     Thank you for choosing [facility] INFUSION SERVICES  for your care. Our goal is always to provide you with excellent care. Hearing back from our patients is one way we can continue to improve our services. Please take a few minutes to complete the written survey that you may receive in the mail after your visit with us. Thank you!             Your Updated Medication List -  Protect others around you: Learn how to safely use, store and throw away your medicines at www.disposemymeds.org.          This list is accurate as of 4/18/18  3:13 PM.  Always use your most recent med list.                   Brand Name Dispense Instructions for use Diagnosis    ALPRAZolam 0.5 MG tablet    XANAX     Take 1 tab up to 4x daily only if needed. Refill when due,no early refills  Indications: Feeling Anxious        amoxicillin-clavulanate 875-125 MG per tablet    AUGMENTIN    20 tablet    Take 1 tablet by mouth 2 times daily    Acute maxillary sinusitis, recurrence not specified       BENZONATATE PO           GABAPENTIN PO      Take 100 mg by mouth 3 times daily as needed (anxiety) 1-3 capsules        HYDROcodone-acetaminophen 5-325 MG per tablet    NORCO          lisinopril-hydrochlorothiazide 20-12.5 MG per tablet    PRINZIDE/ZESTORETIC     Take 2 tablets by mouth daily        metoprolol succinate 50 MG 24 hr tablet    TOPROL-XL     Take 50 mg by mouth daily        MIRTAZAPINE PO      Take 15 mg by mouth At Bedtime        rifampin 300 MG capsule    RIFADIN     Take 600 mg by mouth        senna 8.6 MG tablet    SENOKOT          simvastatin 20 MG tablet    ZOCOR     Take 20 mg by mouth At Bedtime        TEMAZEPAM PO      Take 15 mg by mouth nightly as needed for sleep        TYLENOL PO      Take 325 mg by mouth as needed for mild pain or fever        venlafaxine 150 MG Tb24 24 hr tablet    EFFEXOR-ER     Take 150 mg by mouth daily (with breakfast)

## 2018-04-19 ENCOUNTER — HOSPITAL ENCOUNTER (EMERGENCY)
Facility: CLINIC | Age: 78
Discharge: HOME | End: 2018-04-19
Admitting: FAMILY MEDICINE
Payer: MEDICARE

## 2018-04-19 ENCOUNTER — INFUSION THERAPY VISIT (OUTPATIENT)
Dept: INFUSION THERAPY | Facility: CLINIC | Age: 78
End: 2018-04-19
Attending: FAMILY MEDICINE
Payer: MEDICARE

## 2018-04-19 VITALS
HEART RATE: 58 BPM | SYSTOLIC BLOOD PRESSURE: 162 MMHG | OXYGEN SATURATION: 97 % | RESPIRATION RATE: 18 BRPM | DIASTOLIC BLOOD PRESSURE: 83 MMHG

## 2018-04-19 VITALS
TEMPERATURE: 98.9 F | SYSTOLIC BLOOD PRESSURE: 150 MMHG | DIASTOLIC BLOOD PRESSURE: 80 MMHG | OXYGEN SATURATION: 99 % | HEART RATE: 72 BPM | RESPIRATION RATE: 16 BRPM

## 2018-04-19 DIAGNOSIS — M00.062 STAPHYLOCOCCAL ARTHRITIS OF LEFT KNEE (H): ICD-10-CM

## 2018-04-19 DIAGNOSIS — M00.062 STAPHYLOCOCCAL ARTHRITIS OF LEFT KNEE (H): Primary | ICD-10-CM

## 2018-04-19 PROCEDURE — 96374 THER/PROPH/DIAG INJ IV PUSH: CPT | Mod: XE | Performed by: FAMILY MEDICINE

## 2018-04-19 PROCEDURE — 25000128 H RX IP 250 OP 636: Performed by: FAMILY MEDICINE

## 2018-04-19 PROCEDURE — 40000268 ZZH STATISTIC NO CHARGES: Performed by: FAMILY MEDICINE

## 2018-04-19 PROCEDURE — 96365 THER/PROPH/DIAG IV INF INIT: CPT

## 2018-04-19 RX ADMIN — SODIUM CHLORIDE 250 ML: 9 INJECTION, SOLUTION INTRAVENOUS at 20:13

## 2018-04-19 RX ADMIN — VANCOMYCIN HYDROCHLORIDE 1500 MG: 1 INJECTION, POWDER, LYOPHILIZED, FOR SOLUTION INTRAVENOUS at 08:39

## 2018-04-19 RX ADMIN — SODIUM CHLORIDE 250 ML: 9 INJECTION, SOLUTION INTRAVENOUS at 08:30

## 2018-04-19 RX ADMIN — VANCOMYCIN HYDROCHLORIDE 1500 MG: 1 INJECTION, POWDER, LYOPHILIZED, FOR SOLUTION INTRAVENOUS at 20:13

## 2018-04-19 ASSESSMENT — PAIN SCALES - GENERAL: PAINLEVEL: NO PAIN (0)

## 2018-04-19 NOTE — MR AVS SNAPSHOT
After Visit Summary   4/19/2018    Duane E Sogge    MRN: [id number]           Patient Information     Date Of Birth          1940        Visit Information        Provider Department      4/19/2018 8:00 AM NL INFUSION CHAIR 4 [facility]        Today's Diagnoses     Staphylococcal arthritis of left knee (H)    -  1      Care Instructions    Pt to return on 4/20/19 for vanco/labs, going to ER for evening dose. Copies of medication list and upcoming appointments given prior to discharge.           Follow-ups after your visit        Your next 10 appointments already scheduled     Apr 20, 2018  8:00 AM CDT   Level 2 with NL INFUSION CHAIR 5   [facility] ([facility])    [address] [place] [address] [address] [place] [address]   [phone]            Apr 23, 2018  8:00 AM CDT   Level 2 with NL INFUSION CHAIR 2   [facility])    [address] [place] [address]   [phone]            Apr 24, 2018  8:00 AM CDT   Level 2 with NL INFUSION CHAIR 1   [facility] ([facility])    [address] [place] [address]   [phone]            Apr 24, 2018 11:30 AM CDT   Ortho Treatment with Christina Mohr, PT   [facility] ([facility])    [address] [place] [address]   [phone]            Apr 25, 2018  8:00 AM CDT   Level 2 with NL INFUSION CHAIR 1   [facility] ([facility])    [address] [place] [address]   [phone]            Apr 26, 2018  8:00 AM CDT   Level 2 with NL INFUSION CHAIR 2   [facility] ([facility])    [address] [place] [address]   [phone]            Apr 26, 2018 12:00 PM CDT   Ortho Treatment with Christina Mohr, PT   [facility]  "([facility])    [address] [place] [address]   [phone]            Apr 27, 2018  8:00 AM CDT   Level 2 with NL INFUSION CHAIR 1   [facility] ([facility])    [address] [place] [address]   [phone]            Apr 30, 2018  8:00 AM CDT   Level 2 with NL INFUSION CHAIR 5   [facility] ([facility])    [address]   [place] [address]   [phone]            May 01, 2018  8:00 AM CDT   Level 2 with NL INFUSION CHAIR 1   [facility] ([facility])    [address] [place] [address]   [phone]              Who to contact     If you have questions or need follow up information about today's clinic visit or your schedule please contact [facility] INFUSION [facility] directly at [phone].  Normal or non-critical lab and imaging results will be communicated to you by BombBombhart, letter or phone within 4 business days after the clinic has received the results. If you do not hear from us within 7 days, please contact the clinic through [employer] or phone. If you have a critical or abnormal lab result, we will notify you by phone as soon as possible.  Submit refill requests through [employer] or call your pharmacy and they will forward the refill request to us. Please allow 3 business days for your refill to be completed.          Additional Information About Your Visit        [employer] Information     [employer] lets you send messages to your doctor, view your test results, renew your prescriptions, schedule appointments and more. To sign up, go to [email or web address] . Click on \"Log in\" on the left side of the screen, which will take you to the Welcome page. Then click on \"Sign up Now\" on the right side of the page.     You will be asked to enter the access code listed below, as well as some personal information. Please follow " the directions to create your username and password.     Your access code is: [email or web address]  Expires: 2018  5:11 PM     Your access code will  in 90 days. If you need help or a new code, please call your [place] clinic or [phone].        Care EveryWhere ID     This is your Care EveryWhere ID. This could be used by other organizations to access your [place] medical records  [id number]        Your Vitals Were     Pulse Respirations Pulse Oximetry             58 18 97%          Blood Pressure from Last 3 Encounters:   18 162/83   18 168/80   18 170/86    Weight from Last 3 Encounters:   18 108.7 kg (239 lb 9.6 oz)   04/15/18 106.1 kg (234 lb)   18 106.1 kg (234 lb)              Today, you had the following     No orders found for display       Primary Care Provider Office Phone # Fax #    Saqib Chavarria [phone]       [facility] [address]  [facility] [address]        Equal Access to Services     [facility]: Hadii aad ku hadasho Soomaali, waaxda luqadaha, qaybta kaalmada adeegyada, waxay idiin hayaan adeeg connor sanderson . So [facility] [phone].    ATENCIÓN: Si habla español, tiene a roth disposición servicios gratuitos de asistencia lingüística. Llame al [phone].    We comply with applicable federal civil rights laws and [place] laws. We do not discriminate on the basis of race, color, national origin, age, disability, sex, sexual orientation, or gender identity.            Thank you!     Thank you for choosing [facility] INFUSION SERVICES  for your care. Our goal is always to provide you with excellent care. Hearing back from our patients is one way we can continue to improve our services. Please take a few minutes to complete the written survey that you may receive in the mail after your visit with us. Thank you!             Your Updated Medication List - Protect others around you: Learn how to safely  use, store and throw away your medicines at www.disposemymeds.org.          This list is accurate as of 4/19/18 10:34 AM.  Always use your most recent med list.                   Brand Name Dispense Instructions for use Diagnosis    ALPRAZolam 0.5 MG tablet    XANAX     Take 1 tab up to 4x daily only if needed. Refill when due,no early refills  Indications: Feeling Anxious        amoxicillin-clavulanate 875-125 MG per tablet    AUGMENTIN    20 tablet    Take 1 tablet by mouth 2 times daily    Acute maxillary sinusitis, recurrence not specified       BENZONATATE PO           GABAPENTIN PO      Take 100 mg by mouth 3 times daily as needed (anxiety) 1-3 capsules        HYDROcodone-acetaminophen 5-325 MG per tablet    NORCO          lisinopril-hydrochlorothiazide 20-12.5 MG per tablet    PRINZIDE/ZESTORETIC     Take 2 tablets by mouth daily        metoprolol succinate 50 MG 24 hr tablet    TOPROL-XL     Take 50 mg by mouth daily        MIRTAZAPINE PO      Take 15 mg by mouth At Bedtime        rifampin 300 MG capsule    RIFADIN     Take 600 mg by mouth        senna 8.6 MG tablet    SENOKOT          simvastatin 20 MG tablet    ZOCOR     Take 20 mg by mouth At Bedtime        TEMAZEPAM PO      Take 15 mg by mouth nightly as needed for sleep        TYLENOL PO      Take 325 mg by mouth as needed for mild pain or fever        venlafaxine 150 MG Tb24 24 hr tablet    EFFEXOR-ER     Take 150 mg by mouth daily (with breakfast)

## 2018-04-19 NOTE — PATIENT INSTRUCTIONS
Pt to return on 4/20/19 for vanco/labs, going to ER for evening dose. Copies of medication list and upcoming appointments given prior to discharge.

## 2018-04-20 ENCOUNTER — HOSPITAL ENCOUNTER (EMERGENCY)
Facility: CLINIC | Age: 78
Discharge: HOME | End: 2018-04-20
Admitting: FAMILY MEDICINE
Payer: MEDICARE

## 2018-04-20 ENCOUNTER — INFUSION THERAPY VISIT (OUTPATIENT)
Dept: INFUSION THERAPY | Facility: CLINIC | Age: 78
End: 2018-04-20
Attending: FAMILY MEDICINE
Payer: MEDICARE

## 2018-04-20 VITALS
OXYGEN SATURATION: 96 % | HEART RATE: 58 BPM | SYSTOLIC BLOOD PRESSURE: 151 MMHG | DIASTOLIC BLOOD PRESSURE: 74 MMHG | RESPIRATION RATE: 16 BRPM | TEMPERATURE: 97.7 F

## 2018-04-20 VITALS
SYSTOLIC BLOOD PRESSURE: 139 MMHG | WEIGHT: 239 LBS | OXYGEN SATURATION: 97 % | DIASTOLIC BLOOD PRESSURE: 78 MMHG | RESPIRATION RATE: 20 BRPM | TEMPERATURE: 98.6 F | HEIGHT: 68 IN | BODY MASS INDEX: 36.22 KG/M2

## 2018-04-20 DIAGNOSIS — M00.062 STAPHYLOCOCCAL ARTHRITIS OF LEFT KNEE (H): ICD-10-CM

## 2018-04-20 DIAGNOSIS — M00.062 STAPHYLOCOCCAL ARTHRITIS OF LEFT KNEE (H): Primary | ICD-10-CM

## 2018-04-20 LAB
CREAT SERPL-MCNC: 0.72 MG/DL (ref 0.66–1.25)
GFR SERPL CREATININE-BSD FRML MDRD: >90 ML/MIN/1.7M2
VANCOMYCIN SERPL-MCNC: 17.5 MG/L

## 2018-04-20 PROCEDURE — 82565 ASSAY OF CREATININE: CPT | Performed by: FAMILY MEDICINE

## 2018-04-20 PROCEDURE — 96365 THER/PROPH/DIAG IV INF INIT: CPT

## 2018-04-20 PROCEDURE — 80202 ASSAY OF VANCOMYCIN: CPT | Performed by: FAMILY MEDICINE

## 2018-04-20 PROCEDURE — 25000128 H RX IP 250 OP 636: Performed by: FAMILY MEDICINE

## 2018-04-20 PROCEDURE — 96366 THER/PROPH/DIAG IV INF ADDON: CPT

## 2018-04-20 PROCEDURE — 40000268 ZZH STATISTIC NO CHARGES

## 2018-04-20 RX ADMIN — SODIUM CHLORIDE 250 ML: 9 INJECTION, SOLUTION INTRAVENOUS at 08:59

## 2018-04-20 RX ADMIN — VANCOMYCIN HYDROCHLORIDE 1500 MG: 1 INJECTION, POWDER, LYOPHILIZED, FOR SOLUTION INTRAVENOUS at 09:08

## 2018-04-20 RX ADMIN — VANCOMYCIN HYDROCHLORIDE 1500 MG: 1 INJECTION, POWDER, LYOPHILIZED, FOR SOLUTION INTRAVENOUS at 20:29

## 2018-04-20 ASSESSMENT — PAIN SCALES - GENERAL: PAINLEVEL: NO PAIN (0)

## 2018-04-20 NOTE — PROGRESS NOTES
Patient here for vancomycin infusion. Labs drawn. Creat-0.72 and Vanco level-17.5. Faxed results to [facility]. Tolerated infusion well. Positive blood return pre/post infusions. Discharged in stable condition. Patient going to ER over weekend and returning here on 4/23 at 8am.

## 2018-04-20 NOTE — PATIENT INSTRUCTIONS
Pt to return on 4/23/18 for Vancomycin. Copies of medication list and upcoming appointments given prior to discharge.

## 2018-04-20 NOTE — ED NOTES
Pt to return to infusion therapy. Pt's knee much improved per pt. Still continues to be swelling.

## 2018-04-20 NOTE — MR AVS SNAPSHOT
After Visit Summary   4/20/2018    Duane E Sogge    MRN: [id number]           Patient Information     Date Of Birth          1940        Visit Information        Provider Department      4/20/2018 8:00 AM NL INFUSION CHAIR 5 [facility]        Today's Diagnoses     Staphylococcal arthritis of left knee (H)    -  1      Care Instructions    Pt to return on 4/23/18 for Vancomycin. Copies of medication list and upcoming appointments given prior to discharge.           Follow-ups after your visit        Your next 10 appointments already scheduled     Apr 23, 2018  8:00 AM CDT   Level 2 with NL INFUSION CHAIR 2   [facility] ([facility])    [address] [place] [address] Dr Radha [place] [address]   [phone]            Apr 24, 2018  8:00 AM CDT   Level 2 with NL INFUSION CHAIR 1   [facility] ([facility])    [address] [place] [address] Dr Radha [place] [address]   [phone]            Apr 24, 2018 11:30 AM CDT   Ortho Treatment with Christina Mohr, PT   [facility] ([facility])    [address] [place] [address] Dr Radha [place] [address]   [phone]            Apr 25, 2018  8:00 AM CDT   Level 2 with NL INFUSION CHAIR 1   [facility] ([facility])    [address] Radha [place] [address]   [phone]            Apr 26, 2018  8:00 AM CDT   Level 2 with NL INFUSION CHAIR 2   [facility] ([facility])    [place] [address] Radha [place] [address]   [phone]            Apr 26, 2018 12:00 PM CDT   Ortho Treatment with Christina Mohr, PT   [facility] ([facility])    [address] [place] [address] Dr Radha [place] [address]   [phone]            Apr 27, 2018  8:00 AM CDT   Level 2 with NL INFUSION CHAIR 1   [facility] ([facility])     "[address] [place] [address]   [phone]            Apr 30, 2018  8:00 AM CDT   Level 2 with NL INFUSION CHAIR 5   [facility] ([facility])    [address] [place] [address]   [phone]            May 01, 2018  8:00 AM CDT   Level 2 with NL INFUSION CHAIR 1   [facility] ([facility])    [address] [place] [address]   [phone]            May 01, 2018 11:00 AM CDT   Ortho Treatment with Christina Mohr, PT   [facility] ([facility])    [address] [place] [address]   [phone]              Who to contact     If you have questions or need follow up information about today's clinic visit or your schedule please contact [facility] INFUSION [facility] directly at [phone].  Normal or non-critical lab and imaging results will be communicated to you by ThoughtLeadrhart, letter or phone within 4 business days after the clinic has received the results. If you do not hear from us within 7 days, please contact the clinic through Wikiat or phone. If you have a critical or abnormal lab result, we will notify you by phone as soon as possible.  Submit refill requests through Newtricious or call your pharmacy and they will forward the refill request to us. Please allow 3 business days for your refill to be completed.          Additional Information About Your Visit        Newtricious Information     Newtricious lets you send messages to your doctor, view your test results, renew your prescriptions, schedule appointments and more. To sign up, go to [email or web address] . Click on \"Log in\" on the left side of the screen, which will take you to the Welcome page. Then click on \"Sign up Now\" on the right side of the page.     You will be asked to enter the access code listed below, as well as some personal information. Please follow the directions to create " your username and password.     Your access code is: [email or web address]  Expires: 2018  5:11 PM     Your access code will  in 90 days. If you need help or a new code, please call your [place] clinic or [phone].        Care EveryWhere ID     This is your Care EveryWhere ID. This could be used by other organizations to access your [facility] medical records  [id number]        Your Vitals Were     Pulse Temperature Respirations Pulse Oximetry          58 97.7  F (36.5  C) (Tympanic) 16 96%         Blood Pressure from Last 3 Encounters:   18 151/74   18 150/80   18 162/83    Weight from Last 3 Encounters:   18 108.7 kg (239 lb 9.6 oz)   04/15/18 106.1 kg (234 lb)   18 106.1 kg (234 lb)              We Performed the Following     Creatinine     Vancomycin level        Primary Care Provider Office Phone # Fax #    Saqib Chavarria [phone]       [facility] [address] [facility] [address]  [facility] [address]        Equal Access to Services     [facility] CenterSTEFFANY : Hadii aad ku hadasho Soomaali, waaxda luqadaha, qaybta kaalmada adeegyada, waxay froylanin hayfaizan lalit sanderson . So [facility] [phone].    ATENCIÓN: Si habla español, tiene a roth disposición servicios gratuitos de asistencia lingüística. [facility] [phone].    We comply with applicable federal civil rights laws and [place] laws. We do not discriminate on the basis of race, color, national origin, age, disability, sex, sexual orientation, or gender identity.            Thank you!     Thank you for choosing [facility] INF[facility]ION SERVICES  for your care. Our goal is always to provide you with excellent care. Hearing back from our patients is one way we can continue to improve our services. Please take a few minutes to complete the written survey that you may receive in the mail after your visit with us. Thank you!             Your Updated Medication List - Protect others around you: Learn  how to safely use, store and throw away your medicines at www.disposemymeds.org.          This list is accurate as of 4/20/18 12:28 PM.  Always use your most recent med list.                   Brand Name Dispense Instructions for use Diagnosis    ALPRAZolam 0.5 MG tablet    XANAX     Take 1 tab up to 4x daily only if needed. Refill when due,no early refills  Indications: Feeling Anxious        amoxicillin-clavulanate 875-125 MG per tablet    AUGMENTIN    20 tablet    Take 1 tablet by mouth 2 times daily    Acute maxillary sinusitis, recurrence not specified       BENZONATATE PO           GABAPENTIN PO      Take 100 mg by mouth 3 times daily as needed (anxiety) 1-3 capsules        HYDROcodone-acetaminophen 5-325 MG per tablet    NORCO          lisinopril-hydrochlorothiazide 20-12.5 MG per tablet    PRINZIDE/ZESTORETIC     Take 2 tablets by mouth daily        metoprolol succinate 50 MG 24 hr tablet    TOPROL-XL     Take 50 mg by mouth daily        MIRTAZAPINE PO      Take 15 mg by mouth At Bedtime        rifampin 300 MG capsule    RIFADIN     Take 600 mg by mouth        senna 8.6 MG tablet    SENOKOT          simvastatin 20 MG tablet    ZOCOR     Take 20 mg by mouth At Bedtime        TEMAZEPAM PO      Take 15 mg by mouth nightly as needed for sleep        TYLENOL PO      Take 325 mg by mouth as needed for mild pain or fever        venlafaxine 150 MG Tb24 24 hr tablet    EFFEXOR-ER     Take 150 mg by mouth daily (with breakfast)

## 2018-04-21 ENCOUNTER — HOSPITAL ENCOUNTER (EMERGENCY)
Facility: CLINIC | Age: 78
Discharge: HOME | End: 2018-04-21
Admitting: FAMILY MEDICINE
Payer: MEDICARE

## 2018-04-21 VITALS
DIASTOLIC BLOOD PRESSURE: 87 MMHG | TEMPERATURE: 96.1 F | SYSTOLIC BLOOD PRESSURE: 159 MMHG | OXYGEN SATURATION: 96 % | RESPIRATION RATE: 14 BRPM | HEART RATE: 58 BPM

## 2018-04-21 VITALS
DIASTOLIC BLOOD PRESSURE: 90 MMHG | WEIGHT: 235 LBS | SYSTOLIC BLOOD PRESSURE: 139 MMHG | OXYGEN SATURATION: 99 % | RESPIRATION RATE: 16 BRPM | HEART RATE: 92 BPM | TEMPERATURE: 99.3 F | BODY MASS INDEX: 35.73 KG/M2

## 2018-04-21 DIAGNOSIS — M00.062 STAPHYLOCOCCAL ARTHRITIS OF LEFT KNEE (H): ICD-10-CM

## 2018-04-21 PROCEDURE — 96365 THER/PROPH/DIAG IV INF INIT: CPT | Performed by: FAMILY MEDICINE

## 2018-04-21 PROCEDURE — 96366 THER/PROPH/DIAG IV INF ADDON: CPT | Performed by: FAMILY MEDICINE

## 2018-04-21 PROCEDURE — 25000128 H RX IP 250 OP 636: Performed by: FAMILY MEDICINE

## 2018-04-21 PROCEDURE — 40000268 ZZH STATISTIC NO CHARGES: Performed by: FAMILY MEDICINE

## 2018-04-21 RX ADMIN — SODIUM CHLORIDE 250 ML: 9 INJECTION, SOLUTION INTRAVENOUS at 22:25

## 2018-04-21 RX ADMIN — VANCOMYCIN HYDROCHLORIDE 1500 MG: 1 INJECTION, POWDER, LYOPHILIZED, FOR SOLUTION INTRAVENOUS at 08:38

## 2018-04-21 RX ADMIN — SODIUM CHLORIDE 250 ML: 9 INJECTION, SOLUTION INTRAVENOUS at 08:37

## 2018-04-21 RX ADMIN — VANCOMYCIN HYDROCHLORIDE 1500 MG: 1 INJECTION, POWDER, LYOPHILIZED, FOR SOLUTION INTRAVENOUS at 20:33

## 2018-04-22 ENCOUNTER — HOSPITAL ENCOUNTER (EMERGENCY)
Facility: CLINIC | Age: 78
Discharge: HOME | End: 2018-04-22
Attending: EMERGENCY MEDICINE | Admitting: EMERGENCY MEDICINE
Payer: MEDICARE

## 2018-04-22 ENCOUNTER — HOSPITAL ENCOUNTER (EMERGENCY)
Facility: CLINIC | Age: 78
Discharge: HOME | End: 2018-04-22
Admitting: FAMILY MEDICINE
Payer: MEDICARE

## 2018-04-22 VITALS
HEART RATE: 90 BPM | SYSTOLIC BLOOD PRESSURE: 169 MMHG | TEMPERATURE: 98.2 F | RESPIRATION RATE: 20 BRPM | DIASTOLIC BLOOD PRESSURE: 97 MMHG | OXYGEN SATURATION: 98 %

## 2018-04-22 VITALS
SYSTOLIC BLOOD PRESSURE: 161 MMHG | TEMPERATURE: 97.3 F | DIASTOLIC BLOOD PRESSURE: 85 MMHG | HEART RATE: 58 BPM | OXYGEN SATURATION: 96 % | RESPIRATION RATE: 20 BRPM

## 2018-04-22 DIAGNOSIS — M00.062 STAPHYLOCOCCAL ARTHRITIS OF LEFT KNEE (H): ICD-10-CM

## 2018-04-22 LAB
BASOPHILS # BLD AUTO: 0.1 10E9/L (ref 0–0.2)
BASOPHILS NFR BLD AUTO: 0.6 %
CRP SERPL-MCNC: 14.6 MG/L (ref 0–8)
DIFFERENTIAL METHOD BLD: ABNORMAL
EOSINOPHIL # BLD AUTO: 0.7 10E9/L (ref 0–0.7)
EOSINOPHIL NFR BLD AUTO: 8.5 %
ERYTHROCYTE [DISTWIDTH] IN BLOOD BY AUTOMATED COUNT: 13.3 % (ref 10–15)
ERYTHROCYTE [SEDIMENTATION RATE] IN BLOOD BY WESTERGREN METHOD: 35 MM/H (ref 0–20)
HCT VFR BLD AUTO: 40.5 % (ref 40–53)
HGB BLD-MCNC: 12.8 G/DL (ref 13.3–17.7)
IMM GRANULOCYTES # BLD: 0 10E9/L (ref 0–0.4)
IMM GRANULOCYTES NFR BLD: 0.1 %
LYMPHOCYTES # BLD AUTO: 1.6 10E9/L (ref 0.8–5.3)
LYMPHOCYTES NFR BLD AUTO: 19 %
MCH RBC QN AUTO: 31.4 PG (ref 26.5–33)
MCHC RBC AUTO-ENTMCNC: 31.6 G/DL (ref 31.5–36.5)
MCV RBC AUTO: 99 FL (ref 78–100)
MONOCYTES # BLD AUTO: 1.3 10E9/L (ref 0–1.3)
MONOCYTES NFR BLD AUTO: 14.8 %
NEUTROPHILS # BLD AUTO: 4.9 10E9/L (ref 1.6–8.3)
NEUTROPHILS NFR BLD AUTO: 57 %
PLATELET # BLD AUTO: 254 10E9/L (ref 150–450)
RBC # BLD AUTO: 4.08 10E12/L (ref 4.4–5.9)
WBC # BLD AUTO: 8.6 10E9/L (ref 4–11)

## 2018-04-22 PROCEDURE — 96366 THER/PROPH/DIAG IV INF ADDON: CPT | Performed by: FAMILY MEDICINE

## 2018-04-22 PROCEDURE — 96365 THER/PROPH/DIAG IV INF INIT: CPT | Performed by: FAMILY MEDICINE

## 2018-04-22 PROCEDURE — 25000128 H RX IP 250 OP 636: Performed by: FAMILY MEDICINE

## 2018-04-22 PROCEDURE — 40000268 ZZH STATISTIC NO CHARGES: Performed by: FAMILY MEDICINE

## 2018-04-22 PROCEDURE — 96366 THER/PROPH/DIAG IV INF ADDON: CPT | Performed by: EMERGENCY MEDICINE

## 2018-04-22 PROCEDURE — 85025 COMPLETE CBC W/AUTO DIFF WBC: CPT | Performed by: EMERGENCY MEDICINE

## 2018-04-22 PROCEDURE — 86140 C-REACTIVE PROTEIN: CPT | Performed by: EMERGENCY MEDICINE

## 2018-04-22 PROCEDURE — 85652 RBC SED RATE AUTOMATED: CPT | Performed by: EMERGENCY MEDICINE

## 2018-04-22 PROCEDURE — 96365 THER/PROPH/DIAG IV INF INIT: CPT | Performed by: EMERGENCY MEDICINE

## 2018-04-22 PROCEDURE — 36415 COLL VENOUS BLD VENIPUNCTURE: CPT | Performed by: EMERGENCY MEDICINE

## 2018-04-22 PROCEDURE — 99283 EMERGENCY DEPT VISIT LOW MDM: CPT | Mod: Z6 | Performed by: EMERGENCY MEDICINE

## 2018-04-22 PROCEDURE — 99284 EMERGENCY DEPT VISIT MOD MDM: CPT | Mod: 25 | Performed by: EMERGENCY MEDICINE

## 2018-04-22 RX ADMIN — VANCOMYCIN HYDROCHLORIDE 1500 MG: 1 INJECTION, POWDER, LYOPHILIZED, FOR SOLUTION INTRAVENOUS at 20:16

## 2018-04-22 RX ADMIN — SODIUM CHLORIDE 250 ML: 9 INJECTION, SOLUTION INTRAVENOUS at 08:50

## 2018-04-22 RX ADMIN — SODIUM CHLORIDE 500 ML: 9 INJECTION, SOLUTION INTRAVENOUS at 20:17

## 2018-04-22 RX ADMIN — VANCOMYCIN HYDROCHLORIDE 1500 MG: 1 INJECTION, POWDER, LYOPHILIZED, FOR SOLUTION INTRAVENOUS at 08:51

## 2018-04-22 NOTE — ED NOTES
Patient tolerated Vanco well this evening. Plan to return in AM for next dose. Denies any questions.

## 2018-04-23 ENCOUNTER — INFUSION THERAPY VISIT (OUTPATIENT)
Dept: INFUSION THERAPY | Facility: CLINIC | Age: 78
End: 2018-04-23
Attending: FAMILY MEDICINE
Payer: MEDICARE

## 2018-04-23 ENCOUNTER — HOSPITAL ENCOUNTER (EMERGENCY)
Facility: CLINIC | Age: 78
Discharge: HOME | End: 2018-04-23
Admitting: PHYSICIAN ASSISTANT
Payer: MEDICARE

## 2018-04-23 ENCOUNTER — HOSPITAL ENCOUNTER (EMERGENCY)
Facility: CLINIC | Age: 78
Discharge: HOME | End: 2018-04-23
Payer: MEDICARE

## 2018-04-23 VITALS
RESPIRATION RATE: 18 BRPM | TEMPERATURE: 97.9 F | OXYGEN SATURATION: 95 % | SYSTOLIC BLOOD PRESSURE: 150 MMHG | HEART RATE: 55 BPM | DIASTOLIC BLOOD PRESSURE: 86 MMHG

## 2018-04-23 VITALS
HEART RATE: 75 BPM | DIASTOLIC BLOOD PRESSURE: 93 MMHG | RESPIRATION RATE: 20 BRPM | TEMPERATURE: 97.5 F | OXYGEN SATURATION: 97 % | SYSTOLIC BLOOD PRESSURE: 150 MMHG

## 2018-04-23 VITALS
DIASTOLIC BLOOD PRESSURE: 95 MMHG | OXYGEN SATURATION: 99 % | SYSTOLIC BLOOD PRESSURE: 167 MMHG | HEART RATE: 87 BPM | WEIGHT: 245 LBS | RESPIRATION RATE: 20 BRPM | TEMPERATURE: 98.8 F | BODY MASS INDEX: 37.25 KG/M2

## 2018-04-23 DIAGNOSIS — M00.062 STAPHYLOCOCCAL ARTHRITIS OF LEFT KNEE (H): Primary | ICD-10-CM

## 2018-04-23 DIAGNOSIS — M00.062 STAPHYLOCOCCAL ARTHRITIS OF LEFT KNEE (H): ICD-10-CM

## 2018-04-23 PROCEDURE — 25000128 H RX IP 250 OP 636: Performed by: FAMILY MEDICINE

## 2018-04-23 PROCEDURE — 40000268 ZZH STATISTIC NO CHARGES: Performed by: PHYSICIAN ASSISTANT

## 2018-04-23 PROCEDURE — 96365 THER/PROPH/DIAG IV INF INIT: CPT

## 2018-04-23 PROCEDURE — 40000268 ZZH STATISTIC NO CHARGES

## 2018-04-23 PROCEDURE — 96365 THER/PROPH/DIAG IV INF INIT: CPT | Performed by: PHYSICIAN ASSISTANT

## 2018-04-23 PROCEDURE — 96366 THER/PROPH/DIAG IV INF ADDON: CPT | Performed by: PHYSICIAN ASSISTANT

## 2018-04-23 RX ADMIN — VANCOMYCIN HYDROCHLORIDE 1500 MG: 10 INJECTION, POWDER, LYOPHILIZED, FOR SOLUTION INTRAVENOUS at 08:49

## 2018-04-23 RX ADMIN — VANCOMYCIN HYDROCHLORIDE 1500 MG: 1 INJECTION, POWDER, LYOPHILIZED, FOR SOLUTION INTRAVENOUS at 19:46

## 2018-04-23 RX ADMIN — SODIUM CHLORIDE 250 ML: 9 INJECTION, SOLUTION INTRAVENOUS at 21:00

## 2018-04-23 RX ADMIN — SODIUM CHLORIDE 250 ML: 9 INJECTION, SOLUTION INTRAVENOUS at 08:35

## 2018-04-23 ASSESSMENT — PAIN SCALES - GENERAL: PAINLEVEL: NO PAIN (1)

## 2018-04-23 NOTE — ED TRIAGE NOTES
Patient here for infusion only. Patient and wife reporting new swelling in L ankle and knee, starting today.

## 2018-04-23 NOTE — MR AVS SNAPSHOT
After Visit Summary   4/23/2018    Duane E Sogge    MRN: [id number]           Patient Information     Date Of Birth          1940        Visit Information        Provider Department      4/23/2018 8:00 AM NL INFUSION CHAIR 2 [facility]        Today's Diagnoses     Staphylococcal arthritis of left knee (H)    -  1      Care Instructions    Pt to return on 4/23/18  For vanco in ER this evening , return tomorrow am to infusion. Copies of medication list and upcoming appointments given prior to discharge.           Follow-ups after your visit        Your next 10 appointments already scheduled     Apr 24, 2018  8:00 AM CDT   Level 2 with NL INFUSION CHAIR 1   [facility] ([facility])    [address] [place] [address] Dr Radha [place] [address]   [phone]            Apr 24, 2018 11:30 AM CDT   Ortho Treatment with Christina Mohr, PT   [facility] ([facility])    [address] [place] [address] Dr Radha [place] [address]   [phone]            Apr 25, 2018  8:00 AM CDT   Level 2 with NL INFUSION CHAIR 1   [facility] ([facility])    [address] [place] [address] Dr Radha [place] [address]   [phone]            Apr 26, 2018  8:00 AM CDT   Level 2 with NL INFUSION CHAIR 2   [facility] ([facility])    [address] [place] [address]   [phone]            Apr 26, 2018 12:00 PM CDT   Ortho Treatment with Christina Mohr, PT   [facility] ([facility])    [address] [place] [address]   [phone]            Apr 27, 2018  8:00 AM CDT   Level 2 with NL INFUSION CHAIR 1   [facility] ([facility])    [address] [place] [address]   [phone]            Apr 30, 2018  8:00 AM CDT   Level 2 with NL INFUSION CHAIR 5   [facility]  "Infusion Services ([facility])    [address] [place] [address]   [phone]            May 01, 2018  8:00 AM CDT   Level 2 with NL INFUSION CHAIR 1   [facility] Infusion Services ([facility])    [address] [place] [address]   [phone]            May 01, 2018 11:00 AM CDT   Ortho Treatment with Christina Mohr PT   [facility] ([facility])    [address] [place] [address]   [phone]            May 02, 2018  8:00 AM CDT   Level 2 with NL INFUSION CHAIR 1   [facility] ([facility])    [address] [place] [address]   [phone]              Who to contact     If you have questions or need follow up information about today's clinic visit or your schedule please contact [facility] INFUSION SERVICES directly at [phone].  Normal or non-critical lab and imaging results will be communicated to you by Mapadohart, letter or phone within 4 business days after the clinic has received the results. If you do not hear from us within 7 days, please contact the clinic through Streakt or phone. If you have a critical or abnormal lab result, we will notify you by phone as soon as possible.  Submit refill requests through Intune Networks or call your pharmacy and they will forward the refill request to us. Please allow 3 business days for your refill to be completed.          Additional Information About Your Visit        MapadoharDomains Income Information     Intune Networks lets you send messages to your doctor, view your test results, renew your prescriptions, schedule appointments and more. To sign up, go to [email or web address] . Click on \"Log in\" on the left side of the screen, which will take you to the Welcome page. Then click on \"Sign up Now\" on the right side of the page.     You will be asked to enter the access code listed below, as well as some personal " information. Please follow the directions to create your username and password.     Your access code is: [email or web address]  Expires: 2018  5:11 PM     Your access code will  in 90 days. If you need help or a new code, please call your [place] clinic or [phone].        Care EveryWhere ID     This is your Care EveryWhere ID. This could be used by other organizations to access your [place] medical records  [id number]        Your Vitals Were     Pulse Temperature Respirations Pulse Oximetry          55 97.9  F (36.6  C) (Temporal) 18 95%         Blood Pressure from Last 3 Encounters:   18 150/86   18 (!) 169/97   18 161/85    Weight from Last 3 Encounters:   18 106.6 kg (235 lb)   18 108.4 kg (239 lb)   18 108.7 kg (239 lb 9.6 oz)              Today, you had the following     No orders found for display       Primary Care Provider Office Phone # Fax #    Saqib Chavarria [phone]       [facility] [address] [facility] [address]  [facility] [address]        Equal Access to Services     MATT LEIVA : Hadii aad ku hadasho Soomaali, waaxda luqadaha, qaybta kaalmada adeegyada, waxay idiin hayaan adeeg connor sanderson . So [facility] [phone].    ATENCIÓN: Si habla español, tiene a roth disposición servicios gratuitos de asistencia lingüística. Llame al [phone].    We comply with applicable federal civil rights laws and [place] laws. We do not discriminate on the basis of race, color, national origin, age, disability, sex, sexual orientation, or gender identity.            Thank you!     Thank you for choosing [facility] SERVICES  for your care. Our goal is always to provide you with excellent care. Hearing back from our patients is one way we can continue to improve our services. Please take a few minutes to complete the written survey that you may receive in the mail after your visit with us. Thank you!             Your Updated  Medication List - Protect others around you: Learn how to safely use, store and throw away your medicines at www.disposemymeds.org.          This list is accurate as of 4/23/18 12:37 PM.  Always use your most recent med list.                   Brand Name Dispense Instructions for use Diagnosis    ALPRAZolam 0.5 MG tablet    XANAX     Take 1 tab up to 4x daily only if needed. Refill when due,no early refills  Indications: Feeling Anxious        amoxicillin-clavulanate 875-125 MG per tablet    AUGMENTIN    20 tablet    Take 1 tablet by mouth 2 times daily    Acute maxillary sinusitis, recurrence not specified       BENZONATATE PO           GABAPENTIN PO      Take 100 mg by mouth 3 times daily as needed (anxiety) 1-3 capsules        HYDROcodone-acetaminophen 5-325 MG per tablet    NORCO          lisinopril-hydrochlorothiazide 20-12.5 MG per tablet    PRINZIDE/ZESTORETIC     Take 2 tablets by mouth daily        metoprolol succinate 50 MG 24 hr tablet    TOPROL-XL     Take 50 mg by mouth daily        MIRTAZAPINE PO      Take 15 mg by mouth At Bedtime        senna 8.6 MG tablet    SENOKOT          simvastatin 20 MG tablet    ZOCOR     Take 20 mg by mouth At Bedtime        TEMAZEPAM PO      Take 15 mg by mouth nightly as needed for sleep        TYLENOL PO      Take 325 mg by mouth as needed for mild pain or fever        venlafaxine 150 MG Tb24 24 hr tablet    EFFEXOR-ER     Take 150 mg by mouth daily (with breakfast)

## 2018-04-23 NOTE — PROGRESS NOTES
Patient here for vancomycin infusion. Labs done yesterday evening in ER, noted to be elevated. Faxed results to DR. Long at [facility]. Tolerated infusion well. PICC line dressing changed. Discharged in stable condition.

## 2018-04-23 NOTE — ED PROVIDER NOTES
I was asked to examine the patient's left lower extremity during his infusion this evening because of concerns that there was increased redness and swelling of the knee that developed today.  The patient was a bit more active today riding a 4 martinez and working outside in the spring climate.  His wife noted that when she came in from being outside the patient was icing his knee which he has not done for the last 2-3 weeks.  She reports he still has not requested anything for pain but did notice that the leg was swollen and becoming red.  The patient continues to get IV vancomycin as scheduled.  There was an area on the medial left knee over the proximal tibia that is red, hot, tender which has been documented in the attached photos.  I discussed the case with the patient's ID doctor at [place], Dr. Reyes who will have her office call the patient tomorrow morning to schedule appointment to be seen on Wednesday when she returns to the clinic.  She also recommended that the patient contact the orthopedic surgeon at [place], Dr. Dawson Monday morning to discuss this development.  I did obtain a CBC, CRP, and erythrocyte sedimentation rate.  His CBC still remains normal with a leukocyte count of 8.6, however, his C-reactive protein is gone from 6.0 on 4/17/2018 to 14.6 today and his erythrocyte sedimentation rate is elevated as well at 35.            Results for orders placed or performed during the hospital encounter of 04/22/18 (from the past 48 hour(s))   CBC with platelets differential   Result Value Ref Range    WBC 8.6 4.0 - 11.0 10e9/L    RBC Count 4.08 (L) 4.4 - 5.9 10e12/L    Hemoglobin 12.8 (L) 13.3 - 17.7 g/dL    Hematocrit 40.5 40.0 - 53.0 %    MCV 99 78 - 100 fl    MCH 31.4 26.5 - 33.0 pg    MCHC 31.6 31.5 - 36.5 g/dL    RDW 13.3 10.0 - 15.0 %    Platelet Count 254 150 - 450 10e9/L    Diff Method Automated Method     % Neutrophils 57.0 %    % Lymphocytes 19.0 %    % Monocytes 14.8 %    %  Eosinophils 8.5 %    % Basophils 0.6 %    % Immature Granulocytes 0.1 %    Absolute Neutrophil 4.9 1.6 - 8.3 10e9/L    Absolute Lymphocytes 1.6 0.8 - 5.3 10e9/L    Absolute Monocytes 1.3 0.0 - 1.3 10e9/L    Absolute Eosinophils 0.7 0.0 - 0.7 10e9/L    Absolute Basophils 0.1 0.0 - 0.2 10e9/L    Abs Immature Granulocytes 0.0 0 - 0.4 10e9/L   CRP inflammation   Result Value Ref Range    CRP Inflammation 14.6 (H) 0.0 - 8.0 mg/L   Erythrocyte sedimentation rate auto   Result Value Ref Range    Sed Rate 35 (H) 0 - 20 mm/h              Kristopher Paredes MD  04/22/18 1909

## 2018-04-23 NOTE — ED NOTES
Patient showed up for his infusion too early.  Patient discharged from ER and will return tonight at 2000 when medication is due.

## 2018-04-23 NOTE — PATIENT INSTRUCTIONS
Pt to return on 4/23/18  For vanco in ER this evening , return tomorrow am to infusion. Copies of medication list and upcoming appointments given prior to discharge.

## 2018-04-24 ENCOUNTER — INFUSION THERAPY VISIT (OUTPATIENT)
Dept: INFUSION THERAPY | Facility: CLINIC | Age: 78
End: 2018-04-24
Attending: FAMILY MEDICINE
Payer: MEDICARE

## 2018-04-24 ENCOUNTER — HOSPITAL ENCOUNTER (OUTPATIENT)
Dept: PHYSICAL THERAPY | Facility: CLINIC | Age: 78
Setting detail: THERAPIES SERIES
End: 2018-04-24
Attending: ORTHOPAEDIC SURGERY
Payer: MEDICARE

## 2018-04-24 ENCOUNTER — HOSPITAL ENCOUNTER (EMERGENCY)
Facility: CLINIC | Age: 78
Discharge: HOME | End: 2018-04-24
Admitting: NURSE PRACTITIONER
Payer: MEDICARE

## 2018-04-24 VITALS
RESPIRATION RATE: 20 BRPM | DIASTOLIC BLOOD PRESSURE: 90 MMHG | SYSTOLIC BLOOD PRESSURE: 162 MMHG | OXYGEN SATURATION: 95 % | WEIGHT: 244 LBS | HEART RATE: 82 BPM | BODY MASS INDEX: 37.1 KG/M2 | TEMPERATURE: 98.6 F

## 2018-04-24 VITALS
SYSTOLIC BLOOD PRESSURE: 142 MMHG | HEART RATE: 53 BPM | DIASTOLIC BLOOD PRESSURE: 73 MMHG | OXYGEN SATURATION: 94 % | RESPIRATION RATE: 18 BRPM | TEMPERATURE: 98.4 F

## 2018-04-24 DIAGNOSIS — M00.062 STAPHYLOCOCCAL ARTHRITIS OF LEFT KNEE (H): ICD-10-CM

## 2018-04-24 DIAGNOSIS — M00.062 STAPHYLOCOCCAL ARTHRITIS OF LEFT KNEE (H): Primary | ICD-10-CM

## 2018-04-24 LAB
ALP SERPL-CCNC: 133 U/L (ref 40–150)
ALT SERPL W P-5'-P-CCNC: 25 U/L (ref 0–70)
AST SERPL W P-5'-P-CCNC: 18 U/L (ref 0–45)
BASOPHILS # BLD AUTO: 0.1 10E9/L (ref 0–0.2)
BASOPHILS NFR BLD AUTO: 0.8 %
BILIRUB SERPL-MCNC: 0.3 MG/DL (ref 0.2–1.3)
CREAT SERPL-MCNC: 0.93 MG/DL (ref 0.66–1.25)
CRP SERPL-MCNC: 19.3 MG/L (ref 0–8)
DIFFERENTIAL METHOD BLD: ABNORMAL
EOSINOPHIL # BLD AUTO: 0.5 10E9/L (ref 0–0.7)
EOSINOPHIL NFR BLD AUTO: 7.8 %
ERYTHROCYTE [DISTWIDTH] IN BLOOD BY AUTOMATED COUNT: 13.3 % (ref 10–15)
GFR SERPL CREATININE-BSD FRML MDRD: 78 ML/MIN/1.7M2
HCT VFR BLD AUTO: 40.4 % (ref 40–53)
HGB BLD-MCNC: 12.5 G/DL (ref 13.3–17.7)
IMM GRANULOCYTES # BLD: 0 10E9/L (ref 0–0.4)
IMM GRANULOCYTES NFR BLD: 0.2 %
LYMPHOCYTES # BLD AUTO: 1.5 10E9/L (ref 0.8–5.3)
LYMPHOCYTES NFR BLD AUTO: 23 %
MCH RBC QN AUTO: 30.6 PG (ref 26.5–33)
MCHC RBC AUTO-ENTMCNC: 30.9 G/DL (ref 31.5–36.5)
MCV RBC AUTO: 99 FL (ref 78–100)
MONOCYTES # BLD AUTO: 0.9 10E9/L (ref 0–1.3)
MONOCYTES NFR BLD AUTO: 13.7 %
NEUTROPHILS # BLD AUTO: 3.6 10E9/L (ref 1.6–8.3)
NEUTROPHILS NFR BLD AUTO: 54.5 %
PLATELET # BLD AUTO: 197 10E9/L (ref 150–450)
RBC # BLD AUTO: 4.08 10E12/L (ref 4.4–5.9)
VANCOMYCIN SERPL-MCNC: 19.3 MG/L
WBC # BLD AUTO: 6.6 10E9/L (ref 4–11)

## 2018-04-24 PROCEDURE — 86140 C-REACTIVE PROTEIN: CPT | Performed by: FAMILY MEDICINE

## 2018-04-24 PROCEDURE — 40000268 ZZH STATISTIC NO CHARGES: Performed by: NURSE PRACTITIONER

## 2018-04-24 PROCEDURE — 84075 ASSAY ALKALINE PHOSPHATASE: CPT | Performed by: FAMILY MEDICINE

## 2018-04-24 PROCEDURE — 85025 COMPLETE CBC W/AUTO DIFF WBC: CPT | Performed by: FAMILY MEDICINE

## 2018-04-24 PROCEDURE — 25000128 H RX IP 250 OP 636: Performed by: FAMILY MEDICINE

## 2018-04-24 PROCEDURE — 96366 THER/PROPH/DIAG IV INF ADDON: CPT | Performed by: NURSE PRACTITIONER

## 2018-04-24 PROCEDURE — 80202 ASSAY OF VANCOMYCIN: CPT | Performed by: FAMILY MEDICINE

## 2018-04-24 PROCEDURE — 82565 ASSAY OF CREATININE: CPT | Performed by: FAMILY MEDICINE

## 2018-04-24 PROCEDURE — 82247 BILIRUBIN TOTAL: CPT | Performed by: FAMILY MEDICINE

## 2018-04-24 PROCEDURE — 96365 THER/PROPH/DIAG IV INF INIT: CPT | Performed by: NURSE PRACTITIONER

## 2018-04-24 PROCEDURE — 40000718 ZZHC STATISTIC PT DEPARTMENT ORTHO VISIT

## 2018-04-24 PROCEDURE — 97110 THERAPEUTIC EXERCISES: CPT | Mod: GP

## 2018-04-24 PROCEDURE — 84450 TRANSFERASE (AST) (SGOT): CPT | Performed by: FAMILY MEDICINE

## 2018-04-24 PROCEDURE — 84460 ALANINE AMINO (ALT) (SGPT): CPT | Performed by: FAMILY MEDICINE

## 2018-04-24 PROCEDURE — 96365 THER/PROPH/DIAG IV INF INIT: CPT

## 2018-04-24 RX ADMIN — VANCOMYCIN HYDROCHLORIDE 1500 MG: 1 INJECTION, POWDER, LYOPHILIZED, FOR SOLUTION INTRAVENOUS at 09:18

## 2018-04-24 RX ADMIN — VANCOMYCIN HYDROCHLORIDE 1500 MG: 1 INJECTION, POWDER, LYOPHILIZED, FOR SOLUTION INTRAVENOUS at 20:23

## 2018-04-24 RX ADMIN — SODIUM CHLORIDE 250 ML: 9 INJECTION, SOLUTION INTRAVENOUS at 20:22

## 2018-04-24 RX ADMIN — SODIUM CHLORIDE 250 ML: 9 INJECTION, SOLUTION INTRAVENOUS at 08:42

## 2018-04-24 ASSESSMENT — PAIN SCALES - GENERAL: PAINLEVEL: NO PAIN (0)

## 2018-04-24 NOTE — ED AVS SNAPSHOT
[facility] Emergency Department    [address] [place] [address]    Phone:  [phone]    Fax:  [phone]                                       Duane E Sogge   MRN: [id number]    Department:  [facility] Emergency Department   Date of Visit:  4/24/2018           After Visit Summary Signature Page     I have received my discharge instructions, and my questions have been answered. I have discussed any challenges I see with this plan with the nurse or doctor.    ..........................................................................................................................................  Patient/Patient Representative Signature      ..........................................................................................................................................  Patient Representative Print Name and Relationship to Patient    ..................................................               ................................................  Date                                            Time    ..........................................................................................................................................  Reviewed by Signature/Title    ...................................................              ..............................................  Date                                                            Time

## 2018-04-24 NOTE — PATIENT INSTRUCTIONS
Pt to return on 4/25/18  for vanco, going to ER in evening. Copies of medication list and upcoming appointments given prior to discharge.

## 2018-04-24 NOTE — PROGRESS NOTES
Patient here for vancomycin infusion. PICC labs drawn and faxed to [facility]- and  Patient OK to continue with PT if OK with Ortho. Recheck CRP on 4/27.Tolerated infusion well. Discharged in stable condition.

## 2018-04-24 NOTE — MR AVS SNAPSHOT
After Visit Summary   4/24/2018    Duane E Sogge    MRN: [id number]           Patient Information     Date Of Birth          1940        Visit Information        Provider Department      4/24/2018 8:00 AM NL INFUSION CHAIR 1 [facility]        Today's Diagnoses     Staphylococcal arthritis of left knee (H)    -  1      Care Instructions    Pt to return on 4/25/18  for vanco, going to ER in evening. Copies of medication list and upcoming appointments given prior to discharge.           Follow-ups after your visit        Your next 10 appointments already scheduled     Apr 25, 2018  8:00 AM CDT   Level 2 with NL INFUSION CHAIR 1   [facility] ([facility])    [address] [place] [address]   [phone]            Apr 26, 2018  8:00 AM CDT   Level 2 with NL INFUSION CHAIR 2   [facility] ([facility])    [address] [place] [address]   [phone]            Apr 26, 2018 12:00 PM CDT   Ortho Treatment with Christina Mohr, PT   [facility] ([facility])    [address] [place] [address] [place] [address]   [phone]            Apr 27, 2018  8:00 AM CDT   Level 2 with NL INFUSION CHAIR 1   [facility] ([facility])    [address] [place] [address]   [phone]            Apr 30, 2018  8:00 AM CDT   Level 2 with NL INFUSION CHAIR 5   [facility] ([facility])    [address] [place] [address] [address] [place] [address]   [phone]            May 01, 2018  8:00 AM CDT   Level 2 with NL INFUSION CHAIR 1   [facility] ([facility])    [address] [place] [address] [address] [place] [address]   [phone]            May 01, 2018 11:00 AM CDT   Ortho Treatment with Christina Mohr, PT   [facility] ([place]  "[facility])    [address]   [place] [address]   [phone]            May 02, 2018  8:00 AM CDT   Level 2 with NL INFUSION CHAIR 1   [facility] ([facility])    [address] [place] [address]   [phone]            May 03, 2018 10:15 AM CDT   Ortho Treatment with Christina Mohr PT   [facility] ([facility])    [address] [place] [address]   [phone]            May 09, 2018 10:20 AM CDT   PHYSICAL with Asa Oliver MD   [facility]    [address] [address] [phone]              Who to contact     If you have questions or need follow up information about today's clinic visit or your schedule please contact [facility] directly at [phone].  Normal or non-critical lab and imaging results will be communicated to you by Elliehart, letter or phone within 4 business days after the clinic has received the results. If you do not hear from us within 7 days, please contact the clinic through Atomic Mogulst or phone. If you have a critical or abnormal lab result, we will notify you by phone as soon as possible.  Submit refill requests through [employer] or call your pharmacy and they will forward the refill request to us. Please allow 3 business days for your refill to be completed.          Additional Information About Your Visit        [employer] Information     [employer] lets you send messages to your doctor, view your test results, renew your prescriptions, schedule appointments and more. To sign up, go to [email or web address] . Click on \"Log in\" on the left side of the screen, which will take you to the Welcome page. Then click on \"Sign up Now\" on the right side of the page.     You will be asked to enter the access code listed below, as well as some personal information. Please follow the " directions to create your username and password.     Your access code is: [email or web address]  Expires: 2018  5:11 PM     Your access code will  in 90 days. If you need help or a new code, please call your [place] clinic or [phone].        Care EveryWhere ID     This is your Care EveryWhere ID. This could be used by other organizations to access your [place] medical records  [id number]        Your Vitals Were     Pulse Temperature Respirations Pulse Oximetry          53 98.4  F (36.9  C) (Temporal) 18 94%         Blood Pressure from Last 3 Encounters:   18 142/73   18 (!) 150/93   18 (!) 167/95    Weight from Last 3 Encounters:   18 111.1 kg (245 lb)   18 106.6 kg (235 lb)   18 108.4 kg (239 lb)              We Performed the Following     Alkaline phosphatase     ALT     AST     Bilirubin  total     CBC with platelets differential     Creatinine     CRP inflammation     Vancomycin level        Primary Care Provider Office Phone # Fax #    Saqib Chavarria [phone]       [facility] [address]  [facility] [address]        Equal Access to Services     STARR LEIVA : Hadii aad ku hadasho Soomaali, waaxda luqadaha, qaybta kaalmada adeegyada, waxay idiin hayaan adeeg connor sanderson . So [facility] [phone].    ATENCIÓN: Si habla español, tiene a roth disposición servicios gratuitos de asistencia lingüística. Llling al [phone].    We comply with applicable federal civil rights laws and [place] laws. We do not discriminate on the basis of race, color, national origin, age, disability, sex, sexual orientation, or gender identity.            Thank you!     Thank you for choosing [facility]  for your care. Our goal is always to provide you with excellent care. Hearing back from our patients is one way we can continue to improve our services. Please take a few minutes to complete the written survey that  you may receive in the mail after your visit with us. Thank you!             Your Updated Medication List - Protect others around you: Learn how to safely use, store and throw away your medicines at www.disposemymeds.org.          This list is accurate as of 4/24/18  1:59 PM.  Always use your most recent med list.                   Brand Name Dispense Instructions for use Diagnosis    ALPRAZolam 0.5 MG tablet    XANAX     Take 1 tab up to 4x daily only if needed. Refill when due,no early refills  Indications: Feeling Anxious        amoxicillin-clavulanate 875-125 MG per tablet    AUGMENTIN    20 tablet    Take 1 tablet by mouth 2 times daily    Acute maxillary sinusitis, recurrence not specified       BENZONATATE PO           GABAPENTIN PO      Take 100 mg by mouth 3 times daily as needed (anxiety) 1-3 capsules        HYDROcodone-acetaminophen 5-325 MG per tablet    NORCO          lisinopril-hydrochlorothiazide 20-12.5 MG per tablet    PRINZIDE/ZESTORETIC     Take 2 tablets by mouth daily        metoprolol succinate 50 MG 24 hr tablet    TOPROL-XL     Take 50 mg by mouth daily        MIRTAZAPINE PO      Take 15 mg by mouth At Bedtime        senna 8.6 MG tablet    SENOKOT          simvastatin 20 MG tablet    ZOCOR     Take 20 mg by mouth At Bedtime        TEMAZEPAM PO      Take 15 mg by mouth nightly as needed for sleep        TYLENOL PO      Take 325 mg by mouth as needed for mild pain or fever        venlafaxine 150 MG Tb24 24 hr tablet    EFFEXOR-ER     Take 150 mg by mouth daily (with breakfast)

## 2018-04-24 NOTE — ED AVS SNAPSHOT
[facility]    [address] [place] [address]    Phone:  [phone]    Fax:  [phone]                                       Duane E Sogge   MRN: [id number]    Department:  [facility]   Date of Visit:  4/24/2018           Patient Information     Date Of Birth          1940        Your diagnoses for this visit were:     Staphylococcal arthritis of left knee (H)       Your next 10 appointments already scheduled     Apr 25, 2018  8:00 AM CDT   Level 2 with NL INFUSION CHAIR 1   [facility] ([facility])    [address] [place] [address]   [phone]            Apr 26, 2018  8:00 AM CDT   Level 2 with NL INFUSION CHAIR 2   [facility] ([facility])    [address] [place] [address]   [phone]            Apr 26, 2018 12:00 PM CDT   Ortho Treatment with Christina Mohr, PT   [facility] ([facility])    [address] [place] [address] [address] [place] [address]   [phone]            Apr 27, 2018  8:00 AM CDT   Level 2 with NL INFUSION CHAIR 1   [facility] ([facility])    [address] [place] [address]   [phone]            Apr 30, 2018  8:00 AM CDT   Level 2 with NL INFUSION CHAIR 5   [facility] ([facility])    [address] [place] [address]   [phone]            May 01, 2018  8:00 AM CDT   Level 2 with NL INFUSION CHAIR 1   [facility] ([facility])    [address] [place] [address]   [phone]            May 01, 2018 11:00 AM CDT   Ortho Treatment with Christina Mohr, PT   [facility] ([facility])    [address] [place] [address]   [phone]            May 02, 2018  8:00 AM CDT    Level 2 with NL INFUSION CHAIR 1   [facility] ([facility])    [address]   [place] [address]   [phone]            May 03, 2018 10:15 AM CDT   Ortho Treatment with Christina Mohr PT   [facility] ([facility])    [address]   [place] [address]   [phone]            May 09, 2018 10:20 AM CDT   PHYSICAL with Asa Oliver MD   [facility]    [address]  [place] [address]   [phone]              24 Hour Appointment Hotline       To make an appointment at any [facility], call [phone] ([phone]). If you don't have a family doctor or clinic, we will help you find one. [facility] clinics are conveniently located to serve the needs of you and your family.             Review of your medicines      Our records show that you are taking the medicines listed below. If these are incorrect, please call your family doctor or clinic.        Dose / Directions Last dose taken    ALPRAZolam 0.5 MG tablet   Commonly known as:  XANAX        Take 1 tab up to 4x daily only if needed. Refill when due,no early refills  Indications: Feeling Anxious   Refills:  0        amoxicillin-clavulanate 875-125 MG per tablet   Commonly known as:  AUGMENTIN   Dose:  1 tablet   Quantity:  20 tablet        Take 1 tablet by mouth 2 times daily   Refills:  0        BENZONATATE PO        Refills:  0        GABAPENTIN PO   Dose:  100 mg        Take 100 mg by mouth 3 times daily as needed (anxiety) 1-3 capsules   Refills:  0        HYDROcodone-acetaminophen 5-325 MG per tablet   Commonly known as:  NORCO        Refills:  0        lisinopril-hydrochlorothiazide 20-12.5 MG per tablet   Commonly known as:  PRINZIDE/ZESTORETIC   Dose:  2 tablet        Take 2 tablets by mouth daily   Refills:  0        metoprolol succinate 50 MG 24 hr tablet   Commonly known as:   "TOPROL-XL   Dose:  50 mg        Take 50 mg by mouth daily   Refills:  0        MIRTAZAPINE PO   Dose:  15 mg        Take 15 mg by mouth At Bedtime   Refills:  0        senna 8.6 MG tablet   Commonly known as:  SENOKOT        Refills:  0        simvastatin 20 MG tablet   Commonly known as:  ZOCOR   Dose:  20 mg        Take 20 mg by mouth At Bedtime   Refills:  0        TEMAZEPAM PO   Dose:  15 mg        Take 15 mg by mouth nightly as needed for sleep   Refills:  0        TYLENOL PO   Dose:  325 mg        Take 325 mg by mouth as needed for mild pain or fever   Refills:  0        venlafaxine 150 MG Tb24 24 hr tablet   Commonly known as:  EFFEXOR-ER   Dose:  150 mg        Take 150 mg by mouth daily (with breakfast)   Refills:  0                Orders Needing Specimen Collection     None      Pending Results     No orders found from 4/22/2018 to 4/25/2018.            Pending Culture Results     No orders found from 4/22/2018 to 4/25/2018.            Pending Results Instructions     If you had any lab results that were not finalized at the time of your Discharge, you can call the ED Lab Result RN at [phone]. You will be contacted by this team for any positive Lab results or changes in treatment. The nurses are available 7 days a week from 10A to 6:30P.  You can leave a message 24 hours per day and they will return your call.        Thank you for choosing [facility]       Thank you for choosing [facility] for your care. Our goal is always to provide you with excellent care. Hearing back from our patients is one way we can continue to improve our services. Please take a few minutes to complete the written survey that you may receive in the mail after you visit with us. Thank you!        Clever[employer] Information     CleverSet lets you send messages to your doctor, view your test results, renew your prescriptions, schedule appointments and more. To sign up, go to [email or web address] . Click on \"Log in\" on the left side of the " "screen, which will take you to the Welcome page. Then click on \"Sign up Now\" on the right side of the page.     You will be asked to enter the access code listed below, as well as some personal information. Please follow the directions to create your username and password.     Your access code is: [email or web address]  Expires: 2018  5:11 PM     Your access code will  in 90 days. If you need help or a new code, please call your [place] clinic or [phone].        Care EveryWhere ID     This is your Care EveryWhere ID. This could be used by other organizations to access your [place] medical records  [id number]        Equal Access to Services     STARR LEIVA : Joey Reno, stevie headley, sherry payne, brittny ying. So [facility] [phone].    ATENCIÓN: Si habla español, tiene a roth disposición servicios gratuitos de asistencia lingüística. Llame al [phone].    We comply with applicable federal civil rights laws and [place] laws. We do not discriminate on the basis of race, color, national origin, age, disability, sex, sexual orientation, or gender identity.            After Visit Summary       This is your record. Keep this with you and show to your community pharmacist(s) and doctor(s) at your next visit.                  "

## 2018-04-25 ENCOUNTER — INFUSION THERAPY VISIT (OUTPATIENT)
Dept: INFUSION THERAPY | Facility: CLINIC | Age: 78
End: 2018-04-25
Attending: FAMILY MEDICINE
Payer: MEDICARE

## 2018-04-25 ENCOUNTER — HOSPITAL ENCOUNTER (EMERGENCY)
Facility: CLINIC | Age: 78
Discharge: HOME | End: 2018-04-25
Admitting: FAMILY MEDICINE
Payer: MEDICARE

## 2018-04-25 VITALS
OXYGEN SATURATION: 97 % | RESPIRATION RATE: 18 BRPM | TEMPERATURE: 97.8 F | DIASTOLIC BLOOD PRESSURE: 72 MMHG | HEART RATE: 80 BPM | SYSTOLIC BLOOD PRESSURE: 142 MMHG

## 2018-04-25 VITALS
BODY MASS INDEX: 35.61 KG/M2 | WEIGHT: 235 LBS | DIASTOLIC BLOOD PRESSURE: 94 MMHG | SYSTOLIC BLOOD PRESSURE: 156 MMHG | HEIGHT: 68 IN | OXYGEN SATURATION: 97 % | TEMPERATURE: 98.3 F

## 2018-04-25 DIAGNOSIS — M00.062 STAPHYLOCOCCAL ARTHRITIS OF LEFT KNEE (H): Primary | ICD-10-CM

## 2018-04-25 DIAGNOSIS — M00.062 STAPHYLOCOCCAL ARTHRITIS OF LEFT KNEE (H): ICD-10-CM

## 2018-04-25 PROCEDURE — 40000268 ZZH STATISTIC NO CHARGES: Performed by: FAMILY MEDICINE

## 2018-04-25 PROCEDURE — 96366 THER/PROPH/DIAG IV INF ADDON: CPT | Performed by: FAMILY MEDICINE

## 2018-04-25 PROCEDURE — 25000128 H RX IP 250 OP 636: Performed by: FAMILY MEDICINE

## 2018-04-25 PROCEDURE — 96365 THER/PROPH/DIAG IV INF INIT: CPT | Performed by: FAMILY MEDICINE

## 2018-04-25 PROCEDURE — 96365 THER/PROPH/DIAG IV INF INIT: CPT

## 2018-04-25 RX ADMIN — VANCOMYCIN HYDROCHLORIDE 1500 MG: 1 INJECTION, POWDER, LYOPHILIZED, FOR SOLUTION INTRAVENOUS at 08:11

## 2018-04-25 RX ADMIN — SODIUM CHLORIDE 250 ML: 9 INJECTION, SOLUTION INTRAVENOUS at 20:16

## 2018-04-25 RX ADMIN — SODIUM CHLORIDE 250 ML: 9 INJECTION, SOLUTION INTRAVENOUS at 08:09

## 2018-04-25 RX ADMIN — VANCOMYCIN HYDROCHLORIDE 1500 MG: 1 INJECTION, POWDER, LYOPHILIZED, FOR SOLUTION INTRAVENOUS at 20:15

## 2018-04-25 ASSESSMENT — PAIN SCALES - GENERAL: PAINLEVEL: NO PAIN (0)

## 2018-04-25 NOTE — MR AVS SNAPSHOT
After Visit Summary   4/25/2018    Duane E Sogge    MRN: [id number]           Patient Information     Date Of Birth          1940        Visit Information        Provider Department      4/25/2018 8:00 AM NL INFUSION CHAIR 1 [facility]        Today's Diagnoses     Staphylococcal arthritis of left knee (H)    -  1      Care Instructions    Pt to return on 4/26/18 for vanco. Copies of medication list and upcoming appointments given prior to discharge.           Follow-ups after your visit        Your next 10 appointments already scheduled     Apr 26, 2018  8:00 AM CDT   Level 2 with NL INFUSION CHAIR 2   [facility] ([facility])    [address] [place] [address]   [phone]            Apr 26, 2018 12:00 PM CDT   Ortho Treatment with Christina Mohr, PT   [facility] ([facility])    [address] [place] [address] [address] [place] [address]   [phone]            Apr 27, 2018  8:00 AM CDT   Level 2 with NL INFUSION CHAIR 1   [facility] ([facility])    [address] [place] [address]   [phone]            Apr 30, 2018  8:00 AM CDT   Level 2 with NL INFUSION CHAIR 5   [facility] ([facility])    [address] [place] [address]   [phone]            May 01, 2018  8:00 AM CDT   Level 2 with NL INFUSION CHAIR 1   [facility] ([facility])    [address] [place] [address]   [phone]            May 01, 2018 11:00 AM CDT   Ortho Treatment with Christina Mohr, PT   [facility] ([facility])    [address] [place] [address] [address] [place] [address]   [phone]            May 02, 2018  8:00 AM CDT   Level 2 with NL INFUSION CHAIR 1   [facility] ([facility])    [address]   [place] [address]   [phone]            May 03, 2018 10:15 AM CDT   Ortho Treatment with Christina Mohr PT   [facility] ([facility])    [address] [place] [address]   [phone]            May 09, 2018 10:20 AM CDT   PHYSICAL with Asa Oliver MD   [facility]    [address] [place] [address]   [phone]            May 09, 2018 11:00 AM CDT   Ortho Treatment with Christina Mohr PT   [facility] ([facility])    [address] [place] [address]   [phone]              Who to contact     If you have questions or need follow up information about today's clinic visit or your schedule please contact [facility] directly at [phone].  Normal or non-critical lab and imaging results will be communicated to you by Gold Lassohart, letter or phone within 4 business days after the clinic has received the results. If you do not hear from us within 7 days, please contact the clinic through Digitickt or phone. If you have a critical or abnormal lab result, we will notify you by phone as soon as possible.  Submit refill requests through 3DMGAME or call your pharmacy and they will forward the refill request to us. Please allow 3 business days for your refill to be completed.          Additional Information About Your Visit        3DMGAME Information     3DMGAME lets you send messages to your doctor, view your test results, renew your prescriptions, schedule appointments and more. To sign up, go to [email or web address] . Click on \"Log in\" on the left side of the screen, which will take you to the Welcome page. Then click on \"Sign up Now\" on the right side of the page.     You will be asked to enter the access code listed below, as well as some personal information. Please follow the directions to create " your username and password.     Your access code is: [email or web address]  Expires: 2018  5:11 PM     Your access code will  in 90 days. If you need help or a new code, please call your [place] clinic or [phone].        Care EveryWhere ID     This is your Care EveryWhere ID. This could be used by other organizations to access your [place] medical records  [id number]        Your Vitals Were     Pulse Temperature Respirations Pulse Oximetry          80 97.8  F (36.6  C) (Temporal) 18 97%         Blood Pressure from Last 3 Encounters:   18 142/72   18 162/90   18 142/73    Weight from Last 3 Encounters:   18 110.7 kg (244 lb)   18 111.1 kg (245 lb)   18 106.6 kg (235 lb)              Today, you had the following     No orders found for display       Primary Care Provider Office Phone # Fax #    Saqib Chavarria [phone]       [facility] [address]  [facility] [address]        Equal Access to Services     [facility] SanatoriumSTEFFANY : Hadii aad ku hadasho Soomaali, waaxda luqadaha, qaybta kaalmada adeegyada, waxay froylanin hayfaizan aderachelle sanderson . So [facility] [phone].    ATENCIÓN: Si habla español, tiene a roth disposición servicios gratuitos de asistencia lingüística. [facility] [phone].    We comply with applicable federal civil rights laws and [place] laws. We do not discriminate on the basis of race, color, national origin, age, disability, sex, sexual orientation, or gender identity.            Thank you!     Thank you for choosing [facility]  for your care. Our goal is always to provide you with excellent care. Hearing back from our patients is one way we can continue to improve our services. Please take a few minutes to complete the written survey that you may receive in the mail after your visit with us. Thank you!             Your Updated Medication List - Protect others around you: Learn how to  safely use, store and throw away your medicines at www.disposemymeds.org.          This list is accurate as of 4/25/18 11:22 AM.  Always use your most recent med list.                   Brand Name Dispense Instructions for use Diagnosis    ALPRAZolam 0.5 MG tablet    XANAX     Take 1 tab up to 4x daily only if needed. Refill when due,no early refills  Indications: Feeling Anxious        amoxicillin-clavulanate 875-125 MG per tablet    AUGMENTIN    20 tablet    Take 1 tablet by mouth 2 times daily    Acute maxillary sinusitis, recurrence not specified       BENZONATATE PO           GABAPENTIN PO      Take 100 mg by mouth 3 times daily as needed (anxiety) 1-3 capsules        HYDROcodone-acetaminophen 5-325 MG per tablet    NORCO          lisinopril-hydrochlorothiazide 20-12.5 MG per tablet    PRINZIDE/ZESTORETIC     Take 2 tablets by mouth daily        metoprolol succinate 50 MG 24 hr tablet    TOPROL-XL     Take 50 mg by mouth daily        MIRTAZAPINE PO      Take 15 mg by mouth At Bedtime        senna 8.6 MG tablet    SENOKOT          simvastatin 20 MG tablet    ZOCOR     Take 20 mg by mouth At Bedtime        TEMAZEPAM PO      Take 15 mg by mouth nightly as needed for sleep        TYLENOL PO      Take 325 mg by mouth as needed for mild pain or fever        venlafaxine 150 MG Tb24 24 hr tablet    EFFEXOR-ER     Take 150 mg by mouth daily (with breakfast)

## 2018-04-25 NOTE — PROGRESS NOTES
Patient here for vancomycin infusion and tolerated well. Rested quietly in recliner. VSS. Discharged in stable condition.

## 2018-04-25 NOTE — PATIENT INSTRUCTIONS
Pt to return on 4/26/18 for vanco. Copies of medication list and upcoming appointments given prior to discharge.

## 2018-04-26 ENCOUNTER — HOSPITAL ENCOUNTER (OUTPATIENT)
Dept: PHYSICAL THERAPY | Facility: CLINIC | Age: 78
Setting detail: THERAPIES SERIES
End: 2018-04-26
Attending: ORTHOPAEDIC SURGERY
Payer: MEDICARE

## 2018-04-26 ENCOUNTER — INFUSION THERAPY VISIT (OUTPATIENT)
Dept: INFUSION THERAPY | Facility: CLINIC | Age: 78
End: 2018-04-26
Attending: FAMILY MEDICINE
Payer: MEDICARE

## 2018-04-26 ENCOUNTER — HOSPITAL ENCOUNTER (EMERGENCY)
Facility: CLINIC | Age: 78
Discharge: HOME | End: 2018-04-26
Admitting: FAMILY MEDICINE
Payer: MEDICARE

## 2018-04-26 VITALS
TEMPERATURE: 98.2 F | RESPIRATION RATE: 20 BRPM | SYSTOLIC BLOOD PRESSURE: 172 MMHG | HEART RATE: 84 BPM | OXYGEN SATURATION: 97 % | DIASTOLIC BLOOD PRESSURE: 89 MMHG

## 2018-04-26 VITALS
DIASTOLIC BLOOD PRESSURE: 82 MMHG | HEART RATE: 51 BPM | TEMPERATURE: 97.2 F | SYSTOLIC BLOOD PRESSURE: 144 MMHG | RESPIRATION RATE: 16 BRPM | OXYGEN SATURATION: 95 %

## 2018-04-26 DIAGNOSIS — M00.062 STAPHYLOCOCCAL ARTHRITIS OF LEFT KNEE (H): Primary | ICD-10-CM

## 2018-04-26 DIAGNOSIS — M00.062 STAPHYLOCOCCAL ARTHRITIS OF LEFT KNEE (H): ICD-10-CM

## 2018-04-26 LAB
CREAT SERPL-MCNC: 0.89 MG/DL (ref 0.66–1.25)
GFR SERPL CREATININE-BSD FRML MDRD: 82 ML/MIN/1.7M2
VANCOMYCIN SERPL-MCNC: 18.8 MG/L

## 2018-04-26 PROCEDURE — 25000128 H RX IP 250 OP 636: Performed by: FAMILY MEDICINE

## 2018-04-26 PROCEDURE — 97110 THERAPEUTIC EXERCISES: CPT | Mod: GP

## 2018-04-26 PROCEDURE — 96365 THER/PROPH/DIAG IV INF INIT: CPT | Performed by: FAMILY MEDICINE

## 2018-04-26 PROCEDURE — 97112 NEUROMUSCULAR REEDUCATION: CPT | Mod: GP

## 2018-04-26 PROCEDURE — 96365 THER/PROPH/DIAG IV INF INIT: CPT

## 2018-04-26 PROCEDURE — 40000268 ZZH STATISTIC NO CHARGES: Performed by: FAMILY MEDICINE

## 2018-04-26 PROCEDURE — 82565 ASSAY OF CREATININE: CPT | Performed by: FAMILY MEDICINE

## 2018-04-26 PROCEDURE — 97140 MANUAL THERAPY 1/> REGIONS: CPT | Mod: GP

## 2018-04-26 PROCEDURE — 96366 THER/PROPH/DIAG IV INF ADDON: CPT

## 2018-04-26 PROCEDURE — 40000718 ZZHC STATISTIC PT DEPARTMENT ORTHO VISIT

## 2018-04-26 PROCEDURE — 80202 ASSAY OF VANCOMYCIN: CPT | Performed by: FAMILY MEDICINE

## 2018-04-26 RX ADMIN — SODIUM CHLORIDE 250 ML: 9 INJECTION, SOLUTION INTRAVENOUS at 08:25

## 2018-04-26 RX ADMIN — SODIUM CHLORIDE 250 ML: 9 INJECTION, SOLUTION INTRAVENOUS at 20:19

## 2018-04-26 RX ADMIN — VANCOMYCIN HYDROCHLORIDE 1500 MG: 10 INJECTION, POWDER, LYOPHILIZED, FOR SOLUTION INTRAVENOUS at 08:26

## 2018-04-26 RX ADMIN — VANCOMYCIN HYDROCHLORIDE 1500 MG: 1 INJECTION, POWDER, LYOPHILIZED, FOR SOLUTION INTRAVENOUS at 20:21

## 2018-04-26 ASSESSMENT — PAIN SCALES - GENERAL: PAINLEVEL: NO PAIN (0)

## 2018-04-26 NOTE — MR AVS SNAPSHOT
After Visit Summary   4/26/2018    Duane E Sogge    MRN: [id number]           Patient Information     Date Of Birth          1940        Visit Information        Provider Department      4/26/2018 8:00 AM NL INFUSION CHAIR 2 [facility]fusion [facility]        Today's Diagnoses     Staphylococcal arthritis of left knee (H)    -  1      Care Instructions    Pt to return to ED tonight and to Infusion tomorrow for continued Vancomycin dosing.           Follow-ups after your visit        Follow-up notes from your care team     Return in about 1 day (around 4/27/2018).      Your next 10 appointments already scheduled     Apr 27, 2018  8:00 AM CDT   Level 2 with NL INFUSION CHAIR 1   [facility] Infusion [facility] ([facility])    [address] [place] [address]   [phone]            Apr 30, 2018  8:00 AM CDT   Level 2 with NL INFUSION CHAIR 5   [facility]fusion [facility] ([facility])    [address] [place] [address]   [phone]            May 01, 2018  8:00 AM CDT   Level 2 with NL INFUSION CHAIR 1   [facility]fusion [facility] ([facility])    [address] [place] [address] [address] [place] [address]   [phone]            May 01, 2018 11:00 AM CDT   Ortho Treatment with Christina Mohr, PT   [facility] Physical Therapy ([facility])    [address] [place] [address] [address] [place] [address]   [phone]            May 02, 2018  8:00 AM CDT   Level 2 with NL INFUSION CHAIR 1   [facility] ([facility])    [address] [place] [address]   [phone]            May 03, 2018 10:15 AM CDT   Ortho Treatment with Christina Mohr, PT   [facility] Physical Therapy ([facility])    [address] [place] [address]   [phone]            May 09, 2018 10:20 AM CDT   PHYSICAL with Asa Oliver MD   [place]  "[facility]    [address]  [place] [address]   [phone]            May 09, 2018 11:00 AM CDT   Ortho Treatment with Chrisitna Luna Onur, PT   [facility] Physical Therapy ([facility])    [address] [place] [address] [address] [place] [address]   [phone]            May 11, 2018 10:15 AM CDT   Ortho Treatment with Christina Luna Onur, PT   [facility] Physical Therapy ([facility])    [facility]   [place] [address]   [phone]            May 15, 2018  2:30 PM CDT   Ortho Treatment with Christina Murphyiggs, PT   [facility] Physical Therapy ([facility])    [facility] [address] [place] [address]   [phone]              Who to contact     If you have questions or need follow up information about today's clinic visit or your schedule please contact [facility] INFUSION SERVICES directly at [phone].  Normal or non-critical lab and imaging results will be communicated to you by [employer], letter or phone within 4 business days after the clinic has received the results. If you do not hear from us within 7 days, please contact the clinic through [email or web address] or phone. If you have a critical or abnormal lab result, we will notify you by phone as soon as possible.  Submit refill requests through [employer] or call your pharmacy and they will forward the refill request to us. Please allow 3 business days for your refill to be completed.          Additional Information About Your Visit        Redbooth Information     [employer]booth lets you send messages to your doctor, view your test results, renew your prescriptions, schedule appointments and more. To sign up, go to [email or web address] . Click on \"Log in\" on the left side of the screen, which will take you to the Welcome page. Then click on \"Sign up Now\" on the right side of the page.     You will be asked to enter the access code listed below, as well as some " personal information. Please follow the directions to create your username and password.     Your access code is: [email or web address]  Expires: 2018  5:11 PM     Your access code will  in 90 days. If you need help or a new code, please call your [place] clinic or [phone].        Care EveryWhere ID     This is your Care EveryWhere ID. This could be used by other organizations to access your [place] medical records  [id number]        Your Vitals Were     Pulse Temperature Respirations Pulse Oximetry          51 97.2  F (36.2  C) (Temporal) 16 95%         Blood Pressure from Last 3 Encounters:   18 144/82   18 (!) 156/94   18 142/72    Weight from Last 3 Encounters:   18 106.6 kg (235 lb)   18 110.7 kg (244 lb)   18 111.1 kg (245 lb)              We Performed the Following     Creatinine     Vancomycin level        Primary Care Provider Office Phone # Fax #    Saqib Chavarria [phone]       [facility] [address]  [facility] [address]        Equal Access to Services     MATT LEIVA : Hadii aad ku hadasho Soomaali, waaxda luqadaha, qaybta kaalmada adeegyada, brittny biggsn lalit sanderson . So [facility] [phone].    ATENCIÓN: Si habla español, tiene a roth disposición servicios gratuitos de asistencia lingüística. Llame al [phone].    We comply with applicable federal civil rights laws and [place] laws. We do not discriminate on the basis of race, color, national origin, age, disability, sex, sexual orientation, or gender identity.            Thank you!     Thank you for choosing [facility]  for your care. Our goal is always to provide you with excellent care. Hearing back from our patients is one way we can continue to improve our services. Please take a few minutes to complete the written survey that you may receive in the mail after your visit with us. Thank you!             Your  Updated Medication List - Protect others around you: Learn how to safely use, store and throw away your medicines at www.disposemymeds.org.          This list is accurate as of 4/26/18 12:19 PM.  Always use your most recent med list.                   Brand Name Dispense Instructions for use Diagnosis    ALPRAZolam 0.5 MG tablet    XANAX     Take 1 tab up to 4x daily only if needed. Refill when due,no early refills  Indications: Feeling Anxious        amoxicillin-clavulanate 875-125 MG per tablet    AUGMENTIN    20 tablet    Take 1 tablet by mouth 2 times daily    Acute maxillary sinusitis, recurrence not specified       BENZONATATE PO           GABAPENTIN PO      Take 100 mg by mouth 3 times daily as needed (anxiety) 1-3 capsules        HYDROcodone-acetaminophen 5-325 MG per tablet    NORCO          lisinopril-hydrochlorothiazide 20-12.5 MG per tablet    PRINZIDE/ZESTORETIC     Take 2 tablets by mouth daily        metoprolol succinate 50 MG 24 hr tablet    TOPROL-XL     Take 50 mg by mouth daily        MIRTAZAPINE PO      Take 15 mg by mouth At Bedtime        senna 8.6 MG tablet    SENOKOT          simvastatin 20 MG tablet    ZOCOR     Take 20 mg by mouth At Bedtime        TEMAZEPAM PO      Take 15 mg by mouth nightly as needed for sleep        TYLENOL PO      Take 325 mg by mouth as needed for mild pain or fever        venlafaxine 150 MG Tb24 24 hr tablet    EFFEXOR-ER     Take 150 mg by mouth daily (with breakfast)

## 2018-04-26 NOTE — PROGRESS NOTES
Pt here for IV Vancomycin.  Labs drawn today, Vanco level = 18.8,  Creat level = .89. Tolerated Vancomycin well and was discharged in stable condition.

## 2018-04-27 ENCOUNTER — INFUSION THERAPY VISIT (OUTPATIENT)
Dept: INFUSION THERAPY | Facility: CLINIC | Age: 78
End: 2018-04-27
Attending: FAMILY MEDICINE
Payer: MEDICARE

## 2018-04-27 ENCOUNTER — HOSPITAL ENCOUNTER (EMERGENCY)
Facility: CLINIC | Age: 78
Discharge: HOME | End: 2018-04-27
Admitting: NURSE PRACTITIONER
Payer: MEDICARE

## 2018-04-27 VITALS
RESPIRATION RATE: 22 BRPM | HEART RATE: 77 BPM | DIASTOLIC BLOOD PRESSURE: 79 MMHG | SYSTOLIC BLOOD PRESSURE: 159 MMHG | OXYGEN SATURATION: 97 % | TEMPERATURE: 96.6 F

## 2018-04-27 VITALS
HEART RATE: 50 BPM | SYSTOLIC BLOOD PRESSURE: 166 MMHG | TEMPERATURE: 97.2 F | OXYGEN SATURATION: 96 % | DIASTOLIC BLOOD PRESSURE: 90 MMHG | RESPIRATION RATE: 16 BRPM

## 2018-04-27 DIAGNOSIS — M00.062 STAPHYLOCOCCAL ARTHRITIS OF LEFT KNEE (H): ICD-10-CM

## 2018-04-27 DIAGNOSIS — M00.062 STAPHYLOCOCCAL ARTHRITIS OF LEFT KNEE (H): Primary | ICD-10-CM

## 2018-04-27 LAB
CREAT SERPL-MCNC: 0.87 MG/DL (ref 0.66–1.25)
CRP SERPL-MCNC: 11.8 MG/L (ref 0–8)
GFR SERPL CREATININE-BSD FRML MDRD: 84 ML/MIN/1.7M2
VANCOMYCIN SERPL-MCNC: 20.9 MG/L

## 2018-04-27 PROCEDURE — 25000128 H RX IP 250 OP 636: Performed by: FAMILY MEDICINE

## 2018-04-27 PROCEDURE — 40000268 ZZH STATISTIC NO CHARGES: Performed by: NURSE PRACTITIONER

## 2018-04-27 PROCEDURE — 96366 THER/PROPH/DIAG IV INF ADDON: CPT

## 2018-04-27 PROCEDURE — 25000128 H RX IP 250 OP 636: Performed by: NURSE PRACTITIONER

## 2018-04-27 PROCEDURE — 82565 ASSAY OF CREATININE: CPT | Performed by: FAMILY MEDICINE

## 2018-04-27 PROCEDURE — 96374 THER/PROPH/DIAG INJ IV PUSH: CPT | Performed by: NURSE PRACTITIONER

## 2018-04-27 PROCEDURE — 96365 THER/PROPH/DIAG IV INF INIT: CPT

## 2018-04-27 PROCEDURE — 86140 C-REACTIVE PROTEIN: CPT | Performed by: FAMILY MEDICINE

## 2018-04-27 PROCEDURE — 80202 ASSAY OF VANCOMYCIN: CPT | Performed by: FAMILY MEDICINE

## 2018-04-27 RX ORDER — CEFAZOLIN SODIUM 1 G/50ML
1250 SOLUTION INTRAVENOUS EVERY 12 HOURS
Status: DISCONTINUED | OUTPATIENT
Start: 2018-04-27 | End: 2018-04-28 | Stop reason: HOSPADM

## 2018-04-27 RX ADMIN — SODIUM CHLORIDE 250 ML: 9 INJECTION, SOLUTION INTRAVENOUS at 09:00

## 2018-04-27 RX ADMIN — VANCOMYCIN HYDROCHLORIDE 1500 MG: 10 INJECTION, POWDER, LYOPHILIZED, FOR SOLUTION INTRAVENOUS at 09:24

## 2018-04-27 RX ADMIN — VANCOMYCIN HYDROCHLORIDE 1250 MG: 1 INJECTION, POWDER, LYOPHILIZED, FOR SOLUTION INTRAVENOUS at 20:51

## 2018-04-27 RX ADMIN — SODIUM CHLORIDE 250 ML: 9 INJECTION, SOLUTION INTRAVENOUS at 20:51

## 2018-04-27 ASSESSMENT — PAIN SCALES - GENERAL: PAINLEVEL: NO PAIN (0)

## 2018-04-27 NOTE — PROGRESS NOTES
Patient here today for Vanco infusion and labs. Vanco-20.9, CRP-11.8 and Creat-0.87, faxed to [facility]. Pharm informed of Vanco level and no change to dose today, will asad next week. If still elevated then will adjust dose then. PICC line patent with blood return pre and post infusion. Discharged to home in stable condition.

## 2018-04-27 NOTE — MR AVS SNAPSHOT
After Visit Summary   4/27/2018    Duane E Sogge    MRN: [id number]           Patient Information     Date Of Birth          1940        Visit Information        Provider Department      4/27/2018 8:00 AM NL INFUSION CHAIR 1 [facility]fusion [facility]        Today's Diagnoses     Staphylococcal arthritis of left knee (H)    -  1      Care Instructions    Pt to return tonight to the ED for Vanco. Copies of medication list and upcoming appointments given prior to discharge.             Follow-ups after your visit        Your next 10 appointments already scheduled     Apr 30, 2018  8:00 AM CDT   Level 2 with NL INFUSION CHAIR 5   [facility] Infusion [facility] ([facility])    [address] [place] [address] [address] [place] [address]   [phone]            May 01, 2018  8:00 AM CDT   Level 2 with NL INFUSION CHAIR 1   [facility] ([facility])    [address] [place] [address] [address] [place] [address]   [phone]            May 01, 2018 11:00 AM CDT   Ortho Treatment with Christina Mohr, PT   [facility] Physical Therapy ([facility])    [address] [place] [address] [address] [place] [address]   [phone]            May 02, 2018  8:00 AM CDT   Level 2 with NL INFUSION CHAIR 1   [facility] ([facility])    [address] [place] [address] [address] [place] [address]   [phone]            May 03, 2018 10:15 AM CDT   Ortho Treatment with Christina Mohr PT   [facility] ([facility])    [address] [place] [address] [address] [place] [address]   [phone]            May 09, 2018 10:20 AM CDT   PHYSICAL with Asa Oliver MD   [facility] ([facility])    [address]  [place] [address]   [phone]            May 09, 2018 11:00 AM CDT   Ortho Treatment with Christina Mohr PT   [facility] ([facility]  "[facility]    [address] [place] [address] [address] [place] [address]   [phone]            May 11, 2018 10:15 AM CDT   Ortho Treatment with Christina Luna Onur, PT   [facility] Physical Therapy ([facility])    [address] [place] [address]   [phone]            May 15, 2018  2:30 PM CDT   Ortho Treatment with HaileyCheryl Mohr, PT   [facility] Physical Therapy ([facility])    [address] [place] [address]   [phone]            May 17, 2018  3:30 PM CDT   Ortho Treatment with Christina Luna Onur, PT   [facility] Physical Therapy ([facility])    [address] [place] [address]   [phone]              Who to contact     If you have questions or need follow up information about today's clinic visit or your schedule please contact [facility] INFUSION SERVICES directly at [phone].  Normal or non-critical lab and imaging results will be communicated to you by [employer], letter or phone within 4 business days after the clinic has received the results. If you do not hear from us within 7 days, please contact the clinic through [employer] or phone. If you have a critical or abnormal lab result, we will notify you by phone as soon as possible.  Submit refill requests through [employer] or call your pharmacy and they will forward the refill request to us. Please allow 3 business days for your refill to be completed.          Additional Information About Your Visit        [employer] Information     [employer] lets you send messages to your doctor, view your test results, renew your prescriptions, schedule appointments and more. To sign up, go to [email or web address] . Click on \"Log in\" on the left side of the screen, which will take you to the Welcome page. Then click on \"Sign up Now\" on the right side of the page.     You will be asked to enter the access code listed below, as well as some personal information. Please follow the " directions to create your username and password.     Your access code is: [email or web address]  Expires: 2018  5:11 PM     Your access code will  in 90 days. If you need help or a new code, please call your [place] clinic or [phone].        Care EveryWhere ID     This is your Care EveryWhere ID. This could be used by other organizations to access your [place] medical records  [id number]        Your Vitals Were     Pulse Temperature Respirations Pulse Oximetry          50 97.2  F (36.2  C) (Temporal) 16 96%         Blood Pressure from Last 3 Encounters:   18 166/90   18 172/89   18 144/82    Weight from Last 3 Encounters:   18 106.6 kg (235 lb)   18 110.7 kg (244 lb)   18 111.1 kg (245 lb)              We Performed the Following     Creatinine     CRP inflammation     Vancomycin level        Primary Care Provider Office Phone # Fax #    Saqib Chavarria [phone]       [facility] [address] [facility] [address]  [facility] [address]        Equal Access to Services     [facility] Hospital[facility]FF[facility] : Hadii aad ku hadasho Soomaali, waaxda luqadaha, qaybta kaalmada adeegyada, waxay froylanin hayfaizan lalit sanderson . So [facility] [phone].    ATENCIÓN: Si habla español, tiene a roth disposición servicios gratuitos de asistencia lingüística. [facility] [phone].    We comply with applicable federal civil rights laws and [place] laws. We do not discriminate on the basis of race, color, national origin, age, disability, sex, sexual orientation, or gender identity.            Thank you!     Thank you for choosing [facility] INFUSION SERVICES  for your care. Our goal is always to provide you with excellent care. Hearing back from our patients is one way we can continue to improve our services. Please take a few minutes to complete the written survey that you may receive in the mail after your visit with us. Thank you!             Your Updated Medication List -  Protect others around you: Learn how to safely use, store and throw away your medicines at www.disposemymeds.org.          This list is accurate as of 4/27/18 11:16 AM.  Always use your most recent med list.                   Brand Name Dispense Instructions for use Diagnosis    ALPRAZolam 0.5 MG tablet    XANAX     Take 1 tab up to 4x daily only if needed. Refill when due,no early refills  Indications: Feeling Anxious        amoxicillin-clavulanate 875-125 MG per tablet    AUGMENTIN    20 tablet    Take 1 tablet by mouth 2 times daily    Acute maxillary sinusitis, recurrence not specified       BENZONATATE PO           GABAPENTIN PO      Take 100 mg by mouth 3 times daily as needed (anxiety) 1-3 capsules        HYDROcodone-acetaminophen 5-325 MG per tablet    NORCO          lisinopril-hydrochlorothiazide 20-12.5 MG per tablet    PRINZIDE/ZESTORETIC     Take 2 tablets by mouth daily        metoprolol succinate 50 MG 24 hr tablet    TOPROL-XL     Take 50 mg by mouth daily        MIRTAZAPINE PO      Take 15 mg by mouth At Bedtime        senna 8.6 MG tablet    SENOKOT          simvastatin 20 MG tablet    ZOCOR     Take 20 mg by mouth At Bedtime        TEMAZEPAM PO      Take 15 mg by mouth nightly as needed for sleep        TYLENOL PO      Take 325 mg by mouth as needed for mild pain or fever        venlafaxine 150 MG Tb24 24 hr tablet    EFFEXOR-ER     Take 150 mg by mouth daily (with breakfast)

## 2018-04-28 ENCOUNTER — HOSPITAL ENCOUNTER (EMERGENCY)
Facility: CLINIC | Age: 78
Discharge: HOME | End: 2018-04-28
Admitting: FAMILY MEDICINE
Payer: MEDICARE

## 2018-04-28 VITALS
DIASTOLIC BLOOD PRESSURE: 85 MMHG | TEMPERATURE: 97.5 F | OXYGEN SATURATION: 95 % | RESPIRATION RATE: 20 BRPM | SYSTOLIC BLOOD PRESSURE: 163 MMHG

## 2018-04-28 VITALS
RESPIRATION RATE: 18 BRPM | OXYGEN SATURATION: 98 % | SYSTOLIC BLOOD PRESSURE: 160 MMHG | HEART RATE: 51 BPM | HEIGHT: 68 IN | DIASTOLIC BLOOD PRESSURE: 95 MMHG | WEIGHT: 243 LBS | BODY MASS INDEX: 36.83 KG/M2 | TEMPERATURE: 97.8 F

## 2018-04-28 DIAGNOSIS — M00.062 STAPHYLOCOCCAL ARTHRITIS OF LEFT KNEE (H): ICD-10-CM

## 2018-04-28 PROCEDURE — 25000128 H RX IP 250 OP 636: Performed by: FAMILY MEDICINE

## 2018-04-28 PROCEDURE — 96366 THER/PROPH/DIAG IV INF ADDON: CPT

## 2018-04-28 PROCEDURE — 40000268 ZZH STATISTIC NO CHARGES

## 2018-04-28 PROCEDURE — 96365 THER/PROPH/DIAG IV INF INIT: CPT

## 2018-04-28 RX ORDER — CEFAZOLIN SODIUM 1 G/50ML
1250 SOLUTION INTRAVENOUS EVERY 12 HOURS
Status: DISCONTINUED | OUTPATIENT
Start: 2018-04-28 | End: 2018-04-29 | Stop reason: HOSPADM

## 2018-04-28 RX ORDER — CEFAZOLIN SODIUM 1 G/50ML
1250 SOLUTION INTRAVENOUS EVERY 12 HOURS
Status: DISCONTINUED | OUTPATIENT
Start: 2018-04-28 | End: 2018-04-28 | Stop reason: HOSPADM

## 2018-04-28 RX ORDER — CEFAZOLIN SODIUM 1 G/50ML
1250 SOLUTION INTRAVENOUS EVERY 12 HOURS
Status: CANCELLED | OUTPATIENT
Start: 2018-04-28

## 2018-04-28 RX ADMIN — SODIUM CHLORIDE 250 ML: 9 INJECTION, SOLUTION INTRAVENOUS at 09:41

## 2018-04-28 RX ADMIN — VANCOMYCIN HYDROCHLORIDE 1250 MG: 1 INJECTION, POWDER, LYOPHILIZED, FOR SOLUTION INTRAVENOUS at 09:43

## 2018-04-28 RX ADMIN — SODIUM CHLORIDE 250 ML: 9 INJECTION, SOLUTION INTRAVENOUS at 20:41

## 2018-04-28 RX ADMIN — VANCOMYCIN HYDROCHLORIDE 1250 MG: 1 INJECTION, POWDER, LYOPHILIZED, FOR SOLUTION INTRAVENOUS at 20:40

## 2018-04-28 NOTE — ED NOTES
Accessed PICC line.  Antibiotics started.  Vanco to be administered over 90 mins per pharmacy.  Pt and wife disappointed that the time for administration isn't less.

## 2018-04-29 ENCOUNTER — HOSPITAL ENCOUNTER (EMERGENCY)
Facility: CLINIC | Age: 78
Discharge: HOME | End: 2018-04-29
Admitting: FAMILY MEDICINE
Payer: MEDICARE

## 2018-04-29 VITALS
HEART RATE: 67 BPM | DIASTOLIC BLOOD PRESSURE: 90 MMHG | RESPIRATION RATE: 18 BRPM | TEMPERATURE: 98.6 F | OXYGEN SATURATION: 98 % | SYSTOLIC BLOOD PRESSURE: 147 MMHG

## 2018-04-29 VITALS
OXYGEN SATURATION: 99 % | WEIGHT: 239 LBS | TEMPERATURE: 98.2 F | RESPIRATION RATE: 18 BRPM | DIASTOLIC BLOOD PRESSURE: 91 MMHG | BODY MASS INDEX: 36.34 KG/M2 | HEART RATE: 63 BPM | SYSTOLIC BLOOD PRESSURE: 151 MMHG

## 2018-04-29 DIAGNOSIS — M00.062 STAPHYLOCOCCAL ARTHRITIS OF LEFT KNEE (H): ICD-10-CM

## 2018-04-29 PROCEDURE — 96366 THER/PROPH/DIAG IV INF ADDON: CPT | Performed by: FAMILY MEDICINE

## 2018-04-29 PROCEDURE — 25000128 H RX IP 250 OP 636: Performed by: PHYSICIAN ASSISTANT

## 2018-04-29 PROCEDURE — 96365 THER/PROPH/DIAG IV INF INIT: CPT | Performed by: FAMILY MEDICINE

## 2018-04-29 PROCEDURE — 25000128 H RX IP 250 OP 636: Performed by: FAMILY MEDICINE

## 2018-04-29 PROCEDURE — 40000268 ZZH STATISTIC NO CHARGES: Performed by: FAMILY MEDICINE

## 2018-04-29 RX ORDER — CEFAZOLIN SODIUM 1 G/50ML
1250 SOLUTION INTRAVENOUS EVERY 12 HOURS
Status: DISCONTINUED | OUTPATIENT
Start: 2018-04-29 | End: 2018-04-29 | Stop reason: HOSPADM

## 2018-04-29 RX ORDER — CEFAZOLIN SODIUM 1 G/50ML
1250 SOLUTION INTRAVENOUS EVERY 12 HOURS
Status: DISCONTINUED | OUTPATIENT
Start: 2018-04-29 | End: 2018-04-30 | Stop reason: HOSPADM

## 2018-04-29 RX ADMIN — SODIUM CHLORIDE 250 ML: 9 INJECTION, SOLUTION INTRAVENOUS at 08:56

## 2018-04-29 RX ADMIN — VANCOMYCIN HYDROCHLORIDE 1250 MG: 1 INJECTION, POWDER, LYOPHILIZED, FOR SOLUTION INTRAVENOUS at 08:56

## 2018-04-29 RX ADMIN — SODIUM CHLORIDE, PRESERVATIVE FREE 250 ML: 5 INJECTION INTRAVENOUS at 20:25

## 2018-04-29 RX ADMIN — VANCOMYCIN HYDROCHLORIDE 1250 MG: 1 INJECTION, POWDER, LYOPHILIZED, FOR SOLUTION INTRAVENOUS at 20:24

## 2018-04-29 NOTE — ED NOTES
PICC STAT/nurse called r/t flow issues. Pump has been alarming no problems with arm extended.  No inflammation/pain.    Ilan RN on til 5pm and dory DICKSON is on [facility].  Change dressing and extend catheter if cont.

## 2018-04-30 ENCOUNTER — INFUSION THERAPY VISIT (OUTPATIENT)
Dept: INFUSION THERAPY | Facility: CLINIC | Age: 78
End: 2018-04-30
Attending: FAMILY MEDICINE
Payer: MEDICARE

## 2018-04-30 ENCOUNTER — HOSPITAL ENCOUNTER (EMERGENCY)
Facility: CLINIC | Age: 78
Discharge: HOME | End: 2018-04-30
Admitting: FAMILY MEDICINE
Payer: MEDICARE

## 2018-04-30 VITALS — DIASTOLIC BLOOD PRESSURE: 68 MMHG | SYSTOLIC BLOOD PRESSURE: 142 MMHG | RESPIRATION RATE: 18 BRPM | TEMPERATURE: 98.4 F

## 2018-04-30 VITALS
SYSTOLIC BLOOD PRESSURE: 162 MMHG | OXYGEN SATURATION: 98 % | TEMPERATURE: 97.5 F | RESPIRATION RATE: 16 BRPM | DIASTOLIC BLOOD PRESSURE: 83 MMHG | HEART RATE: 52 BPM

## 2018-04-30 DIAGNOSIS — M00.062 STAPHYLOCOCCAL ARTHRITIS OF LEFT KNEE (H): ICD-10-CM

## 2018-04-30 DIAGNOSIS — T84.59XA INFECTION OF PROSTHETIC KNEE JOINT (H): Primary | ICD-10-CM

## 2018-04-30 DIAGNOSIS — Z96.659 INFECTION OF PROSTHETIC KNEE JOINT (H): Primary | ICD-10-CM

## 2018-04-30 LAB — VANCOMYCIN SERPL-MCNC: 19.8 MG/L

## 2018-04-30 PROCEDURE — 96365 THER/PROPH/DIAG IV INF INIT: CPT | Mod: XE

## 2018-04-30 PROCEDURE — 25000128 H RX IP 250 OP 636: Performed by: NURSE PRACTITIONER

## 2018-04-30 PROCEDURE — 96366 THER/PROPH/DIAG IV INF ADDON: CPT | Performed by: FAMILY MEDICINE

## 2018-04-30 PROCEDURE — 25000128 H RX IP 250 OP 636: Performed by: FAMILY MEDICINE

## 2018-04-30 PROCEDURE — 40000268 ZZH STATISTIC NO CHARGES: Performed by: FAMILY MEDICINE

## 2018-04-30 PROCEDURE — 80202 ASSAY OF VANCOMYCIN: CPT | Performed by: FAMILY MEDICINE

## 2018-04-30 PROCEDURE — 96365 THER/PROPH/DIAG IV INF INIT: CPT | Performed by: FAMILY MEDICINE

## 2018-04-30 RX ORDER — CEFAZOLIN SODIUM 1 G/50ML
1250 SOLUTION INTRAVENOUS EVERY 12 HOURS
Status: DISCONTINUED | OUTPATIENT
Start: 2018-04-30 | End: 2018-04-30 | Stop reason: HOSPADM

## 2018-04-30 RX ADMIN — VANCOMYCIN HYDROCHLORIDE 1250 MG: 10 INJECTION, POWDER, LYOPHILIZED, FOR SOLUTION INTRAVENOUS at 09:35

## 2018-04-30 RX ADMIN — VANCOMYCIN HYDROCHLORIDE 1250 MG: 1 INJECTION, POWDER, LYOPHILIZED, FOR SOLUTION INTRAVENOUS at 20:04

## 2018-04-30 RX ADMIN — SODIUM CHLORIDE 250 ML: 9 INJECTION, SOLUTION INTRAVENOUS at 09:17

## 2018-04-30 RX ADMIN — SODIUM CHLORIDE 250 ML: 9 INJECTION, SOLUTION INTRAVENOUS at 20:04

## 2018-04-30 ASSESSMENT — PAIN SCALES - GENERAL: PAINLEVEL: NO PAIN (0)

## 2018-04-30 NOTE — PROGRESS NOTES
Patient her for vancomycin infusion. Vanco level drawn today, 19.8. Toelrated infusion well. Positive blood return pre/post in PICC. Discharged in stable condition.

## 2018-04-30 NOTE — PATIENT INSTRUCTIONS
Pt to return on 5/1/18 for Vanco/Labs. Copies of medication list and upcoming appointments given prior to discharge.

## 2018-04-30 NOTE — MR AVS SNAPSHOT
After Visit Summary   4/30/2018    Duane E Sogge    MRN: [id number]           Patient Information     Date Of Birth          1940        Visit Information        Provider Department      4/30/2018 8:00 AM NL INFUSION CHAIR 5 [facility]        Today's Diagnoses     Infection of prosthetic knee joint (H)    -  1    Staphylococcal arthritis of left knee (H)          Care Instructions    Pt to return on 5/1/18 for Vanco/Labs. Copies of medication list and upcoming appointments given prior to discharge.           Follow-ups after your visit        Your next 10 appointments already scheduled     May 01, 2018  8:00 AM CDT   Level 2 with NL INFUSION CHAIR 1   [facility] ([facility])    [address] [place] [address] Dr [place] [address]   [phone]            May 01, 2018 11:15 AM CDT   Ortho Treatment with Ariana Wharton, PT   [facility] ([facility])    [address] [place] [address] Dr [place] [address]   [phone]            May 02, 2018  8:00 AM CDT   Level 2 with NL INFUSION CHAIR 1   [facility] ([facility])    [address] [place] [address] Dr [place] [address]   [phone]            May 03, 2018 10:15 AM CDT   Ortho Treatment with Christina Mohr, PT   [facility] ([facility])    [address] [place] [address] Dr [place] [address]   [phone]            May 09, 2018 10:20 AM CDT   PHYSICAL with Asa Oliver MD   [facility]    [address] [place] [address] [place] [address]   [phone]            May 09, 2018 11:00 AM CDT   Ortho Treatment with Christina Mohr, PT   [facility] ([facility])    [address] [facility] [address] [place] [address]   [phone]            May 11, 2018 10:15 AM CDT   Ortho Treatment with Christina Mohr, PT   [facility] ([facility])    [address] [place] [address]   [phone]            May 15, 2018  2:30 PM CDT   Ortho Treatment with HaileyCheryl Mohr, PT   [facility] ([facility])    [address] [place] [address]   [phone]            May 17, 2018  3:30 PM CDT   Ortho Treatment with HaileyCheryl Mohr, PT   [facility] ([facility])    [address]   [place] [address] [phone]              Who to contact     If you have questions or need follow up information about today's clinic visit or your schedule please contact [facility] INFUSION SERVICES directly at [phone].  Normal or non-critical lab and imaging results will be communicated to you by 8digitshart, letter or phone within 4 business days after the clinic has received the results. If you do not hear from us within 7 days, please contact the clinic through 8digitshart or phone. If you have a critical or abnormal lab result, we will notify you by phone as soon as possible.  Submit refill requests through [employer] or call your pharmacy and they will forward the refill request to us. Please allow 3 business days for your refill to be completed.          Additional Information About Your Visit        [employer]digitshar[employer] Information     [employer] lets you send messages to your doctor, view your test results, renew your prescriptions, schedule appointments and more. To sign up, go to [email or web address] . Click on \"Log in\" on the left side of the screen, which will take you to the Welcome page. Then click on \"Sign up Now\" on the right side of the page.     You will be asked to enter the access code listed below, as well as some personal information. Please follow the directions to create your username and password.     Your access code is: [email or web address]  Expires: 2018  5:11 PM     Your access code will  in 90 days. " If you need help or a new code, please call your [place] clinic or [phone].        Care EveryWhere ID     This is your Care EveryWhere ID. This could be used by other organizations to access your [place] medical records  [id number]        Your Vitals Were     Pulse Temperature Respirations Pulse Oximetry          52 97.5  F (36.4  C) (Temporal) 16 98%         Blood Pressure from Last 3 Encounters:   04/30/18 162/83   04/29/18 147/90   04/29/18 (!) 151/91    Weight from Last 3 Encounters:   04/29/18 108.4 kg (239 lb)   04/28/18 110.2 kg (243 lb)   04/25/18 106.6 kg (235 lb)              We Performed the Following     [facility] level        Primary Care Provider Office Phone # Fax #    Saqib Chavarria [phone]       [facility] [address] [facility] [address]  [facility] [address]        Equal Access to Services     [facility]: Hadii aad ku hadasho Soomaali, waaxda luqadaha, qaybta kaalmada adeegyada, waxay idiin hayaan alishaeg kharaeliezer la'osman . So [facility] [phone].    ATENCIÓN: Si habla español, tiene a roth disposición servicios gratuitos de asistencia lingüística. [facility] [phone].    We comply with applicable federal civil rights laws and [place] laws. We do not discriminate on the basis of race, color, national origin, age, disability, sex, sexual orientation, or gender identity.            Thank you!     Thank you for choosing [facility] INFUSION SERVICES  for your care. Our goal is always to provide you with excellent care. Hearing back from our patients is one way we can continue to improve our services. Please take a few minutes to complete the written survey that you may receive in the mail after your visit with us. Thank you!             Your Updated Medication List - Protect others around you: Learn how to safely use, store and throw away your medicines at www.disposemymeds.org.          This list is accurate as of 4/30/18  1:22 PM.  Always use your most  recent med list.                   Brand Name Dispense Instructions for use Diagnosis    ALPRAZolam 0.5 MG tablet    XANAX     Take 1 tab up to 4x daily only if needed. Refill when due,no early refills  Indications: Feeling Anxious        amoxicillin-clavulanate 875-125 MG per tablet    AUGMENTIN    20 tablet    Take 1 tablet by mouth 2 times daily    Acute maxillary sinusitis, recurrence not specified       BENZONATATE PO           GABAPENTIN PO      Take 100 mg by mouth 3 times daily as needed (anxiety) 1-3 capsules        HYDROcodone-acetaminophen 5-325 MG per tablet    NORCO          lisinopril-hydrochlorothiazide 20-12.5 MG per tablet    PRINZIDE/ZESTORETIC     Take 2 tablets by mouth daily        metoprolol succinate 50 MG 24 hr tablet    TOPROL-XL     Take 50 mg by mouth daily        MIRTAZAPINE PO      Take 15 mg by mouth At Bedtime        senna 8.6 MG tablet    SENOKOT          simvastatin 20 MG tablet    ZOCOR     Take 20 mg by mouth At Bedtime        TEMAZEPAM PO      Take 15 mg by mouth nightly as needed for sleep        TYLENOL PO      Take 325 mg by mouth as needed for mild pain or fever        venlafaxine 150 MG Tb24 24 hr tablet    EFFEXOR-ER     Take 150 mg by mouth daily (with breakfast)

## 2018-05-01 ENCOUNTER — HOSPITAL ENCOUNTER (OUTPATIENT)
Dept: PHYSICAL THERAPY | Facility: CLINIC | Age: 78
Setting detail: THERAPIES SERIES
End: 2018-05-01
Attending: ORTHOPAEDIC SURGERY
Payer: MEDICARE

## 2018-05-01 ENCOUNTER — HOSPITAL ENCOUNTER (EMERGENCY)
Facility: CLINIC | Age: 78
Discharge: HOME | End: 2018-05-01
Admitting: NURSE PRACTITIONER
Payer: MEDICARE

## 2018-05-01 ENCOUNTER — INFUSION THERAPY VISIT (OUTPATIENT)
Dept: INFUSION THERAPY | Facility: CLINIC | Age: 78
End: 2018-05-01
Attending: FAMILY MEDICINE
Payer: MEDICARE

## 2018-05-01 VITALS
HEART RATE: 51 BPM | SYSTOLIC BLOOD PRESSURE: 157 MMHG | OXYGEN SATURATION: 97 % | DIASTOLIC BLOOD PRESSURE: 87 MMHG | TEMPERATURE: 97.7 F | RESPIRATION RATE: 16 BRPM

## 2018-05-01 VITALS — DIASTOLIC BLOOD PRESSURE: 75 MMHG | SYSTOLIC BLOOD PRESSURE: 157 MMHG | TEMPERATURE: 98.4 F | RESPIRATION RATE: 18 BRPM

## 2018-05-01 DIAGNOSIS — M00.062 STAPHYLOCOCCAL ARTHRITIS OF LEFT KNEE (H): ICD-10-CM

## 2018-05-01 DIAGNOSIS — M00.062 STAPHYLOCOCCAL ARTHRITIS OF LEFT KNEE (H): Primary | ICD-10-CM

## 2018-05-01 LAB
ALP SERPL-CCNC: 130 U/L (ref 40–150)
ALT SERPL W P-5'-P-CCNC: 24 U/L (ref 0–70)
AST SERPL W P-5'-P-CCNC: 20 U/L (ref 0–45)
BASOPHILS # BLD AUTO: 0 10E9/L (ref 0–0.2)
BASOPHILS NFR BLD AUTO: 0.5 %
BILIRUB SERPL-MCNC: 0.2 MG/DL (ref 0.2–1.3)
CREAT SERPL-MCNC: 0.88 MG/DL (ref 0.66–1.25)
CRP SERPL-MCNC: 3.4 MG/L (ref 0–8)
DIFFERENTIAL METHOD BLD: ABNORMAL
EOSINOPHIL # BLD AUTO: 0.4 10E9/L (ref 0–0.7)
EOSINOPHIL NFR BLD AUTO: 7.8 %
ERYTHROCYTE [DISTWIDTH] IN BLOOD BY AUTOMATED COUNT: 13.3 % (ref 10–15)
GFR SERPL CREATININE-BSD FRML MDRD: 83 ML/MIN/1.7M2
HCT VFR BLD AUTO: 42.2 % (ref 40–53)
HGB BLD-MCNC: 13.1 G/DL (ref 13.3–17.7)
IMM GRANULOCYTES # BLD: 0 10E9/L (ref 0–0.4)
IMM GRANULOCYTES NFR BLD: 0.2 %
LYMPHOCYTES # BLD AUTO: 1.4 10E9/L (ref 0.8–5.3)
LYMPHOCYTES NFR BLD AUTO: 25 %
MCH RBC QN AUTO: 30.5 PG (ref 26.5–33)
MCHC RBC AUTO-ENTMCNC: 31 G/DL (ref 31.5–36.5)
MCV RBC AUTO: 98 FL (ref 78–100)
MONOCYTES # BLD AUTO: 0.8 10E9/L (ref 0–1.3)
MONOCYTES NFR BLD AUTO: 14.3 %
NEUTROPHILS # BLD AUTO: 2.9 10E9/L (ref 1.6–8.3)
NEUTROPHILS NFR BLD AUTO: 52.2 %
PLATELET # BLD AUTO: 285 10E9/L (ref 150–450)
RBC # BLD AUTO: 4.3 10E12/L (ref 4.4–5.9)
WBC # BLD AUTO: 5.6 10E9/L (ref 4–11)

## 2018-05-01 PROCEDURE — 40000268 ZZH STATISTIC NO CHARGES: Performed by: NURSE PRACTITIONER

## 2018-05-01 PROCEDURE — 84450 TRANSFERASE (AST) (SGOT): CPT | Performed by: FAMILY MEDICINE

## 2018-05-01 PROCEDURE — 25000128 H RX IP 250 OP 636: Performed by: FAMILY MEDICINE

## 2018-05-01 PROCEDURE — 84460 ALANINE AMINO (ALT) (SGPT): CPT | Performed by: FAMILY MEDICINE

## 2018-05-01 PROCEDURE — 36593 DECLOT VASCULAR DEVICE: CPT

## 2018-05-01 PROCEDURE — 84075 ASSAY ALKALINE PHOSPHATASE: CPT | Performed by: FAMILY MEDICINE

## 2018-05-01 PROCEDURE — 82247 BILIRUBIN TOTAL: CPT | Performed by: FAMILY MEDICINE

## 2018-05-01 PROCEDURE — 97140 MANUAL THERAPY 1/> REGIONS: CPT | Mod: GP

## 2018-05-01 PROCEDURE — 96366 THER/PROPH/DIAG IV INF ADDON: CPT | Performed by: NURSE PRACTITIONER

## 2018-05-01 PROCEDURE — 97110 THERAPEUTIC EXERCISES: CPT | Mod: GP

## 2018-05-01 PROCEDURE — 86140 C-REACTIVE PROTEIN: CPT | Performed by: FAMILY MEDICINE

## 2018-05-01 PROCEDURE — 82565 ASSAY OF CREATININE: CPT | Performed by: FAMILY MEDICINE

## 2018-05-01 PROCEDURE — 96365 THER/PROPH/DIAG IV INF INIT: CPT | Performed by: NURSE PRACTITIONER

## 2018-05-01 PROCEDURE — 96365 THER/PROPH/DIAG IV INF INIT: CPT

## 2018-05-01 PROCEDURE — 40000718 ZZHC STATISTIC PT DEPARTMENT ORTHO VISIT

## 2018-05-01 PROCEDURE — 85025 COMPLETE CBC W/AUTO DIFF WBC: CPT | Performed by: FAMILY MEDICINE

## 2018-05-01 RX ORDER — CEFAZOLIN SODIUM 1 G/50ML
1250 SOLUTION INTRAVENOUS EVERY 12 HOURS
Status: DISCONTINUED | OUTPATIENT
Start: 2018-05-01 | End: 2018-05-01 | Stop reason: HOSPADM

## 2018-05-01 RX ORDER — CEFAZOLIN SODIUM 1 G/50ML
1250 SOLUTION INTRAVENOUS EVERY 12 HOURS
Status: CANCELLED | OUTPATIENT
Start: 2018-05-01

## 2018-05-01 RX ORDER — CEFAZOLIN SODIUM 1 G/50ML
1250 SOLUTION INTRAVENOUS EVERY 12 HOURS
Status: DISCONTINUED | OUTPATIENT
Start: 2018-05-01 | End: 2018-05-02 | Stop reason: HOSPADM

## 2018-05-01 RX ADMIN — SODIUM CHLORIDE 250 ML: 9 INJECTION, SOLUTION INTRAVENOUS at 20:34

## 2018-05-01 RX ADMIN — VANCOMYCIN HYDROCHLORIDE 1250 MG: 1 INJECTION, POWDER, LYOPHILIZED, FOR SOLUTION INTRAVENOUS at 08:49

## 2018-05-01 RX ADMIN — VANCOMYCIN HYDROCHLORIDE 1250 MG: 1 INJECTION, POWDER, LYOPHILIZED, FOR SOLUTION INTRAVENOUS at 20:37

## 2018-05-01 RX ADMIN — ALTEPLASE 2 MG: 2.2 INJECTION, POWDER, LYOPHILIZED, FOR SOLUTION INTRAVENOUS at 09:02

## 2018-05-01 RX ADMIN — SODIUM CHLORIDE 250 ML: 9 INJECTION, SOLUTION INTRAVENOUS at 08:47

## 2018-05-01 NOTE — PROGRESS NOTES
Pt here for labs and IV Vancomycin. Unable to flush or draw labs from PICC line.  Alteplas instilled and allowed to dwell x 30 mins.  Blood return noted and line saline locked.   Vanco given via peripheral while Alteplas instillled.  Tolerated Vanco well and pt discharged in stable condition.

## 2018-05-01 NOTE — MR AVS SNAPSHOT
After Visit Summary   5/1/2018    Duane E Sogge    MRN: [id number]           Patient Information     Date Of Birth          1940        Visit Information        Provider Department      5/1/2018 8:00 AM NL INFUSION CHAIR 1 [facility]        Today's Diagnoses     Staphylococcal arthritis of left knee (H)    -  1      Care Instructions    Pt to return to ED tonight and again to Infusion in the am for Vancomycin.           Follow-ups after your visit        Follow-up notes from your care team     Return in 1 day (on 5/2/2018).      Your next 10 appointments already scheduled     May 02, 2018  8:00 AM CDT   Level 2 with NL INFUSION CHAIR 1   [facility] ([facility])    [address] [place] [address] Dr [place] [address]   [phone]            May 03, 2018 10:15 AM CDT   Ortho Treatment with Christina Mohr, PT   [facility] ([facility])    [address] [place] [address] Dr [place] [address]   [phone]            May 09, 2018 10:20 AM CDT   PHYSICAL with Asa Oliver MD   [facility] ([facility])    [address] [place], [place] [address] [address]   [phone]            May 09, 2018 11:00 AM CDT   Ortho Treatment with Christina Mohr, PT   [facility] ([facility])    [address] [place] [address] Dr [place] [address]   [phone]            May 11, 2018 10:15 AM CDT   Ortho Treatment with Christina Mohr PT   [facility] ([facility])    [place] [facility] [address] [place] [address]   [phone]            May 15, 2018  2:30 PM CDT   Ortho Treatment with Christina Mohr PT   [facility] ([facility])    [place] [address] [place] [address]   [phone]            May 17, 2018  3:30 PM CDT   Ortho Treatment with Christina Mohr, PT   [facility]  "Physical Therapy ([facility])    [address] [place] [address] [phone]              Future tests that were ordered for you today     Open Standing Orders        Priority Remaining Interval Expires Ordered    Vancomycin level Routine / AM DRAW  2018            Who to contact     If you have questions or need follow up information about today's clinic visit or your schedule please contact [facility] INFUSION SERVICES directly at [phone].  Normal or non-critical lab and imaging results will be communicated to you by Oshiboreehart, letter or phone within 4 business days after the clinic has received the results. If you do not hear from us within 7 days, please contact the clinic through BuildFaxt or phone. If you have a critical or abnormal lab result, we will notify you by phone as soon as possible.  Submit refill requests through [employer] or call your pharmacy and they will forward the refill request to us. Please allow 3 business days for your refill to be completed.          Additional Information About Your Visit        [employer] Information     [employer] lets you send messages to your doctor, view your test results, renew your prescriptions, schedule appointments and more. To sign up, go to [email or web address] . Click on \"Log in\" on the left side of the screen, which will take you to the Welcome page. Then click on \"Sign up Now\" on the right side of the page.     You will be asked to enter the access code listed below, as well as some personal information. Please follow the directions to create your username and password.     Your access code is: [email or web address]  Expires: 2018  5:11 PM     Your access code will  in 90 days. If you need help or a new code, please call your [place] clinic or [phone].        Care EveryWhere ID     This is your Care EveryWhere ID. This could be used by other organizations to access your [place] medical " records  [id number]        Your Vitals Were     Pulse Temperature Respirations Pulse Oximetry          51 97.7  F (36.5  C) (Oral) 16 97%         Blood Pressure from Last 3 Encounters:   05/01/18 157/87   04/30/18 142/68   04/30/18 162/83    Weight from Last 3 Encounters:   04/29/18 108.4 kg (239 lb)   04/28/18 110.2 kg (243 lb)   04/25/18 106.6 kg (235 lb)              We Performed the Following     Alkaline phosphatase     ALT     AST     Bilirubin  total     CBC with platelets differential     Creatinine     CRP inflammation        Primary Care Provider Office Phone # Fax #    Saqib Chavarria [phone]       [facility] [address] [facility] [address]  [facility] [address]        Equal Access to Services     [facility] : Hadii carlene ku hadasho Soomaali, waaxda luqadaha, qaybta kaalmada adeegyada, waxay froylanin hayosman sanderson . So [facility] [phone].    ATENCIÓN: Si habla español, tiene a roth disposición servicios gratuitos de asistencia lingüística. LlThe [facility] [phone].    We comply with applicable federal civil rights laws and [place] laws. We do not discriminate on the basis of race, color, national origin, age, disability, sex, sexual orientation, or gender identity.            Thank you!     Thank you for choosing [facility] INFUSION SERVICES  for your care. Our goal is always to provide you with excellent care. Hearing back from our patients is one way we can continue to improve our services. Please take a few minutes to complete the written survey that you may receive in the mail after your visit with us. Thank you!             Your Updated Medication List - Protect others around you: Learn how to safely use, store and throw away your medicines at www.disposemymeds.org.          This list is accurate as of 5/1/18 12:24 PM.  Always use your most recent med list.                   Brand Name Dispense Instructions for use Diagnosis    ALPRAZolam 0.5 MG tablet     XANAX     Take 1 tab up to 4x daily only if needed. Refill when due,no early refills  Indications: Feeling Anxious        amoxicillin-clavulanate 875-125 MG per tablet    AUGMENTIN    20 tablet    Take 1 tablet by mouth 2 times daily    Acute maxillary sinusitis, recurrence not specified       BENZONATATE PO           GABAPENTIN PO      Take 100 mg by mouth 3 times daily as needed (anxiety) 1-3 capsules        HYDROcodone-acetaminophen 5-325 MG per tablet    NORCO          lisinopril-hydrochlorothiazide 20-12.5 MG per tablet    PRINZIDE/ZESTORETIC     Take 2 tablets by mouth daily        metoprolol succinate 50 MG 24 hr tablet    TOPROL-XL     Take 50 mg by mouth daily        MIRTAZAPINE PO      Take 15 mg by mouth At Bedtime        senna 8.6 MG tablet    SENOKOT          simvastatin 20 MG tablet    ZOCOR     Take 20 mg by mouth At Bedtime        TEMAZEPAM PO      Take 15 mg by mouth nightly as needed for sleep        TYLENOL PO      Take 325 mg by mouth as needed for mild pain or fever        venlafaxine 150 MG Tb24 24 hr tablet    EFFEXOR-ER     Take 150 mg by mouth daily (with breakfast)

## 2018-05-02 ENCOUNTER — HOSPITAL ENCOUNTER (EMERGENCY)
Facility: CLINIC | Age: 78
Discharge: HOME | End: 2018-05-02
Admitting: FAMILY MEDICINE
Payer: MEDICARE

## 2018-05-02 ENCOUNTER — INFUSION THERAPY VISIT (OUTPATIENT)
Dept: INFUSION THERAPY | Facility: CLINIC | Age: 78
End: 2018-05-02
Attending: FAMILY MEDICINE
Payer: MEDICARE

## 2018-05-02 VITALS
DIASTOLIC BLOOD PRESSURE: 92 MMHG | SYSTOLIC BLOOD PRESSURE: 180 MMHG | OXYGEN SATURATION: 97 % | HEART RATE: 50 BPM | RESPIRATION RATE: 17 BRPM | TEMPERATURE: 96.7 F

## 2018-05-02 VITALS
TEMPERATURE: 98.5 F | OXYGEN SATURATION: 100 % | RESPIRATION RATE: 18 BRPM | DIASTOLIC BLOOD PRESSURE: 105 MMHG | SYSTOLIC BLOOD PRESSURE: 190 MMHG

## 2018-05-02 DIAGNOSIS — M00.062 STAPHYLOCOCCAL ARTHRITIS OF LEFT KNEE (H): ICD-10-CM

## 2018-05-02 DIAGNOSIS — M00.062 STAPHYLOCOCCAL ARTHRITIS OF LEFT KNEE (H): Primary | ICD-10-CM

## 2018-05-02 PROCEDURE — 25000128 H RX IP 250 OP 636: Performed by: FAMILY MEDICINE

## 2018-05-02 PROCEDURE — 96365 THER/PROPH/DIAG IV INF INIT: CPT | Performed by: FAMILY MEDICINE

## 2018-05-02 PROCEDURE — 96366 THER/PROPH/DIAG IV INF ADDON: CPT | Performed by: FAMILY MEDICINE

## 2018-05-02 PROCEDURE — 96365 THER/PROPH/DIAG IV INF INIT: CPT | Mod: XE

## 2018-05-02 PROCEDURE — 40000268 ZZH STATISTIC NO CHARGES: Performed by: FAMILY MEDICINE

## 2018-05-02 RX ORDER — CEFAZOLIN SODIUM 1 G/50ML
1250 SOLUTION INTRAVENOUS EVERY 12 HOURS
Status: CANCELLED | OUTPATIENT
Start: 2018-05-02

## 2018-05-02 RX ORDER — CEFAZOLIN SODIUM 1 G/50ML
1250 SOLUTION INTRAVENOUS EVERY 12 HOURS
Status: DISCONTINUED | OUTPATIENT
Start: 2018-05-02 | End: 2018-05-03 | Stop reason: HOSPADM

## 2018-05-02 RX ORDER — CEFAZOLIN SODIUM 1 G/50ML
1250 SOLUTION INTRAVENOUS EVERY 12 HOURS
Status: DISCONTINUED | OUTPATIENT
Start: 2018-05-02 | End: 2018-05-02 | Stop reason: HOSPADM

## 2018-05-02 RX ADMIN — SODIUM CHLORIDE 250 ML: 9 INJECTION, SOLUTION INTRAVENOUS at 08:13

## 2018-05-02 RX ADMIN — VANCOMYCIN HYDROCHLORIDE 1250 MG: 1 INJECTION, POWDER, LYOPHILIZED, FOR SOLUTION INTRAVENOUS at 08:15

## 2018-05-02 RX ADMIN — VANCOMYCIN HYDROCHLORIDE 1250 MG: 1 INJECTION, POWDER, LYOPHILIZED, FOR SOLUTION INTRAVENOUS at 20:00

## 2018-05-02 RX ADMIN — SODIUM CHLORIDE 250 ML: 9 INJECTION, SOLUTION INTRAVENOUS at 20:00

## 2018-05-02 ASSESSMENT — PAIN SCALES - GENERAL: PAINLEVEL: NO PAIN (0)

## 2018-05-02 NOTE — MR AVS SNAPSHOT
After Visit Summary   5/2/2018    Duane E Sogge    MRN: [id number]           Patient Information     Date Of Birth          1940        Visit Information        Provider Department      5/2/2018 8:00 AM NL INFUSION CHAIR 1 [facility]        Today's Diagnoses     Staphylococcal arthritis of left knee (H)    -  1      Care Instructions    Pt to return to ED this evening and again to Infusion tomorrow am for Vancomycin.           Follow-ups after your visit        Follow-up notes from your care team     Return in 1 day (on 5/3/2018).      Your next 10 appointments already scheduled     May 03, 2018  8:00 AM CDT   Level 2 with NL INFUSION CHAIR 3   [facility] ([facility])    [address] [place] [address] Dr [place] [address]   [phone]            May 03, 2018 10:15 AM CDT   Ortho Treatment with Christina Mohr, PT   [facility] Physical Therapy ([facility])    [address] [place] [address] Dr [place] [address]   [phone]            May 04, 2018  8:00 AM CDT   Level 2 with NL INFUSION CHAIR 3   [facility] ([facility])    [address] [place] [address] Dr [place] [address]   [phone]            May 09, 2018 10:20 AM CDT   PHYSICAL with Asa Oliver MD   [facility] ([facility])    [address] [facility]  [place] [address]   [phone]            May 09, 2018 11:00 AM CDT   Ortho Treatment with Ariana Wharton, LAIS[place]   [facility] ([facility])    [address] [place] [address] Dr [place] [address]   [phone]            May 11, 2018 10:15 AM CDT   Ortho Treatment with Christian Mohr, PT   [facility] ([facility])    [place] [address] [facility] [address] [place] [address]   [phone]            May 15, 2018  2:30 PM CDT   Ortho Treatment with Christina Mohr, PT   [facility]  "Physical Therapy ([facility])    [address] [place] [address] [phone]            May 17, 2018  3:30 PM CDT   Ortho Treatment with Christina Mohr, PT   [facility] Physical Therapy ([facility])    [address] [place] [address] [phone]              Who to contact     If you have questions or need follow up information about today's clinic visit or your schedule please contact [facility] INFUSION SERVICES directly at [phone].  Normal or non-critical lab and imaging results will be communicated to you by Medaxionhart, letter or phone within 4 business days after the clinic has received the results. If you do not hear from us within 7 days, please contact the clinic through Medaxionhart or phone. If you have a critical or abnormal lab result, we will notify you by phone as soon as possible.  Submit refill requests through [employer] or call your pharmacy and they will forward the refill request to us. Please allow 3 business days for your refill to be completed.          Additional Information About Your Visit        MyChart Information     [employer] lets you send messages to your doctor, view your test results, renew your prescriptions, schedule appointments and more. To sign up, go to [email or web address] . Click on \"Log in\" on the left side of the screen, which will take you to the Welcome page. Then click on \"Sign up Now\" on the right side of the page.     You will be asked to enter the access code listed below, as well as some personal information. Please follow the directions to create your username and password.     Your access code is: [email or web address]  Expires: 2018  5:11 PM     Your access code will  in 90 days. If you need help or a new code, please call your [place] clinic or [phone].        Care EveryWhere ID     This is your Care EveryWhere ID. This could be used by other organizations to access your [place] " medical records  [id number]        Your Vitals Were     Pulse Temperature Respirations Pulse Oximetry          50 96.7  F (35.9  C) (Temporal) 17 97%         Blood Pressure from Last 3 Encounters:   05/02/18 (!) 180/92   05/01/18 157/75   05/01/18 157/87    Weight from Last 3 Encounters:   04/29/18 108.4 kg (239 lb)   04/28/18 110.2 kg (243 lb)   04/25/18 106.6 kg (235 lb)              Today, you had the following     No orders found for display       Primary Care Provider Office Phone # Fax #    Saqib Chavarria [phone]       [facility] [address] [facility] [address]  [facility] [address]        Equal Access to Services     [facility] : Hadii carlene layton hadasho Soomaali, waaxda luqadaha, qaybta kaalmada adeegyada, waxay lindsey hayomsan sanderson . So [facility] [phone].    ATENCIÓN: Si habla español, tiene a roth disposición servicios gratuitos de asistencia lingüística. Llame al [phone].    We comply with applicable federal civil rights laws and [place] laws. We do not discriminate on the basis of race, color, national origin, age, disability, sex, sexual orientation, or gender identity.            Thank you!     Thank you for choosing [facility] INFUSION SERVICES  for your care. Our goal is always to provide you with excellent care. Hearing back from our patients is one way we can continue to improve our services. Please take a few minutes to complete the written survey that you may receive in the mail after your visit with us. Thank you!             Your Updated Medication List - Protect others around you: Learn how to safely use, store and throw away your medicines at www.disposemymeds.org.          This list is accurate as of 5/2/18  3:33 PM.  Always use your most recent med list.                   Brand Name Dispense Instructions for use Diagnosis    ALPRAZolam 0.5 MG tablet    XANAX     Take 1 tab up to 4x daily only if needed. Refill when due,no early refills   Indications: Feeling Anxious        amoxicillin-clavulanate 875-125 MG per tablet    AUGMENTIN    20 tablet    Take 1 tablet by mouth 2 times daily    Acute maxillary sinusitis, recurrence not specified       BENZONATATE PO           GABAPENTIN PO      Take 100 mg by mouth 3 times daily as needed (anxiety) 1-3 capsules        HYDROcodone-acetaminophen 5-325 MG per tablet    NORCO          lisinopril-hydrochlorothiazide 20-12.5 MG per tablet    PRINZIDE/ZESTORETIC     Take 2 tablets by mouth daily        metoprolol succinate 50 MG 24 hr tablet    TOPROL-XL     Take 50 mg by mouth daily        MIRTAZAPINE PO      Take 15 mg by mouth At Bedtime        senna 8.6 MG tablet    SENOKOT          simvastatin 20 MG tablet    ZOCOR     Take 20 mg by mouth At Bedtime        TEMAZEPAM PO      Take 15 mg by mouth nightly as needed for sleep        TYLENOL PO      Take 325 mg by mouth as needed for mild pain or fever        venlafaxine 150 MG Tb24 24 hr tablet    EFFEXOR-ER     Take 150 mg by mouth daily (with breakfast)

## 2018-05-02 NOTE — PROGRESS NOTES
Pt here for IV Vancomycin, tolerated well.  Pt informed that his treatment would be extended through Friday when he sees the Infectious disease Dr.  Pt voiced understanding.

## 2018-05-03 ENCOUNTER — HOSPITAL ENCOUNTER (OUTPATIENT)
Dept: PHYSICAL THERAPY | Facility: CLINIC | Age: 78
Setting detail: THERAPIES SERIES
End: 2018-05-03
Attending: ORTHOPAEDIC SURGERY
Payer: MEDICARE

## 2018-05-03 ENCOUNTER — INFUSION THERAPY VISIT (OUTPATIENT)
Dept: INFUSION THERAPY | Facility: CLINIC | Age: 78
End: 2018-05-03
Attending: FAMILY MEDICINE
Payer: MEDICARE

## 2018-05-03 ENCOUNTER — HOSPITAL ENCOUNTER (EMERGENCY)
Facility: CLINIC | Age: 78
Discharge: HOME | End: 2018-05-03
Admitting: FAMILY MEDICINE
Payer: MEDICARE

## 2018-05-03 VITALS
SYSTOLIC BLOOD PRESSURE: 151 MMHG | RESPIRATION RATE: 16 BRPM | TEMPERATURE: 98 F | HEART RATE: 68 BPM | DIASTOLIC BLOOD PRESSURE: 74 MMHG

## 2018-05-03 VITALS
TEMPERATURE: 98.2 F | RESPIRATION RATE: 18 BRPM | SYSTOLIC BLOOD PRESSURE: 154 MMHG | HEART RATE: 51 BPM | OXYGEN SATURATION: 96 % | DIASTOLIC BLOOD PRESSURE: 84 MMHG

## 2018-05-03 DIAGNOSIS — M00.062 STAPHYLOCOCCAL ARTHRITIS OF LEFT KNEE (H): ICD-10-CM

## 2018-05-03 DIAGNOSIS — M00.062 STAPHYLOCOCCAL ARTHRITIS OF LEFT KNEE (H): Primary | ICD-10-CM

## 2018-05-03 PROCEDURE — 96365 THER/PROPH/DIAG IV INF INIT: CPT

## 2018-05-03 PROCEDURE — 97140 MANUAL THERAPY 1/> REGIONS: CPT | Mod: GP

## 2018-05-03 PROCEDURE — 25000128 H RX IP 250 OP 636: Performed by: FAMILY MEDICINE

## 2018-05-03 PROCEDURE — 96366 THER/PROPH/DIAG IV INF ADDON: CPT | Performed by: FAMILY MEDICINE

## 2018-05-03 PROCEDURE — 40000268 ZZH STATISTIC NO CHARGES: Performed by: FAMILY MEDICINE

## 2018-05-03 PROCEDURE — 96365 THER/PROPH/DIAG IV INF INIT: CPT | Performed by: FAMILY MEDICINE

## 2018-05-03 PROCEDURE — 97110 THERAPEUTIC EXERCISES: CPT | Mod: GP

## 2018-05-03 PROCEDURE — 40000718 ZZHC STATISTIC PT DEPARTMENT ORTHO VISIT

## 2018-05-03 RX ORDER — CEFAZOLIN SODIUM 1 G/50ML
1250 SOLUTION INTRAVENOUS EVERY 12 HOURS
Status: DISCONTINUED | OUTPATIENT
Start: 2018-05-03 | End: 2018-05-03 | Stop reason: HOSPADM

## 2018-05-03 RX ORDER — CEFAZOLIN SODIUM 1 G/50ML
1250 SOLUTION INTRAVENOUS EVERY 12 HOURS
Status: CANCELLED | OUTPATIENT
Start: 2018-05-03

## 2018-05-03 RX ORDER — CEFAZOLIN SODIUM 1 G/50ML
1250 SOLUTION INTRAVENOUS EVERY 12 HOURS
Status: DISCONTINUED | OUTPATIENT
Start: 2018-05-03 | End: 2018-05-04 | Stop reason: HOSPADM

## 2018-05-03 RX ADMIN — VANCOMYCIN HYDROCHLORIDE 1250 MG: 1 INJECTION, POWDER, LYOPHILIZED, FOR SOLUTION INTRAVENOUS at 20:16

## 2018-05-03 RX ADMIN — VANCOMYCIN HYDROCHLORIDE 1250 MG: 1 INJECTION, POWDER, LYOPHILIZED, FOR SOLUTION INTRAVENOUS at 08:35

## 2018-05-03 RX ADMIN — SODIUM CHLORIDE 250 ML: 9 INJECTION, SOLUTION INTRAVENOUS at 20:13

## 2018-05-03 RX ADMIN — SODIUM CHLORIDE 250 ML: 9 INJECTION, SOLUTION INTRAVENOUS at 08:21

## 2018-05-03 ASSESSMENT — PAIN SCALES - GENERAL: PAINLEVEL: NO PAIN (0)

## 2018-05-03 NOTE — PATIENT INSTRUCTIONS
Pt to return on 5/4/18 for vanco/labs. Copies of medication list and upcoming appointments given prior to discharge.

## 2018-05-03 NOTE — PROGRESS NOTES
Patient here for vancomycin and tolerated well. PICC line dressing change completed without incident. Discharged in stable condition.

## 2018-05-03 NOTE — MR AVS SNAPSHOT
After Visit Summary   5/3/2018    Duane E Sogge    MRN: [id number]           Patient Information     Date Of Birth          1940        Visit Information        Provider Department      5/3/2018 8:00 AM NL INFUSION CHAIR 3 [facility]        Today's Diagnoses     Staphylococcal arthritis of left knee (H)    -  1      Care Instructions    Pt to return on 5/4/18 for vanco/labs. Copies of medication list and upcoming appointments given prior to discharge.           Follow-ups after your visit        Your next 10 appointments already scheduled     May 04, 2018  8:00 AM CDT   Level 2 with NL INFUSION CHAIR 3   [facility] ([facility])    [address] [place] [address] [address] [place] [address]   [phone]            May 09, 2018 10:20 AM CDT   [facility]   [facility]    [address] [place], [place] [address] [address]   [phone]            May 09, 2018 11:00 AM CDT   Ortho Treatment with Ariana Wharton, PT   [facility] ([facility])    [address] [place] [address] Dr [place] [address]   [phone]            May 11, 2018 10:15 AM CDT   Ortho Treatment with Christina Mohr, PT   [facility] ([facility])    [facility] [address] [place] [address]   [phone]            May 15, 2018  2:30 PM CDT   Ortho Treatment with Christina Mohr PT   [facility] ([facility])    [facility] [address] [place] [address]   [phone]            May 17, 2018  3:30 PM CDT   Ortho Treatment with Christina Mohr, PT   [facility] ([facility])    [facility] [address] [place] [address]   [phone]              Who to contact     If you have questions or need follow up information about today's clinic visit or your schedule please  "contact [facility] INF[facility]ION SERVICES directly at [phone].  Normal or non-critical lab and imaging results will be communicated to you by MyChart, letter or phone within 4 business days after the clinic has received the results. If you do not hear from us within 7 days, please contact the clinic through [employer] or phone. If you have a critical or abnormal lab result, we will notify you by phone as soon as possible.  Submit refill requests through [employer] or call your pharmacy and they will forward the refill request to us. Please allow 3 business days for your refill to be completed.          Additional Information About Your Visit        [employer] Information     [employer] lets you send messages to your doctor, view your test results, renew your prescriptions, schedule appointments and more. To sign up, go to [email or web address] . Click on \"Log in\" on the left side of the screen, which will take you to the Welcome page. Then click on \"Sign up Now\" on the right side of the page.     You will be asked to enter the access code listed below, as well as some personal information. Please follow the directions to create your username and password.     Your access code is: [email or web address]  Expires: 2018  5:11 PM     Your access code will  in 90 days. If you need help or a new code, please call your [place] clinic or [phone].        Care EveryWhere ID     This is your Care EveryWhere ID. This could be used by other organizations to access your [place] medical records  [id number]        Your Vitals Were     Pulse Temperature Respirations Pulse Oximetry          51 98.2  F (36.8  C) (Temporal) 18 96%         Blood Pressure from Last 3 Encounters:   18 154/84   18 (!) 190/105   18 (!) 180/92    Weight from Last 3 Encounters:   18 108.4 kg (239 lb)   18 110.2 kg (243 lb)   18 106.6 kg (235 lb)              Today, you had the following     No orders found for " display       Primary Care Provider Office Phone # Fax #    Saqib Chavarria [phone]       [facility] [place] [address] [place] [address] 300  [facility] [address]        Equal Access to Services     STARR LEIVA : Hadii aad ku hadchono Soomaali, waaxda luqadaha, qaybta kaalmada adeegyada, waxtheresa biggsn aderachelle ryan kin ying. So [facility] [phone].    ATENCIÓN: Si habla español, tiene a roth disposición servicios gratuitos de asistencia lingüística. [facility] [phone].    We comply with applicable federal civil rights laws and [place] laws. We do not discriminate on the basis of race, color, national origin, age, disability, sex, sexual orientation, or gender identity.            Thank you!     Thank you for choosing [facility]  for your care. Our goal is always to provide you with excellent care. Hearing back from our patients is one way we can continue to improve our services. Please take a few minutes to complete the written survey that you may receive in the mail after your visit with us. Thank you!             Your Updated Medication List - Protect others around you: Learn how to safely use, store and throw away your medicines at www.disposemymeds.org.          This list is accurate as of 5/3/18 10:29 AM.  Always use your most recent med list.                   Brand Name Dispense Instructions for use Diagnosis    ALPRAZolam 0.5 MG tablet    XANAX     Take 1 tab up to 4x daily only if needed. Refill when due,no early refills  Indications: Feeling Anxious        amoxicillin-clavulanate 875-125 MG per tablet    AUGMENTIN    20 tablet    Take 1 tablet by mouth 2 times daily    Acute maxillary sinusitis, recurrence not specified       BENZONATATE PO           GABAPENTIN PO      Take 100 mg by mouth 3 times daily as needed (anxiety) 1-3 capsules        HYDROcodone-acetaminophen 5-325 MG per tablet    NORCO          lisinopril-hydrochlorothiazide 20-12.5 MG per  tablet    PRINZIDE/ZESTORETIC     Take 2 tablets by mouth daily        metoprolol succinate 50 MG 24 hr tablet    TOPROL-XL     Take 50 mg by mouth daily        MIRTAZAPINE PO      Take 15 mg by mouth At Bedtime        senna 8.6 MG tablet    SENOKOT          simvastatin 20 MG tablet    ZOCOR     Take 20 mg by mouth At Bedtime        TEMAZEPAM PO      Take 15 mg by mouth nightly as needed for sleep        TYLENOL PO      Take 325 mg by mouth as needed for mild pain or fever        venlafaxine 150 MG Tb24 24 hr tablet    EFFEXOR-ER     Take 150 mg by mouth daily (with breakfast)

## 2018-05-04 ENCOUNTER — INFUSION THERAPY VISIT (OUTPATIENT)
Dept: INFUSION THERAPY | Facility: CLINIC | Age: 78
End: 2018-05-04
Attending: FAMILY MEDICINE
Payer: MEDICARE

## 2018-05-04 VITALS
OXYGEN SATURATION: 96 % | HEART RATE: 53 BPM | RESPIRATION RATE: 16 BRPM | DIASTOLIC BLOOD PRESSURE: 68 MMHG | SYSTOLIC BLOOD PRESSURE: 155 MMHG | TEMPERATURE: 98.3 F

## 2018-05-04 DIAGNOSIS — M00.062 STAPHYLOCOCCAL ARTHRITIS OF LEFT KNEE (H): Primary | ICD-10-CM

## 2018-05-04 LAB
CREAT SERPL-MCNC: 0.89 MG/DL (ref 0.66–1.25)
GFR SERPL CREATININE-BSD FRML MDRD: 83 ML/MIN/1.7M2
VANCOMYCIN SERPL-MCNC: 18.7 MG/L

## 2018-05-04 PROCEDURE — 96365 THER/PROPH/DIAG IV INF INIT: CPT

## 2018-05-04 PROCEDURE — 82565 ASSAY OF CREATININE: CPT | Performed by: FAMILY MEDICINE

## 2018-05-04 PROCEDURE — 25000128 H RX IP 250 OP 636: Performed by: FAMILY MEDICINE

## 2018-05-04 PROCEDURE — 80202 ASSAY OF VANCOMYCIN: CPT | Performed by: FAMILY MEDICINE

## 2018-05-04 RX ORDER — CEFAZOLIN SODIUM 1 G/50ML
1250 SOLUTION INTRAVENOUS EVERY 12 HOURS
Status: CANCELLED | OUTPATIENT
Start: 2018-05-04

## 2018-05-04 RX ORDER — CEFAZOLIN SODIUM 1 G/50ML
1250 SOLUTION INTRAVENOUS EVERY 12 HOURS
Status: DISCONTINUED | OUTPATIENT
Start: 2018-05-04 | End: 2018-05-04

## 2018-05-04 RX ADMIN — SODIUM CHLORIDE 250 ML: 9 INJECTION, SOLUTION INTRAVENOUS at 08:16

## 2018-05-04 RX ADMIN — VANCOMYCIN HYDROCHLORIDE 1250 MG: 1 INJECTION, POWDER, LYOPHILIZED, FOR SOLUTION INTRAVENOUS at 08:19

## 2018-05-04 ASSESSMENT — PAIN SCALES - GENERAL: PAINLEVEL: NO PAIN (0)

## 2018-05-04 NOTE — MR AVS SNAPSHOT
After Visit Summary   5/4/2018    Duane E Sogge    MRN: [id number]           Patient Information     Date Of Birth          1940        Visit Information        Provider Department      5/4/2018 8:00 AM NL INFUSION CHAIR 3 [facility]        Today's Diagnoses     Staphylococcal arthritis of left knee (H)    -  1      Care Instructions    Pt to see Infectious Disease Dr milian.            Follow-ups after your visit        Follow-up notes from your care team     Return if symptoms worsen or fail to improve.      Your next 10 appointments already scheduled     May 09, 2018 10:20 AM CDT   PHYSICAL with Asa Oliver MD   [facility] ([address] [address]   [phone]            May 09, 2018 11:00 AM CDT   Ortho Treatment with Ariana Wharton, PT   [facility] ([facility])    [address] [place] [address] Dr [place] [address]   [phone]            May 11, 2018 10:15 AM CDT   Ortho Treatment with Christina Mohr, PT   [facility] ([facility])    [address] [place] [address] Dr [place] [address]   [phone]            May 15, 2018  2:30 PM CDT   Ortho Treatment with Chritsina Mohr, PT   [facility] ([facility])    [place] [facility] [address] [place] [address]   [phone]            May 17, 2018  3:30 PM CDT   Ortho Treatment with Christina Mohr, PT   [facility] ([facility])    [address] [place] [address] Dr [place] [address] [phone]              Who to contact     If you have questions or need follow up information about today's clinic visit or your schedule please contact [facility] INFUSION SERVICES directly at [phone].  Normal or non-critical lab and imaging results will be communicated to you by MyChart, letter or phone within 4  "business days after the clinic has received the results. If you do not hear from us within 7 days, please contact the clinic through [employer] or phone. If you have a critical or abnormal lab result, we will notify you by phone as soon as possible.  Submit refill requests through [employer] or call your pharmacy and they will forward the refill request to us. Please allow 3 business days for your refill to be completed.          Additional Information About Your Visit        [employer] Information     [employer] lets you send messages to your doctor, view your test results, renew your prescriptions, schedule appointments and more. To sign up, go to [email or web address] . Click on \"Log in\" on the left side of the screen, which will take you to the Welcome page. Then click on \"Sign up Now\" on the right side of the page.     You will be asked to enter the access code listed below, as well as some personal information. Please follow the directions to create your username and password.     Your access code is: [email or web address]  Expires: 2018  5:11 PM     Your access code will  in 90 days. If you need help or a new code, please call your [place] clinic or [phone].        Care EveryWhere ID     This is your Care EveryWhere ID. This could be used by other organizations to access your [place] medical records  [id number]        Your Vitals Were     Pulse Temperature Respirations Pulse Oximetry          53 98.3  F (36.8  C) (Oral) 16 96%         Blood Pressure from Last 3 Encounters:   18 155/68   18 151/74   18 154/84    Weight from Last 3 Encounters:   18 108.4 kg (239 lb)   18 110.2 kg (243 lb)   18 106.6 kg (235 lb)              We Performed the Following     Creatinine     Vancomycin level        Primary Care Provider Office Phone # Fax #    Saqib Chavarria [phone]       [facility] [place] [address] [facility] [address]  [facility] [address]        Equal " Access to Services     [facility]: Hadii carlene layton michelle [place], wabarneyda luqadaha, qaybta kaalmaoriana payne, brittny ying. So [facility] [phone].    ATENCIÓN: Si libertadla cal, tiene a roth disposición servicios gratuitos de asistencia lingüística. Llame al [phone].    We comply with applicable federal civil rights laws and [place] laws. We do not discriminate on the basis of race, color, national origin, age, disability, sex, sexual orientation, or gender identity.            Thank you!     Thank you for choosing [facility]  for your care. Our goal is always to provide you with excellent care. Hearing back from our patients is one way we can continue to improve our services. Please take a few minutes to complete the written survey that you may receive in the mail after your visit with us. Thank you!             Your Updated Medication List - Protect others around you: Learn how to safely use, store and throw away your medicines at www.disposemymeds.org.          This list is accurate as of 5/4/18  3:03 PM.  Always use your most recent med list.                   Brand Name Dispense Instructions for use Diagnosis    ALPRAZolam 0.5 MG tablet    XANAX     Take 1 tab up to 4x daily only if needed. Refill when due,no early refills  Indications: Feeling Anxious        amoxicillin-clavulanate 875-125 MG per tablet    AUGMENTIN    20 tablet    Take 1 tablet by mouth 2 times daily    Acute maxillary sinusitis, recurrence not specified       BENZONATATE PO           GABAPENTIN PO      Take 100 mg by mouth 3 times daily as needed (anxiety) 1-3 capsules        HYDROcodone-acetaminophen 5-325 MG per tablet    NORCO          lisinopril-hydrochlorothiazide 20-12.5 MG per tablet    PRINZIDE/ZESTORETIC     Take 2 tablets by mouth daily        metoprolol succinate 50 MG 24 hr tablet    TOPROL-XL     Take 50 mg by mouth daily        MIRTAZAPINE PO      Take 15 mg by mouth  At Bedtime        senna 8.6 MG tablet    SENOKOT          simvastatin 20 MG tablet    ZOCOR     Take 20 mg by mouth At Bedtime        TEMAZEPAM PO      Take 15 mg by mouth nightly as needed for sleep        TYLENOL PO      Take 325 mg by mouth as needed for mild pain or fever        venlafaxine 150 MG Tb24 24 hr tablet    EFFEXOR-ER     Take 150 mg by mouth daily (with breakfast)

## 2018-05-04 NOTE — PROGRESS NOTES
Pt here for final dose of Vancomycin, tolerated well.  Vanco level drawn as pt was extremely insistent that his dr wanted to see those results today when he saw her.  Pt discharged in stable condition.

## 2018-05-08 ENCOUNTER — TRANSFERRED RECORDS (OUTPATIENT)
Dept: HEALTH INFORMATION MANAGEMENT | Facility: CLINIC | Age: 78
End: 2018-05-08

## 2018-05-08 NOTE — ADDENDUM NOTE
Encounter addended by: Christina Mohr, PT on: 5/8/2018  9:10 AM<BR>     Actions taken: Flowsheet data copied forward, Flowsheet accepted

## 2018-05-09 ENCOUNTER — HOSPITAL ENCOUNTER (OUTPATIENT)
Dept: PHYSICAL THERAPY | Facility: CLINIC | Age: 78
Setting detail: THERAPIES SERIES
End: 2018-05-09
Attending: ORTHOPAEDIC SURGERY
Payer: MEDICARE

## 2018-05-09 ENCOUNTER — OFFICE VISIT (OUTPATIENT)
Dept: FAMILY MEDICINE | Facility: CLINIC | Age: 78
End: 2018-05-09
Payer: COMMERCIAL

## 2018-05-09 VITALS
DIASTOLIC BLOOD PRESSURE: 86 MMHG | WEIGHT: 236.2 LBS | OXYGEN SATURATION: 96 % | BODY MASS INDEX: 35.91 KG/M2 | HEART RATE: 85 BPM | TEMPERATURE: 96 F | SYSTOLIC BLOOD PRESSURE: 158 MMHG

## 2018-05-09 DIAGNOSIS — Z00.00 ANNUAL PHYSICAL EXAM: Primary | ICD-10-CM

## 2018-05-09 DIAGNOSIS — I10 ESSENTIAL HYPERTENSION: ICD-10-CM

## 2018-05-09 DIAGNOSIS — M21.41 ACQUIRED PES PLANUS OF BOTH FEET: ICD-10-CM

## 2018-05-09 DIAGNOSIS — M21.42 ACQUIRED PES PLANUS OF BOTH FEET: ICD-10-CM

## 2018-05-09 DIAGNOSIS — M00.062 STAPHYLOCOCCAL ARTHRITIS OF LEFT KNEE (H): ICD-10-CM

## 2018-05-09 PROCEDURE — 97110 THERAPEUTIC EXERCISES: CPT | Mod: GP

## 2018-05-09 PROCEDURE — 40000718 ZZHC STATISTIC PT DEPARTMENT ORTHO VISIT

## 2018-05-09 PROCEDURE — G0438 PPPS, INITIAL VISIT: HCPCS | Performed by: FAMILY MEDICINE

## 2018-05-09 ASSESSMENT — PAIN SCALES - GENERAL: PAINLEVEL: NO PAIN (0)

## 2018-05-09 NOTE — MR AVS SNAPSHOT
After Visit Summary   5/9/2018    Duane E Sogge    MRN: [id number]           Patient Information     Date Of Birth          1940        Visit Information        Provider Department      5/9/2018 10:20 AM Asa Oliver MD [facility]        Today's Diagnoses     Annual physical exam    -  1    Essential hypertension        Staphylococcal arthritis of left knee (H)        Acquired pes planus of both feet          Care Instructions      Preventive Health Recommendations:   Male Ages 65 and over    Yearly exam:             See your health care provider every year in order to  o   Review health changes.   o   Discuss preventive care.    o   Review your medicines if your doctor has prescribed any.    Talk with your health care provider about whether you should have a test to screen for prostate cancer (PSA).    Every 3 years, have a diabetes test (fasting glucose). If you are at risk for diabetes, you should have this test more often.    Every 5 years, have a cholesterol test. Have this test more often if you are at risk for high cholesterol or heart disease.     Every 10 years, have a colonoscopy. Or, have a yearly FIT test (stool test). These exams will check for colon cancer.    Talk to with your health care provider about screening for Abdominal Aortic Aneurysm if you have a family history of AAA or have a history of smoking.    Shots:     Get a flu shot each year.     Get a tetanus shot every 10 years.     Talk to your doctor about your pneumonia vaccines. There are now two you should receive - Pneumovax (PPSV 23) and Prevnar (PCV 13).     Talk to your doctor about a shingles vaccine.     Talk to your doctor about the hepatitis B vaccine.  Nutrition:     Eat at least 5 servings of fruits and vegetables each day.     Eat whole-grain bread, whole-wheat pasta and brown rice instead of white grains and rice.     Talk to your provider about Calcium and Vitamin D.    Lifestyle    Exercise for at least 150 minutes a week (30 minutes a day, 5 days a week). This will help you control your weight and prevent disease.     Limit alcohol to one drink per day.     No smoking.     Wear sunscreen to prevent skin cancer.     See your dentist every six months for an exam and cleaning.     See your eye doctor every 1 to 2 years to screen for conditions such as glaucoma, macular degeneration, cataracts, etc           Follow-ups after your visit        Additional Services     PODIATRY/FOOT & ANKLE SURGERY REFERRAL       Your provider has referred you to: FM[facility]: [facility] [phone]   [email or web address]    Please be aware that coverage of these services is subject to the terms and limitations of your health insurance plan.  Call member services at your health plan with any benefit or coverage questions.      Please bring the following to your appointment:  >>   Any x-rays, CTs or MRIs which have been performed.  Contact the facility where they were done to arrange for  prior to your scheduled appointment.    >>   List of current medications   >>   This referral request   >>   Any documents/labs given to you for this referral                  Your next 10 appointments already scheduled     May 11, 2018 10:15 AM CDT   Ortho Treatment with Christina Mohr, PT   [facility] ([facility])    [address] [place] [address] Dr [place] [address]   [phone]            May 15, 2018  2:30 PM CDT   Ortho Treatment with Christina Mohr PT   [facility] ([facility])    [address] [facility] [address] [place] [address]   [phone]            May 16, 2018  1:45 PM CDT   New Visit with Duke Frankel DPM   [facility] ([facility])    [address] [place] [address]   [phone]            May 17, 2018  3:30 PM CDT   Ortho  "Treatment with Christina Mohr PT   [facility] ([facility])    [address] [place] [address]  [place] [id number] [phone]            Aug 09, 2018 10:40 AM CDT   SHORT with Asa Oliver MD   [facility]    [address] [facility]  [place] [address] [phone]              Who to contact     If you have questions or need follow up information about today's clinic visit or your schedule please contact [facility] directly at [phone].  Normal or non-critical lab and imaging results will be communicated to you by MyChart, letter or phone within 4 business days after the clinic has received the results. If you do not hear from us within 7 days, please contact the clinic through [employer] or phone. If you have a critical or abnormal lab result, we will notify you by phone as soon as possible.  Submit refill requests through [employer] [place] or call your pharmacy and they will forward the refill request to us. Please allow 3 business days for your refill to be completed.          Additional Information About Your Visit        [employer] Information     [employer] [place] lets you send messages to your doctor, view your test results, renew your prescriptions, schedule appointments and more. To sign up, go to [email or web address] . Click on \"Log in\" on the left side of the screen, which will take you to the Welcome page. Then click on \"Sign up Now\" on the right side of the page.     You will be asked to enter the access code listed below, as well as some personal information. Please follow the directions to create your username and password.     Your access code is: [email or web address]  Expires: 2018  5:11 PM     Your access code will  in 90 days. If you need help or a new code, please call your [place] clinic or [phone].        Care EveryWhere ID     This is your Care EveryWhere ID. This could be used by other " organizations to access your [place] medical records  [id number]        Your Vitals Were     Pulse Temperature Pulse Oximetry BMI (Body Mass Index)          85 96  F (35.6  C) (Temporal) 96% 35.91 kg/m2         Blood Pressure from Last 3 Encounters:   05/09/18 158/86   05/04/18 155/68   05/03/18 151/74    Weight from Last 3 Encounters:   05/09/18 236 lb 3.2 oz (107.1 kg)   04/29/18 239 lb (108.4 kg)   04/28/18 243 lb (110.2 kg)              We Performed the Following     PODIATRY/FOOT & ANKLE SURGERY REFERRAL        Primary Care Provider Office Phone # Fax #    Saqib Chavarria [phone]       [facility] [place] [address] [facility] [address]  [facility] [address]        Equal Access to Services     MATT LEIVA : Hadii aad ku hadasho Soomaali, waaxda luqadaha, qaybta kaalmada adeegyada, brittny portillo hayaaefrain sanderson . So [facility] [phone].    ATENCIÓN: Si habla español, tiene a roth disposición servicios gratuitos de asistencia lingüística. [place] al [phone].    We comply with applicable federal civil rights laws and [place] laws. We do not discriminate on the basis of race, color, national origin, age, disability, sex, sexual orientation, or gender identity.            Thank you!     Thank you for choosing [facility]  for your care. Our goal is always to provide you with excellent care. Hearing back from our patients is one way we can continue to improve our services. Please take a few minutes to complete the written survey that you may receive in the mail after your visit with us. Thank you!             Your Updated Medication List - Protect others around you: Learn how to safely use, store and throw away your medicines at www.disposemymeds.org.          This list is accurate as of 5/9/18 11:51 AM.  Always use your most recent med list.                   Brand Name Dispense Instructions for use Diagnosis    ALPRAZolam 0.5 MG tablet    XANAX     Take 1 tab up to  4x daily only if needed. Refill when due,no early refills  Indications: Feeling Anxious        amoxicillin-clavulanate 875-125 MG per tablet    AUGMENTIN    20 tablet    Take 1 tablet by mouth 2 times daily    Acute maxillary sinusitis, recurrence not specified       BENZONATATE PO           GABAPENTIN PO      Take 100 mg by mouth 3 times daily as needed (anxiety) 1-3 capsules        HYDROcodone-acetaminophen 5-325 MG per tablet    NORCO          lisinopril-hydrochlorothiazide 20-12.5 MG per tablet    PRINZIDE/ZESTORETIC     Take 2 tablets by mouth daily        metoprolol succinate 50 MG 24 hr tablet    TOPROL-XL     Take 50 mg by mouth daily        MIRTAZAPINE PO      Take 15 mg by mouth At Bedtime        senna 8.6 MG tablet    SENOKOT          simvastatin 20 MG tablet    ZOCOR     Take 20 mg by mouth At Bedtime        TEMAZEPAM PO      Take 15 mg by mouth nightly as needed for sleep        TYLENOL PO      Take 325 mg by mouth as needed for mild pain or fever        venlafaxine 150 MG Tb24 24 hr tablet    EFFEXOR-ER     Take 150 mg by mouth daily (with breakfast)

## 2018-05-09 NOTE — PROGRESS NOTES
SUBJECTIVE:   Duane E Sogge is a 77 year old male who presents for Preventive Visit.      Are you in the first 12 months of your Medicare Part B coverage?  No    Healthy Habits:    Do you get at least three servings of calcium containing foods daily (dairy, green leafy vegetables, etc.)? yes    Amount of exercise or daily activities, outside of work: 1-4 hour(s) per day    Problems taking medications regularly No    Medication side effects: No    Have you had an eye exam in the past two years? yes    Do you see a dentist twice per year? yes    Do you have sleep apnea, excessive snoring or daytime drowsiness?no      Ability to successfully perform activities of daily living: Yes, no assistance needed    Home safety:  none identified     Hearing impairment: Yes, hearing aids    Fall risk:  Fallen 2 or more times in the past year?: No  Any fall with injury in the past year?: No        COGNITIVE SCREEN  1) Repeat 3 items (Banana, Sunrise, Chair)    2) Clock draw: ABNORMAL hands not in the right spot  3) 3 item recall: Recalls 1 object   Results: ABNORMAL clock, 1-2 items recalled: PROBABLE COGNITIVE IMPAIRMENT, **INFORM PROVIDER**    Mini-CogTM Copyright S Monse. Licensed by the author for use in [facility]; reprinted with permission (ness@[facility]). All rights reserved.                Reviewed and updated as needed this visit by clinical staff  Tobacco  Allergies  Meds         Reviewed and updated as needed this visit by Provider        Social History   Substance Use Topics     Smoking status: Never Smoker     Smokeless tobacco: Current User     Types: Snuff     Alcohol use 1.2 oz/week     2 Cans of beer per week      Comment: Occasionally       If you drink alcohol do you typically have >3 drinks per day or >7 drinks per week? No                        Today's PHQ-2 Score:   PHQ-2 ( 1999 [employer]) 11/3/2017 5/8/2017   Q1: Little interest or pleasure in doing things 0 0   Q2: Feeling down, depressed  "or hopeless 0 0   PHQ-2 Score 0 0       Do you feel safe in your environment - Yes    Do you have a Health Care Directive?: No: Advance care planning reviewed with patient; information given to patient to review.    Current providers sharing in care for this patient include:   Patient Care Team:  Saqib Chavarria MD as PCP - General    The following health maintenance items are reviewed in Epic and correct as of today:  Health Maintenance   Topic Date Due     ADVANCE DIRECTIVE PLANNING Q5 YRS  10/04/1994     PNEUMOCOCCAL (2 of 2 - PCV13) 01/10/2014     TETANUS IMMUNIZATION (SYSTEM ASSIGNED)  08/12/2015     INFLUENZA VACCINE (Season Ended) 09/01/2018     FALL RISK ASSESSMENT  05/09/2019     LIPID SCREEN Q5 YR MALE (SYSTEM ASSIGNED)  05/08/2022     Labs reviewed in EPIC        ROS:  Constitutional, HEENT, cardiovascular, pulmonary, gi and gu systems are negative, except as otherwise noted.    OBJECTIVE:   /86  Pulse 85  Temp 96  F (35.6  C) (Temporal)  Wt 236 lb 3.2 oz (107.1 kg)  SpO2 96%  BMI 35.91 kg/m2 Estimated body mass index is 35.91 kg/(m^2) as calculated from the following:    Height as of 4/28/18: 5' 8\" (1.727 m).    Weight as of this encounter: 236 lb 3.2 oz (107.1 kg).  EXAM:   Annual GENERAL: healthy, alert and no distress  EYES: Eyes grossly normal to inspection, PERRL and conjunctivae and sclerae normal  HENT: ear canals and TM's normal, nose and mouth without ulcers or lesions  NECK: no adenopathy, no asymmetry, masses, or scars and thyroid normal to palpation  RESP: lungs clear to auscultation - no rales, rhonchi or wheezes  CV: regular rate and rhythm, normal S1 S2, no S3 or S4, no murmur, click or rub, no peripheral edema, and peripheral pulses strong  ABDOMEN: soft, nontender, no hepatosplenomegaly, no masses and bowel sounds normal  MS: no gross musculoskeletal defects noted, no edema  SKIN: no suspicious lesions or rashes  NEURO: Normal strength and tone, mentation intact and speech " "normal  PSYCH: mentation appears normal, affect normal/bright    ASSESSMENT / PLAN:       ICD-10-CM    1. Annual physical exam Z00.00    2. Essential hypertension I10    3. Staphylococcal arthritis of left knee (H) M00.062    4. Acquired pes planus of both feet M21.41 PODIATRY/FOOT & ANKLE SURGERY REFERRAL    M21.42        End of Life Planning:  Patient currently has an advanced directive: No.  I have verified the patient's ablity to prepare an advanced directive/make health care decisions.  Literature was provided to assist patient in preparing an advanced directive.    COUNSELING:  Reviewed preventive health counseling, as reflected in patient instructions        Estimated body mass index is 35.91 kg/(m^2) as calculated from the following:    Height as of 4/28/18: 5' 8\" (1.727 m).    Weight as of this encounter: 236 lb 3.2 oz (107.1 kg).       reports that he has never smoked. His smokeless tobacco use includes Snuff.      Appropriate preventive services were discussed with this patient, including applicable screening as appropriate for cardiovascular disease, diabetes, osteopenia/osteoporosis, and glaucoma.  As appropriate for age/gender, discussed screening for colorectal cancer, prostate cancer, breast cancer, and cervical cancer. Checklist reviewing preventive services available has been given to the patient.    Reviewed patients plan of care and provided an AVS. The Basic Care Plan (routine screening as documented in Health Maintenance) for Duane meets the Care Plan requirement. This Care Plan has been established and reviewed with the Patient.    Counseling Resources:  ATP IV Guidelines  Pooled Cohorts Equation Calculator  Breast Cancer Risk Calculator  FRAX Risk Assessment  ICSI Preventive Guidelines  Dietary Guidelines for Americans, 2010  USDA's MyPlate  ASA Prophylaxis  Lung CA Screening    Asa Oliver MD  [facility]  "

## 2018-05-09 NOTE — NURSING NOTE
Health Maintenance Due   Topic Date Due     TETANUS IMMUNIZATION (SYSTEM ASSIGNED)  10/04/1957     ADVANCE DIRECTIVE PLANNING Q5 YRS  10/04/1994     PNEUMOCOCCAL (1 of 2 - PCV13) 10/04/2004     FALL RISK ASSESSMENT  05/08/2018       Health Maintenance reviewed at today's visit patient asked to schedule/complete:   Patient is aware.

## 2018-05-11 ENCOUNTER — OFFICE VISIT (OUTPATIENT)
Dept: INTERNAL MEDICINE | Facility: CLINIC | Age: 78
End: 2018-05-11
Payer: COMMERCIAL

## 2018-05-11 ENCOUNTER — HOSPITAL ENCOUNTER (OUTPATIENT)
Dept: PHYSICAL THERAPY | Facility: CLINIC | Age: 78
Setting detail: THERAPIES SERIES
End: 2018-05-11
Attending: ORTHOPAEDIC SURGERY
Payer: MEDICARE

## 2018-05-11 VITALS
OXYGEN SATURATION: 98 % | RESPIRATION RATE: 20 BRPM | WEIGHT: 235 LBS | HEART RATE: 80 BPM | BODY MASS INDEX: 35.73 KG/M2 | TEMPERATURE: 97 F | SYSTOLIC BLOOD PRESSURE: 138 MMHG | DIASTOLIC BLOOD PRESSURE: 88 MMHG

## 2018-05-11 DIAGNOSIS — M00.062 STAPHYLOCOCCAL ARTHRITIS OF LEFT KNEE (H): Primary | ICD-10-CM

## 2018-05-11 DIAGNOSIS — L98.9 SKIN LESION OF LEFT LEG: ICD-10-CM

## 2018-05-11 PROCEDURE — 97116 GAIT TRAINING THERAPY: CPT | Mod: GP

## 2018-05-11 PROCEDURE — 97112 NEUROMUSCULAR REEDUCATION: CPT | Mod: GP

## 2018-05-11 PROCEDURE — 99213 OFFICE O/P EST LOW 20 MIN: CPT | Performed by: INTERNAL MEDICINE

## 2018-05-11 PROCEDURE — 40000718 ZZHC STATISTIC PT DEPARTMENT ORTHO VISIT

## 2018-05-11 ASSESSMENT — PAIN SCALES - GENERAL: PAINLEVEL: NO PAIN (0)

## 2018-05-11 NOTE — PROGRESS NOTES
SUBJECTIVE:   Duane E Sogge is a 77 year old male who presents to clinic today for the following health issues:      Chief Complaint   Patient presents with     Derm Problem     check spots left knee     Concern with a skin spot on his left lower leg, no fevers, no pain.    He is still on antibiotics.    Past Medical History:   Diagnosis Date     Anxiety      Hypertension      Current Outpatient Prescriptions   Medication     Acetaminophen (TYLENOL PO)     ALPRAZolam (XANAX) 0.5 MG tablet     amoxicillin-clavulanate (AUGMENTIN) 875-125 MG per tablet     BENZONATATE PO     GABAPENTIN PO     lisinopril-hydrochlorothiazide (PRINZIDE/ZESTORETIC) 20-12.5 MG per tablet     metoprolol (TOPROL-XL) 50 MG 24 hr tablet     MIRTAZAPINE PO     simvastatin (ZOCOR) 20 MG tablet     TEMAZEPAM PO     venlafaxine (EFFEXOR-ER) 150 MG TB24 24 hr tablet     No current facility-administered medications for this visit.      Physical Exam  /88  Pulse 80  Temp 97  F (36.1  C) (Temporal)  Resp 20  Wt 235 lb (106.6 kg)  SpO2 98%  BMI 35.73 kg/m2  General Appearance-healthy, alert, no distress  Left lower leg with a small change in skin tone on a lesion the size of a dime, no erythema, no pain, appears to be an age spot, no skin breakdown.    ASSESSMENT:  Patient with previous left knee infection after knee replacement. He is still on antibiotics, oral now. He is worried about skin lesion but doesn't appear like infection, not near the knee or incision, no drainage, no pain, no systemic symptoms.  He can try regular lotion to moisture and avoid skin dryness and cracks.    Electronically signed by Rajesh Murray MD

## 2018-05-11 NOTE — MR AVS SNAPSHOT
After Visit Summary   5/11/2018    Duane E Sogge    MRN: [id number]           Patient Information     Date Of Birth          1940        Visit Information        Provider Department      5/11/2018 12:00 PM Rajesh Murray MD [facility]         Follow-ups after your visit        Your next 10 appointments already scheduled     May 11, 2018 12:00 PM CDT   [facility] Visit with Rajesh [facility] MD   [facility]    [address] [place] [address]   [phone]           Bring a current list of meds and any records pertaining to this visit. For Physicals, please bring immunization records and any forms needing to be filled out. Please arrive 10 minutes early to complete paperwork.            May 15, 2018  2:30 PM CDT   Ortho Treatment with Christina Mohr, PT   [facility] ([facility])    [address] [place] [address] Dr [place] [address]   [phone]            May 16, 2018  1:45 PM CDT   New Visit with Duke Frankel DPM   [facility]    [address] [place] [address]   [phone]            May 17, 2018  3:30 PM CDT   Ortho Treatment with Christina Mohr PT   [facility] ([facility])    [address] [place] [address] [address] [place] [address]   [phone]            May 29, 2018  2:45 PM CDT   Ortho Treatment with Christina Mohr PT   [facility] ([facility])    [address]  [place] [address]   [phone]            Jun 06, 2018 10:00 AM CDT   Ortho Treatment with Christina Mohr PT   [facility] ([facility])    [place] [address]   [phone]            Jun 08, 2018 10:00 AM CDT   Ortho Treatment with Christina Mohr, PT   [facility] ([facility])     "[address] [place] [address] [address] [place] [address]   [phone]            Jun 13, 2018 10:00 AM CDT   Ortho Treatment with Christina Mohr, LAISHA   [facility] ([facility])    [place] [facility] [address] [place] [address]   [phone]            Andrew 15, 2018 10:00 AM CDT   Ortho Treatment with Christina Mohr PT   [facility] ([facility])    [address] [place] [address] [address] [place] [address]   [phone]            Aug 09, 2018 10:40 AM CDT   SHORT with Asa Oliver MD   [facility]    [address] [facility] [address]   [phone]              Who to contact     If you have questions or need follow up information about today's clinic visit or your schedule please contact [facility] directly at [phone].  Normal or non-critical lab and imaging results will be communicated to you by [employer], letter or phone within 4 business days after the clinic has received the results. If you do not hear from us within 7 days, please contact the clinic through [employer] or phone. If you have a critical or abnormal lab result, we will notify you by phone as soon as possible.  Submit refill requests through [employer] or call your pharmacy and they will forward the refill request to us. Please allow 3 business days for your refill to be completed.          Additional Information About Your Visit        [employer] Information     [employer] lets you send messages to your doctor, view your test results, renew your prescriptions, schedule appointments and more. To sign up, go to [email or web address] . Click on \"Log in\" on the left side of the screen, which will take you to the Welcome page. Then click on \"Sign up Now\" on the right side of the page.     You will be asked to enter the access code listed below, as well as some personal information. Please follow the directions to create your " username and password.     Your access code is: [email or web address]  Expires: 2018  5:11 PM     Your access code will  in 90 days. If you need help or a new code, please call your [facility] clinic or [phone].        Care EveryWhere ID     This is your Care EveryWhere ID. This could be used by other organizations to access your [facility] medical records  [id number]        Your Vitals Were     Pulse Temperature Respirations Pulse Oximetry BMI (Body Mass Index)       80 97  F (36.1  C) (Temporal) 20 98% 35.73 kg/m2        Blood Pressure from Last 3 Encounters:   18 138/88   18 158/86   18 155/68    Weight from Last 3 Encounters:   18 235 lb (106.6 kg)   18 236 lb 3.2 oz (107.1 kg)   18 239 lb (108.4 kg)              Today, you had the following     No orders found for display       Primary Care Provider Office Phone # Fax #    Saqib Chavarria [phone]       [facility] [address]  [facility] [address]        Equal Access to Services     STARR LEIVA : Hadii aad ku hadasho Soomaali, waaxda luqadaha, qaybta kaalmada adeegyada, waxay idiin hayfaizan lalit ying. So [facility] [phone].    ATENCIÓN: Si habla español, tiene a roth disposición servicios gratuitos de asistencia lingüística. Llame al [phone].    We comply with applicable federal civil rights laws and [place] laws. We do not discriminate on the basis of race, color, national origin, age, disability, sex, sexual orientation, or gender identity.            Thank you!     Thank you for choosing [facility]  for your care. Our goal is always to provide you with excellent care. Hearing back from our patients is one way we can continue to improve our services. Please take a few minutes to complete the written survey that you may receive in the mail after your visit with us. Thank you!             Your Updated Medication List - Protect others around  you: Learn how to safely use, store and throw away your medicines at www.disposemymeds.org.          This list is accurate as of 5/11/18 11:48 AM.  Always use your most recent med list.                   Brand Name Dispense Instructions for use Diagnosis    ALPRAZolam 0.5 MG tablet    XANAX     Take 1 tab up to 4x daily only if needed. Refill when due,no early refills  Indications: Feeling Anxious        amoxicillin-clavulanate 875-125 MG per tablet    AUGMENTIN    20 tablet    Take 1 tablet by mouth 2 times daily    Acute maxillary sinusitis, recurrence not specified       BENZONATATE PO           GABAPENTIN PO      Take 100 mg by mouth 3 times daily as needed (anxiety) 1-3 capsules        lisinopril-hydrochlorothiazide 20-12.5 MG per tablet    PRINZIDE/ZESTORETIC     Take 2 tablets by mouth daily        metoprolol succinate 50 MG 24 hr tablet    TOPROL-XL     Take 50 mg by mouth daily        MIRTAZAPINE PO      Take 15 mg by mouth At Bedtime        simvastatin 20 MG tablet    ZOCOR     Take 20 mg by mouth At Bedtime        TEMAZEPAM PO      Take 15 mg by mouth nightly as needed for sleep        TYLENOL PO      Take 325 mg by mouth as needed for mild pain or fever        venlafaxine 150 MG Tb24 24 hr tablet    EFFEXOR-ER     Take 150 mg by mouth daily (with breakfast)

## 2018-05-15 ENCOUNTER — HOSPITAL ENCOUNTER (OUTPATIENT)
Dept: PHYSICAL THERAPY | Facility: CLINIC | Age: 78
Setting detail: THERAPIES SERIES
End: 2018-05-15
Attending: ORTHOPAEDIC SURGERY
Payer: MEDICARE

## 2018-05-15 PROCEDURE — 97116 GAIT TRAINING THERAPY: CPT | Mod: GP

## 2018-05-15 PROCEDURE — 97110 THERAPEUTIC EXERCISES: CPT | Mod: GP

## 2018-05-15 PROCEDURE — 40000718 ZZHC STATISTIC PT DEPARTMENT ORTHO VISIT

## 2018-05-16 ENCOUNTER — OFFICE VISIT (OUTPATIENT)
Dept: PODIATRY | Facility: CLINIC | Age: 78
End: 2018-05-16
Payer: COMMERCIAL

## 2018-05-16 VITALS
SYSTOLIC BLOOD PRESSURE: 134 MMHG | HEIGHT: 67 IN | DIASTOLIC BLOOD PRESSURE: 60 MMHG | BODY MASS INDEX: 36.88 KG/M2 | WEIGHT: 235 LBS | TEMPERATURE: 97.5 F

## 2018-05-16 DIAGNOSIS — Q66.6 PES VALGUS: Primary | ICD-10-CM

## 2018-05-16 PROCEDURE — 99203 OFFICE O/P NEW LOW 30 MIN: CPT | Performed by: PODIATRIST

## 2018-05-16 ASSESSMENT — PAIN SCALES - GENERAL: PAINLEVEL: MODERATE PAIN (4)

## 2018-05-16 NOTE — LETTER
5/16/2018         RE: Duane E Sogge  [address] [facility] [address]        Dear Colleague,    Thank you for referring your patient, Duane E Sogge, to the [facility]. Please see a copy of my visit note below.    HPI:  Duane E Sogge is a 77 year old male who is seen in consultation at the request of self.    Pt presents for eval of:   (Onset, Location, L/R, Character, Treatments, Injury if yes)     Ongoing flat feet. No injury noted.  Constant, dull ache, pain 3  Intermittent, sharp, stabbing, throbbing, w/pressure or standing or walking a longer period of time, pain 7  Currently wears orthotics    Actively retired.    Weight management plan: Patient was referred to their PCP to discuss a diet and exercise plan.     Patient to follow up with Primary Care provider regarding elevated blood pressure.    ROS:  10 point ROS neg other than the symptoms noted above in the HPI.    PAST MEDICAL HISTORY:   Past Medical History:   Diagnosis Date     Anxiety      Hypertension         PAST SURGICAL HISTORY:   Past Surgical History:   Procedure Laterality Date     ORTHOPEDIC SURGERY          MEDICATIONS:   Current Outpatient Prescriptions:      Acetaminophen (TYLENOL PO), Take 325 mg by mouth as needed for mild pain or fever, Disp: , Rfl:      ALPRAZolam (XANAX) 0.5 MG tablet, Take 1 tab up to 4x daily only if needed. Refill when due,no early refills  Indications: Feeling Anxious, Disp: , Rfl:      amoxicillin-clavulanate (AUGMENTIN) 875-125 MG per tablet, Take 1 tablet by mouth 2 times daily, Disp: 20 tablet, Rfl: 0     BENZONATATE PO, , Disp: , Rfl:      GABAPENTIN PO, Take 100 mg by mouth 3 times daily as needed (anxiety) 1-3 capsules, Disp: , Rfl:      lisinopril-hydrochlorothiazide (PRINZIDE/ZESTORETIC) 20-12.5 MG per tablet, Take 2 tablets by mouth daily, Disp: , Rfl:      metoprolol (TOPROL-XL) 50 MG 24 hr tablet, Take 50 mg by mouth daily, Disp: , Rfl:      MIRTAZAPINE PO, Take 15 mg by mouth At Bedtime,  "Disp: , Rfl:      simvastatin (ZOCOR) 20 MG tablet, Take 20 mg by mouth At Bedtime, Disp: , Rfl:      TEMAZEPAM PO, Take 15 mg by mouth nightly as needed for sleep, Disp: , Rfl:      venlafaxine (EFFEXOR-ER) 150 MG TB24 24 hr tablet, Take 150 mg by mouth daily (with breakfast), Disp: , Rfl:      ALLERGIES:    Allergies   Allergen Reactions     Rifampin Anaphylaxis     Elevated lfts     Motrin [Ibuprofen] Itching        SOCIAL HISTORY:   Social History     Social History     Marital status:      Spouse name: N/A     Number of children: N/A     Years of education: N/A     Occupational History     Not on file.     Social History Main Topics     Smoking status: Never Smoker     Smokeless tobacco: Current User     Types: Snuff     Alcohol use 1.2 oz/week     2 Cans of beer per week      Comment: Occasionally     Drug use: No     Sexual activity: Yes     Partners: Female     Other Topics Concern     Not on file     Social History Narrative        FAMILY HISTORY: History reviewed. No pertinent family history.     EXAM:Vitals: /60 (BP Location: Left arm, Cuff Size: Adult Large)  Temp 97.5  F (36.4  C) (Temporal)  Ht 5' 7\" (1.702 m)  Wt 235 lb (106.6 kg)  BMI 36.81 kg/m2  BMI= Body mass index is 36.81 kg/(m^2).    General appearance: Patient is alert and fully cooperative with history & exam.  No sign of distress is noted during the visit.     Psychiatric: Affect is pleasant & appropriate.  Patient appears motivated to improve health.     Respiratory: Breathing is regular & unlabored while sitting.     HEENT: Hearing is intact to spoken word.  Speech is clear.  No gross evidence of visual impairment that would impact ambulation.     Vascular: DP & PT pulses are intact & regular bilaterally.  No significant edema or varicosities noted.  CFT and skin temperature is normal to both lower extremities.     Neurologic: Lower extremity sensation is intact to light touch.  No evidence of weakness or contracture in " the lower extremities.  No evidence of neuropathy.    Dermatologic: Skin is intact to both lower extremities with adequate texture, turgor and tone about the integument.  No paronychia or evidence of soft tissue infection is noted.     Musculoskeletal: Patient is ambulatory with custom molded orthotics that are no longer well fabricated to his arch foot shape.  Materials are also compressed or consumed.  Patient has considerable valgus bilateral with crepitus throughout the rear foot and ankle bilateral.  No weakness noted.  Calcaneus near perpendicular with mild valgus upon stance.  Manual muscle strength adequate for unrestricted ambulation.    Radiographs: Deferred     ASSESSMENT:       ICD-10-CM    1. Pes valgus Q66.6 ORTHOTICS REFERRAL        PLAN:  Reviewed patient's chart in [facility].      5/16/2018   Order for orthotics.   Written instructions regarding proper shoe gear  Follow-up as needed      Duke Frankel DPM      Again, thank you for allowing me to participate in the care of your patient.        Sincerely,        Duke Frankel DPM

## 2018-05-16 NOTE — PATIENT INSTRUCTIONS
Reliable shoe stores: To maximize your experience and provide the best possible fit.  Be sure to show them your foot concerns and tell them Dr. Frankel sent you.      Stores listed in bold have only athletic shoes, and stores that are not bold are mostly casual or variety of shoes    [employer]  [address] [place] [address]  [phone]    [employer] [place]  [address] [place] [address]  [phone]     [employer] [place]  [address] [place] [address]  [phone]    [employer]  [address] [facility] [address]  [phone]    [employer]  [address] [place], [place] [address] [phone]    [employer]  [address] [place] [address]  [phone]                         [employer] Locations:     [address] [place] [address]   [phone]     [address] [place] [address] [address] [place] [address]   [phone]     [address] [place] [address]   [phone]     [address] [facility] [place] [address]   [phone]     [address] [place] [address]   [phone]     [address] [place] [address]   [phone]     [address] [facility]   [place] [address]   [phone]     [address] [facility]   [place] [address]   [phone]             How to find reasonable shoes          The correct width    Correct Fitting    Correct Length      Foot Distortion    Posture Distortion                          Torsional Rigidity      Grasp behind the heel and underneath the foot and twist      Bad    Excessive torsion/twist in midfoot     Less torsion/twist in midfoot is better                   Heel Counter Rigidity      Grasp just above   midsole and squeeze      Bad    Soft heel counter      Good    Rigid Heel Counter      Flexion Rigidity      Grasp shoe and bend from forefoot to rearfoot

## 2018-05-16 NOTE — MR AVS SNAPSHOT
After Visit Summary   5/16/2018    Duane E Sogge    MRN: [id number]           Patient Information     Date Of Birth          1940        Visit Information        Provider Department      5/16/2018 1:45 PM Duke Frankel DPM [facility]'s Diagnoses     Pes valgus    -  1      Care Instructions    Reliable shoe stores: To maximize your experience and provide the best possible fit.  Be sure to show them your foot concerns and tell them Dr. Frankel sent you.      Stores listed in bold have only athletic shoes, and stores that are not bold are mostly casual or variety of shoes    [employer]  [address] [place] [address]  [phone]    [employer] [place]  [address] [place] [address]  [phone]    [facility] [employer] [place]  [address] [place] [address]  [phone]    [employer]  [address] [facility]  [facility] [address]  [phone]    [employer]  [address] [place] [address]  [phone]    [employer]  [address] [place] [address]  [phone]                         [employer] Locations:     [address] [place] [address]   [phone]     [address] [place] [address] [address] [place] [address]   [phone]     [address] [place] [address]   [phone]     [address] [place] [address]   [phone]     [address] [facility] [facility] [place] [address]   [phone]     [address] [facility] [place] [address]   [phone]     [address]  [place] [facility] [address]   [place] [address]   [phone]     [address]   [place], [place] [address] [phone]             How to find reasonable shoes          The correct width    Correct Fitting    Correct Length      Foot Distortion    Posture Distortion                          Torsional Rigidity      Grasp behind the heel and underneath the foot and twist      Bad    Excessive  "torsion/twist in midfoot     Less torsion/twist in midfoot is better                   Heel Counter Rigidity      Grasp just above   midsole and squeeze      Bad    Soft heel counter      Good    Rigid Heel Counter      Flexion Rigidity      Grasp shoe and bend from forefoot to rearfoot                        Follow-ups after your visit        Additional Services     ORTHOTICS REFERRAL       [facility] scheduling staff may contact patient to arrange appointments for casting of orthotics and often do not leave messages.  The patient may call this number for scheduling at their convenience. Scheduling Phone [phone].      One pair custom functional foot orthotics.   Flexible polypropylene shell with 1/8\" Spenco cushioned top cover, crepe rearfoot post, medial density arch fill, JAMES bottom cover.  Aerobic model.    May add medial flange to help arch like his current orthotic.                  Your next 10 appointments already scheduled     May 17, 2018  3:30 PM CDT   Ortho Treatment with Christina Mohr, PT   [facility])    [address] [place] [address] [address] [place] [address]   [phone]            May 29, 2018  2:45 PM CDT   Ortho Treatment with Christina Mohr, PT   [facility] ([facility])    [address] [place] [address] [address] [place] [address]   [phone]            Jun 06, 2018 10:00 AM CDT   Ortho Treatment with Christina Mohr, PT   [facility] ([facility])    [address] [place] [address] [address] [place] [address]   [phone]            Jun 08, 2018 10:00 AM CDT   Ortho Treatment with Christina Mohr PT   [facility] ([facility])    [facility] [address] [place] [address]   [phone]            Jun 13, 2018 10:00 AM CDT   Ortho Treatment with Christina Mohr PT   [facility] ([facility])    [address] " "[place] [address]   [phone]            Andrew 15, 2018 10:00 AM CDT   Ortho Treatment with Christina Mohr PT   [facility] ([facility])    [address] [place] [address]  [place] [address]   [phone]            Aug 09, 2018 10:40 AM CDT   SHORT with Asa Oliver MD   [facility]    [address] [place] [address]   [phone]              Who to contact     If you have questions or need follow up information about today's clinic visit or your schedule please contact [facility] directly at [phone].  Normal or non-critical lab and imaging results will be communicated to you by MyChart, letter or phone within 4 business days after the clinic has received the results. If you do not hear from us within 7 days, please contact the clinic through MyChart or phone. If you have a critical or abnormal lab result, we will notify you by phone as soon as possible.  Submit refill requests through [employer] or call your pharmacy and they will forward the refill request to us. Please allow 3 business days for your refill to be completed.          Additional Information About Your Visit        [employer] Information     [employer] lets you send messages to your doctor, view your test results, renew your prescriptions, schedule appointments and more. To sign up, go to [email or web address] . Click on \"Log in\" on the left side of the screen, which will take you to the Welcome page. Then click on \"Sign up Now\" on the right side of the page.     You will be asked to enter the access code listed below, as well as some personal information. Please follow the directions to create your username and password.     Your access code is: [email or web address]  Expires: 2018  5:11 PM     Your access code will  in 90 days. If you need help or a new code, please call your [facility] or [phone].        Care EveryWhere ID  " "   This is your Care EveryWhere ID. This could be used by other organizations to access your [facility] medical records  [id number]        Your Vitals Were     Temperature Height BMI (Body Mass Index)             97.5  F (36.4  C) (Temporal) 5' 7\" (1.702 m) 36.81 kg/m2          Blood Pressure from Last 3 Encounters:   05/16/18 134/60   05/11/18 138/88   05/09/18 158/86    Weight from Last 3 Encounters:   05/16/18 235 lb (106.6 kg)   05/11/18 235 lb (106.6 kg)   05/09/18 236 lb 3.2 oz (107.1 kg)              We Performed the Following     ORTHOTICS REFERRAL        Primary Care Provider Office Phone # Fax #    Saqib Chavarria [phone]       [place] [facility] [address] [facility] [address]  [facility] [address]        Equal Access to Services     MATT LEIVA : Hadii aad ku hadasho Soomaali, waaxda luqadaha, qaybta kaalmada adeegyada, waxay froylanin hayfaizan lalit sanderson . So [facility] [phone].    ATENCIÓN: Si alma espsd, tiene a roth disposición servicios gratuitos de asistencia lingüística. Llame al [phone].    We comply with applicable federal civil rights laws and [place] laws. We do not discriminate on the basis of race, color, national origin, age, disability, sex, sexual orientation, or gender identity.            Thank you!     Thank you for choosing [facility]  for your care. Our goal is always to provide you with excellent care. Hearing back from our patients is one way we can continue to improve our services. Please take a few minutes to complete the written survey that you may receive in the mail after your visit with us. Thank you!             Your Updated Medication List - Protect others around you: Learn how to safely use, store and throw away your medicines at www.disposemymeds.org.          This list is accurate as of 5/16/18  2:22 PM.  Always use your most recent med list.                   Brand Name Dispense Instructions for use Diagnosis    " ALPRAZolam 0.5 MG tablet    XANAX     Take 1 tab up to 4x daily only if needed. Refill when due,no early refills  Indications: Feeling Anxious        amoxicillin-clavulanate 875-125 MG per tablet    AUGMENTIN    20 tablet    Take 1 tablet by mouth 2 times daily    Acute maxillary sinusitis, recurrence not specified       BENZONATATE PO           GABAPENTIN PO      Take 100 mg by mouth 3 times daily as needed (anxiety) 1-3 capsules        lisinopril-hydrochlorothiazide 20-12.5 MG per tablet    PRINZIDE/ZESTORETIC     Take 2 tablets by mouth daily        metoprolol succinate 50 MG 24 hr tablet    TOPROL-XL     Take 50 mg by mouth daily        MIRTAZAPINE PO      Take 15 mg by mouth At Bedtime        simvastatin 20 MG tablet    ZOCOR     Take 20 mg by mouth At Bedtime        TEMAZEPAM PO      Take 15 mg by mouth nightly as needed for sleep        TYLENOL PO      Take 325 mg by mouth as needed for mild pain or fever        venlafaxine 150 MG Tb24 24 hr tablet    EFFEXOR-ER     Take 150 mg by mouth daily (with breakfast)

## 2018-05-16 NOTE — PROGRESS NOTES
HPI:  Duane E Sogge is a 77 year old male who is seen in consultation at the request of self.    Pt presents for eval of:   (Onset, Location, L/R, Character, Treatments, Injury if yes)     Ongoing flat feet. No injury noted.  Constant, dull ache, pain 3  Intermittent, sharp, stabbing, throbbing, w/pressure or standing or walking a longer period of time, pain 7  Currently wears orthotics    Actively retired.    Weight management plan: Patient was referred to their PCP to discuss a diet and exercise plan.     Patient to follow up with Primary Care provider regarding elevated blood pressure.    ROS:  10 point ROS neg other than the symptoms noted above in the HPI.    PAST MEDICAL HISTORY:   Past Medical History:   Diagnosis Date     Anxiety      Hypertension         PAST SURGICAL HISTORY:   Past Surgical History:   Procedure Laterality Date     ORTHOPEDIC SURGERY          MEDICATIONS:   Current Outpatient Prescriptions:      Acetaminophen (TYLENOL PO), Take 325 mg by mouth as needed for mild pain or fever, Disp: , Rfl:      ALPRAZolam (XANAX) 0.5 MG tablet, Take 1 tab up to 4x daily only if needed. Refill when due,no early refills  Indications: Feeling Anxious, Disp: , Rfl:      amoxicillin-clavulanate (AUGMENTIN) 875-125 MG per tablet, Take 1 tablet by mouth 2 times daily, Disp: 20 tablet, Rfl: 0     BENZONATATE PO, , Disp: , Rfl:      GABAPENTIN PO, Take 100 mg by mouth 3 times daily as needed (anxiety) 1-3 capsules, Disp: , Rfl:      lisinopril-hydrochlorothiazide (PRINZIDE/ZESTORETIC) 20-12.5 MG per tablet, Take 2 tablets by mouth daily, Disp: , Rfl:      metoprolol (TOPROL-XL) 50 MG 24 hr tablet, Take 50 mg by mouth daily, Disp: , Rfl:      MIRTAZAPINE PO, Take 15 mg by mouth At Bedtime, Disp: , Rfl:      simvastatin (ZOCOR) 20 MG tablet, Take 20 mg by mouth At Bedtime, Disp: , Rfl:      TEMAZEPAM PO, Take 15 mg by mouth nightly as needed for sleep, Disp: , Rfl:      venlafaxine (EFFEXOR-ER) 150 MG TB24 24 hr  "tablet, Take 150 mg by mouth daily (with breakfast), Disp: , Rfl:      ALLERGIES:    Allergies   Allergen Reactions     Rifampin Anaphylaxis     Elevated lfts     Motrin [Ibuprofen] Itching        SOCIAL HISTORY:   Social History     Social History     Marital status:      Spouse name: N/A     Number of children: N/A     Years of education: N/A     Occupational History     Not on file.     Social History Main Topics     Smoking status: Never Smoker     Smokeless tobacco: Current User     Types: Snuff     Alcohol use 1.2 oz/week     2 Cans of beer per week      Comment: Occasionally     Drug use: No     Sexual activity: Yes     Partners: Female     Other Topics Concern     Not on file     Social History Narrative        FAMILY HISTORY: History reviewed. No pertinent family history.     EXAM:Vitals: /60 (BP Location: Left arm, Cuff Size: Adult Large)  Temp 97.5  F (36.4  C) (Temporal)  Ht 5' 7\" (1.702 m)  Wt 235 lb (106.6 kg)  BMI 36.81 kg/m2  BMI= Body mass index is 36.81 kg/(m^2).    General appearance: Patient is alert and fully cooperative with history & exam.  No sign of distress is noted during the visit.     Psychiatric: Affect is pleasant & appropriate.  Patient appears motivated to improve health.     Respiratory: Breathing is regular & unlabored while sitting.     HEENT: Hearing is intact to spoken word.  Speech is clear.  No gross evidence of visual impairment that would impact ambulation.     Vascular: DP & PT pulses are intact & regular bilaterally.  No significant edema or varicosities noted.  CFT and skin temperature is normal to both lower extremities.     Neurologic: Lower extremity sensation is intact to light touch.  No evidence of weakness or contracture in the lower extremities.  No evidence of neuropathy.    Dermatologic: Skin is intact to both lower extremities with adequate texture, turgor and tone about the integument.  No paronychia or evidence of soft tissue infection is " noted.     Musculoskeletal: Patient is ambulatory with custom molded orthotics that are no longer well fabricated to his arch foot shape.  Materials are also compressed or consumed.  Patient has considerable valgus bilateral with crepitus throughout the rear foot and ankle bilateral.  No weakness noted.  Calcaneus near perpendicular with mild valgus upon stance.  Manual muscle strength adequate for unrestricted ambulation.    Radiographs: Deferred     ASSESSMENT:       ICD-10-CM    1. 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      5/16/2018   Order for orthotics.   Written instructions regarding proper shoe gear  Follow-up as needed      Duke Frankel DPM

## 2018-05-17 ENCOUNTER — HOSPITAL ENCOUNTER (OUTPATIENT)
Dept: PHYSICAL THERAPY | Facility: CLINIC | Age: 78
Setting detail: THERAPIES SERIES
End: 2018-05-17
Attending: ORTHOPAEDIC SURGERY
Payer: MEDICARE

## 2018-05-17 PROCEDURE — 40000718 ZZHC STATISTIC PT DEPARTMENT ORTHO VISIT

## 2018-05-17 PROCEDURE — 97110 THERAPEUTIC EXERCISES: CPT | Mod: GP

## 2018-05-17 PROCEDURE — 97112 NEUROMUSCULAR REEDUCATION: CPT | Mod: GP

## 2018-05-17 PROCEDURE — 97116 GAIT TRAINING THERAPY: CPT | Mod: GP

## 2018-05-30 ENCOUNTER — HOSPITAL ENCOUNTER (OUTPATIENT)
Dept: PHYSICAL THERAPY | Facility: CLINIC | Age: 78
Setting detail: THERAPIES SERIES
End: 2018-05-30
Attending: ORTHOPAEDIC SURGERY
Payer: MEDICARE

## 2018-05-30 PROCEDURE — 40000718 ZZHC STATISTIC PT DEPARTMENT ORTHO VISIT

## 2018-05-30 PROCEDURE — 97110 THERAPEUTIC EXERCISES: CPT | Mod: GP

## 2018-05-30 PROCEDURE — 97112 NEUROMUSCULAR REEDUCATION: CPT | Mod: GP

## 2018-05-30 PROCEDURE — 97116 GAIT TRAINING THERAPY: CPT | Mod: GP

## 2018-05-31 NOTE — PROGRESS NOTES
Outpatient Physical Therapy Progress Note     Patient: Duane E Sogge  : 1940    Beginning/End Dates of Reporting Period:  4/10/2018 to 2018    Referring Provider: Dr. 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      Client Self Report: Pt states that he has been feeling stronger inleft leg but that his balance is not good. He wants to continue to work on strength and balance.      Goals:  Goal Identifier 1   Goal Description Pt will have ROM to 120 to 125 knee flexion on left and 0/10 extention. in order for him to progress stength and be able to walk in woods for hunting.plus reciprocal stair climbing.   Target Date 2018   progress  Pt has ROM of 125 to 3 dg left knee     Goal Description Pt will have strenght to 5/5 to be able to progress to stair climbing reciprocall and walking any distance he wants plus in the woods for hunting.   Target Date 2018   Date Met      Progress:strength of quad and HS 4/5. Core and hip strength 3/5. He is able to do 3-4 inch steps with no hold up down. He is working on posture to feel the muscle control to gain the strength for correct posture in gait with and without hiking poles.     Plan:  Continue therapy per current plan of care.    Discharge:  No

## 2018-05-31 NOTE — ADDENDUM NOTE
Encounter addended by: Christina Mohr, PT on: 5/31/2018 12:24 PM<BR>     Actions taken: Sign clinical note, Flowsheet accepted

## 2018-06-08 ENCOUNTER — HOSPITAL ENCOUNTER (OUTPATIENT)
Dept: PHYSICAL THERAPY | Facility: CLINIC | Age: 78
Setting detail: THERAPIES SERIES
End: 2018-06-08
Attending: ORTHOPAEDIC SURGERY
Payer: MEDICARE

## 2018-06-08 PROCEDURE — 97110 THERAPEUTIC EXERCISES: CPT | Mod: GP

## 2018-06-08 PROCEDURE — 40000718 ZZHC STATISTIC PT DEPARTMENT ORTHO VISIT

## 2018-06-13 ENCOUNTER — HOSPITAL ENCOUNTER (OUTPATIENT)
Dept: PHYSICAL THERAPY | Facility: CLINIC | Age: 78
Setting detail: THERAPIES SERIES
End: 2018-06-13
Attending: ORTHOPAEDIC SURGERY
Payer: MEDICARE

## 2018-06-13 PROCEDURE — 97110 THERAPEUTIC EXERCISES: CPT | Mod: GP

## 2018-06-13 PROCEDURE — 97116 GAIT TRAINING THERAPY: CPT | Mod: GP

## 2018-06-13 PROCEDURE — 40000718 ZZHC STATISTIC PT DEPARTMENT ORTHO VISIT

## 2018-06-15 ENCOUNTER — HOSPITAL ENCOUNTER (OUTPATIENT)
Dept: PHYSICAL THERAPY | Facility: CLINIC | Age: 78
Setting detail: THERAPIES SERIES
End: 2018-06-15
Attending: ORTHOPAEDIC SURGERY
Payer: MEDICARE

## 2018-06-15 PROCEDURE — 97110 THERAPEUTIC EXERCISES: CPT | Mod: GP

## 2018-06-15 PROCEDURE — 97112 NEUROMUSCULAR REEDUCATION: CPT | Mod: GP

## 2018-06-15 PROCEDURE — 40000718 ZZHC STATISTIC PT DEPARTMENT ORTHO VISIT

## 2018-06-19 NOTE — ADDENDUM NOTE
Encounter addended by: Christina Mohr, PT on: 6/18/2018  7:23 PM<BR>     Actions taken: Flowsheet data copied forward, Flowsheet accepted

## 2018-06-22 ENCOUNTER — HOSPITAL ENCOUNTER (OUTPATIENT)
Dept: PHYSICAL THERAPY | Facility: CLINIC | Age: 78
Setting detail: THERAPIES SERIES
End: 2018-06-22
Attending: ORTHOPAEDIC SURGERY
Payer: MEDICARE

## 2018-06-22 PROCEDURE — 97110 THERAPEUTIC EXERCISES: CPT | Mod: GP

## 2018-06-22 PROCEDURE — 40000718 ZZHC STATISTIC PT DEPARTMENT ORTHO VISIT

## 2018-06-26 NOTE — ADDENDUM NOTE
Encounter addended by: Christina Mohr, PT on: 6/26/2018 11:21 AM<BR>     Actions taken: Flowsheet data copied forward, Flowsheet accepted, Episode edited

## 2018-06-28 ENCOUNTER — TELEPHONE (OUTPATIENT)
Dept: FAMILY MEDICINE | Facility: CLINIC | Age: 78
End: 2018-06-28

## 2018-06-28 NOTE — TELEPHONE ENCOUNTER
Reason for Call:  Same Day Appointment, Requested Provider:  Asa Oliver MD    PCP: Saqib Chavarria    Reason for visit: Patient stated that he is in a lot of pain in his left knee. I wants to see Dr. Oliver tomorrow (06/29/2018) to see what is going on.     Duration of symptoms: a few days    Have you been treated for this in the past? Yes    Additional comments: please call patient and can also talk to wife and schedule with her.     Can we leave a detailed message on this number? YES    Phone number patient can be reached at: Cell number on file:    Telephone Information:   Mobile [phone]       Best Time: any    Call taken on 6/28/2018 at 4:55 PM by Ashley Colmenares

## 2018-06-29 NOTE — TELEPHONE ENCOUNTER
Called patient and left a message to call the clinic back, when he calls back please offer him an appointment in [place] if there is any openings.  If there is not any openings and he can wait until Tuesday go ahead and use any DR ONLY or ACUTE spot that works for the patient that day.  Thank you.     Hilda Fong CMA

## 2018-07-03 ENCOUNTER — OFFICE VISIT (OUTPATIENT)
Dept: FAMILY MEDICINE | Facility: CLINIC | Age: 78
End: 2018-07-03
Payer: COMMERCIAL

## 2018-07-03 ENCOUNTER — HOSPITAL ENCOUNTER (OUTPATIENT)
Dept: PHYSICAL THERAPY | Facility: CLINIC | Age: 78
Setting detail: THERAPIES SERIES
End: 2018-07-03
Attending: ORTHOPAEDIC SURGERY
Payer: MEDICARE

## 2018-07-03 VITALS
RESPIRATION RATE: 17 BRPM | OXYGEN SATURATION: 97 % | SYSTOLIC BLOOD PRESSURE: 146 MMHG | DIASTOLIC BLOOD PRESSURE: 86 MMHG | BODY MASS INDEX: 37.33 KG/M2 | TEMPERATURE: 98.6 F | HEART RATE: 70 BPM | WEIGHT: 238.38 LBS

## 2018-07-03 DIAGNOSIS — M00.062 STAPHYLOCOCCAL ARTHRITIS OF LEFT KNEE (H): Primary | ICD-10-CM

## 2018-07-03 DIAGNOSIS — Z23 ENCOUNTER FOR IMMUNIZATION: ICD-10-CM

## 2018-07-03 LAB
ALBUMIN SERPL-MCNC: 3.7 G/DL (ref 3.4–5)
ALP SERPL-CCNC: 108 U/L (ref 40–150)
ALT SERPL W P-5'-P-CCNC: 27 U/L (ref 0–70)
ANION GAP SERPL CALCULATED.3IONS-SCNC: 6 MMOL/L (ref 3–14)
AST SERPL W P-5'-P-CCNC: 26 U/L (ref 0–45)
BILIRUB SERPL-MCNC: 0.3 MG/DL (ref 0.2–1.3)
BUN SERPL-MCNC: 14 MG/DL (ref 7–30)
CALCIUM SERPL-MCNC: 9.3 MG/DL (ref 8.5–10.1)
CHLORIDE SERPL-SCNC: 103 MMOL/L (ref 94–109)
CO2 SERPL-SCNC: 30 MMOL/L (ref 20–32)
CREAT SERPL-MCNC: 1 MG/DL (ref 0.66–1.25)
CRP SERPL-MCNC: 3.5 MG/L (ref 0–8)
ERYTHROCYTE [DISTWIDTH] IN BLOOD BY AUTOMATED COUNT: 13.3 % (ref 10–15)
GFR SERPL CREATININE-BSD FRML MDRD: 73 ML/MIN/1.7M2
GLUCOSE SERPL-MCNC: 101 MG/DL (ref 70–99)
HCT VFR BLD AUTO: 47.2 % (ref 40–53)
HGB BLD-MCNC: 14.9 G/DL (ref 13.3–17.7)
MCH RBC QN AUTO: 30 PG (ref 26.5–33)
MCHC RBC AUTO-ENTMCNC: 31.6 G/DL (ref 31.5–36.5)
MCV RBC AUTO: 95 FL (ref 78–100)
PLATELET # BLD AUTO: 229 10E9/L (ref 150–450)
POTASSIUM SERPL-SCNC: 4.1 MMOL/L (ref 3.4–5.3)
PROT SERPL-MCNC: 7.9 G/DL (ref 6.8–8.8)
RBC # BLD AUTO: 4.96 10E12/L (ref 4.4–5.9)
SODIUM SERPL-SCNC: 139 MMOL/L (ref 133–144)
WBC # BLD AUTO: 5.5 10E9/L (ref 4–11)

## 2018-07-03 PROCEDURE — 99214 OFFICE O/P EST MOD 30 MIN: CPT | Mod: 25 | Performed by: FAMILY MEDICINE

## 2018-07-03 PROCEDURE — 90670 PCV13 VACCINE IM: CPT | Performed by: FAMILY MEDICINE

## 2018-07-03 PROCEDURE — 40000718 ZZHC STATISTIC PT DEPARTMENT ORTHO VISIT

## 2018-07-03 PROCEDURE — 80053 COMPREHEN METABOLIC PANEL: CPT | Performed by: FAMILY MEDICINE

## 2018-07-03 PROCEDURE — 86140 C-REACTIVE PROTEIN: CPT | Performed by: FAMILY MEDICINE

## 2018-07-03 PROCEDURE — 97110 THERAPEUTIC EXERCISES: CPT | Mod: GP

## 2018-07-03 PROCEDURE — 85027 COMPLETE CBC AUTOMATED: CPT | Performed by: FAMILY MEDICINE

## 2018-07-03 PROCEDURE — G0009 ADMIN PNEUMOCOCCAL VACCINE: HCPCS | Performed by: FAMILY MEDICINE

## 2018-07-03 PROCEDURE — 36415 COLL VENOUS BLD VENIPUNCTURE: CPT | Performed by: FAMILY MEDICINE

## 2018-07-03 RX ORDER — DOXYCYCLINE HYCLATE 100 MG
100 TABLET ORAL
Refills: 1 | COMMUNITY
Start: 2018-05-29 | End: 2018-11-14

## 2018-07-03 ASSESSMENT — PAIN SCALES - GENERAL: PAINLEVEL: NO PAIN (0)

## 2018-07-03 NOTE — PROGRESS NOTES
SUBJECTIVE:                                                      Chief Complaint   Patient presents with     Surgical Followup     he had left knee replacement last March at [facility]. He ended up getting MRSA. He states he is doing pretty good now. He is still taking the antibiotic. He is wondering how long he should be taking it. He has knee pain at times depending on what he is doing.        Chief Complaint   Patient presents with     Surgical Followup     he had left knee replacement last March at [facility]. He ended up getting MRSA. He states he is doing pretty good now. He is still taking the antibiotic. He is wondering how long he should be taking it. He has knee pain at times depending on what he is doing.         Duane is a 77 year old male returns for routine follow-up.  He is wondering if his knee is better.  He recently stopped antibiotics.  Is been under surveillance by infectious disease.  He had MRSA infection of his knee hardware.  We reviewed the last notes and he is due for follow-up.  He has been feeling well.  He has no pain in the knee except from time to time.  Nothing consistent.  No swelling or warmth.    ROS:  Constitutional, HEENT, cardiovascular, pulmonary, gi and gu systems are negative, except as otherwise noted.    OBJECTIVE:                                                    /86  Pulse 70  Temp 98.6  F (37  C) (Tympanic)  Resp 17  Wt 238 lb 6 oz (108.1 kg)  SpO2 97%  BMI 37.33 kg/m2  Body mass index is 37.33 kg/(m^2).    Well-appearing male no distress.  Heart regular.  Lungs clear and unlabored.  Bilateral knees appear normal.  In particular left knee has a well-healed incision.  There is no warmth or effusion present.  No redness.    Diagnostic Test Results:  Results for orders placed or performed in visit on 07/03/18   CBC with platelets   Result Value Ref Range    WBC 5.5 4.0 - 11.0 10e9/L    RBC Count 4.96 4.4 - 5.9 10e12/L    Hemoglobin 14.9 13.3 - 17.7 g/dL     Hematocrit 47.2 40.0 - 53.0 %    MCV 95 78 - 100 fl    MCH 30.0 26.5 - 33.0 pg    MCHC 31.6 31.5 - 36.5 g/dL    RDW 13.3 10.0 - 15.0 %    Platelet Count 229 150 - 450 10e9/L   CRP, inflammation   Result Value Ref Range    CRP Inflammation 3.5 0.0 - 8.0 mg/L   Comprehensive metabolic panel   Result Value Ref Range    Sodium 139 133 - 144 mmol/L    Potassium 4.1 3.4 - 5.3 mmol/L    Chloride 103 94 - 109 mmol/L    Carbon Dioxide 30 20 - 32 mmol/L    Anion Gap 6 3 - 14 mmol/L    Glucose 101 (H) 70 - 99 mg/dL    Urea Nitrogen 14 7 - 30 mg/dL    Creatinine 1.00 0.66 - 1.25 mg/dL    GFR Estimate 73 >60 mL/min/1.7m2    GFR Estimate If Black 88 >60 mL/min/1.7m2    Calcium 9.3 8.5 - 10.1 mg/dL    Bilirubin Total 0.3 0.2 - 1.3 mg/dL    Albumin 3.7 3.4 - 5.0 g/dL    Protein Total 7.9 6.8 - 8.8 g/dL    Alkaline Phosphatase 108 40 - 150 U/L    ALT 27 0 - 70 U/L    AST 26 0 - 45 U/L        ASSESSMENT/PLAN:                                                        ICD-10-CM    1. 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 Encounter for immunization Z23 PNEUMOCOCCAL CONJ VACCINE 13 VALENT IM     ADMIN 1st VACCINE       Exam and history and labs reassuring today.  He is just about finished with his course of doxycycline.  He should follow-up with infectious disease as they had instructed.  Continue to watch his knee for signs of infection we discussed this.  He should return the next few months for a med check given his hypertension, etc. blood pressure mildly elevated today.    Asa Oliver MD  [employer]

## 2018-07-03 NOTE — MR AVS SNAPSHOT
After Visit Summary   7/3/2018    Duane E Sogge    MRN: [id number]           Patient Information     Date Of Birth          1940        Visit Information        Provider Department      7/3/2018 3:20 PM Asa Oliver MD [facility]        Today's Diagnoses     Staphylococcal arthritis of left knee (H)    -  1    Encounter for immunization           Follow-ups after your visit        Your next 10 appointments already scheduled     Jul 19, 2018  2:00 PM CDT   Ortho Treatment with Christina Mohr, PT   [facility] ([facility])    [address] [place] [address] Dr [place] [address]   [phone]            Jul 26, 2018  2:00 PM CDT   Ortho Treatment with Christina Mohr PT   [facility] ([facility])    [address] [place] [address] Dr [place] [address]   [phone]            Aug 09, 2018 10:40 AM CDT   SHORT with Asa Oliver MD   [facility] ([facility]    [address] [place] [address]   [phone]            Aug 09, 2018  1:45 PM CDT   Ortho Treatment with Christina Mohr PT   [facility] ([facility])    [address] [place] [address] Dr Radha [place] [address]   [phone]            Aug 16, 2018  1:00 PM CDT   Ortho Treatment with Christina Mohr PT   [facility] ([facility])    [address] [place] [address] Dr [place] [address]   [phone]              Who to contact     If you have questions or need follow up information about today's clinic visit or your schedule please contact [facility] directly at [phone].  Normal or non-critical lab and imaging results will be communicated to you by MyChart, letter or phone within 4 business days after the clinic has received the results. If you do not hear from us within 7 days, please contact the clinic through MyChart or phone.  If you have a critical or abnormal lab result, we will notify you by phone as soon as possible.  Submit refill requests through [employer] or call your pharmacy and they will forward the refill request to us. Please allow 3 business days for your refill to be completed.          Additional Information About Your Visit        Care EveryWhere ID     This is your Care EveryWhere ID. This could be used by other organizations to access your [facility] medical records  [id number]        Your Vitals Were     Pulse Temperature Respirations Pulse Oximetry BMI (Body Mass Index)       70 98.6  F (37  C) (Tympanic) 17 97% 37.33 kg/m2        Blood Pressure from Last 3 Encounters:   07/03/18 146/86   05/16/18 134/60   05/11/18 138/88    Weight from Last 3 Encounters:   07/03/18 238 lb 6 oz (108.1 kg)   05/16/18 235 lb (106.6 kg)   05/11/18 235 lb (106.6 kg)              We Performed the Following     ADMIN 1st VACCINE     CBC with platelets     Comprehensive metabolic panel     CRP, inflammation     PNEUMOCOCCAL CONJ VACCINE 13 VALENT IM        Primary Care Provider Office Phone # Fax #    Saqib Chavarria [phone]       [facility] [facility] [address]  [facility] [address]        Equal Access to Services     STARR LEIVA : Hadii aad ku hadasho Soomaali, waaxda luqadaha, qaybta kaalmada adeegyada, waxay lindsey sanderson . So [facility] [phone].    ATENCIÓN: Si habla español, tiene a roth disposición servicios gratuitos de asistencia lingüística. Llame al [phone].    We comply with applicable federal civil rights laws and [place] laws. We do not discriminate on the basis of race, color, national origin, age, disability, sex, sexual orientation, or gender identity.            Thank you!     Thank you for choosing [facility]  for your care. Our goal is always to provide you with excellent care. Hearing back from our patients is one way we can continue to improve  our services. Please take a few minutes to complete the written survey that you may receive in the mail after your visit with us. Thank you!             Your Updated Medication List - Protect others around you: Learn how to safely use, store and throw away your medicines at www.disposemymeds.org.          This list is accurate as of 7/3/18 11:59 PM.  Always use your most recent med list.                   Brand Name Dispense Instructions for use Diagnosis    ALPRAZolam 0.5 MG tablet    XANAX     Take 1 tab up to 4x daily only if needed. Refill when due,no early refills  Indications: Feeling Anxious        amoxicillin-clavulanate 875-125 MG per tablet    AUGMENTIN    20 tablet    Take 1 tablet by mouth 2 times daily    Acute maxillary sinusitis, recurrence not specified       BENZONATATE PO           doxycycline 100 MG tablet    VIBRA-TABS     Take 100 mg by mouth        GABAPENTIN PO      Take 100 mg by mouth 3 times daily as needed (anxiety) 1-3 capsules        lisinopril-hydrochlorothiazide 20-12.5 MG per tablet    PRINZIDE/ZESTORETIC     Take 2 tablets by mouth daily        metoprolol succinate 50 MG 24 hr tablet    TOPROL-XL     Take 50 mg by mouth daily        MIRTAZAPINE PO      Take 15 mg by mouth At Bedtime        simvastatin 20 MG tablet    ZOCOR     Take 20 mg by mouth At Bedtime        TEMAZEPAM PO      Take 15 mg by mouth nightly as needed for sleep        venlafaxine 150 MG Tb24 24 hr tablet    EFFEXOR-ER     Take 150 mg by mouth daily (with breakfast)

## 2018-07-05 ENCOUNTER — TELEPHONE (OUTPATIENT)
Dept: FAMILY MEDICINE | Facility: CLINIC | Age: 78
End: 2018-07-05

## 2018-07-05 NOTE — TELEPHONE ENCOUNTER
----- Message from Asa Oliver MD sent at 7/5/2018  9:15 AM CDT -----  Please call the patient with the results. Blood work looks great. No sign of infection     Asa Oliver MD

## 2018-07-05 NOTE — TELEPHONE ENCOUNTER
Patient informed and is requesting his lab results be sent to his infectious disease doctor. Please advise.   Rochelle Edmond MA

## 2018-07-10 ENCOUNTER — HOSPITAL ENCOUNTER (OUTPATIENT)
Dept: PHYSICAL THERAPY | Facility: CLINIC | Age: 78
Setting detail: THERAPIES SERIES
End: 2018-07-10
Attending: ORTHOPAEDIC SURGERY
Payer: MEDICARE

## 2018-07-10 PROCEDURE — 97110 THERAPEUTIC EXERCISES: CPT | Mod: GP

## 2018-07-10 PROCEDURE — 40000718 ZZHC STATISTIC PT DEPARTMENT ORTHO VISIT

## 2018-07-16 ENCOUNTER — TRANSFERRED RECORDS (OUTPATIENT)
Dept: HEALTH INFORMATION MANAGEMENT | Facility: CLINIC | Age: 78
End: 2018-07-16

## 2018-07-16 LAB
ALT SERPL-CCNC: 23 U/L (ref 9–55)
ALT SERPL-CCNC: 23 U/L (ref 9–55)
AST SERPL-CCNC: 25 U/L (ref 10–40)
AST SERPL-CCNC: 25 U/L (ref 10–40)
CHOLEST SERPL-MCNC: 142 MG/DL (ref 0–199)
CHOLEST SERPL-MCNC: 142 MG/DL (ref 0–199)
CREAT SERPL-MCNC: 1.09 MG/DL (ref 0.73–1.18)
CREAT SERPL-MCNC: 1.09 MG/DL (ref 0.73–1.18)
GFR SERPL CREATININE-BSD FRML MDRD: >60 ML/MIN/1.73M2
GFR SERPL CREATININE-BSD FRML MDRD: >60 ML/MIN/1.73M2
HDLC SERPL-MCNC: 41 MG/DL
HDLC SERPL-MCNC: 41 MG/DL
LDLC SERPL CALC-MCNC: 57 MG/DL (ref 19–130)
LDLC SERPL CALC-MCNC: 57 MG/DL (ref 19–130)
NONHDLC SERPL-MCNC: 101 MG/DL (ref 0–149)
NONHDLC SERPL-MCNC: 101 MG/DL (ref 0–159)
TRIGL SERPL-MCNC: 222 MG/DL (ref 4–149)
TRIGL SERPL-MCNC: 222 MG/DL (ref 4–149)
TSH SERPL-ACNC: 3.39 UIU/ML (ref 0.3–4.5)
TSH SERPL-ACNC: 3.39 UIU/ML (ref 0.3–4.5)

## 2018-07-19 ENCOUNTER — HOSPITAL ENCOUNTER (OUTPATIENT)
Dept: PHYSICAL THERAPY | Facility: CLINIC | Age: 78
Setting detail: THERAPIES SERIES
End: 2018-07-19
Attending: ORTHOPAEDIC SURGERY
Payer: MEDICARE

## 2018-07-19 PROCEDURE — 97112 NEUROMUSCULAR REEDUCATION: CPT | Mod: GP

## 2018-07-19 PROCEDURE — 40000718 ZZHC STATISTIC PT DEPARTMENT ORTHO VISIT

## 2018-07-26 ENCOUNTER — HOSPITAL ENCOUNTER (OUTPATIENT)
Dept: PHYSICAL THERAPY | Facility: CLINIC | Age: 78
Setting detail: THERAPIES SERIES
End: 2018-07-26
Attending: ORTHOPAEDIC SURGERY
Payer: MEDICARE

## 2018-07-26 PROCEDURE — 97110 THERAPEUTIC EXERCISES: CPT | Mod: GP

## 2018-07-26 PROCEDURE — 40000718 ZZHC STATISTIC PT DEPARTMENT ORTHO VISIT

## 2018-07-26 PROCEDURE — 97116 GAIT TRAINING THERAPY: CPT | Mod: GP

## 2018-07-26 PROCEDURE — 97112 NEUROMUSCULAR REEDUCATION: CPT | Mod: GP

## 2018-07-26 NOTE — ADDENDUM NOTE
Encounter addended by: Christina Mohr, PT on: 7/26/2018  3:26 PM<BR>     Actions taken: Flowsheet accepted, Pend clinical note, Sign clinical note

## 2018-07-26 NOTE — PROGRESS NOTES
[facility]      OUTPATIENT PHYSICAL THERAPY  PLAN OF TREATMENT FOR OUTPATIENT REHABILITATION    Patient's Last Name, First Name, M.JOSE ALFREDO.                YOB: 1940  Sogge,Duane E                        Provider's Name  [facility] Medical Record No.  [id number]                               Onset Date: 2/21/2018(3/16/2018)   Start of Care Date: 4/10/18   Type:     _X_PT   ___OT   ___SLP Medical Diagnosis: SP TKA left knee, Infection involved                       PT Diagnosis: gait dysfunction, weakness decreased ROM left knee      _________________________________________________________________________________  Plan of Treatment: strengthening, neuromuscular re ed for balance and trendelenberg re-training.     Frequency/Duration: 1-2x/week     Goals:  Goal Identifier 1   Goal Description Pt will have ROM to 120 to 125 knee flexion on left and 0/10 extention. in order for him to progress stength and be able to walk in woods for hunting.plus reciprocal stair climbing.   Target Date 7/14/2018   Date Met      Progress: goal met for ROM     Goal Identifier 2   Goal Description Pt will have strenght to 5/5 to be able to progress to stair climbing reciprocall and walking any distance he wants plus in the woods for hunting.   Target Date 9/05/2018   Date Met      Pt is having touble with balance and safe ambulation on uneven ground. He has right glut med weakness and quad weakness bilateral for stairs.       Certification date from 6/9/2018 to 9/5/2018.    Christina Mohr, PT          I CERTIFY THE NEED FOR THESE SERVICES FURNISHED UNDER        THIS PLAN OF TREATMENT AND WHILE UNDER MY CARE .             Physician Signature               Date    X_____________________________________________________                      Referring Provider: Dr. Pierre Mera

## 2018-07-26 NOTE — TELEPHONE ENCOUNTER
Lab results faxed to patients infectious disease  in [place] @ [phone].   Rochelle Edmond MA

## 2018-08-24 ENCOUNTER — TRANSFERRED RECORDS (OUTPATIENT)
Dept: HEALTH INFORMATION MANAGEMENT | Facility: CLINIC | Age: 78
End: 2018-08-24

## 2018-10-09 NOTE — PROGRESS NOTES
Outpatient Physical Therapy Discharge Note     Patient: Duane E Sogge  : 1940    Beginning/End Dates of Reporting Period:  4/10/2018 to 2018    Referring Provider: Dr. Pierre Mera    Therapy Diagnosis: weakness, decreased ROM left knee and gait dysfunction     Client Self Report: Pt was progressing steadily with strength and function bilateral LE. He was going to see if he needed further Rx or if he felt confident on his own.    Goals:    Goal Identifier 2   Goal Description Pt will have strenght to 5/5 to be able to progress to stair climbing reciprocall and walking any distance he wants plus in the woods for hunting.   Target Date 18   Date Met      Progress:goal met     Plan:  Discharge from therapy.    Discharge:    Reason for Discharge: Patient has met all goals.    Equipment Issued: none    Discharge Plan: Patient to continue home program.

## 2018-10-09 NOTE — ADDENDUM NOTE
Encounter addended by: Christina Mohr, PT on: 10/9/2018 12:01 PM<BR>     Actions taken: Flowsheet accepted, Sign clinical note

## 2018-10-09 NOTE — ADDENDUM NOTE
Encounter addended by: Christian Mohr, PT on: 10/9/2018 12:03 PM<BR>     Actions taken: Episode resolved

## 2018-11-14 ENCOUNTER — MYC MEDICAL ADVICE (OUTPATIENT)
Dept: FAMILY MEDICINE | Facility: CLINIC | Age: 78
End: 2018-11-14

## 2018-11-14 ENCOUNTER — OFFICE VISIT (OUTPATIENT)
Dept: FAMILY MEDICINE | Facility: CLINIC | Age: 78
End: 2018-11-14
Payer: COMMERCIAL

## 2018-11-14 ENCOUNTER — HOSPITAL ENCOUNTER (OUTPATIENT)
Dept: GENERAL RADIOLOGY | Facility: CLINIC | Age: 78
Discharge: HOME | End: 2018-11-14
Attending: FAMILY MEDICINE | Admitting: FAMILY MEDICINE
Payer: MEDICARE

## 2018-11-14 VITALS
HEIGHT: 67 IN | BODY MASS INDEX: 37.98 KG/M2 | TEMPERATURE: 97.3 F | DIASTOLIC BLOOD PRESSURE: 82 MMHG | HEART RATE: 82 BPM | WEIGHT: 242 LBS | RESPIRATION RATE: 20 BRPM | SYSTOLIC BLOOD PRESSURE: 118 MMHG | OXYGEN SATURATION: 95 %

## 2018-11-14 DIAGNOSIS — M10.9 ACUTE GOUTY ARTHRITIS: ICD-10-CM

## 2018-11-14 DIAGNOSIS — Z00.00 ANNUAL PHYSICAL EXAM: Primary | ICD-10-CM

## 2018-11-14 DIAGNOSIS — Z86.0100 HISTORY OF COLONIC POLYPS: ICD-10-CM

## 2018-11-14 DIAGNOSIS — Z23 NEED FOR PROPHYLACTIC VACCINATION AND INOCULATION AGAINST INFLUENZA: ICD-10-CM

## 2018-11-14 DIAGNOSIS — F41.0 PANIC DISORDER WITHOUT AGORAPHOBIA: ICD-10-CM

## 2018-11-14 LAB
ALBUMIN SERPL-MCNC: 3.8 G/DL (ref 3.4–5)
ALP SERPL-CCNC: 101 U/L (ref 40–150)
ALT SERPL W P-5'-P-CCNC: 26 U/L (ref 0–70)
ANION GAP SERPL CALCULATED.3IONS-SCNC: 8 MMOL/L (ref 3–14)
AST SERPL W P-5'-P-CCNC: 18 U/L (ref 0–45)
BASOPHILS # BLD AUTO: 0 10E9/L (ref 0–0.2)
BASOPHILS NFR BLD AUTO: 0.4 %
BILIRUB SERPL-MCNC: 0.7 MG/DL (ref 0.2–1.3)
BUN SERPL-MCNC: 17 MG/DL (ref 7–30)
CALCIUM SERPL-MCNC: 9.2 MG/DL (ref 8.5–10.1)
CHLORIDE SERPL-SCNC: 102 MMOL/L (ref 94–109)
CO2 SERPL-SCNC: 26 MMOL/L (ref 20–32)
CREAT SERPL-MCNC: 1.08 MG/DL (ref 0.66–1.25)
DIFFERENTIAL METHOD BLD: ABNORMAL
EOSINOPHIL NFR BLD AUTO: 3.1 %
ERYTHROCYTE [DISTWIDTH] IN BLOOD BY AUTOMATED COUNT: 12.1 % (ref 10–15)
GFR SERPL CREATININE-BSD FRML MDRD: 66 ML/MIN/1.7M2
GLUCOSE SERPL-MCNC: 108 MG/DL (ref 70–99)
HCT VFR BLD AUTO: 43.4 % (ref 40–53)
HGB BLD-MCNC: 14.5 G/DL (ref 13.3–17.7)
IMM GRANULOCYTES # BLD: 0 10E9/L (ref 0–0.4)
IMM GRANULOCYTES NFR BLD: 0.3 %
LYMPHOCYTES # BLD AUTO: 1.8 10E9/L (ref 0.8–5.3)
LYMPHOCYTES NFR BLD AUTO: 20 %
MCH RBC QN AUTO: 33 PG (ref 26.5–33)
MCHC RBC AUTO-ENTMCNC: 33.4 G/DL (ref 31.5–36.5)
MCV RBC AUTO: 99 FL (ref 78–100)
MONOCYTES # BLD AUTO: 1.2 10E9/L (ref 0–1.3)
MONOCYTES NFR BLD AUTO: 13.4 %
NEUTROPHILS # BLD AUTO: 5.8 10E9/L (ref 1.6–8.3)
NEUTROPHILS NFR BLD AUTO: 62.8 %
NRBC # BLD AUTO: 0 10*3/UL
NRBC BLD AUTO-RTO: 0 /100
PLATELET # BLD AUTO: 223 10E9/L (ref 150–450)
POTASSIUM SERPL-SCNC: 4.1 MMOL/L (ref 3.4–5.3)
PROT SERPL-MCNC: 8 G/DL (ref 6.8–8.8)
RBC # BLD AUTO: 4.39 10E12/L (ref 4.4–5.9)
SODIUM SERPL-SCNC: 136 MMOL/L (ref 133–144)
URATE SERPL-MCNC: 7.4 MG/DL (ref 3.5–7.2)
WBC # BLD AUTO: 9.2 10E9/L (ref 4–11)

## 2018-11-14 PROCEDURE — 99397 PER PM REEVAL EST PAT 65+ YR: CPT | Mod: 25 | Performed by: FAMILY MEDICINE

## 2018-11-14 PROCEDURE — 36415 COLL VENOUS BLD VENIPUNCTURE: CPT | Performed by: FAMILY MEDICINE

## 2018-11-14 PROCEDURE — 73660 X-RAY EXAM OF TOE(S): CPT | Mod: TC,RT

## 2018-11-14 PROCEDURE — 85025 COMPLETE CBC W/AUTO DIFF WBC: CPT | Performed by: FAMILY MEDICINE

## 2018-11-14 PROCEDURE — G0008 ADMIN INFLUENZA VIRUS VAC: HCPCS | Performed by: FAMILY MEDICINE

## 2018-11-14 PROCEDURE — 99214 OFFICE O/P EST MOD 30 MIN: CPT | Mod: 25 | Performed by: FAMILY MEDICINE

## 2018-11-14 PROCEDURE — 80053 COMPREHEN METABOLIC PANEL: CPT | Performed by: FAMILY MEDICINE

## 2018-11-14 PROCEDURE — 90662 IIV NO PRSV INCREASED AG IM: CPT | Performed by: FAMILY MEDICINE

## 2018-11-14 PROCEDURE — 84550 ASSAY OF BLOOD/URIC ACID: CPT | Performed by: FAMILY MEDICINE

## 2018-11-14 ASSESSMENT — ENCOUNTER SYMPTOMS
EYE PAIN: 0
COUGH: 0
DIZZINESS: 0
DIARRHEA: 0
FREQUENCY: 0
NERVOUS/ANXIOUS: 0
HEMATOCHEZIA: 0
CHILLS: 0
FEVER: 0
HEMATURIA: 0
CONSTIPATION: 0
ABDOMINAL PAIN: 0

## 2018-11-14 ASSESSMENT — ACTIVITIES OF DAILY LIVING (ADL): CURRENT_FUNCTION: NO ASSISTANCE NEEDED

## 2018-11-14 ASSESSMENT — PAIN SCALES - GENERAL: PAINLEVEL: NO PAIN (0)

## 2018-11-14 NOTE — PATIENT INSTRUCTIONS
Preventive Health Recommendations:     See your health care provider every year to    Review health changes.     Discuss preventive care.      Review your medicines if your doctor has prescribed any.      Talk with your health care provider about whether you should have a test to screen for prostate cancer (PSA).    Every 3 years, have a diabetes test (fasting glucose). If you are at risk for diabetes, you should have this test more often.    Every 5 years, have a cholesterol test. Have this test more often if you are at risk for high cholesterol or heart disease.     Every 10 years, have a colonoscopy. Or, have a yearly FIT test (stool test). These exams will check for colon cancer.    Talk to with your health care provider about screening for Abdominal Aortic Aneurysm if you have a family history of AAA or have a history of smoking.    Shots:     Get a flu shot each year.     Get a tetanus shot every 10 years.     Talk to your doctor about your pneumonia vaccines. There are now two you should receive - Pneumovax (PPSV 23) and Prevnar (PCV 13).     Talk to your pharmacist about a shingles vaccine.     Talk to your doctor about the hepatitis B vaccine.  Nutrition:     Eat at least 5 servings of fruits and vegetables each day.     Eat whole-grain bread, whole-wheat pasta and brown rice instead of white grains and rice.     Get adequate Calcium and Vitamin D.   Lifestyle    Exercise for at least 150 minutes a week (30 minutes a day, 5 days a week). This will help you control your weight and prevent disease.     Limit alcohol to one drink per day.     No smoking.     Wear sunscreen to prevent skin cancer.    See your dentist every six months for an exam and cleaning.    See your eye doctor every 1 to 2 years to screen for conditions such as glaucoma, macular degeneration, cataracts, etc.    Personalized Prevention Plan  You are due for the preventive services outlined below.  Your care team is available to assist you  in scheduling these services.  If you have already completed any of these items, please share that information with your care team to update in your medical record.  Health Maintenance Due   Topic Date Due     Discuss Advance Directive Planning  10/04/1994     Flu Vaccine (1) 09/01/2018     Depression Assessment 2 - yearly  11/03/2018                   Gout   What is gout?   Gout is a disease usually caused by having too much uric acid in your body. Too much uric acid may not cause symptoms for years, but after a time it usually causes painful joint inflammation (arthritis). The most common site of inflammation is the joint between the foot and the big toe. Later attacks often affect other joints of the foot and leg. Less often, the arms and hands are affected.   In addition to the arthritis, gout causes the formation of tophi. Tophi are lumpy deposits of uric acid crystals just under the skin. Common places for tophi to develop are in the outer edge of the ear, on or near the elbow, over the fingers and toes, and around the Achilles tendon in the ankle.   Gout can also cause kidney stones made of uric acid.   Most people who have gout are middle-aged men, but it can occur at any age. Only 5 to 10% of cases of gout occur in women, most often after menopause.   How does it occur?   Gout usually occurs because too much uric acid is in your joints. Uric acid comes from the breakdown of substances called purines. Purines are found in all of your body's tissues. They are also in many foods. Normally, uric acid dissolves in the blood and passes through the kidneys and out of the body in urine. If the levels of uric acid build up in the blood, sharp uric acid crystals may form in the joints. The crystals cause pain and swelling. You may have too much uric acid in your joints when your kidney does not get rid of enough uric acid or when your body makes too much uric acid.   Most cases of gout are caused by poor elimination  of uric acid by the kidneys, but it can be hard to know why this is happening. The specific problem with the kidney is usually never found.   Some people inherit a tendency to make too much uric acid. Others may make too much uric acid because they have a disease such as cancer or certain types of red blood cell disorders. A diet high in alcoholic drinks and purine-rich foods (such as seafood and meat, especially red meat and organ meats) can also cause your body to make too much uric acid.   Uric acid levels in men start to go up after puberty. Women's uric acid levels usually do not go up until after menopause. For this reason women are protected from gout until several years after menopause. The uric acid levels have to be high for many years before gout develops. Men with gout usually have their first attack when they are middle-aged.   Certain conditions, such as dehydration, can cause excess levels of uric acid. Diuretic medicine (also called water pills), which is often used to treat high blood pressure, can increase levels of uric acid. Other medicines can also affect the level of uric acid in the blood. It is important to make sure your healthcare provider knows all the medicines you are using, both prescription and nonprescription.   People who have recently had a serious illness or surgery have an increased chance of having an attack of gout. Some people have gouty arthritis even though they have normal uric acid levels.   What are the symptoms?   Some people have high uric acid blood levels for years and never have any symptoms. Only 10 to 20% of people with high levels develop symptoms in their joints, such as:     sudden, severe pain, especially of just one joint at a time     redness     swelling.   The sudden attacks are sometimes related to physical illness, trauma, or excessive alcohol use. The symptoms may last for days to weeks. The arthritis usually occurs before tophi or kidney stones develop.    The tophi do not cause any symptoms unless they open and drain. They are often not painful. Depending on their location, they may limit the movement of joints.   The symptoms of uric acid stones are like those of other kidney stones. They can cause severe abdominal pain and sometimes nausea, vomiting, fever, or blood in the urine.   How is it diagnosed?   Your healthcare provider will suspect that you have gout if:     Your first toe joint is inflamed.     You have a blood test that shows a high level of uric acid in your blood.     You are developing tophi.     You start taking the drug colchicine and your symptoms of arthritis improve. (Colchicine, an anti-inflammatory drug, is effective only in gouty-type arthritis.)   To confirm the diagnosis, your provider may take a sample of fluid from the affected joint or joints and send it to the lab for tests. If you have uric acid crystals in the fluid, you have gout.   How is it treated?   Usually, if you have high uric acid levels but no symptoms, you will not need treatment. In special cases (for example, if you have a strong family history of gouty arthritis or kidney stones), you may be treated for gout even though you do not have any symptoms.   If you have symptoms of gout, the goals of treatment are:     Stop the pain of gouty arthritis or kidney stones.     Try to prevent the recurrence of these problems by controlling the uric acid levels.     Prevent serious complications such as kidney damage.   Treatment of the arthritis first involves the use of anti-inflammatory medicines, such as:     indomethacin     ibuprofen or naproxen     corticosteroid drugs, such as prednisone     colchicine.   Aspirin is not usually recommended because it may keep the urine from taking the uric acid out of the body.   Anti-inflammatory medicines are sometimes taken daily to prevent recurrent attacks of gouty arthritis. If the gouty arthritis becomes a frequent problem,  allopurinol and probenecid may also be prescribed to prevent damaging deposits of uric acid in the joints.   How long will the effects last?   The sooner treatment is started, the sooner the symptoms stop, which may be within 24 to 48 hours. If gout is not treated, it could last a few days to several weeks. A second attack may occur, but usually not for 6 months to 2 years. In other cases another attack may not occur until many years later, or never.   How can I help prevent gout?   There is no sure way to prevent gout. However, you can take these steps to lessen the chance that you will have high uric acid levels:     Eat a diet low in purines. Purine-containing foods that you should avoid include meat--especially red meat and organ meats (such as sweetbreads, liver, and kidney)--shrimp, anchovies, sardines, and dried legumes (beans).     Do not overindulge in alcohol. Do not drink more than 2 ounces a day.     Drink lots of fluids.

## 2018-11-14 NOTE — NURSING NOTE
"Chief Complaint   Patient presents with     Physical       Initial /82  Pulse 82  Temp 97.3  F (36.3  C) (Temporal)  Resp 20  Ht 5' 7\" (1.702 m)  Wt 242 lb (109.8 kg)  SpO2 95%  BMI 37.9 kg/m2 Estimated body mass index is 37.9 kg/(m^2) as calculated from the following:    Height as of this encounter: 5' 7\" (1.702 m).    Weight as of this encounter: 242 lb (109.8 kg).  BP completed using cuff size: jessee Calle MA      "

## 2018-11-14 NOTE — MR AVS SNAPSHOT
After Visit Summary   11/14/2018    Duane E Sogge    MRN: [id number]           Patient Information     Date Of Birth          1940        Visit Information        Provider Department      11/14/2018 10:40 AM Asa Oliver MD [facility]        Today's Diagnoses     Annual physical exam    -  1    Acute gouty arthritis        History of colonic polyps          Care Instructions      Preventive Health Recommendations:     See your health care provider every year to    Review health changes.     Discuss preventive care.      Review your medicines if your doctor has prescribed any.      Talk with your health care provider about whether you should have a test to screen for prostate cancer (PSA).    Every 3 years, have a diabetes test (fasting glucose). If you are at risk for diabetes, you should have this test more often.    Every 5 years, have a cholesterol test. Have this test more often if you are at risk for high cholesterol or heart disease.     Every 10 years, have a colonoscopy. Or, have a yearly FIT test (stool test). These exams will check for colon cancer.    Talk to with your health care provider about screening for Abdominal Aortic Aneurysm if you have a family history of AAA or have a history of smoking.    Shots:     Get a flu shot each year.     Get a tetanus shot every 10 years.     Talk to your doctor about your pneumonia vaccines. There are now two you should receive - Pneumovax (PPSV 23) and Prevnar (PCV 13).     Talk to your pharmacist about a shingles vaccine.     Talk to your doctor about the hepatitis B vaccine.  Nutrition:     Eat at least 5 servings of fruits and vegetables each day.     Eat whole-grain bread, whole-wheat pasta and brown rice instead of white grains and rice.     Get adequate Calcium and Vitamin D.   Lifestyle    Exercise for at least 150 minutes a week (30 minutes a day, 5 days a week). This will help you control your weight and prevent  disease.     Limit alcohol to one drink per day.     No smoking.     Wear sunscreen to prevent skin cancer.    See your dentist every six months for an exam and cleaning.    See your eye doctor every 1 to 2 years to screen for conditions such as glaucoma, macular degeneration, cataracts, etc.    Personalized Prevention Plan  You are due for the preventive services outlined below.  Your care team is available to assist you in scheduling these services.  If you have already completed any of these items, please share that information with your care team to update in your medical record.  Health Maintenance Due   Topic Date Due     Discuss Advance Directive Planning  10/04/1994     Flu Vaccine (1) 09/01/2018     Depression Assessment 2 - yearly  11/03/2018                   Gout   What is gout?   Gout is a disease usually caused by having too much uric acid in your body. Too much uric acid may not cause symptoms for years, but after a time it usually causes painful joint inflammation (arthritis). The most common site of inflammation is the joint between the foot and the big toe. Later attacks often affect other joints of the foot and leg. Less often, the arms and hands are affected.   In addition to the arthritis, gout causes the formation of tophi. Tophi are lumpy deposits of uric acid crystals just under the skin. Common places for tophi to develop are in the outer edge of the ear, on or near the elbow, over the fingers and toes, and around the Achilles tendon in the ankle.   Gout can also cause kidney stones made of uric acid.   Most people who have gout are middle-aged men, but it can occur at any age. Only 5 to 10% of cases of gout occur in women, most often after menopause.   How does it occur?   Gout usually occurs because too much uric acid is in your joints. Uric acid comes from the breakdown of substances called purines. Purines are found in all of your body's tissues. They are also in many foods. Normally,  uric acid dissolves in the blood and passes through the kidneys and out of the body in urine. If the levels of uric acid build up in the blood, sharp uric acid crystals may form in the joints. The crystals cause pain and swelling. You may have too much uric acid in your joints when your kidney does not get rid of enough uric acid or when your body makes too much uric acid.   Most cases of gout are caused by poor elimination of uric acid by the kidneys, but it can be hard to know why this is happening. The specific problem with the kidney is usually never found.   Some people inherit a tendency to make too much uric acid. Others may make too much uric acid because they have a disease such as cancer or certain types of red blood cell disorders. A diet high in alcoholic drinks and purine-rich foods (such as seafood and meat, especially red meat and organ meats) can also cause your body to make too much uric acid.   Uric acid levels in men start to go up after puberty. Women's uric acid levels usually do not go up until after menopause. For this reason women are protected from gout until several years after menopause. The uric acid levels have to be high for many years before gout develops. Men with gout usually have their first attack when they are middle-aged.   Certain conditions, such as dehydration, can cause excess levels of uric acid. Diuretic medicine (also called water pills), which is often used to treat high blood pressure, can increase levels of uric acid. Other medicines can also affect the level of uric acid in the blood. It is important to make sure your healthcare provider knows all the medicines you are using, both prescription and nonprescription.   People who have recently had a serious illness or surgery have an increased chance of having an attack of gout. Some people have gouty arthritis even though they have normal uric acid levels.   What are the symptoms?   Some people have high uric acid blood  levels for years and never have any symptoms. Only 10 to 20% of people with high levels develop symptoms in their joints, such as:     sudden, severe pain, especially of just one joint at a time     redness     swelling.   The sudden attacks are sometimes related to physical illness, trauma, or excessive alcohol use. The symptoms may last for days to weeks. The arthritis usually occurs before tophi or kidney stones develop.   The tophi do not cause any symptoms unless they open and drain. They are often not painful. Depending on their location, they may limit the movement of joints.   The symptoms of uric acid stones are like those of other kidney stones. They can cause severe abdominal pain and sometimes nausea, vomiting, fever, or blood in the urine.   How is it diagnosed?   Your healthcare provider will suspect that you have gout if:     Your first toe joint is inflamed.     You have a blood test that shows a high level of uric acid in your blood.     You are developing tophi.     You start taking the drug colchicine and your symptoms of arthritis improve. (Colchicine, an anti-inflammatory drug, is effective only in gouty-type arthritis.)   To confirm the diagnosis, your provider may take a sample of fluid from the affected joint or joints and send it to the lab for tests. If you have uric acid crystals in the fluid, you have gout.   How is it treated?   Usually, if you have high uric acid levels but no symptoms, you will not need treatment. In special cases (for example, if you have a strong family history of gouty arthritis or kidney stones), you may be treated for gout even though you do not have any symptoms.   If you have symptoms of gout, the goals of treatment are:     Stop the pain of gouty arthritis or kidney stones.     Try to prevent the recurrence of these problems by controlling the uric acid levels.     Prevent serious complications such as kidney damage.   Treatment of the arthritis first involves  the use of anti-inflammatory medicines, such as:     indomethacin     ibuprofen or naproxen     corticosteroid drugs, such as prednisone     colchicine.   Aspirin is not usually recommended because it may keep the urine from taking the uric acid out of the body.   Anti-inflammatory medicines are sometimes taken daily to prevent recurrent attacks of gouty arthritis. If the gouty arthritis becomes a frequent problem, allopurinol and probenecid may also be prescribed to prevent damaging deposits of uric acid in the joints.   How long will the effects last?   The sooner treatment is started, the sooner the symptoms stop, which may be within 24 to 48 hours. If gout is not treated, it could last a few days to several weeks. A second attack may occur, but usually not for 6 months to 2 years. In other cases another attack may not occur until many years later, or never.   How can I help prevent gout?   There is no sure way to prevent gout. However, you can take these steps to lessen the chance that you will have high uric acid levels:     Eat a diet low in purines. Purine-containing foods that you should avoid include meat--especially red meat and organ meats (such as sweetbreads, liver, and kidney)--shrimp, anchovies, sardines, and dried legumes (beans).     Do not overindulge in alcohol. Do not drink more than 2 ounces a day.     Drink lots of fluids.                      Follow-ups after your visit        Additional Services     GASTROENTEROLOGY ADULT REF PROCEDURE ONLY       Last Lab Result: Creatinine (mg/dL)       Date                     Value                 07/16/2018               1.09                  07/16/2018               1.09             ----------  Body mass index is 37.9 kg/(m^2).     Needed:  No  Language:  English    Patient will be contacted to schedule procedure.     Please be aware that coverage of these services is subject to the terms and limitations of your health insurance plan.  Call  member services at your health plan with any benefit or coverage questions.  Any procedures must be performed at a [facility] facility OR coordinated by your clinic's referral office.    Please bring the following with you to your appointment:    (1) Any X-Rays, CTs or MRIs which have been performed.  Contact the facility where they were done to arrange for  prior to your scheduled appointment.    (2) List of current medications   (3) This referral request   (4) Any documents/labs given to you for this referral                  Your next 10 appointments already scheduled     Nov 15, 2018  2:30 PM CST   [facility]    [address] [place] [address] [id number] [phone]            Dec 06, 2018  2:00 PM CST   Office Visit with Asa Oliver MD   [facility]    [address] [place] [address] [id number] [phone]           Bring a current list of meds and any records pertaining to this visit. For Physicals, please bring immunization records and any forms needing to be filled out. Please arrive 10 minutes early to complete paperwork.              Future tests that were ordered for you today     Open Future Orders        Priority Expected Expires Ordered    XR Toe Right G/E 2 Views Routine 11/14/2018 11/14/2019 11/14/2018            Who to contact     If you have questions or need follow up information about today's clinic visit or your schedule please contact [facility] directly at [phone].  Normal or non-critical lab and imaging results will be communicated to you by MyChart, letter or phone within 4 business days after the clinic has received the results. If you do not hear from us within 7 days, please contact the clinic through MyChart or phone. If you have a critical or abnormal lab result, we will notify you by phone as soon as  "possible.  Submit refill requests through [employer] or call your pharmacy and they will forward the refill request to us. Please allow 3 business days for your refill to be completed.          Additional Information About Your Visit        Care EveryWhere ID     This is your Care EveryWhere ID. This could be used by other organizations to access your [facility] medical records  [id number]        Your Vitals Were     Pulse Temperature Respirations Height Pulse Oximetry BMI (Body Mass Index)    82 97.3  F (36.3  C) (Temporal) 20 5' 7\" (1.702 m) 95% 37.9 kg/m2       Blood Pressure from Last 3 Encounters:   11/14/18 118/82   07/03/18 146/86   05/16/18 134/60    Weight from Last 3 Encounters:   11/14/18 242 lb (109.8 kg)   07/03/18 238 lb 6 oz (108.1 kg)   05/16/18 235 lb (106.6 kg)              We Performed the Following     CBC with platelets differential     Comprehensive metabolic panel     GASTROENTEROLOGY ADULT REF PROCEDURE ONLY     Uric acid        Primary Care Provider Fax #    Physician No Ref-Primary [phone]       No address on file        Equal Access to Services     MATT LEIVA : Hadamadou Reno, wacara headley, qafamilia venturaallinh payne, brittny sanderson . So [facility] [phone].    ATENCIÓN: Si habla español, tiene a roth disposición servicios gratuitos de asistencia lingüística. Shreon al [phone].    We comply with applicable federal civil rights laws and [place] laws. We do not discriminate on the basis of race, color, national origin, age, disability, sex, sexual orientation, or gender identity.            Thank you!     Thank you for choosing [facility]  for your care. Our goal is always to provide you with excellent care. Hearing back from our patients is one way we can continue to improve our services. Please take a few minutes to complete the written survey that you may receive in the mail after your visit with us. Thank you!           "   Your Updated Medication List - Protect others around you: Learn how to safely use, store and throw away your medicines at www.disposemymeds.org.          This list is accurate as of 11/14/18 11:39 AM.  Always use your most recent med list.                   Brand Name Dispense Instructions for use Diagnosis    ALPRAZolam 0.5 MG tablet    XANAX     Take 1 tab up to 4x daily only if needed. Refill when due,no early refills  Indications: Feeling Anxious        BENZONATATE PO           GABAPENTIN PO      Take 100 mg by mouth 3 times daily as needed (anxiety) 1-3 capsules        lisinopril-hydrochlorothiazide 20-12.5 MG per tablet    PRINZIDE/ZESTORETIC     Take 2 tablets by mouth daily        metoprolol succinate 50 MG 24 hr tablet    TOPROL-XL     Take 50 mg by mouth daily        MIRTAZAPINE PO      Take 15 mg by mouth At Bedtime        simvastatin 20 MG tablet    ZOCOR     Take 20 mg by mouth At Bedtime        TEMAZEPAM PO      Take 15 mg by mouth nightly as needed for sleep        venlafaxine 150 MG Tb24 24 hr tablet    EFFEXOR-ER     Take 150 mg by mouth daily (with breakfast)

## 2018-11-14 NOTE — PROGRESS NOTES
"SUBJECTIVE:   Duane E Sogge is a 78 year old male who presents for Preventive Visit.    Anxiety - better while hunting  Knee stable, no new swelling, pain, redness  Lots of r great toe pain, redness, warmth x 2 wks  Are you in the first 12 months of your Medicare coverage?  No    Annual Wellness Visit     In general, how would you rate your overall health?  Excellent    Frequency of exercise:  2-3 days/week    Do you usually eat at least 4 servings of fruit and vegetables a day, include whole grains    & fiber and avoid regularly eating high fat or \"junk\" foods?  Yes    Taking medications regularly:  Yes    Medication side effects:  None and Other    Ability to successfully perform activities of daily living:  No assistance needed    Home Safety:  No safety concerns identified    Hearing Impairment:  Difficulty following a conversation in a noisy restaurant or crowded room and difficulty understanding speech on the telephone    In the past 6 months, have you been bothered by leaking of urine?  No    In general, how would you rate your overall mental or emotional health?  Excellent    PHQ-2 Total Score: 0    Additional concerns today:  No        Fall risk:      COGNITIVE SCREEN  1) Repeat 3 items (Leader, Season, Table)    2) Clock draw: NORMAL  3) 3 item recall: Recalls NO objects   Results: 0 items recalled: PROBABLE COGNITIVE IMPAIRMENT, **INFORM PROVIDER**    Mini-CogTM Copyright JANELL Shea. Licensed by the author for use in [facility]; reprinted with permission (ness@.[facility]). All rights reserved.        Reviewed and updated as needed this visit by clinical staff  Allergies  Meds         Reviewed and updated as needed this visit by Provider        Social History   Substance Use Topics     Smoking status: Never Smoker     Smokeless tobacco: Current User     Types: Snuff     Alcohol use 3.0 - 3.6 oz/week     3 Cans of beer, 2 - 3 Shots of liquor per week       Alcohol Use 11/14/2018   If you drink " "alcohol do you typically have greater than 3 drinks per day OR greater than 7 drinks per week? No   No flowsheet data found.            Do you feel safe in your environment - Yes    Do you have a Health Care Directive?: No: Advance care planning was reviewed with patient; patient declined at this time.    Current providers sharing in care for this patient include:   Patient Care Team:  No Ref-Primary, Physician as PCP - General    The following health maintenance items are reviewed in Epic and correct as of today:  Health Maintenance   Topic Date Due     ADVANCE DIRECTIVE PLANNING Q5 YRS  10/04/1994     INFLUENZA VACCINE (1) 09/01/2018     PHQ-2 Q1 YR  11/03/2018     FALL RISK ASSESSMENT  05/09/2019     LIPID SCREEN Q5 YR MALE (SYSTEM ASSIGNED)  07/16/2023     TETANUS IMMUNIZATION (SYSTEM ASSIGNED)  06/17/2025     PNEUMOCOCCAL  Completed             Review of Systems   Constitutional: Negative for chills and fever.   HENT: Negative for congestion and ear pain.    Eyes: Negative for pain.   Respiratory: Negative for cough.    Cardiovascular: Negative for chest pain.   Gastrointestinal: Negative for abdominal pain, constipation, diarrhea and hematochezia.   Genitourinary: Negative for frequency, genital sores and hematuria.   Neurological: Negative for dizziness.   Psychiatric/Behavioral: The patient is not nervous/anxious.          OBJECTIVE:   There were no vitals taken for this visit. Estimated body mass index is 37.33 kg/(m^2) as calculated from the following:    Height as of 5/16/18: 5' 7\" (1.702 m).    Weight as of 7/3/18: 238 lb 6 oz (108.1 kg).  Physical Exam  GENERAL: healthy, alert and no distress  EYES: Eyes grossly normal to inspection, PERRL and conjunctivae and sclerae normal  HENT: ear canals and TM's normal, nose and mouth without ulcers or lesions  NECK: no adenopathy, no asymmetry, masses, or scars and thyroid normal to palpation  RESP: lungs clear to auscultation - no rales, rhonchi or wheezes  CV: " "regular rate and rhythm, normal S1 S2, no S3 or S4, no murmur, click or rub, no peripheral edema, and peripheral pulses strong  ABDOMEN: soft, nontender, no hepatosplenomegaly, no masses and bowel sounds normal  MS: no gross musculoskeletal defects noted, no edema.  His right first MTP is markedly swollen, erythematous, warm and tender to touch  SKIN: no suspicious lesions or rashes  NEURO: Normal strength and tone, mentation intact and speech normal  PSYCH: mentation appears normal, affect normal/bright    Diagnostic Test Results:  none     ASSESSMENT / PLAN:       ICD-10-CM    1. Annual physical exam Z00.00    2. Acute gouty arthritis M10.9 Comprehensive metabolic panel     Uric acid     CBC with platelets differential     XR Toe Right G/E 2 Views   3. History of colonic polyps Z86.010 GASTROENTEROLOGY ADULT REF PROCEDURE ONLY   4. Need for prophylactic vaccination and inoculation against influenza Z23 FLU VACCINE, INCREASED ANTIGEN, PRESV FREE, AGE 65+ [56389]     Vaccine Administration, Initial [19243]     History of polyps and due for repeat colonoscopy  Clearly has acute gout on the right MTP.  Should start ibuprofen 600 mg 3 times daily and I will see him back next week to see if it is improved.  He was warmth, not cold.  Avoid purine foods.  He agrees.  Annual topics covered  End of Life Planning:  Patient currently has an advanced directive: Yes.  Practitioner is supportive of decision.    COUNSELING:      BP Readings from Last 1 Encounters:   07/03/18 146/86     Estimated body mass index is 37.33 kg/(m^2) as calculated from the following:    Height as of 5/16/18: 5' 7\" (1.702 m).    Weight as of 7/3/18: 238 lb 6 oz (108.1 kg).           reports that he has never smoked. His smokeless tobacco use includes Snuff.      Appropriate preventive services were discussed with this patient, including applicable screening as appropriate for cardiovascular disease, diabetes, osteopenia/osteoporosis, and glaucoma.  As " appropriate for age/gender, discussed screening for colorectal cancer, prostate cancer, breast cancer, and cervical cancer. Checklist reviewing preventive services available has been given to the patient.    Reviewed patients plan of care and provided an AVS. The  for Duane meets the Care Plan requirement. This Care Plan has been established and reviewed with the .    Counseling Resources:  ATP IV Guidelines  Pooled Cohorts Equation Calculator  Breast Cancer Risk Calculator  FRAX Risk Assessment  ICSI Preventive Guidelines  Dietary Guidelines for Americans, 2010  USDA's MyPlate  ASA Prophylaxis  Lung CA Screening    Asa Oliver MD  [facility]

## 2018-11-14 NOTE — PROGRESS NOTES
Prior to injection verified patient identity using patient's name and date of birth.  Due to injection administration, patient instructed to remain in clinic for 15 minutes  afterwards, and to report any adverse reaction to me immediately.      Injectable Influenza Immunization Documentation    1.  Is the person to be vaccinated sick today?   No    2. Does the person to be vaccinated have an allergy to a component   of the vaccine?   No  Egg Allergy Algorithm Link    3. Has the person to be vaccinated ever had a serious reaction   to influenza vaccine in the past?   No    4. Has the person to be vaccinated ever had Guillain-Barré syndrome?   No    Form completed by Sharon Avitia CMA (AAMA)

## 2018-11-15 ENCOUNTER — TELEPHONE (OUTPATIENT)
Dept: FAMILY MEDICINE | Facility: CLINIC | Age: 78
End: 2018-11-15

## 2018-11-15 DIAGNOSIS — K63.5 POLYP OF COLON, UNSPECIFIED PART OF COLON, UNSPECIFIED TYPE: Primary | ICD-10-CM

## 2018-11-15 NOTE — TELEPHONE ENCOUNTER
Called pt's wife and informed her of the labs and that she can wait to have his colonoscopy done in July and she can let them know when she is called for it. I also informed her that I sent an email to my supervisors about the flu shot,  Shavonne Calle MA

## 2018-11-15 NOTE — TELEPHONE ENCOUNTER
Left message for patient to return call to schedule colonoscopy or EGD. If Belinda or Noemi are unavailable, please transfer to the surgery center.

## 2018-11-15 NOTE — TELEPHONE ENCOUNTER
Reason for Call:  Request for results:    Name of test or procedure: labs & X ray     Date of test of procedure: yesterday     Location of the test or procedure: [facility] to leave the result message on voice mail or with a family member? YES    Phone number Patient's wife can be reached at:  [phone]- S9I on file    Additional comments: any     Call taken on 11/15/2018 at 1:34 PM by Erika Poole

## 2018-11-15 NOTE — TELEPHONE ENCOUNTER
I called pt's wife and informed her of the the results . Per Dr. Oliver his cbc, cmp were normal, no concerns with diabetes. His Uric acid does state he has gout. His x-ray shows some arthritis but no infection. The other labs she was worried about in her my chart have corrected themselves are are good no concerns.  Shavonne Calle MA

## 2018-11-16 NOTE — TELEPHONE ENCOUNTER
Left message for patient to return call to schedule EGD/colonoscopy. If Noemi or Belinda are not available, please transfer to same day surgery

## 2018-11-19 ENCOUNTER — TELEPHONE (OUTPATIENT)
Dept: FAMILY MEDICINE | Facility: CLINIC | Age: 78
End: 2018-11-19

## 2018-11-19 NOTE — TELEPHONE ENCOUNTER
Date of colonoscopy/EGD: 12/18  Surgeon: Dr. Palma  Prep:GoLytely, meds ordered, routed to provider  Packet:Colonoscopy/EGD instructions mailed to patient's home address.   Date: 11/19/2018      Surgery Scheduler

## 2018-11-20 ENCOUNTER — PRE VISIT (OUTPATIENT)
Dept: OPHTHALMOLOGY | Facility: CLINIC | Age: 78
End: 2018-11-20

## 2018-11-20 DIAGNOSIS — A49.01 METHICILLIN SUSCEPTIBLE STAPHYLOCOCCUS AUREUS INFECTION: Primary | ICD-10-CM

## 2018-11-20 NOTE — TELEPHONE ENCOUNTER
Spoke to wife. They will be changing to [employer] ins at the beginning of the year.   Can get pt in to consult in 2018 unsure of ability to get the surgery done in 2018.  Wife will call [employer] and find out if we are in their new network and call back to schedule if needed.  Jolynn ABURTO. COA. OSC

## 2018-11-21 RX ORDER — BISACODYL 5 MG/1
15 TABLET, DELAYED RELEASE ORAL ONCE
Qty: 4 TABLET | Refills: 0 | Status: SHIPPED | OUTPATIENT
Start: 2018-11-21 | End: 2019-02-26

## 2018-11-21 NOTE — TELEPHONE ENCOUNTER
Wife called back and LM stating that she found out that we will not be in network for Duane next year so therefore they will be going to a different physician for consultation.  Jolynn COMBS COA. OSC

## 2018-12-06 ENCOUNTER — OFFICE VISIT (OUTPATIENT)
Dept: FAMILY MEDICINE | Facility: CLINIC | Age: 78
End: 2018-12-06
Payer: COMMERCIAL

## 2018-12-06 VITALS
HEART RATE: 75 BPM | DIASTOLIC BLOOD PRESSURE: 82 MMHG | TEMPERATURE: 96 F | OXYGEN SATURATION: 95 % | BODY MASS INDEX: 39.16 KG/M2 | SYSTOLIC BLOOD PRESSURE: 122 MMHG | RESPIRATION RATE: 18 BRPM | WEIGHT: 250 LBS

## 2018-12-06 DIAGNOSIS — M25.562 ACUTE PAIN OF LEFT KNEE: Primary | ICD-10-CM

## 2018-12-06 DIAGNOSIS — E66.01 MORBID OBESITY (H): ICD-10-CM

## 2018-12-06 DIAGNOSIS — T84.59XD INFECTION OF PROSTHETIC KNEE JOINT, SUBSEQUENT ENCOUNTER: ICD-10-CM

## 2018-12-06 DIAGNOSIS — Z96.659 INFECTION OF PROSTHETIC KNEE JOINT, SUBSEQUENT ENCOUNTER: ICD-10-CM

## 2018-12-06 LAB
CRP SERPL-MCNC: <2.9 MG/L (ref 0–8)
ERYTHROCYTE [DISTWIDTH] IN BLOOD BY AUTOMATED COUNT: 11.9 % (ref 10–15)
HCT VFR BLD AUTO: 44.5 % (ref 40–53)
HGB BLD-MCNC: 14.6 G/DL (ref 13.3–17.7)
MCH RBC QN AUTO: 33.3 PG (ref 26.5–33)
MCHC RBC AUTO-ENTMCNC: 32.8 G/DL (ref 31.5–36.5)
MCV RBC AUTO: 101 FL (ref 78–100)
PLATELET # BLD AUTO: 244 10E9/L (ref 150–450)
RBC # BLD AUTO: 4.39 10E12/L (ref 4.4–5.9)
WBC # BLD AUTO: 8 10E9/L (ref 4–11)

## 2018-12-06 PROCEDURE — 86140 C-REACTIVE PROTEIN: CPT | Performed by: FAMILY MEDICINE

## 2018-12-06 PROCEDURE — 36415 COLL VENOUS BLD VENIPUNCTURE: CPT | Performed by: FAMILY MEDICINE

## 2018-12-06 PROCEDURE — 85027 COMPLETE CBC AUTOMATED: CPT | Performed by: FAMILY MEDICINE

## 2018-12-06 PROCEDURE — 99214 OFFICE O/P EST MOD 30 MIN: CPT | Performed by: FAMILY MEDICINE

## 2018-12-06 ASSESSMENT — PAIN SCALES - GENERAL: PAINLEVEL: NO PAIN (0)

## 2018-12-06 NOTE — NURSING NOTE
"Chief Complaint   Patient presents with     RECHECK       Initial /82  Pulse 75  Temp 96  F (35.6  C) (Temporal)  Resp 18  Wt 250 lb (113.4 kg)  SpO2 95%  BMI 39.16 kg/m2 Estimated body mass index is 39.16 kg/(m^2) as calculated from the following:    Height as of 11/14/18: 5' 7\" (1.702 m).    Weight as of this encounter: 250 lb (113.4 kg).  BP completed using cuff size: jessee Calle MA      "

## 2018-12-06 NOTE — PROGRESS NOTES
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  That was last year.  Is been followed closely by infectious disease.  He is cleared of infection.  This was after suppressive course of doxycycline.  Is been off that for a number of months now.  Is not followed up with infectious disease.  He reports mildly increased pain in his left knee as well as increased swelling.  He struggles with bringing his weight down.  I talked to his wife over the phone always present and she admits he is doing a lot of snacks, as well as making wine a regular habit.  History of gout.  Left MTP is feeling much better.  No fevers.  Not feeling ill.      ROS:  Constitutional, HEENT, cardiovascular, pulmonary, gi and gu systems are negative, except as otherwise noted.    OBJECTIVE:                                                    /82  Pulse 75  Temp 96  F (35.6  C) (Temporal)  Resp 18  Wt 250 lb (113.4 kg)  SpO2 95%  BMI 39.16 kg/m2  Body mass index is 39.16 kg/(m^2).    Elderly male.  Obese.  Alert and oriented.  Left knee looks more enlarged than the right.  There is a well-healed incision.  He has normal range of motion.  There is a mild effusion present.  No warmth or redness.  Left MTP has returned to his normal state.  No longer red and warm.    Diagnostic Test Results:  none      ASSESSMENT/PLAN:                                                        ICD-10-CM    1. Acute pain of left knee M25.562 CRP, inflammation     CBC with platelets   2. 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 Morbid obesity (H) E66.01        He reports some  increased symptoms in the left knee.  We will start with some blood work blood work.  I asked him to return for recheck to his infectious disease consultant.  He will do that.  I will see him back in 3-4 weeks, sooner certainly if he has an increase in knee symptoms, or develops fever chills etc.  He understands.  Should also abstain from alcohol while on gabapentin and Xanax.  Long discussion about weight loss.  He will start a calorie tracker with the help of his wife.  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        Asa Oliver MD  [facility]

## 2018-12-06 NOTE — MR AVS SNAPSHOT
After Visit Summary   12/6/2018    Duane E Sogge    MRN: [id number]           Patient Information     Date Of Birth          1940        Visit Information        Provider Department      12/6/2018 2:00 PM Asa Oliver MD [facility]        Today's Diagnoses     Acute pain of left knee    -  1    Infection of prosthetic knee joint, subsequent encounter           Follow-ups after your visit        Your next 10 appointments already scheduled     Jan 03, 2019  1:00 PM CST   [facility] Visit with Asa Oliver MD   [facility]    [address] [place] [id number] [phone]           Bring a current list of meds and any records pertaining to this visit. For Physicals, please bring immunization records and any forms needing to be filled out. Please arrive 10 minutes early to complete paperwork.            Jul 15, 2019   Procedure with Asa Oliver MD   [facility] ([facility])    [address] [place] [id number] [phone]              Who to contact     If you have questions or need follow up information about today's clinic visit or your schedule please contact [facility] directly at [phone].  Normal or non-critical lab and imaging results will be communicated to you by MyChart, letter or phone within 4 business days after the clinic has received the results. If you do not hear from us within 7 days, please contact the clinic through MyChart or phone. If you have a critical or abnormal lab result, we will notify you by phone as soon as possible.  Submit refill requests through [employer] or call your pharmacy and they will forward the refill request to us. Please allow 3 business days for your refill to be completed.          Additional Information About Your Visit        [employer] Information     [facility] gives you secure access to your  electronic health record. If you see a primary care provider, you can also send messages to your care team and make appointments. If you have questions, please call your primary care clinic.  If you do not have a primary care provider, please call [phone] and they will assist you.        Care EveryWhere ID     This is your Care EveryWhere ID. This could be used by other organizations to access your [facility] medical records  [id number]        Your Vitals Were     Pulse Temperature Respirations Pulse Oximetry BMI (Body Mass Index)       75 96  F (35.6  C) (Temporal) 18 95% 39.16 kg/m2        Blood Pressure from Last 3 Encounters:   12/06/18 122/82   11/14/18 118/82   07/03/18 146/86    Weight from Last 3 Encounters:   12/06/18 250 lb (113.4 kg)   11/14/18 242 lb (109.8 kg)   07/03/18 238 lb 6 oz (108.1 kg)              We Performed the Following     CBC with platelets     CRP, inflammation        Primary Care Provider Office Phone # Fax #    Jeancarlosjerry Rashaad Oliver [phone] [address] [place] [address]        Equal Access to Services     [facility]iumSTEFFANY : Hadii aad kinjal hadasho Soozzie, waaxda luqadaha, qaybta kaalmada adeegyada, brittny sanderson . So [facility] [phone].    ATENCIÓN: Si habla español, tiene a roth disposición servicios gratuitos de asistencia lingüística. Llame al [phone].    We comply with applicable federal civil rights laws and [place] laws. We do not discriminate on the basis of race, color, national origin, age, disability, sex, sexual orientation, or gender identity.            Thank you!     Thank you for choosing [facility]  for your care. Our goal is always to provide you with excellent care. Hearing back from our patients is one way we can continue to improve our services. Please take a few minutes to complete the written survey that you may receive in the mail after your visit with us. Thank you!             Your  Updated Medication List - Protect others around you: Learn how to safely use, store and throw away your medicines at www.disposemymeds.org.          This list is accurate as of 12/6/18  2:46 PM.  Always use your most recent med list.                   Brand Name Dispense Instructions for use Diagnosis    ALPRAZolam 0.5 MG tablet    XANAX     Take 1 tab up to 4x daily only if needed. Refill when due,no early refills  Indications: Feeling Anxious        BENZONATATE PO           GABAPENTIN PO      Take 100 mg by mouth 3 times daily as needed (anxiety) 1-3 capsules        lisinopril-hydrochlorothiazide 20-12.5 MG tablet    PRINZIDE/ZESTORETIC     Take 2 tablets by mouth daily        metoprolol succinate ER 50 MG 24 hr tablet    TOPROL-XL     Take 50 mg by mouth daily        MIRTAZAPINE PO      Take 15 mg by mouth At Bedtime        simvastatin 20 MG tablet    ZOCOR     Take 20 mg by mouth At Bedtime        TEMAZEPAM PO      Take 15 mg by mouth nightly as needed for sleep        venlafaxine 150 MG 24 hr tablet    EFFEXOR-ER     Take 150 mg by mouth daily (with breakfast)

## 2018-12-07 ENCOUNTER — TELEPHONE (OUTPATIENT)
Dept: FAMILY MEDICINE | Facility: CLINIC | Age: 78
End: 2018-12-07

## 2018-12-11 ENCOUNTER — OFFICE VISIT (OUTPATIENT)
Dept: PODIATRY | Facility: OTHER | Age: 78
End: 2018-12-11
Payer: COMMERCIAL

## 2018-12-11 VITALS
HEIGHT: 67 IN | SYSTOLIC BLOOD PRESSURE: 134 MMHG | TEMPERATURE: 97.9 F | WEIGHT: 250 LBS | BODY MASS INDEX: 39.24 KG/M2 | DIASTOLIC BLOOD PRESSURE: 82 MMHG

## 2018-12-11 DIAGNOSIS — Z86.14 HX MRSA INFECTION: ICD-10-CM

## 2018-12-11 DIAGNOSIS — R60.0 EDEMA EXTREMITIES: ICD-10-CM

## 2018-12-11 DIAGNOSIS — M1A.9XX0 CHRONIC GOUT INVOLVING TOE OF RIGHT FOOT WITHOUT TOPHUS, UNSPECIFIED CAUSE: Primary | ICD-10-CM

## 2018-12-11 PROCEDURE — 99213 OFFICE O/P EST LOW 20 MIN: CPT | Performed by: PODIATRIST

## 2018-12-11 RX ORDER — COLCHICINE 0.6 MG/1
0.6 TABLET ORAL 2 TIMES DAILY
Qty: 10 TABLET | Refills: 1 | Status: SHIPPED | OUTPATIENT
Start: 2018-12-11 | End: 2019-01-28

## 2018-12-11 ASSESSMENT — PAIN SCALES - GENERAL: PAINLEVEL: SEVERE PAIN (6)

## 2018-12-11 ASSESSMENT — MIFFLIN-ST. JEOR: SCORE: 1807.62

## 2018-12-11 NOTE — LETTER
12/11/2018         RE: Duane E Sogge  [address] [facility] [address]        Dear Colleague,    Thank you for referring your patient, Duane E Sogge, to the [facility] [place]. Please see a copy of my visit note below.    HPI:  Duane E Sogge is a 78 year old male who is seen in consultation at the request of self.    Pt presents for eval of:   (Onset, Location, L/R, Character, Treatments, Injury if yes)     Onset early Nov 2018, medial and dorsal Right foot pain. No injury noted. History of gout.  Constant, redness, swelling, throbbing, sharp, stabbing, burning, pain 6-10  Toe spacer between Right great and 2nd toe.    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  He states his compression stockings have not been very helpful and he does not like to wear them, because they do not fit properly.    Retired.    Weight management plan: Patient was referred to their PCP to discuss a diet and exercise plan.     Review of Systems:  Patient denies fever, chills, rash, wound, stiffness, limping, numbness, weakness, heart burn, blood in stool, chest pain with activity, calf pain when walking, shortness of breath with activity, chronic cough, easy bleeding/bruising, swelling of ankles, excessive thirst, fatigue, depression, anxiety.       PAST MEDICAL HISTORY:   Past Medical History:   Diagnosis Date     Anxiety      Hypertension         PAST SURGICAL HISTORY:   Past Surgical History:   Procedure Laterality Date     ORTHOPEDIC SURGERY          MEDICATIONS:   Current Outpatient Medications:      ALPRAZolam (XANAX) 0.5 MG tablet, Take 1 tab up to 4x daily only if needed. Refill when due,no early refills  Indications: Feeling Anxious, Disp: , Rfl:      BENZONATATE PO, , Disp: , Rfl:       colchicine (COLCYRS) 0.6 MG tablet, Take 1 tablet (0.6 mg) by mouth 2 times daily, Disp: 10 tablet, Rfl: 1     GABAPENTIN PO, Take 100 mg by mouth 3 times daily as needed (anxiety) 1-3 capsules, Disp: , Rfl:      lisinopril-hydrochlorothiazide (PRINZIDE/ZESTORETIC) 20-12.5 MG per tablet, Take 2 tablets by mouth daily, Disp: , Rfl:      metoprolol (TOPROL-XL) 50 MG 24 hr tablet, Take 50 mg by mouth daily, Disp: , Rfl:      MIRTAZAPINE PO, Take 15 mg by mouth At Bedtime, Disp: , Rfl:      simvastatin (ZOCOR) 20 MG tablet, Take 20 mg by mouth At Bedtime, Disp: , Rfl:      TEMAZEPAM PO, Take 15 mg by mouth nightly as needed for sleep, Disp: , Rfl:      venlafaxine (EFFEXOR-ER) 150 MG TB24 24 hr tablet, Take 150 mg by mouth daily (with breakfast), Disp: , Rfl:      ALLERGIES:    Allergies   Allergen Reactions     Rifampin Anaphylaxis     Elevated lfts     Motrin [Ibuprofen] Itching        SOCIAL HISTORY:   Social History     Socioeconomic History     Marital status:      Spouse name: Not on file     Number of children: Not on file     Years of education: Not on file     Highest education level: Not on file   Social Needs     Financial resource strain: Not on file     Food insecurity - worry: Not on file     Food insecurity - inability: Not on file     Transportation needs - medical: Not on file     Transportation needs - non-medical: Not on file   Occupational History     Not on file   Tobacco Use     Smoking status: Never Smoker     Smokeless tobacco: Current User     Types: Snuff   Substance and Sexual Activity     Alcohol use: Yes     Alcohol/week: 3.0 - 3.6 oz     Types: 3 Cans of beer, 2 - 3 Shots of liquor per week     Drug use: No     Sexual activity: Yes     Partners: Female   Other Topics Concern     Not on file   Social History Narrative     Not on file        FAMILY HISTORY:   Family History   Problem Relation Age of Onset     Hypertension Mother      Lung Cancer Sister      Thyroid Disease No family hx  "of      Diabetes No family hx of      Glaucoma No family hx of      Macular Degeneration No family hx of         EXAM:Vitals: /82 (BP Location: Right arm, Cuff Size: Adult Large)   Temp 97.9  F (36.6  C) (Temporal)   Ht 1.702 m (5' 7\")   Wt 113.4 kg (250 lb)   BMI 39.16 kg/m     BMI= Body mass index is 39.16 kg/m .    General appearance: Patient is alert and fully cooperative with history & exam.  No sign of distress is noted during the visit.     Psychiatric: Affect is pleasant & appropriate.  Patient appears motivated to improve health.     Respiratory: Breathing is regular & unlabored while sitting.     HEENT: Hearing is intact to spoken word.  Speech is clear.  No gross evidence of visual impairment that would impact ambulation.     Vascular: DP & PT pulses are intact & regular bilaterally.  No significant edema or varicosities noted.  CFT and skin temperature is normal to both lower extremities.     Neurologic: Lower extremity sensation is intact to light touch.  No evidence of weakness or contracture in the lower extremities.  No evidence of neuropathy.    Dermatologic: Skin is intact to both lower extremities without significant laceration ulceration drainage or abrasion.      Musculoskeletal: No gross dislocation noted.  The right first MPJ is edematous surrounding the joint with mild dark erythema that blanches easily and subtle heat.  Limited dorsiflexion and plantarflexion bilateral with crepitus throughout but no guarding.  There is discomfort with firm palpation of the joint but it is not causing guarding at this time.    Radiographs: Of the right great toe a few weeks ago demonstrates moderate degenerative changes with elevated intermetatarsal angle HAV angle and no fracture or dislocation.    Labs a few weeks ago demonstrates elevated uric acid.  Also elevated creatinine     ASSESSMENT:       ICD-10-CM    1. Chronic gout involving toe of right foot without tophus, unspecified cause M1A.9XX0 " NUTRITION REFERRAL     colchicine (COLCYRS) 0.6 MG tablet   2. Edema extremities R60.0 LYMPHEDEMA THERAPY REFERRAL   3. Hx MRSA infection Z86.14 LYMPHEDEMA THERAPY REFERRAL        PLAN:  Reviewed patient's chart in [facility].  This is consistent with acute gout flare.  Discussed etiology and treatment options.  Discussed how to manage low purine consumption.  Increase clear fluid to flush uric acid.  Avoid beer and other alcohol.      Discussed oral meds starting with oral colchicine for acute symptoms.  May consider allopurinol for chronic therapy if experiencing multiple flares per year with high uric acid in labs. Your primary care provider may manage chronic meds as periodic labs may be necessary.  May use Indocin for a course to reduce inflammation.  Patient may also opt for local/steroid injection if symptoms are severe or not resolved.  Reviewed blood labs.  May repeat labs if flare up becomes chronic.   Begin colchicine for a couple of days.  All questions were answered to their satisfaction.     May consider follow-up with rheumatology if necessary or becomes chronic  Also recommended and placed order for nutrition consult as he has many questions about specific foods he can and cannot eat.    Also is very adamant about problems with chronic swelling since left knee implant 2/17 and then getting MRSA and some form of revision and now constant edema about left knee and compression socks that he did not like. Discussed options and he would like a custom fit compression garmet therefore I recmoent PT for compression therapy as needed and then custom compression toes to upper thigh.     Duke Frankel DPM        Again, thank you for allowing me to participate in the care of your patient.        Sincerely,        Duke Frankel DPM

## 2018-12-11 NOTE — PROGRESS NOTES
HPI:  Duane E Sogge is a 78 year old male who is seen in consultation at the request of self.    Pt presents for eval of:   (Onset, Location, L/R, Character, Treatments, Injury if yes)     Onset early Nov 2018, medial and dorsal Right foot pain. No injury noted. History of gout.  Constant, redness, swelling, throbbing, sharp, stabbing, burning, pain 6-10  Toe spacer between Right great and 2nd toe.    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  He states his compression stockings have not been very helpful and he does not like to wear them, because they do not fit properly.    Retired.    Weight management plan: Patient was referred to their PCP to discuss a diet and exercise plan.     Review of Systems:  Patient denies fever, chills, rash, wound, stiffness, limping, numbness, weakness, heart burn, blood in stool, chest pain with activity, calf pain when walking, shortness of breath with activity, chronic cough, easy bleeding/bruising, swelling of ankles, excessive thirst, fatigue, depression, anxiety.       PAST MEDICAL HISTORY:   Past Medical History:   Diagnosis Date     Anxiety      Hypertension         PAST SURGICAL HISTORY:   Past Surgical History:   Procedure Laterality Date     ORTHOPEDIC SURGERY          MEDICATIONS:   Current Outpatient Medications:      ALPRAZolam (XANAX) 0.5 MG tablet, Take 1 tab up to 4x daily only if needed. Refill when due,no early refills  Indications: Feeling Anxious, Disp: , Rfl:      BENZONATATE PO, , Disp: , Rfl:      colchicine (COLCYRS) 0.6 MG tablet, Take 1 tablet (0.6 mg) by mouth 2 times daily, Disp: 10 tablet, Rfl: 1     GABAPENTIN PO, Take 100 mg by mouth 3 times daily as needed (anxiety) 1-3 capsules, Disp: , Rfl:       lisinopril-hydrochlorothiazide (PRINZIDE/ZESTORETIC) 20-12.5 MG per tablet, Take 2 tablets by mouth daily, Disp: , Rfl:      metoprolol (TOPROL-XL) 50 MG 24 hr tablet, Take 50 mg by mouth daily, Disp: , Rfl:      MIRTAZAPINE PO, Take 15 mg by mouth At Bedtime, Disp: , Rfl:      simvastatin (ZOCOR) 20 MG tablet, Take 20 mg by mouth At Bedtime, Disp: , Rfl:      TEMAZEPAM PO, Take 15 mg by mouth nightly as needed for sleep, Disp: , Rfl:      venlafaxine (EFFEXOR-ER) 150 MG TB24 24 hr tablet, Take 150 mg by mouth daily (with breakfast), Disp: , Rfl:      ALLERGIES:    Allergies   Allergen Reactions     Rifampin Anaphylaxis     Elevated lfts     Motrin [Ibuprofen] Itching        SOCIAL HISTORY:   Social History     Socioeconomic History     Marital status:      Spouse name: Not on file     Number of children: Not on file     Years of education: Not on file     Highest education level: Not on file   Social Needs     Financial resource strain: Not on file     Food insecurity - worry: Not on file     Food insecurity - inability: Not on file     Transportation needs - medical: Not on file     Transportation needs - non-medical: Not on file   Occupational History     Not on file   Tobacco Use     Smoking status: Never Smoker     Smokeless tobacco: Current User     Types: Snuff   Substance and Sexual Activity     Alcohol use: Yes     Alcohol/week: 3.0 - 3.6 oz     Types: 3 Cans of beer, 2 - 3 Shots of liquor per week     Drug use: No     Sexual activity: Yes     Partners: Female   Other Topics Concern     Not on file   Social History Narrative     Not on file        FAMILY HISTORY:   Family History   Problem Relation Age of Onset     Hypertension Mother      Lung Cancer Sister      Thyroid Disease No family hx of      Diabetes No family hx of      Glaucoma No family hx of      Macular Degeneration No family hx of         EXAM:Vitals: /82 (BP Location: Right arm, Cuff Size: Adult Large)   Temp 97.9  F (36.6  C)  "(Temporal)   Ht 1.702 m (5' 7\")   Wt 113.4 kg (250 lb)   BMI 39.16 kg/m    BMI= Body mass index is 39.16 kg/m .    General appearance: Patient is alert and fully cooperative with history & exam.  No sign of distress is noted during the visit.     Psychiatric: Affect is pleasant & appropriate.  Patient appears motivated to improve health.     Respiratory: Breathing is regular & unlabored while sitting.     HEENT: Hearing is intact to spoken word.  Speech is clear.  No gross evidence of visual impairment that would impact ambulation.     Vascular: DP & PT pulses are intact & regular bilaterally.  No significant edema or varicosities noted.  CFT and skin temperature is normal to both lower extremities.     Neurologic: Lower extremity sensation is intact to light touch.  No evidence of weakness or contracture in the lower extremities.  No evidence of neuropathy.    Dermatologic: Skin is intact to both lower extremities without significant laceration ulceration drainage or abrasion.      Musculoskeletal: No gross dislocation noted.  The right first MPJ is edematous surrounding the joint with mild dark erythema that blanches easily and subtle heat.  Limited dorsiflexion and plantarflexion bilateral with crepitus throughout but no guarding.  There is discomfort with firm palpation of the joint but it is not causing guarding at this time.    Radiographs: Of the right great toe a few weeks ago demonstrates moderate degenerative changes with elevated intermetatarsal angle HAV angle and no fracture or dislocation.    Labs a few weeks ago demonstrates elevated uric acid.  Also elevated creatinine     ASSESSMENT:       ICD-10-CM    1. Chronic gout involving toe of right foot without tophus, unspecified cause M1A.9XX0 NUTRITION REFERRAL     colchicine (COLCYRS) 0.6 MG tablet   2. Edema extremities R60.0 LYMPHEDEMA THERAPY REFERRAL   3. Hx MRSA infection Z86.14 LYMPHEDEMA THERAPY REFERRAL        PLAN:  Reviewed patient's chart " in epic.  This is consistent with acute gout flare.  Discussed etiology and treatment options.  Discussed how to manage low purine consumption.  Increase clear fluid to flush uric acid.  Avoid beer and other alcohol.      Discussed oral meds starting with oral colchicine for acute symptoms.  May consider allopurinol for chronic therapy if experiencing multiple flares per year with high uric acid in labs. Your primary care provider may manage chronic meds as periodic labs may be necessary.  May use Indocin for a course to reduce inflammation.  Patient may also opt for local/steroid injection if symptoms are severe or not resolved.  Reviewed blood labs.  May repeat labs if flare up becomes chronic.   Begin colchicine for a couple of days.  All questions were answered to their satisfaction.     May consider follow-up with rheumatology if necessary or becomes chronic  Also recommended and placed order for nutrition consult as he has many questions about specific foods he can and cannot eat.    Also is very adamant about problems with chronic swelling since left knee implant 2/17 and then getting MRSA and some form of revision and now constant edema about left knee and compression socks that he did not like. Discussed options and he would like a custom fit compression garmet therefore I recmoent PT for compression therapy as needed and then custom compression toes to upper thigh.     Duke Frankel DPM

## 2018-12-28 ENCOUNTER — TELEPHONE (OUTPATIENT)
Dept: FAMILY MEDICINE | Facility: CLINIC | Age: 78
End: 2018-12-28

## 2018-12-28 NOTE — TELEPHONE ENCOUNTER
"Duane E Sogge is a 78 year old male who calls with foot pain, and bladder leakage.      NURSING ASSESSMENT:  Description:  Patient and patient's wife are calling stating patient is having foot pain and burning, some bladder leakage and the patient had stated to wife \"if I can't feel better or work on anything I would rather be dead\".  Wife is concerned with patient's depression and pain.  Patient states he does not feel like hurting himself at this time.  Patient's wife states all medications are present and accounted for (due to worry of taking too many medications she counted them while on the phone with me).  Advised patient and wife to take medication as directed for all diagnosis.  Patient's wife denies patient having altered mental status, confusion, delusional, phychotic episodes.  Patient has been treating depression for many years and with newer physical limitations states he is feeling overwhelmed and defeated.  Patient has appointment with Dr. Oliver next week for follow up.  Advised patient's wife to encourage elevating patient's feet and place ice packs on feet if helps, or heat packs, rest feet, take medication as directed for gout and encouraged positive activities.  Advised patient's wife if patient is talking about hurting himself or others and/or is in a lot of physical pain, bring him into the emergency room right away or call 911.  Patient's wife states understanding.    Onset/duration:  Stated above  Precip. factors:  Stated above  Associated symptoms:  Stated above  Improves/worsens symptoms:  None  Pain scale (0-10)   Unable to determine at this time  Allergies:   Allergies   Allergen Reactions     Rifampin Anaphylaxis     Elevated lfts     Motrin [Ibuprofen] Itching       NURSING PLAN: Nursing advice to patient ER for worsening symptoms    RECOMMENDED DISPOSITION:  Home care advice - to ER for worsening symptoms  Will comply with recommendation: Yes  If further questions/concerns or if " symptoms do not improve, worsen or new symptoms develop, call your PCP or [facility] as soon as possible.      Guideline used: Depression, Foot pain  Telephone Triage Protocols for Nurses, Fifth Edition, Delilah Warren RN

## 2019-01-03 ENCOUNTER — HOSPITAL ENCOUNTER (OUTPATIENT)
Dept: PHYSICAL THERAPY | Facility: CLINIC | Age: 79
Setting detail: THERAPIES SERIES
End: 2019-01-03
Attending: PODIATRIST
Payer: COMMERCIAL

## 2019-01-03 ENCOUNTER — OFFICE VISIT (OUTPATIENT)
Dept: FAMILY MEDICINE | Facility: CLINIC | Age: 79
End: 2019-01-03
Payer: COMMERCIAL

## 2019-01-03 VITALS — SYSTOLIC BLOOD PRESSURE: 122 MMHG | DIASTOLIC BLOOD PRESSURE: 82 MMHG

## 2019-01-03 DIAGNOSIS — Z86.14 HX MRSA INFECTION: ICD-10-CM

## 2019-01-03 DIAGNOSIS — R60.0 EDEMA EXTREMITIES: ICD-10-CM

## 2019-01-03 DIAGNOSIS — T84.59XD INFECTION OF PROSTHETIC KNEE JOINT, SUBSEQUENT ENCOUNTER: ICD-10-CM

## 2019-01-03 DIAGNOSIS — E66.01 MORBID OBESITY (H): ICD-10-CM

## 2019-01-03 DIAGNOSIS — Z96.659 INFECTION OF PROSTHETIC KNEE JOINT, SUBSEQUENT ENCOUNTER: ICD-10-CM

## 2019-01-03 DIAGNOSIS — M00.062 STAPHYLOCOCCAL ARTHRITIS OF LEFT KNEE (H): Primary | ICD-10-CM

## 2019-01-03 PROCEDURE — 99214 OFFICE O/P EST MOD 30 MIN: CPT | Performed by: FAMILY MEDICINE

## 2019-01-03 PROCEDURE — 97161 PT EVAL LOW COMPLEX 20 MIN: CPT | Mod: GP

## 2019-01-03 PROCEDURE — 97750 PHYSICAL PERFORMANCE TEST: CPT | Mod: GP,XU

## 2019-01-03 ASSESSMENT — PAIN SCALES - GENERAL: PAINLEVEL: NO PAIN (0)

## 2019-01-03 ASSESSMENT — MIFFLIN-ST. JEOR: SCORE: 1811.47

## 2019-01-03 NOTE — PROGRESS NOTES
"  SUBJECTIVE:                                                      Chief Complaint   Patient presents with     Recheck Medication        Recheck     Amount of exercise or physical activity: None    Problems taking medications regularly: No    Medication side effects: none    Diet: regular (no restrictions)        Duane is a 78 year old   Toe recheck, gout is better. Wondering about diet  Wt loss - going slow, has been successful in the past  l knee recheck. Feeling better. Still swollen. But pain improved. At [employer] now    ROS:  Constitutional, HEENT, cardiovascular, pulmonary, gi and gu systems are negative, except as otherwise noted.    OBJECTIVE:                                                    /82   Pulse (P) 71   Temp (P) 97.4  F (36.3  C) (Temporal)   Resp (P) 18   Ht (P) 1.73 m (5' 8.1\")   Wt (P) 112 kg (247 lb)   SpO2 (P) 96%   BMI (P) 37.45 kg/m    Body mass index is 37.45 kg/m  (pended).    Well-appearing male no distress.  Left foot and in particular the MTP joint is normal.  No redness warmth or swelling.  His left knee still has a moderate effusion present.  No warmth or redness.  Full range of motion.  No tenderness    Diagnostic Test Results:  none      ASSESSMENT/PLAN:                                                        ICD-10-CM    1. 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 Morbid obesity (H) E66.01        Suspect gout for his recent red swollen first MTP.  Diet instructions given  Still has an effusion and probably posttraumatic arthritis present but I want him to follow-up with infectious disease, as we discussed her last visit.  No sign of acute or chronic infection today  Again discussed the importance of weight loss        Asa Oliver MD  [facility]     "

## 2019-01-04 NOTE — PROGRESS NOTES
01/03/19 1600   Rehab Discipline   Discipline PT   Type of Visit   Type of visit Initial Edema Evaluation       present No   General Information   Start of care 01/03/19   Referring physician Dr. Duke Frankel   Orders Evaluate and treat as indicated   Order date 12/11/18   Medical diagnosis Edema and Hx of MRSA infection   Onset of illness / date of surgery 08/24/18   Edema onset 08/24/18   Affected body parts LLE;RLE   Edema etiology TKA;Infection   Edema etiology comments Patient has TKA bilaterally (left more recent than right) and a hx of MRSA infection in L TKA.  Patient stated the swelling occured in both legs after the MRSA infection approximately in August.   Pertinent history of current problem (PT: include personal factors and/or comorbidities that impact the POC; OT: include additional occupational profile info) Patient is a 78 year old male with a hx of TKA, MRS infection post-op, gout, and edema   Surgical / medical history reviewed Yes   Prior level of functional mobility Independent   Prior treatment Other  (Athletic Compression Socks. Patient doesn't remember.)   Community support Family / friend caregiver   Psychosocial concerns Other   Living environment [place] [facility]   Assistive device comments No AD   General observations Left Knee has the swelling. He had MRSA in the left knee. He had therapy twice a day for the knee. He still thinks their is water in the left leg. Patient says he also has gout. Patient is trying to exercise and [employer]. Patient also says his LE is affecting his balance as well.  Patient seems unaware that he is suppose to be here for lymphedema. Patient states he wants to work on his balance..   Fall Risk Screen   Fall screen completed by PT   Have you fallen 2 or more times in the past year? No   Have you fallen and had an injury in the past year? No   Timed Up and Go score (seconds) 10   Is patient a fall risk? Referral initiated   Fall screen  comments No reported falls but patient feels he may have balance impairments. Fierro was performed and patient scored a 44/56 indicating a fall risk, however he scored a 10 on the TUG while demonstrated staggering gait with moderate path deviation.   System Outcome Measures   Outcome Measures Lymphedema   Lymphedema Life Impact Scale (score range 0-72). A higher score indicates greater impairment. 7   Subjective Report   Patient report of symptoms Left Knee has the swelling. He had MRSA in the left knee. He had therapy twice a day for the knee. He still thinks their is water in the left leg. Patient says he also has gout. Patient is trying to exercise and [employer]. Patient also says his LE is affecting his balance as well.  Patient seems unaware that he is suppose to be here for lymphedema. Patient states he wants to work on his balance..   Precautions   Precautions comments None   Patient / Family Goals   Patient / family goals statement To improve his balance?   Pain   Pain comments No Pain   Cognitive Status   Orientation Orientation to person, place and time;Other   Level of consciousness Alert   Follows commands and answers questions 100% of the time   Personal safety and judgement Intact   Memory Intact   Cognitive status comments Patient was not aware why he had this appointment and believed it was to work on his balance. Patient stated he knew his MDs/DPM were concerned about his swelling but he didn't know he had an appointment for it   Edema Exam / Assessment   Skin condition Pitting   Skin condition comments Slight +1 pitting edema on the dorsal aspect of the metatarsal heads, near ankles and in distal leg. Difficult to distingush bones off foot.   Pitting 1+   Pitting location Dorsal MTP, Ankles and distal leg   Dorsal pedal pulse Symmetrical   Stemmer sign Negative   Girth Measurements   Girth Measurements Refer to separate girth measurement flowsheet   Volume LE   Right LE (mL) 6821.19   Left LE (mL)  "7062.37   Range of Motion   ROM No deficits were identified   Strength   Strength No deficits were identified   Posture   Posture Forward head position   Palpation   Palpation Slight Pitting edema in foot and ankle   Activities of Daily Living   Activities of Daily Living \"Not as good as I use to be after the operation\" but independent   Bed Mobility   Bed mobility \"Not as good as I use to be after the operation\" but independent   Transfers   Transfers \"Not as good as I use to be after the operation\" but independent   Gait / Locomotion   Gait / Locomotion \"Not as good as I use to be after the operation\" but independent   Vascular Assessment   Vascular Assessment Vascular concerns;Other   Vascular Assessment Comments Due to unusual onset of bilateral edema, patient may benefit from a physical or cardiac assessment to review Heart, Liver and Kidney function.   Coordination   Coordination Gross motor coordination appropriate   Muscle Tone   Muscle tone No deficits were identified   Planned Edema Interventions   Planned edema interventions Fit for compression garment   Planned edema intervention comments Eval only. Refer to Orthotics/Prosthetics for compression stocking.   Clinical Impression   Criteria for skilled therapeutic intervention met No problems identified which require skilled intervention   Therapy diagnosis Bilateraly LE lymphedema   Influenced by the following impairments / conditions Stage 1   Functional limitations due to impairments / conditions Patient states he has balance and functional deficits following his surgery that are concerning to him. Patient did score a 44/56 on the figueroa which is a strong indication the patient would benefit from balance training.   Clinical Presentation Stable/Uncomplicated   Clinical Presentation Rationale Based on evaluation and clinical reasoning   Clinical Decision Making (Complexity) Low complexity   Patient / family and/or staff in agreement with plan of care Yes "   Risks and benefits of therapy have been explained Yes   Clinical impression comments The patient recieved orders for lymphedema therapy, however despite having +1 pitting edema bilaterally the patient's edema is not severe enough for complete decongestive therapy and will likely be well managed with a compression garment/stocking. However, because the patient presents with bilateral lymphedema occuring at the same time (one would expect the patient to only have left sided lymphedema with the Left TKA plus MRSA), it was recommended that patient recieve a physical and/or cardiac check up since bilateral lymphedema is commonly associated with Cardiac impairments, Kidney impairments and liver impairments. Patient also stated that he had balance impairments and functional limitations at home that he felt needed to be addressed by Physical Therapy. A figueroa balance test and timed-up and go test were performed. The patient scored a 44/56 which indicates the patient is a fall risk. The patient scored 10.0 seconds indicating the patient is not a fall risk on the TUG test.  Contacting the PCP and recommending a systems review of his cardiac function, liver function and/or kidney function. Patient also was recommended for compression stockings and potentially physical therapy for his balance/functional abilities.   Total Evaluation Time   PT Carl Low Complexity Minutes (49423) 51

## 2019-01-08 PROBLEM — M00.9 SEPTIC ARTHRITIS OF KNEE (H): Status: RESOLVED | Noted: 2018-03-19 | Resolved: 2019-01-08

## 2019-01-15 ENCOUNTER — OFFICE VISIT (OUTPATIENT)
Dept: URGENT CARE | Facility: RETAIL CLINIC | Age: 79
End: 2019-01-15
Payer: COMMERCIAL

## 2019-01-15 VITALS
DIASTOLIC BLOOD PRESSURE: 88 MMHG | TEMPERATURE: 98.5 F | OXYGEN SATURATION: 94 % | HEART RATE: 52 BPM | SYSTOLIC BLOOD PRESSURE: 162 MMHG

## 2019-01-15 DIAGNOSIS — J01.90 ACUTE SINUSITIS WITH SYMPTOMS > 10 DAYS: Primary | ICD-10-CM

## 2019-01-15 PROCEDURE — 99213 OFFICE O/P EST LOW 20 MIN: CPT | Performed by: INTERNAL MEDICINE

## 2019-01-15 NOTE — PROGRESS NOTES
[facility] [place]         Gema Monterroso MD, MPH  01/15/2019        History:      Duane E Sogge is a pleasant 78 year old year old male with a chief complaint of  Nasal congestion,facial and sinus pressure and pain since 2 weeks ago.   No fever or chills.   No dyspnea or chest pain.   No smoking history.   No headache or neck pain.  No GI or  symptoms.   No MSK symptoms.         Assessment and Plan:         Acute sinusitis with symptoms > 10 days    - amoxicillin-clavulanate (AUGMENTIN) 875-125 MG tablet; Take 1 tablet by mouth 2 times daily for 10 days  Dispense: 20 tablet; Refill: 0  Discussed supportive care with the patient/family  Advised to increase fluid intake and rest.  Patient was advised to use throat lozenges and gargle with salt water for symptomatic relief.  Tylenol for pain q 6 hours prn  F/u w PCP in 4-5 days, earlier if symptoms worsen.                   Physical Exam:      /88   Pulse 52   Temp 98.5  F (36.9  C) (Oral)   SpO2 94%      Constitutional: Patient is in no distress The patient is pleasant and cooperative.   HEENT: Head:  Head is atraumatic, normocephalic.    Eyes: Pupils are equal, round and reactive to light and accomodation.  Sclera is non-icteric. No conjunctival injection, or exudate noted. Extraocular motion is intact. Visual acuity is intact bilaterally.  Ears:  External acoustic canals are patent and clear.  There is no erythema and bulging( exudate)  of the ( R/L ) tympanic membrane(s ).   Nose:  Nasal congestion w/o drainage or mucosal ulceration is noted. The patient experiences pain upon gentle percussion of malar and maxillary prominences . No pain upon palpation of mastoid processes.  Throat:  Oral mucosa is moist.  No oral lesions are noted.  No posterior pharyngeal hyperemia nor exudate noted.     Neck Supple.  There is no cervical lymphadenopathy.  No nuchal rigidity noted.  There is no meningismus.     Cardiovascular: Heart is regular to rate  and rhythm.  No murmur is noted.     Lungs: Clear in the anterior and posterior pulmonary fields.   Abdomen: Soft and non-tender.    Back No flank tenderness is noted.   Extremeties No edema, no calf tenderness.   Neuro: No focal deficit.   Skin No petechiae or purpura is noted.  There is no rash.   Mood Normal              Data:      All new lab and imaging data was reviewed.   Results for orders placed or performed in visit on 12/06/18   CRP, inflammation   Result Value Ref Range    CRP Inflammation <2.9 0.0 - 8.0 mg/L   CBC with platelets   Result Value Ref Range    WBC 8.0 4.0 - 11.0 10e9/L    RBC Count 4.39 (L) 4.4 - 5.9 10e12/L    Hemoglobin 14.6 13.3 - 17.7 g/dL    Hematocrit 44.5 40.0 - 53.0 %     (H) 78 - 100 fl    MCH 33.3 (H) 26.5 - 33.0 pg    MCHC 32.8 31.5 - 36.5 g/dL    RDW 11.9 10.0 - 15.0 %    Platelet Count 244 150 - 450 10e9/L

## 2019-01-28 ENCOUNTER — OFFICE VISIT (OUTPATIENT)
Dept: FAMILY MEDICINE | Facility: OTHER | Age: 79
End: 2019-01-28
Payer: COMMERCIAL

## 2019-01-28 VITALS
OXYGEN SATURATION: 95 % | SYSTOLIC BLOOD PRESSURE: 160 MMHG | WEIGHT: 240 LBS | TEMPERATURE: 98.4 F | DIASTOLIC BLOOD PRESSURE: 78 MMHG | HEART RATE: 70 BPM | BODY MASS INDEX: 37.67 KG/M2 | RESPIRATION RATE: 24 BRPM | HEIGHT: 67 IN

## 2019-01-28 DIAGNOSIS — M1A.9XX0 CHRONIC GOUT INVOLVING TOE OF RIGHT FOOT WITHOUT TOPHUS, UNSPECIFIED CAUSE: ICD-10-CM

## 2019-01-28 PROCEDURE — 99213 OFFICE O/P EST LOW 20 MIN: CPT | Performed by: NURSE PRACTITIONER

## 2019-01-28 RX ORDER — COLCHICINE 0.6 MG/1
0.6 TABLET ORAL 2 TIMES DAILY
Qty: 10 TABLET | Refills: 1 | Status: SHIPPED | OUTPATIENT
Start: 2019-01-28 | End: 2019-02-26

## 2019-01-28 ASSESSMENT — MIFFLIN-ST. JEOR: SCORE: 1762.26

## 2019-01-28 NOTE — PROGRESS NOTES
SUBJECTIVE:   Duane E Sogge is a 78 year old male who presents to clinic today for the following health issues:      Gout  Duration: 3-4 days  Description   Location: foot - right  Joint Swelling: YES  Redness: YES  Pain intensity:  moderate  Accompanying signs and symptoms: None  History  Previous history of gout: YES   Trauma to the area: no   Precipitating factors:   Alcohol usage: Frequent  Diuretic use: no   Recent illness: YES-sinus  Therapies tried and outcome: has taken colchicine in past          Problem list and histories reviewed & adjusted, as indicated.  Additional history: as documented    Patient Active Problem List   Diagnosis     Hyperlipidemia LDL goal <130     Essential hypertension     Staphylococcal arthritis of left knee (H)     MRSA infection     Infection of prosthetic knee joint (H)     Obstructive sleep apnea     Panic disorder without agoraphobia     Sensorineural hearing loss, bilateral     Morbid obesity (H)     History of colonic polyps     Past Surgical History:   Procedure Laterality Date     ORTHOPEDIC SURGERY         Social History     Tobacco Use     Smoking status: Never Smoker     Smokeless tobacco: Current User     Types: Snuff   Substance Use Topics     Alcohol use: Yes     Alcohol/week: 3.0 - 3.6 oz     Types: 3 Cans of beer, 2 - 3 Shots of liquor per week     Family History   Problem Relation Age of Onset     Hypertension Mother      Lung Cancer Sister      Thyroid Disease No family hx of      Diabetes No family hx of      Glaucoma No family hx of      Macular Degeneration No family hx of          Current Outpatient Medications   Medication Sig Dispense Refill     ALPRAZolam (XANAX) 0.5 MG tablet Take 1 tab up to 4x daily only if needed. Refill when due,no early refills  Indications: Feeling Anxious       BENZONATATE PO        GABAPENTIN PO Take 100 mg by mouth 3 times daily as needed (anxiety) 1-3 capsules       lisinopril-hydrochlorothiazide (PRINZIDE/ZESTORETIC) 20-12.5  "MG per tablet Take 2 tablets by mouth daily       metoprolol (TOPROL-XL) 50 MG 24 hr tablet Take 50 mg by mouth daily       MIRTAZAPINE PO Take 15 mg by mouth At Bedtime       simvastatin (ZOCOR) 20 MG tablet Take 20 mg by mouth At Bedtime       TEMAZEPAM PO Take 15 mg by mouth nightly as needed for sleep       venlafaxine (EFFEXOR-ER) 150 MG TB24 24 hr tablet Take 150 mg by mouth daily (with breakfast)       colchicine (COLCYRS) 0.6 MG tablet Take 1 tablet (0.6 mg) by mouth 2 times daily 10 tablet 1     Allergies   Allergen Reactions     Rifampin Anaphylaxis     Elevated lfts     Motrin [Ibuprofen] Itching     BP Readings from Last 3 Encounters:   01/28/19 160/78   01/15/19 162/88   01/03/19 122/82    Wt Readings from Last 3 Encounters:   01/28/19 108.9 kg (240 lb)   01/03/19 (P) 112 kg (247 lb)   12/11/18 113.4 kg (250 lb)                    Reviewed and updated as needed this visit by clinical staff  Tobacco  Allergies  Meds       Reviewed and updated as needed this visit by Provider         ROS:  Constitutional, HEENT, cardiovascular, pulmonary, gi and gu systems are negative, except as otherwise noted.    OBJECTIVE:     /78   Pulse 70   Temp 98.4  F (36.9  C) (Temporal)   Resp 24   Ht 1.702 m (5' 7\")   Wt 108.9 kg (240 lb)   SpO2 95%   BMI 37.59 kg/m    Body mass index is 37.59 kg/m .  GENERAL: healthy, alert and no distress  MS: right great toe base has swelling erythema and tenderness   SKIN: erythema of right great toe      Diagnostic Test Results:  none     ASSESSMENT/PLAN:       1. Chronic gout involving toe of right foot without tophus, unspecified cause  - colchicine (COLCYRS) 0.6 MG tablet; Take 1 tablet (0.6 mg) by mouth 2 times daily  Dispense: 10 tablet; Refill: 1    See Patient Instructions  His blood pressure was not at goal today.  Likely due to pain.  I advised he have this rechecked in 2 weeks with a nurse only visit.     EPIFANIO Knott [facility]  "

## 2019-01-28 NOTE — PATIENT INSTRUCTIONS
Take the Colchicine as prescribed twice a day for 10 days.     If this continues to be a problem, talk to Dr. Oliver about getting on a daily prevention medication.     Your blood pressure was too high today, you should have it rechecked in 2-3 weeks.       Patient Education     Treating Gout Attacks     Raising the joint above the level of your heart can help reduce gout symptoms.     Gout is a disease that affects the joints. It is caused by excess uric acid in your blood that may lead to crystals forming in your joints. Left untreated, it can lead to painful foot and joint deformities and even kidney problems. But, by treating gout early, you can relieve pain and help prevent future problems. Gout can usually be treated with medicine and proper diet. In severe cases, surgery may be needed.  Gout attacks are painful and often happen more than once. Taking medicines may reduce pain and prevent attacks in the future. There are also some things you can do at home to relieve symptoms.  Medicines for gout  Your healthcare provider may prescribe a daily medicine to reduce levels of uric acid. Reducing your uric acid levels may help prevent gout attacks. Allopurinol is one commonly used medicine taken daily to reduce uric acid levels. Other daily medicines used to reduce uric acid levels include febuxostat, lesinurad, and probencid. Other medicines can help relieve pain and swelling during an acute attack. Medicines such as NSAIDs (nonsteroidal anti-inflammatory medicines), steroids, and colchicine may be prescribed for intermittent use to relieve an acute gout attack. Be sure to take your medicine as directed.  What you can do  Below are some things you can do at home to relieve gout symptoms. Your healthcare provider may have other tips.    Rest the painful joint as much as you can.    Raise the painful joint so it is at a level higher than your heart.    Use ice for 10 minutes every 1 to 2 hours as possible.  How  can I prevent gout?  With a little effort, you may be able to prevent gout attacks in the future. Here are some things you can do:    Don't eat foods high in purines  ? Certain meats (red meat, processed meat, turkey)  ? Organ meats (kidney, liver, sweetbread)  ? Shellfish (lobster, crab, shrimp, scallop, mussel)  ? Certain fish (anchovy, sardine, herring, mackerel)    Take any medicines prescribed by your healthcare provider.    Lose weight if you need to.    Reduce high fructose corn syrup in meals and drinks.    Reduce or cut out alcohol, particularly beer, but also red wine and spirits.    Control blood pressure, diabetes, and cholesterol.    Drink plenty of water to help flush uric acid from your body.  Date Last Reviewed: 4/1/2018 2000-2018 [employer]. [address] [place] [address], [place] [address]. All rights reserved. This information is not intended as a substitute for professional medical care. Always follow your healthcare professional's instructions.

## 2019-02-07 ENCOUNTER — TELEPHONE (OUTPATIENT)
Dept: FAMILY MEDICINE | Facility: CLINIC | Age: 79
End: 2019-02-07

## 2019-02-07 DIAGNOSIS — M10.00 IDIOPATHIC GOUT, UNSPECIFIED CHRONICITY, UNSPECIFIED SITE: Primary | ICD-10-CM

## 2019-02-07 RX ORDER — ALLOPURINOL 100 MG/1
100 TABLET ORAL DAILY
Qty: 90 TABLET | Refills: 3 | Status: SHIPPED | OUTPATIENT
Start: 2019-02-07 | End: 2019-05-16

## 2019-02-07 NOTE — TELEPHONE ENCOUNTER
Reason for Call:  Medication or medication refill:    Do you use a [facility]?  Name of the pharmacy and phone number for the current request:  [facility] - [phone]    Name of the medication requested: to take daily for gout    Other request: patient states he saw Dr. Oliver, for gout, then saw another provider for gout again. Patient states that Dr. Oliver told him if it can back again he could give him a medication to take daily for gout, please call and advise     Can we leave a detailed message on this number? YES    Phone number patient can be reached at: Home number on file [phone] (home)    Best Time: any    Call taken on 2/7/2019 at 1:47 PM by Yasmine tSanton

## 2019-02-10 ENCOUNTER — TELEPHONE (OUTPATIENT)
Dept: NURSING | Facility: CLINIC | Age: 79
End: 2019-02-10

## 2019-02-10 NOTE — TELEPHONE ENCOUNTER
Patient calling regarding RX   allopurinol (ZYLOPRIM) 100 MG tablet 90 tablet 3 2/7/2019 2/7/2020 --   Sig - Route: Take 1 tablet (100 mg) by mouth daily - Oral     I advised he already has refills, he was unaware but will go to pharmacy now to  RX. Call back if needed.

## 2019-02-26 ENCOUNTER — OFFICE VISIT (OUTPATIENT)
Dept: FAMILY MEDICINE | Facility: OTHER | Age: 79
End: 2019-02-26
Payer: COMMERCIAL

## 2019-02-26 VITALS
BODY MASS INDEX: 36.34 KG/M2 | OXYGEN SATURATION: 96 % | HEART RATE: 77 BPM | DIASTOLIC BLOOD PRESSURE: 88 MMHG | RESPIRATION RATE: 18 BRPM | SYSTOLIC BLOOD PRESSURE: 130 MMHG | TEMPERATURE: 97.2 F | WEIGHT: 232 LBS

## 2019-02-26 DIAGNOSIS — I10 ESSENTIAL HYPERTENSION: Primary | ICD-10-CM

## 2019-02-26 DIAGNOSIS — G31.84 MILD COGNITIVE IMPAIRMENT: ICD-10-CM

## 2019-02-26 PROCEDURE — 99215 OFFICE O/P EST HI 40 MIN: CPT | Performed by: FAMILY MEDICINE

## 2019-02-26 RX ORDER — LISINOPRIL AND HYDROCHLOROTHIAZIDE 12.5; 2 MG/1; MG/1
2 TABLET ORAL DAILY
Qty: 180 TABLET | Refills: 3 | Status: SHIPPED | OUTPATIENT
Start: 2019-02-26 | End: 2019-05-16

## 2019-02-26 ASSESSMENT — PAIN SCALES - GENERAL: PAINLEVEL: NO PAIN (0)
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
## 2019-02-27 ENCOUNTER — TELEPHONE (OUTPATIENT)
Dept: FAMILY MEDICINE | Facility: CLINIC | Age: 79
End: 2019-02-27

## 2019-02-27 NOTE — TELEPHONE ENCOUNTER
Reason for Call: Request for an order or referral:    Order or referral being requested: wanting to discuss recommended changes by consulting provider    Date needed: as soon as possible    Has the patient been seen by the PCP for this problem? YES    Additional comments: states Dr Oliver consulted with another provider, and they want to discuss proposed changes.     Phone number Patient can be reached at:  Cell number, [phone]    Best Time:  As soon as possible    Can we leave a detailed message on this number?  YES    Call taken on 2/27/2019 at 4:37 PM by Noy Croft

## 2019-02-28 NOTE — TELEPHONE ENCOUNTER
I called pt's wife and left a msg that Per Dr. Oliver we did talk to Dr. Sunshine about his meds and he agree's with Dr. Oliver he needs to go back down to his base line. He should only be drinking 1-2 drinks daily and go down on his Xanax to 1 tablet three times a day.  Shavonne Calle MA

## 2019-03-18 ENCOUNTER — OFFICE VISIT (OUTPATIENT)
Dept: FAMILY MEDICINE | Facility: OTHER | Age: 79
End: 2019-03-18
Payer: COMMERCIAL

## 2019-03-18 ENCOUNTER — ANCILLARY PROCEDURE (OUTPATIENT)
Dept: GENERAL RADIOLOGY | Facility: OTHER | Age: 79
End: 2019-03-18
Attending: FAMILY MEDICINE
Payer: COMMERCIAL

## 2019-03-18 VITALS
BODY MASS INDEX: 35.76 KG/M2 | DIASTOLIC BLOOD PRESSURE: 82 MMHG | WEIGHT: 228.3 LBS | HEART RATE: 72 BPM | SYSTOLIC BLOOD PRESSURE: 152 MMHG | RESPIRATION RATE: 16 BRPM | TEMPERATURE: 97.9 F

## 2019-03-18 DIAGNOSIS — S20.211A CHEST WALL CONTUSION, RIGHT, INITIAL ENCOUNTER: ICD-10-CM

## 2019-03-18 DIAGNOSIS — S20.211A CHEST WALL CONTUSION, RIGHT, INITIAL ENCOUNTER: Primary | ICD-10-CM

## 2019-03-18 PROCEDURE — 99213 OFFICE O/P EST LOW 20 MIN: CPT | Performed by: FAMILY MEDICINE

## 2019-03-18 PROCEDURE — 71101 X-RAY EXAM UNILAT RIBS/CHEST: CPT | Mod: RT

## 2019-03-18 RX ORDER — HYDROCODONE BITARTRATE AND ACETAMINOPHEN 5; 325 MG/1; MG/1
1 TABLET ORAL EVERY 6 HOURS PRN
Qty: 15 TABLET | Refills: 0 | Status: SHIPPED | OUTPATIENT
Start: 2019-03-18 | End: 2019-03-27

## 2019-03-18 ASSESSMENT — PAIN SCALES - GENERAL: PAINLEVEL: MODERATE PAIN (4)

## 2019-03-18 NOTE — PROGRESS NOTES
vcodin    SUBJECTIVE:   Duane E Sogge is a 78 year old male who presents to clinic today for the following health issues:  Chief Complaint   Patient presents with     Fall     d.o.i. 03 /14/ 19      SUBJECTIVE:  Duane E Sogge, a 78 year old male scheduled an appointment to discuss the following issues:  Chest wall contusion, right, initial encounter  He fell a few dd ago on the ice, injuring both knees and his right anterior chest.    Past Medical History:   Diagnosis Date     Anxiety      Hypertension      Current Outpatient Medications   Medication Sig Dispense Refill     allopurinol (ZYLOPRIM) 100 MG tablet Take 1 tablet (100 mg) by mouth daily 90 tablet 3     ALPRAZolam (XANAX) 0.5 MG tablet Take 1 tab up to 6x daily only if needed. Refill when due,no early refills  Indications: Feeling Anxious       BENZONATATE PO        lisinopril-hydrochlorothiazide (PRINZIDE/ZESTORETIC) 20-12.5 MG tablet Take 2 tablets by mouth daily 180 tablet 3     metoprolol (TOPROL-XL) 50 MG 24 hr tablet Take 50 mg by mouth daily       MIRTAZAPINE PO Take 15 mg by mouth At Bedtime       simvastatin (ZOCOR) 20 MG tablet Take 20 mg by mouth At Bedtime       TEMAZEPAM PO Take 15 mg by mouth nightly as needed for sleep       venlafaxine (EFFEXOR-ER) 150 MG TB24 24 hr tablet Take 150 mg by mouth daily (with breakfast)       GABAPENTIN PO Take 100 mg by mouth 3 times daily as needed (anxiety) 1-3 capsules         EXAM:  /82   Pulse 72   Temp 97.9  F (36.6  C) (Temporal)   Resp 16   Wt 103.6 kg (228 lb 4.8 oz)   BMI 35.76 kg/m    NECK:  Supple with no masses, adenopathy or thyromegaly.    LUNGS:  Clear to auscultation. No chest bruising   HEART:  Regular rhythm, no murmurs.    ABDOMEN:  The abdomen is soft, nontender, no hepatosplenomegaly or masses appreciated.  EXTREMITIES: bilat TKA scars. Rt knee with a round abrasion on the patellar area about 4 cm in diameter, does not appear infected. No effusion evident  NEURO grossly intact.        Cxr/rib detail neg      IMPRESSION:  Encounter Diagnosis   Name Primary?     Chest wall contusion, right, initial encounter Yes     PLAN:    cxr and rib detail neg/nl    Recommended ice to painful areas  rx vicodin  Warned not to drive, operate dangerous tools or machinery, or use Etoh while using pain medications.  .Pt to call back if no improvement in7-10 days, call back sooner if new or increased symptoms.  Local care to abrasions    Orders Placed This Encounter     XR Ribs & Chest Right G/E 3 Views     Juaquin Giraldo

## 2019-03-27 ENCOUNTER — OFFICE VISIT (OUTPATIENT)
Dept: FAMILY MEDICINE | Facility: CLINIC | Age: 79
End: 2019-03-27
Payer: COMMERCIAL

## 2019-03-27 VITALS
SYSTOLIC BLOOD PRESSURE: 130 MMHG | BODY MASS INDEX: 35.55 KG/M2 | OXYGEN SATURATION: 97 % | RESPIRATION RATE: 18 BRPM | WEIGHT: 227 LBS | HEART RATE: 66 BPM | TEMPERATURE: 97.3 F | DIASTOLIC BLOOD PRESSURE: 88 MMHG

## 2019-03-27 DIAGNOSIS — F41.0 PANIC DISORDER WITHOUT AGORAPHOBIA: Primary | ICD-10-CM

## 2019-03-27 DIAGNOSIS — F10.10 ALCOHOL ABUSE: ICD-10-CM

## 2019-03-27 DIAGNOSIS — F43.21 GRIEF: ICD-10-CM

## 2019-03-27 PROCEDURE — 99214 OFFICE O/P EST MOD 30 MIN: CPT | Performed by: FAMILY MEDICINE

## 2019-03-27 ASSESSMENT — PAIN SCALES - GENERAL: PAINLEVEL: NO PAIN (0)

## 2019-03-27 NOTE — PROGRESS NOTES
SUBJECTIVE:                                                      Chief Complaint   Patient presents with     RECHECK        Rechmishel     Duane is a 78 year old male who comes to clinic to discuss his anxiety.  He currently follows with psychiatry.  He admits to excessively drinking alcohol her last visit.  He is cut back to perhaps 1-2 drinks a week.  He feels better.  No more confusion.  Had no problems cutting back without significant urge or signs of withdrawal.  Currently on Xanax 4 times daily    ROS:  Constitutional, HEENT, cardiovascular, pulmonary, gi and gu systems are negative, except as otherwise noted.    OBJECTIVE:                                                    /88   Pulse 66   Temp 97.3  F (36.3  C) (Temporal)   Resp 18   Wt 103 kg (227 lb)   SpO2 97%   BMI 35.55 kg/m    Body mass index is 35.55 kg/m .    GENERAL: healthy, alert and no distress  NECK: no adenopathy, no asymmetry, masses, or scars and thyroid normal to palpation  RESP: lungs clear to auscultation - no rales, rhonchi or wheezes  CV: regular rate and rhythm, normal S1 S2, no S3 or S4, no murmur, click or rub, no peripheral edema and peripheral pulses strong  ABDOMEN: soft, nontender, no hepatosplenomegaly, no masses and bowel sounds normal  MS: no gross musculoskeletal defects noted, no edema    Diagnostic Test Results:  none      ASSESSMENT/PLAN:                                                        ICD-10-CM    1. Panic disorder without agoraphobia F41.0    2. Alcohol abuse F10.10    3. Grief- loss of teenage son F43.21        Alcohol overuse-is currently cut back seeming without much difficulty.  We set a limit at 3 drinks a week and see how he handles that  Anxiety-continue with his Xanax.  Although I stressed the long-term issues with that, especially as he nears his 80s.  This will be more problematic.  He will discuss with his psychiatrist may be restarting gabapentin.  Otherwise I can see him back in 2-3  months        Asa Oliver MD  [facility]

## 2019-04-08 ENCOUNTER — TELEPHONE (OUTPATIENT)
Dept: FAMILY MEDICINE | Facility: CLINIC | Age: 79
End: 2019-04-08

## 2019-04-08 NOTE — TELEPHONE ENCOUNTER
Ingrid is returning Dr Oliver's message.    Please call her back at [phone] and it is ok to leave a message on her phone if she does not answer for her to call back.    She is aware Dr Oliver is out of clinic until 4/9/19.    Thank you,  Teena DUKE

## 2019-04-09 NOTE — TELEPHONE ENCOUNTER
"Ingrid returns call and message is relayed.  Ingrid states she feels like he is doing fine.  Ingrid is irritated that she has to speak with a \"Third Party\" and not Dr Oliver or his nurse.   Ingrid also states that she feels Duane does much better on the Alprazoplam than the he does on the Gabapentin.   To many side effects to the Gabapentin.    Ingrid states if Dr Oliver has anymore questions for her he can try to contact her himself as she is just as busy as he is.  "

## 2019-04-09 NOTE — TELEPHONE ENCOUNTER
I called pt's wife and left a msg to call back. We need or want to know how she thinks Duane is doing. If she thinks he is drinking less and using less xanax. Pt told us at his last apt that he is doing good.  Shavonne Calle MA

## 2019-04-24 ENCOUNTER — OFFICE VISIT (OUTPATIENT)
Dept: PODIATRY | Facility: OTHER | Age: 79
End: 2019-04-24
Payer: COMMERCIAL

## 2019-04-24 ENCOUNTER — ANCILLARY PROCEDURE (OUTPATIENT)
Dept: GENERAL RADIOLOGY | Facility: OTHER | Age: 79
End: 2019-04-24
Attending: PODIATRIST
Payer: COMMERCIAL

## 2019-04-24 VITALS
SYSTOLIC BLOOD PRESSURE: 130 MMHG | WEIGHT: 235 LBS | HEIGHT: 67 IN | BODY MASS INDEX: 36.88 KG/M2 | DIASTOLIC BLOOD PRESSURE: 82 MMHG

## 2019-04-24 DIAGNOSIS — M20.41 HAMMERTOES OF BOTH FEET: ICD-10-CM

## 2019-04-24 DIAGNOSIS — M20.42 HAMMERTOES OF BOTH FEET: ICD-10-CM

## 2019-04-24 DIAGNOSIS — Q66.6 PES VALGUS OF RIGHT FOOT: ICD-10-CM

## 2019-04-24 DIAGNOSIS — M20.11 ACQUIRED HALLUX VALGUS OF RIGHT FOOT: Primary | ICD-10-CM

## 2019-04-24 PROCEDURE — 73630 X-RAY EXAM OF FOOT: CPT | Mod: RT

## 2019-04-24 PROCEDURE — 99213 OFFICE O/P EST LOW 20 MIN: CPT | Performed by: PODIATRIST

## 2019-04-24 ASSESSMENT — MIFFLIN-ST. JEOR: SCORE: 1739.58

## 2019-04-24 ASSESSMENT — PAIN SCALES - GENERAL: PAINLEVEL: EXTREME PAIN (9)

## 2019-04-24 NOTE — LETTER
"    4/24/2019         RE: Duane E Sogge  [address] [place] [address]        Dear Colleague,    Thank you for referring your patient, Duane E Sogge, to the [facility] [place]. Please see a copy of my visit note below.    HPI:  Duane E Sogge is a 78 year old male who is seen in consultation at the request of self.    Pt presents for eval of:   (Onset, Location, L/R, Character, Treatments, Injury if yes)    XR Right foot today, 4/24/2019    Surgery Left and Right bunion approx 15 years ago     1. Onset mid March, medial Right bunion and plantar Right heel and arch pain. No injury noted. Presents today with New Balance athletic shoes.  Intermittent, sharp, stabbing, swelling, pain 9 w/activity, tightness with first steps after sitting.     Retired.    Weight management plan: Patient was referred to their PCP to discuss a diet and exercise plan.     Patient to follow up with Primary Care provider regarding elevated blood pressure.    EXAM:Vitals: /82 (BP Location: Right arm, Cuff Size: Adult Large)   Ht 1.702 m (5' 7\")   Wt 106.6 kg (235 lb)   BMI 36.81 kg/m     BMI= Body mass index is 36.81 kg/m .    General appearance: Patient is alert and fully cooperative with history & exam.  No sign of distress is noted during the visit.     Psychiatric: Affect is pleasant & appropriate.  Patient appears motivated to improve health.     Respiratory: Breathing is regular & unlabored while sitting.     HEENT: Hearing is intact to spoken word.  Speech is clear.  No gross evidence of visual impairment that would impact ambulation.     Vascular: DP & PT pulses are intact & regular bilaterally.  No significant edema or varicosities noted.  CFT and skin temperature is normal to both lower extremities.     Neurologic: Lower extremity sensation is intact to light touch.  No evidence of weakness or contracture in the lower extremities.  No evidence of neuropathy.    Dermatologic: Skin is intact to both lower extremities with " adequate texture, turgor and tone about the integument.  No paronychia or evidence of soft tissue infection is noted.     Musculoskeletal: Patient is ambulatory without assistive device or brace.  Gross valgus noted bilateral with multiple incisions over both bunions and over the left medial arch posterior tibial tendon.  Today most pain is noted throughout the right great toe joint with lateral deviation of the hallux towards the lesser digits.  It is rigidly contracted and nonreducible approximately 20 degrees total range of motion right first MPJ with crepitus throughout.  Lesser digits 2 through 5 have rigid contracture as well causing retrograde plantar flexion across the ball the foot.  There is discomfort across the ball the foot had foot plantar metatarsal heads right foot.  Also quite severe medial column faulting with pes valgus.  Severe arthritis and degenerative changes with crepitus noted about the talus navicular joint.  Upon weightbearing medial column collapse is noted.    Radiographs: 3 views right foot demonstrate degenerative changes through the talus navicular joint.  Recurrent bunion deformity with hallux valgus.  Lesser digits are rigidly contracted.     ASSESSMENT:       ICD-10-CM    1. Acquired hallux valgus of right foot M20.11 XR Foot Right G/E 3 Views   2. Pes valgus of right foot Q66.6    3. Hammertoes of both feet M20.41     M20.42         PLAN:  Reviewed patient's chart in [facility].      4/24/2019   Obtained interpreted radiographs  Discussed treatment options.  His arch has collapsed causing these rigid deformities.  He has had a bunionectomy before.  Most of his symptoms are through the right great toe joint and secondary across the ball the foot.  We discussed first MPJ arthrodesis and hammertoe correction about the right foot.  We also discussed arthrodesis of the talus navicular joint with medial column faulting.  All questions were answered.  He would like to continue conservative  options at this time due to postoperative requirements following reconstruction.  He would consider first MPJ arthrodesis and hammertoe repair in the fall.  Until then he will continue custom molded orthotics which conform quite well.  We discussed appropriate shoe gear as well.  All questions were answered and he is satisfied with this treatment plan.  He will follow-up as needed.    Duke Frankel DPM        Again, thank you for allowing me to participate in the care of your patient.        Sincerely,        Duke Frankel DPM

## 2019-04-24 NOTE — PROGRESS NOTES
"HPI:  Duane E Sogge is a 78 year old male who is seen in consultation at the request of self.    Pt presents for eval of:   (Onset, Location, L/R, Character, Treatments, Injury if yes)    XR Right foot today, 4/24/2019    Surgery Left and Right bunion approx 15 years ago     1. Onset mid March, medial Right bunion and plantar Right heel and arch pain. No injury noted. Presents today with New Balance athletic shoes.  Intermittent, sharp, stabbing, swelling, pain 9 w/activity, tightness with first steps after sitting.     Retired.    Weight management plan: Patient was referred to their PCP to discuss a diet and exercise plan.     Patient to follow up with Primary Care provider regarding elevated blood pressure.    EXAM:Vitals: /82 (BP Location: Right arm, Cuff Size: Adult Large)   Ht 1.702 m (5' 7\")   Wt 106.6 kg (235 lb)   BMI 36.81 kg/m    BMI= Body mass index is 36.81 kg/m .    General appearance: Patient is alert and fully cooperative with history & exam.  No sign of distress is noted during the visit.     Psychiatric: Affect is pleasant & appropriate.  Patient appears motivated to improve health.     Respiratory: Breathing is regular & unlabored while sitting.     HEENT: Hearing is intact to spoken word.  Speech is clear.  No gross evidence of visual impairment that would impact ambulation.     Vascular: DP & PT pulses are intact & regular bilaterally.  No significant edema or varicosities noted.  CFT and skin temperature is normal to both lower extremities.     Neurologic: Lower extremity sensation is intact to light touch.  No evidence of weakness or contracture in the lower extremities.  No evidence of neuropathy.    Dermatologic: Skin is intact to both lower extremities with adequate texture, turgor and tone about the integument.  No paronychia or evidence of soft tissue infection is noted.     Musculoskeletal: Patient is ambulatory without assistive device or brace.  Gross valgus noted bilateral " with multiple incisions over both bunions and over the left medial arch posterior tibial tendon.  Today most pain is noted throughout the right great toe joint with lateral deviation of the hallux towards the lesser digits.  It is rigidly contracted and nonreducible approximately 20 degrees total range of motion right first MPJ with crepitus throughout.  Lesser digits 2 through 5 have rigid contracture as well causing retrograde plantar flexion across the ball the foot.  There is discomfort across the ball the foot had foot plantar metatarsal heads right foot.  Also quite severe medial column faulting with pes valgus.  Severe arthritis and degenerative changes with crepitus noted about the talus navicular joint.  Upon weightbearing medial column collapse is noted.    Radiographs: 3 views right foot demonstrate degenerative changes through the talus navicular joint.  Recurrent bunion deformity with hallux valgus.  Lesser digits are rigidly contracted.     ASSESSMENT:       ICD-10-CM    1. Acquired hallux valgus of right foot M20.11 XR Foot Right G/E 3 Views   2. Pes valgus of right foot Q66.6    3. Hammertoes of both feet M20.41     M20.42         PLAN:  Reviewed patient's chart in [facility].      4/24/2019   Obtained interpreted radiographs  Discussed treatment options.  His arch has collapsed causing these rigid deformities.  He has had a bunionectomy before.  Most of his symptoms are through the right great toe joint and secondary across the ball the foot.  We discussed first MPJ arthrodesis and hammertoe correction about the right foot.  We also discussed arthrodesis of the talus navicular joint with medial column faulting.  All questions were answered.  He would like to continue conservative options at this time due to postoperative requirements following reconstruction.  He would consider first MPJ arthrodesis and hammertoe repair in the fall.  Until then he will continue custom molded orthotics which conform quite  well.  We discussed appropriate shoe gear as well.  All questions were answered and he is satisfied with this treatment plan.  He will follow-up as needed.    Duke Frankel DPM

## 2019-04-24 NOTE — PATIENT INSTRUCTIONS
Reliable shoe stores: To maximize your experience and provide the best possible fit.  Be sure to show them your foot concerns and tell them Dr. Frankel sent you.      Stores listed in bold have only athletic shoes, and stores that are not bold are mostly casual or variety of shoes    [facility] [employer]  [address] [place] [address]  [phone]    [facility] [place]  [address] [place] [address]  [phone]     [facility]  [address] [place] [address]  [phone]    [employer]  [address] [facility] [address]  [phone]    [employer]  [address] [place], [place] [address] [phone]    [facility]  [address] [place] [address]  [phone]                         [facility] [employer] Locations:     [address] [place] [address]   [phone]     [address] [place] [address] [address] [place] [address]   [phone]     [address] [place] [address]   [phone]     [address] [facility] [place] [address]   [phone]     [address] [place] [address]   [phone]     [address] [place] [address]   [phone]     [address] [facility] [address]   [place] [address]   [phone]     [address]   [place] [address]   [phone]             How to find reasonable shoes          The correct width    Correct Fitting    Correct Length      Foot Distortion    Posture Distortion                          Torsional Rigidity      Grasp behind the heel and underneath the foot and twist      Bad    Excessive torsion/twist in midfoot     Less torsion/twist in midfoot is better                   Heel Counter Rigidity      Grasp just above   midsole and squeeze      Bad    Soft heel counter      Good    Rigid Heel Counter      Flexion Rigidity      Grasp shoe and bend from forefoot to rearfoot              PLANTAR FASCIITIS  The  plantar fascia  is a  thick fibrous layer of tissue that covers the bones on the bottom of your foot. It supports the foot bones in an arched position.  Plantar fasciitis  is a painful inflammation of the plantar fascia due to overuse. This can develop gradually or suddenly. It usually affects one foot at a time but can affect both feet. Heel pain can be sharp and feel like a knife sticking in the bottom of your foot. Pain may occur after exercising, long distance jogging, stair climbing, long periods of standing, or after getting up from a seated position.  Risk factors include arthritis, diabetes, obesity or recent weight gain, flat-foot, high arch, wearing high heels or loose shoes or shoes with poor arch support.  Sudden changes in activity or shoe gear may contribute to symptoms.  Foot pain from this condition is usually worse in the morning and improves with walking. By the end of the day there may be a dull aching. Treatment requires improved support of feet, short-term rest and controlling inflammation. It may take up to nine months before all symptoms go away with the measures described below.  A steroid injection into the foot, or surgery may be needed if this is becomes long standing or severe.  HOME CARE  1. If you are overweight, lose weight to promote healing.  2. Choose supportive shoes (stiff through the shank) with good arch support and shock absorbency. Replace athletic shoes when they become worn out. Don t walk or run barefoot.  3. Shoe inserts are an important part of treatment. You can buy off-the-shelf shoe inserts inexpensively such as [employer].  The best ones are custom molded to your foot with a prescription.  4. Night splints keep the plantar fascia gently stretched while you sleep and will eliminate morning pain. Wear it ALL NIGHT EVERY NIGHT, or any time you sit for a long time.  5. Reduce by 10% or more the activities that stress the feet: jogging, prolonged standing or walking, high impact sports, etc.  6.  Stretch your feet. Gently flex your ankle by leaning into a wall or counter or drop your heel from a step.  Stretch two minutes of every hour you are awake.  7. Icing or massage may help heel pain. Apply an ice pack or frozen water or Coke bottle to the heel for 10-20 minutes as a preventive or after an acute flare of symptoms. You may repeat this as needed.   Follow up with your Doctor in 3 weeks as instructed.  SMOKING CESSATION  What's in cigarette smoke? - Cigarette smoke contains over 4,000 chemicals. Nicotine is one of the main ingredients which is an insecticide/herbicide. It is poisonous to our nervous system, increases blood clotting risk, and decreases the body's defenses to fight off infection. Another chemical is Carbon Monoxide is an asphyxiating gas that permanently binds to blood cells and blocks the transport of oxygen. This leads to tissue death and decreases your metabolism. Tar is a chemical that coats your lungs and trachea which impairs new oxygen coming in and carbon dioxide getting out of your body.   How does smoking impact surgery? - Smoking is particularly hazardous with regards to surgery. Surgery puts stress on the body and a smoker's body is already under strain from these chemicals. Putting the two together, especially for an elective surgery, could be a recipe for disaster. Smoking before and after surgery increases your risk of heart problems, slow wound healing, delayed bone healing, blood clots, wound infection and anesthesia complications.   What are the benefits of quitting? - In 20 minutes your blood pressure will drop back down to normal. In 8 hours the carbon monoxide (a toxic gas) levels in your blood stream will drop by half, and oxygen levels will return to normal. In 48 hours your chance of having a heart attack will have decreased. All nicotine will have left your body. Your sense of taste and smell will return to a normal level. In 72 hours your bronchial tubes will  relax, and your energy levels will increase. In 2 weeks your circulation will increase, and it will continue to improve for the next 10 weeks.    Recommendations for elective surgery - Ideally, patients should quit smoking 8 weeks before and at least 2 weeks after elective surgery in order to avoid complications. Simply cutting back on the amount of cigarettes smoked per day does not offer any benefit or decrease the risk of poor wound healing, heart problems, and infection. Smokers should also start taking Vitamin C and B for two weeks before surgery and two weeks after surgery.    Ways to Stop Smokin. Nicotine patches, lozenges, or gum  2. Support Groups  3. Medications (see below)    List of Medications:  1. Varenicline Tartrate (CHANTIX)   2. Bupropion HCL (WELLBUTRIN, ZYBAN)   note: make sure Wellbutrin is for smoking cessation and not other issues   3. Nicotine Patch (NICODERM)   4. Nicotine Inhaler (NICOTROL)   5. Nicotine Gum Nicotine Polacrilex   6. Nicotine Lozenge: Nicotine Polacrilex (COMMIT)   * [facility] offers a smoking support group as well!  Please visit: [email or web address]  If you are interested in these, ask about getting a prescription or talk to your primary care doctor about what may be the best way for you to quit.

## 2019-05-14 ENCOUNTER — APPOINTMENT (OUTPATIENT)
Dept: GENERAL RADIOLOGY | Facility: CLINIC | Age: 79
End: 2019-05-14
Attending: EMERGENCY MEDICINE
Payer: COMMERCIAL

## 2019-05-14 ENCOUNTER — HOSPITAL ENCOUNTER (EMERGENCY)
Facility: CLINIC | Age: 79
Discharge: HOME | End: 2019-05-14
Attending: EMERGENCY MEDICINE | Admitting: EMERGENCY MEDICINE
Payer: COMMERCIAL

## 2019-05-14 VITALS
BODY MASS INDEX: 35.24 KG/M2 | SYSTOLIC BLOOD PRESSURE: 142 MMHG | OXYGEN SATURATION: 98 % | TEMPERATURE: 96 F | DIASTOLIC BLOOD PRESSURE: 73 MMHG | RESPIRATION RATE: 14 BRPM | WEIGHT: 225 LBS

## 2019-05-14 DIAGNOSIS — M25.571 PAIN IN JOINT, ANKLE AND FOOT, RIGHT: ICD-10-CM

## 2019-05-14 PROCEDURE — 73610 X-RAY EXAM OF ANKLE: CPT | Mod: TC,RT

## 2019-05-14 PROCEDURE — 99284 EMERGENCY DEPT VISIT MOD MDM: CPT | Mod: Z6 | Performed by: EMERGENCY MEDICINE

## 2019-05-14 PROCEDURE — 99283 EMERGENCY DEPT VISIT LOW MDM: CPT | Performed by: EMERGENCY MEDICINE

## 2019-05-14 RX ORDER — HYDROCODONE BITARTRATE AND ACETAMINOPHEN 5; 325 MG/1; MG/1
1 TABLET ORAL EVERY 6 HOURS PRN
Qty: 10 TABLET | Refills: 0 | Status: SHIPPED | OUTPATIENT
Start: 2019-05-14 | End: 2019-05-30

## 2019-05-14 NOTE — ED TRIAGE NOTES
Here with right ankle pain that started while he was cutting wood two days ago. He states his left ankle is fused and he may need to do it on the right ankle as well

## 2019-05-14 NOTE — DISCHARGE INSTRUCTIONS
Ankle splint for immobilization.  Ice or heat whichever is more beneficial.  Vicodin for severe pain.

## 2019-05-14 NOTE — ED AVS SNAPSHOT
[facility] Emergency Department  [address] [place] [address]  Phone:  [phone]  Fax:  [phone]                                    Duane E Sogge   MRN: [id number]    Department:  [facility] Emergency Department   Date of Visit:  5/14/2019           After Visit Summary Signature Page    I have received my discharge instructions, and my questions have been answered. I have discussed any challenges I see with this plan with the nurse or doctor.    ..........................................................................................................................................  Patient/Patient Representative Signature      ..........................................................................................................................................  Patient Representative Print Name and Relationship to Patient    ..................................................               ................................................  Date                                   Time    ..........................................................................................................................................  Reviewed by Signature/Title    ...................................................              ..............................................  Date                                               Time          22EPIC Rev 08/18

## 2019-05-14 NOTE — ED PROVIDER NOTES
History     Chief Complaint   Patient presents with     Ankle Pain     The history is provided by the patient.     Duane E Sogge is a 78 year old male who presents to the emergency department for ankle pain. Patient reports noticing right ankle pain 2 nights ago. He states he was cutting wood earlier in the day, denies any specific injury. He denies any swelling, fever, chills or shakes. He states his left ankle was fused about 2 years ago, he states he might need the same thing done to his right ankle. He does have a history of gout in his right great toe, he denies any redness though.    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     Priority: Medium     Hyperlipidemia LDL goal <130 05/08/2017     Priority: Medium     Essential hypertension 05/08/2017     Priority: Medium     Obstructive sleep apnea 12/08/2014     Priority: Medium     Overview:   Setting: APAP 5-15  Supplied by: [facility]  PSG done: 11/25/14  AHI 6 (HTN)  RDI 24  Lowest O2 Sat: 87%  Loraine  1/2/15 ed ord  ; Obstructive sleep apnea (adult)        Sensorineural hearing loss, bilateral 06/19/2014     Priority: Medium     Panic disorder without agoraphobia 12/22/2011     Priority: Medium        Past Medical History:    Past Medical History:   Diagnosis Date     Anxiety      Hypertension        Past Surgical History:    Past Surgical History:   Procedure Laterality Date     ORTHOPEDIC SURGERY          Family History:    Family History   Problem Relation Age of Onset     Hypertension Mother      Lung Cancer Sister      Thyroid Disease No family hx of      Diabetes No family hx of      Glaucoma No family hx of      Macular Degeneration No family hx of        Social History:  Marital Status:   [2]  Social History     Tobacco Use     Smoking status: Never Smoker     Smokeless tobacco: Current User     Types: Snuff   Substance Use Topics     Alcohol use: Yes     Alcohol/week: 3.0 - 3.6 oz     Types: 3 Cans of beer, 2 - 3 Shots of liquor per week     Drug use: No        Medications:      HYDROcodone-acetaminophen (NORCO) 5-325 MG tablet   allopurinol (ZYLOPRIM) 100 MG tablet   ALPRAZolam (XANAX) 0.5 MG tablet   BENZONATATE PO   lisinopril-hydrochlorothiazide (PRINZIDE/ZESTORETIC) 20-12.5 MG tablet   metoprolol succinate ER (TOPROL-XL) 50 MG 24 hr tablet   MIRTAZAPINE PO   simvastatin (ZOCOR) 20 MG tablet   TEMAZEPAM PO   venlafaxine (EFFEXOR-ER) 150 MG TB24 24 hr tablet         Review of Systems   All other systems reviewed and are negative.      Physical Exam   BP: 142/73  Heart Rate: (!) 49  Temp: 96  F (35.6  C)  Resp: 14  Weight: 102.1 kg (225 lb)  SpO2: 98 %      Physical Exam   Nursing note and vitals reviewed.  General alert cooperative male in mild distress.  Examination of his right leg shows no obvious joint effusion or sniffing swelling.  No redness that would signify infection or gout.  Mild tenderness over the medial aspect without crepitus or step-off.  He has very flat feet.  Intact pulses.  His toes are somewhat deformed.  He said previous surgery on his great toe.  He is wearing a brace between his great and second toe for separation.    ED Course        Procedures               Critical Care time:  none               Results for orders placed or performed during the hospital encounter of 05/14/19 (from the past 24 hour(s))   XR Ankle Right G/E 3 Views    Narrative    RIGHT ANKLE THREE  VIEWS   5/14/2019 12:14 PM     HISTORY: Atraumatic medial right ankle pain.    COMPARISON: None.      Impression    IMPRESSION: Pes planus deformity is evident on this nonweightbearing  view. There is soft tissue swelling but no evidence of acute fracture  or dislocation.    KIRSTIN BUSTILLO MD       Medications - No data to display    Assessments & Plan (with Medical Decision Making)   Duane E Sogge is a 78 year old male who presents to the emergency department for ankle pain. Patient reports noticing right ankle pain 2 nights ago. He states he was cutting wood earlier in the day, denies any specific injury. He denies any swelling, fever, chills or shakes. He states his left ankle was fused about 2 years ago, he states he might need the same thing done to his right ankle. He does have a history of gout in his right great toe, he denies any redness though.  On exam he had some medial tenderness of the ankle.  There is no joint effusion or redness that would suggest infection or gout.  He has full range of motion.  He has flat feet.  Brace between his great and second toe.  X-ray showed no acute fracture.  The joint space actually looks well-maintained.  Exact cause of his symptoms are unclear.  May related to his flat feet.  I may have him follow-up with Dr. Frankel who is seen previously.  He can use an ankle splint to see if that will benefit.  Ice or heat if beneficial.  Vicodin for severe pain.  I have reviewed the nursing notes.    I have reviewed the findings, diagnosis, plan and need for follow up with the patient.          Medication List      Started    HYDROcodone-acetaminophen 5-325 MG tablet  Commonly known as:  NORCO  1 tablet, Oral, EVERY 6 HOURS PRN            Final diagnoses:   Pain in joint, ankle and foot, right     This document serves as a record of services personally performed by Tony Farooq MD. It was created on their behalf by Blanca Vazquez, a trained medical scribe. The creation of this record is  based on the provider's personal observations and the statements of the patient. This document has been checked and approved by the attending provider.    Note: Chart documentation done in part with Dragon Voice Recognition software. Although reviewed after completion, some word and grammatical errors may remain.    5/14/2019   [facility] EMERGENCY DEPARTMENT     Tony Farooq MD  05/14/19 [id number]

## 2019-05-15 ENCOUNTER — OFFICE VISIT (OUTPATIENT)
Dept: PODIATRY | Facility: CLINIC | Age: 79
End: 2019-05-15
Payer: COMMERCIAL

## 2019-05-15 VITALS
BODY MASS INDEX: 35.31 KG/M2 | SYSTOLIC BLOOD PRESSURE: 148 MMHG | DIASTOLIC BLOOD PRESSURE: 82 MMHG | HEIGHT: 67 IN | WEIGHT: 225 LBS

## 2019-05-15 DIAGNOSIS — Z86.14 HX MRSA INFECTION: ICD-10-CM

## 2019-05-15 DIAGNOSIS — I10 ESSENTIAL HYPERTENSION: ICD-10-CM

## 2019-05-15 DIAGNOSIS — M1A.9XX0 CHRONIC GOUT INVOLVING TOE OF RIGHT FOOT WITHOUT TOPHUS, UNSPECIFIED CAUSE: ICD-10-CM

## 2019-05-15 DIAGNOSIS — F10.10 ALCOHOL ABUSE: ICD-10-CM

## 2019-05-15 DIAGNOSIS — M19.071 PRIMARY OSTEOARTHRITIS OF RIGHT ANKLE: Primary | ICD-10-CM

## 2019-05-15 DIAGNOSIS — Q66.6 PES VALGUS: ICD-10-CM

## 2019-05-15 DIAGNOSIS — M10.00 IDIOPATHIC GOUT, UNSPECIFIED CHRONICITY, UNSPECIFIED SITE: ICD-10-CM

## 2019-05-15 PROCEDURE — 99213 OFFICE O/P EST LOW 20 MIN: CPT | Performed by: PODIATRIST

## 2019-05-15 ASSESSMENT — MIFFLIN-ST. JEOR: SCORE: 1694.22

## 2019-05-15 ASSESSMENT — PAIN SCALES - GENERAL: PAINLEVEL: MODERATE PAIN (4)

## 2019-05-15 NOTE — TELEPHONE ENCOUNTER
We are waiting to hear from patient to see where he wants to get his medications.  C2C on file.  Left message on wife Ingrid's phone to return our call.  Please see if pt's meds should be sent to [facility] or picking up at pharmacy?  Gissell Ramsey, CMA

## 2019-05-15 NOTE — TELEPHONE ENCOUNTER
Wife calling back. Please send to [facility].  Thank you,  Erika Poole- Patient Representative

## 2019-05-15 NOTE — PROGRESS NOTES
"Chief Complaint   Patient presents with     Consult     Right ankle pain, onset early 2019; new issue - LOV 4/24/19       Weight management plan: Patient was referred to their PCP to discuss a diet and exercise plan.     Duane to follow up with Primary Care provider regarding elevated blood pressure.    5/15/2019 - NEW ISSUE  Onset early 2019, lateral and medial Right ankle pain. No injury noted. 5/12/2019 could hardly walk, was seen by Tony Farooq MD @ [facility] ED Dept and dispensed a gel ankle stirrup brace.  Intermittent, sharp, stabbing, throbbing, pain 4  Presents today with lace up ankle work boots, stirrup ankle brace    Has had some kind of \"fusion\" left ankle about 2014.     EXAM:Vitals: /82 (BP Location: Left arm, Cuff Size: Adult Regular)   Ht 1.702 m (5' 7\")   Wt 102.1 kg (225 lb)   BMI 35.24 kg/m    BMI= Body mass index is 35.24 kg/m .    General appearance: Patient is alert and fully cooperative with history & exam.  No sign of distress is noted during the visit.     Psychiatric: Affect is pleasant & appropriate.  Patient appears motivated to improve health.     Respiratory: Breathing is regular & unlabored while sitting.     HEENT: Hearing is intact to spoken word.  Speech is clear.  No gross evidence of visual impairment that would impact ambulation.     Vascular: DP & PT pulses are intact & regular bilaterally.  No significant edema or varicosities noted.  CFT and skin temperature is normal to both lower extremities.     Neurologic: Lower extremity sensation is intact to light touch.  No evidence of weakness or contracture in the lower extremities.  No evidence of neuropathy.    Dermatologic: Skin is intact to both lower extremities with adequate texture, turgor and tone about the integument.  No paronychia or evidence of soft tissue infection is noted.     Musculoskeletal: Patient is ambulatory without assistive device or brace.  Incisions are healed over both bunions.  Today " most symptoms are noted circumferentially around the right ankle.  No erythema or heat.  Generalized edema equal bilateral but not localized to the right ankle.  Incision also noted over the left medial ankle consistent with previous talus navicular arthrodesis.    Radiographs: 3 views right foot demonstrate degenerative changes through the talus navicular joint.  Recurrent bunion deformity with hallux valgus.  Lesser digits are rigidly contracted.     ASSESSMENT:       ICD-10-CM    1. Primary osteoarthritis of right ankle M19.071    2. Pes valgus Q66.6    3. Chronic gout involving toe of right foot without tophus, unspecified cause M1A.9XX0    4. Hx MRSA infection Z86.14    5. Alcohol abuse F10.10         PLAN:  Reviewed [facility] in [facility].      4/24/2019   Obtained interpreted radiographs  Discussed treatment options.  His arch has collapsed causing these rigid deformities.  He has had a bunionectomy before.  Most of his symptoms are through the right great toe joint and secondary across the ball the foot.  We discussed first MPJ arthrodesis and hammertoe correction about the right foot.  We also discussed arthrodesis of the talus navicular joint with medial column faulting.  All questions were answered.  He would like to continue conservative options at this time due to postoperative requirements following reconstruction.  He would consider first MPJ arthrodesis and hammertoe repair in the fall.  Until then he will continue custom molded orthotics which conform quite well.  We discussed appropriate shoe gear as well.  All questions were answered and he is satisfied with this treatment plan.  He will follow-up as needed.    5/15/2019  discussed options accommodative bracing support versus surgical arthrodesis.  Upon discussing surgical treatments he does not want to progress with surgical treatments.  Order for [place] brace but he would like to try the OTC ankle brace for a abit longer and if this remains  symptomatic he will follow-up with the orthotist to move forward with [place] brace or similar or [place] brace.  We also discussed injections.  Admittedly today he does not localize his discomfort well to the ankle joint versus the talus navicular versus the subtalar versus the calcaneus cuboid joint.    If this remains symptomatic or he continues follow-up in the emergency department with this problem we may consider CT imaging and/or diagnostic injections to better evaluate which joints are most symptomatic.  This was explained to him today as well.  Recommend follow-up in 2 weeks.    Duke Frankel DPM

## 2019-05-15 NOTE — TELEPHONE ENCOUNTER
[facility] mail order pharmacy is calling asking if we have received the request for these medications. They need to be sent to them.   Thank you,  Nancy Rangel   for [facility]

## 2019-05-15 NOTE — TELEPHONE ENCOUNTER
LM asking patient to return our call.  Please find out where he would like his medications sent.  Allopurinol 100 mg  Lisinopril 20 mg-hydrochlorothiazide 12.5 mg  Metoprolol succinate er 50 mg  Simvastatin 20 mg  We received Rx request from "[email or web address].  If he would like them through [facility] then new scripts need to be sent. If he wants them at another pharmacy he can go ahead and call them to have them transferred.    Where his meds are:     Simvastatin has refill request at [facility] pharmacy-Allopurinol 100 mg    [facility] pharmacy-Lisinopril 20 mg-hydrochlorothiazide 12.5 mg    [facility]-Metoprolol succinate er 50 mg    Gissell Ramsey, [facility]

## 2019-05-16 RX ORDER — LISINOPRIL AND HYDROCHLOROTHIAZIDE 12.5; 2 MG/1; MG/1
2 TABLET ORAL DAILY
Qty: 180 TABLET | Refills: 3 | Status: SHIPPED | OUTPATIENT
Start: 2019-05-16 | End: 2020-04-08

## 2019-05-16 RX ORDER — SIMVASTATIN 20 MG
20 TABLET ORAL AT BEDTIME
Qty: 90 TABLET | Refills: 3 | Status: SHIPPED | OUTPATIENT
Start: 2019-05-16 | End: 2020-04-08

## 2019-05-16 RX ORDER — METOPROLOL SUCCINATE 50 MG/1
50 TABLET, EXTENDED RELEASE ORAL DAILY
Qty: 90 TABLET | Refills: 3 | Status: SHIPPED | OUTPATIENT
Start: 2019-05-16 | End: 2020-04-08

## 2019-05-16 RX ORDER — ALLOPURINOL 100 MG/1
100 TABLET ORAL DAILY
Qty: 90 TABLET | Refills: 3 | Status: SHIPPED | OUTPATIENT
Start: 2019-05-16 | End: 2020-03-30

## 2019-05-30 ENCOUNTER — OFFICE VISIT (OUTPATIENT)
Dept: FAMILY MEDICINE | Facility: CLINIC | Age: 79
End: 2019-05-30
Payer: COMMERCIAL

## 2019-05-30 VITALS
BODY MASS INDEX: 36.96 KG/M2 | OXYGEN SATURATION: 94 % | RESPIRATION RATE: 18 BRPM | HEART RATE: 65 BPM | TEMPERATURE: 97.4 F | WEIGHT: 236 LBS | DIASTOLIC BLOOD PRESSURE: 69 MMHG | SYSTOLIC BLOOD PRESSURE: 133 MMHG

## 2019-05-30 DIAGNOSIS — A69.20 LYME DISEASE: ICD-10-CM

## 2019-05-30 DIAGNOSIS — I10 ESSENTIAL HYPERTENSION: Primary | ICD-10-CM

## 2019-05-30 LAB
ANION GAP SERPL CALCULATED.3IONS-SCNC: 5 MMOL/L (ref 3–14)
BUN SERPL-MCNC: 16 MG/DL (ref 7–30)
CALCIUM SERPL-MCNC: 9.5 MG/DL (ref 8.5–10.1)
CHLORIDE SERPL-SCNC: 103 MMOL/L (ref 94–109)
CO2 SERPL-SCNC: 30 MMOL/L (ref 20–32)
CREAT SERPL-MCNC: 1.07 MG/DL (ref 0.66–1.25)
CREAT UR-MCNC: 38 MG/DL
GFR SERPL CREATININE-BSD FRML MDRD: 65 ML/MIN/{1.73_M2}
GLUCOSE SERPL-MCNC: 111 MG/DL (ref 70–99)
MICROALBUMIN UR-MCNC: 19 MG/L
MICROALBUMIN/CREAT UR: 48.82 MG/G CR (ref 0–17)
POTASSIUM SERPL-SCNC: 3.8 MMOL/L (ref 3.4–5.3)
SODIUM SERPL-SCNC: 138 MMOL/L (ref 133–144)

## 2019-05-30 PROCEDURE — 82043 UR ALBUMIN QUANTITATIVE: CPT | Performed by: FAMILY MEDICINE

## 2019-05-30 PROCEDURE — 80048 BASIC METABOLIC PNL TOTAL CA: CPT | Performed by: FAMILY MEDICINE

## 2019-05-30 PROCEDURE — 36415 COLL VENOUS BLD VENIPUNCTURE: CPT | Performed by: FAMILY MEDICINE

## 2019-05-30 PROCEDURE — 99214 OFFICE O/P EST MOD 30 MIN: CPT | Performed by: FAMILY MEDICINE

## 2019-05-30 RX ORDER — TRAZODONE HYDROCHLORIDE 50 MG/1
25-100 TABLET, FILM COATED ORAL
COMMUNITY
Start: 2019-05-07

## 2019-05-30 RX ORDER — DOXYCYCLINE HYCLATE 100 MG
100 TABLET ORAL 2 TIMES DAILY
Qty: 14 TABLET | Refills: 0 | Status: SHIPPED | OUTPATIENT
Start: 2019-05-30 | End: 2019-06-12

## 2019-05-30 ASSESSMENT — PAIN SCALES - GENERAL: PAINLEVEL: NO PAIN (0)

## 2019-05-30 NOTE — PROGRESS NOTES
SUBJECTIVE:                                                      Chief Complaint   Patient presents with     Hypertension        Hypertension Follow-up      Do you check your blood pressure regularly outside of the clinic? Yes     Are you following a low salt diet? Yes    Are your blood pressures ever more than 140 on the top number (systolic) OR more   than 90 on the bottom number (diastolic), for example 140/90? Yes    Amount of exercise or physical activity: None, around the house     Problems taking medications regularly: No    Medication side effects: none    Diet: regular (no restrictions)        Duane is a 78 year old male who comes to clinic   Noted rash on R wrist, maybe a ring around it, no systemic signs    ROS:  Constitutional, HEENT, cardiovascular, pulmonary, gi and gu systems are negative, except as otherwise noted.    OBJECTIVE:                                                    /69   Pulse 65   Temp 97.4  F (36.3  C) (Temporal)   Resp 18   Wt 107 kg (236 lb)   SpO2 94%   BMI 36.96 kg/m    Body mass index is 36.96 kg/m .    Well-appearing male no distress.  No rash on exposed skin except for the right risk on the medial surface at the base of the thumb there is a indurated slightly raised erythematous [place] about 3 cm in diameter there is also a clearing like a halo.  Cannot tell if there is a ring beyond that due to his well tanned skin.    Diagnostic Test Results:  none      ASSESSMENT/PLAN:                                                        ICD-10-CM    1. Essential hypertension I10 Basic metabolic panel     Albumin Random Urine Quantitative with Creat Ratio   2. Lyme disease A69.20 doxycycline hyclate (VIBRA-TABS) 100 MG tablet       Likely this is erythema migrans and consistent with Lyme's disease.  Will treat for 14 days.  Blood pressure stable and due for lab work today.  I get back to him with results.  Otherwise see him back in 3 months        Asa Oliver,  MD  [facility]

## 2019-06-12 ENCOUNTER — OFFICE VISIT (OUTPATIENT)
Dept: FAMILY MEDICINE | Facility: OTHER | Age: 79
End: 2019-06-12
Payer: COMMERCIAL

## 2019-06-12 VITALS
WEIGHT: 231.5 LBS | TEMPERATURE: 98.6 F | RESPIRATION RATE: 16 BRPM | DIASTOLIC BLOOD PRESSURE: 82 MMHG | BODY MASS INDEX: 36.26 KG/M2 | OXYGEN SATURATION: 96 % | HEART RATE: 60 BPM | SYSTOLIC BLOOD PRESSURE: 128 MMHG

## 2019-06-12 DIAGNOSIS — R21 RASH AND NONSPECIFIC SKIN ERUPTION: Primary | ICD-10-CM

## 2019-06-12 DIAGNOSIS — A69.20 LYME DISEASE: ICD-10-CM

## 2019-06-12 LAB
KOH PREP SPEC: NORMAL
SPECIMEN SOURCE: NORMAL

## 2019-06-12 PROCEDURE — 87220 TISSUE EXAM FOR FUNGI: CPT | Performed by: PHYSICIAN ASSISTANT

## 2019-06-12 PROCEDURE — 99213 OFFICE O/P EST LOW 20 MIN: CPT | Performed by: PHYSICIAN ASSISTANT

## 2019-06-12 RX ORDER — DOXYCYCLINE HYCLATE 100 MG
100 TABLET ORAL 2 TIMES DAILY
Qty: 14 TABLET | Refills: 0 | Status: SHIPPED | OUTPATIENT
Start: 2019-06-12 | End: 2019-07-11

## 2019-06-12 RX ORDER — TRIAMCINOLONE ACETONIDE 1 MG/G
CREAM TOPICAL 2 TIMES DAILY
Qty: 45 G | Refills: 0 | Status: SHIPPED | OUTPATIENT
Start: 2019-06-12 | End: 2019-10-08

## 2019-06-12 ASSESSMENT — PATIENT HEALTH QUESTIONNAIRE - PHQ9: SUM OF ALL RESPONSES TO PHQ QUESTIONS 1-9: 2

## 2019-06-12 NOTE — PATIENT INSTRUCTIONS
Patient Education     Cellulitis  Cellulitis is an infection of the deep layers of skin. A break in the skin, such as a cut or scratch, can let bacteria under the skin. If the bacteria get to deep layers of the skin, it can be serious. If not treated, cellulitis can get into the bloodstream and lymph nodes. The infection can then spread throughout the body. This causes serious illness.  Cellulitis causes the affected skin to become red, swollen, warm, and sore. The reddened areas have a visible border. An open sore may leak fluid (pus). You may have a fever, chills, and pain.  Cellulitis is treated with antibiotics taken for 7 to 10 days. An open sore may be cleaned and covered with cool wet gauze. Symptoms should get better 1 to 2 days after treatment is started. Make sure to take all the antibiotics for the full number of days until they are gone. Keep taking the medicine even if your symptoms go away.  Home care  Follow these tips:    Limit the use of the part of your body with cellulitis.     If the infection is on your leg, keep your leg raised while sitting. This will help to reduce swelling.    Take all of the antibiotic medicine exactly as directed until it is gone. Do not miss any doses, especially during the first 7 days. Don t stop taking the medicine when your symptoms get better.    Keep the affected area clean and dry.    Wash your hands with soap and warm water before and after touching your skin. Anyone else who touches your skin should also wash his or her hands. Don't share towels.  Follow-up care  Follow up with your healthcare provider, or as advised. If your infection does not go away on the first antibiotic, your healthcare provider will prescribe a different one.  When to seek medical advice  Call your healthcare provider right away if any of these occur:    Red areas that spread    Swelling or pain that gets worse    Fluid leaking from the skin (pus)    Fever higher of 100.4  F (38.0  C) or  higher after 2 days on antibiotics  Date Last Reviewed: 9/1/2016 2000-2018 [employer]. [address] [place] [address], [place] [address]. All rights reserved. This information is not intended as a substitute for professional medical care. Always follow your healthcare professional's instructions.

## 2019-06-12 NOTE — PROGRESS NOTES
Subjective     Duane E Sogge is a 78 year old male who presents to clinic today for the following health issues:    HPI   Tick Bite Follow Up      Duration: 2 weeks    Description (location/character/radiation): Right wrist, red, started smaller in size    Intensity:  mild    Accompanying signs and symptoms: no    History (similar episodes/previous evaluation): None    Precipitating or alleviating factors: None    Therapies tried and outcome: None     Patient is a 78 year old male who presents for follow up of tick bite. He was seen by PCP on 05/30/19 and started on treated for suspected lyme. Today the patient states that despite taking the medication as prescribed the rash's border has expanded. He notes that it is tender with extension of his right wrist and mildly pruritic.     Reviewed and updated as needed this visit by Provider         Review of Systems   ROS COMP: Constitutional, HEENT, cardiovascular, pulmonary, gi and gu systems are negative, except as otherwise noted.      Objective    /82   Pulse 60   Temp 98.6  F (37  C) (Temporal)   Resp 16   Wt 105 kg (231 lb 8 oz)   SpO2 96%   BMI 36.26 kg/m    Body mass index is 36.26 kg/m .  Physical Exam   GENERAL: healthy, alert and no distress  RESP: lungs clear to auscultation - no rales, rhonchi or wheezes  CV: regular rate and rhythm, normal S1 S2, no S3 or S4, no murmur, click or rub, no peripheral edema and peripheral pulses strong  MS: no gross musculoskeletal defects noted, no edema, normal AROM of wrists  SKIN: 2in annular lesion with desquamated border, center is mildly lichenified, non-tender    Diagnostic Test Results:  Results for orders placed or performed in visit on 06/12/19 (from the past 24 hour(s))   KOH prep (skin, hair or nails only)   Result Value Ref Range    Specimen Description Skin     KOH Skin Hair Nails Test No fungal elements seen            Assessment & Plan     1. Rash and nonspecific skin eruption  FAHEEM returned negative  for fungal elements. Patient will use topical steroid for next 1-2 weeks and maintain regular hygiene.   - KOH prep (skin, hair or nails only)  - triamcinolone (KENALOG) 0.1 % external cream; Apply topically 2 times daily  Dispense: 45 g; Refill: 0    2. Lyme disease  Extend antibiotic for one additional week for lyme and possible cellulitis.   - doxycycline hyclate (VIBRA-TABS) 100 MG tablet; Take 1 tablet (100 mg) by mouth 2 times daily for 7 days  Dispense: 14 tablet; Refill: 0           Follow up with clinic for annual checkup or sooner if conditions change, worsen or fail to improve as expected.      No follow-ups on file.    Erlin Gibbons PA-C  [facility]

## 2019-06-12 NOTE — LETTER
My Depression Action Plan  Name: Duane E Sogge   Date of Birth 1940  Date: 6/12/2019    My doctor: Asa Oliver   My clinic: [place] [address] [address] [facility] [address] [phone]          GREEN    ZONE   Good Control    What it looks like:     Things are going generally well. You have normal up s and down s. You may even feel depressed from time to time, but bad moods usually last less than a day.   What you need to do:  1. Continue to care for yourself (see self care plan)  2. Check your depression survival kit and update it as needed  3. Follow your physician s recommendations including any medication.  4. Do not stop taking medication unless you consult with your physician first.           YELLOW         ZONE Getting Worse    What it looks like:     Depression is starting to interfere with your life.     It may be hard to get out of bed; you may be starting to isolate yourself from others.    Symptoms of depression are starting to last most all day and this has happened for several days.     You may have suicidal thoughts but they are not constant.   What you need to do:     1. Call your care team, your response to treatment will improve if you keep your care team informed of your progress. Yellow periods are signs an adjustment may need to be made.     2. Continue your self-care, even if you have to fake it!    3. Talk to someone in your support network    4. Open up your depression survival kit           RED    ZONE Medical Alert - Get Help    What it looks like:     Depression is seriously interfering with your life.     You may experience these or other symptoms: You can t get out of bed most days, can t work or engage in other necessary activities, you have trouble taking care of basic hygiene, or basic responsibilities, thoughts of suicide or death that will not go away, self-injurious behavior.     What you need to do:  1. Call your care team and request  a same-day appointment. If they are not available (weekends or after hours) call your local crisis line, emergency room or 911.            Depression Self Care Plan / Survival Kit    Self-Care for Depression  Here s the deal. Your body and mind are really not as separate as most people think.  What you do and think affects how you feel and how you feel influences what you do and think. This means if you do things that people who feel good do, it will help you feel better.  Sometimes this is all it takes.  There is also a place for medication and therapy depending on how severe your depression is, so be sure to consult with your medical provider and/ or Behavioral Health Consultant if your symptoms are worsening or not improving.     In order to better manage my stress, I will:    Exercise  Get some form of exercise, every day. This will help reduce pain and release endorphins, the  feel good  chemicals in your brain. This is almost as good as taking antidepressants!  This is not the same as joining a gym and then never going! (they count on that by the way ) It can be as simple as just going for a walk or doing some gardening, anything that will get you moving.      Hygiene   Maintain good hygiene (Get out of bed in the morning, Make your bed, Brush your teeth, Take a shower, and Get dressed like you were going to work, even if you are unemployed).  If your clothes don't fit try to get ones that do.    Diet  I will strive to eat foods that are good for me, drink plenty of water, and avoid excessive sugar, caffeine, alcohol, and other mood-altering substances.  Some foods that are helpful in depression are: complex carbohydrates, B vitamins, flaxseed, fish or fish oil, fresh fruits and vegetables.    Psychotherapy  I agree to participate in Individual Therapy (if recommended).    Medication  If prescribed medications, I agree to take them.  Missing doses can result in serious side effects.  I understand that drinking  alcohol, or other illicit drug use, may cause potential side effects.  I will not stop my medication abruptly without first discussing it with my provider.    Staying Connected With Others  I will stay in touch with my friends, family members, and my primary care provider/team.    Use your imagination  Be creative.  We all have a creative side; it doesn t matter if it s oil painting, sand castles, or mud pies! This will also kick up the endorphins.    Witness Beauty  (AKA stop and smell the roses) Take a look outside, even in mid-winter. Notice colors, textures. Watch the squirrels and birds.     Service to others  Be of service to others.  There is always someone else in need.  By helping others we can  get out of ourselves  and remember the really important things.  This also provides opportunities for practicing all the other parts of the program.    Humor  Laugh and be silly!  Adjust your TV habits for less news and crime-drama and more comedy.    Control your stress  Try breathing deep, massage therapy, biofeedback, and meditation. Find time to relax each day.     My support system    Clinic Contact:  Phone number:    Contact 1:  Phone number:    Contact 2:  Phone number:    Islam/:  Phone number:    Therapist:  Phone number:    Local crisis center:    Phone number:    Other community support:  Phone number:

## 2019-06-18 ENCOUNTER — OFFICE VISIT (OUTPATIENT)
Dept: FAMILY MEDICINE | Facility: OTHER | Age: 79
End: 2019-06-18
Payer: COMMERCIAL

## 2019-06-18 VITALS
RESPIRATION RATE: 18 BRPM | TEMPERATURE: 97.2 F | OXYGEN SATURATION: 94 % | WEIGHT: 230 LBS | BODY MASS INDEX: 36.02 KG/M2 | SYSTOLIC BLOOD PRESSURE: 120 MMHG | DIASTOLIC BLOOD PRESSURE: 72 MMHG | HEART RATE: 61 BPM

## 2019-06-18 DIAGNOSIS — R21 RASH: Primary | ICD-10-CM

## 2019-06-18 PROCEDURE — 11102 TANGNTL BX SKIN SINGLE LES: CPT | Performed by: FAMILY MEDICINE

## 2019-06-18 PROCEDURE — 88312 SPECIAL STAINS GROUP 1: CPT | Mod: TC | Performed by: FAMILY MEDICINE

## 2019-06-18 PROCEDURE — 88305 TISSUE EXAM BY PATHOLOGIST: CPT | Mod: TC | Performed by: FAMILY MEDICINE

## 2019-06-18 ASSESSMENT — PAIN SCALES - GENERAL: PAINLEVEL: MILD PAIN (2)

## 2019-06-18 NOTE — PROGRESS NOTES
SUBJECTIVE:                                                      Chief Complaint   Patient presents with     Insect Bites        Tick bite is swelling.     Duane is a 78 year old male who comes to clinic   stil with R wrist rash, itches occasionally, placed on Kenalog ointment last week by another provider, helping with itching somewhat but still spreading  Worse, larger, redder, despite doxy x 14 days for suspected lymes (looked like EM initially)    ROS:  Constitutional, HEENT, cardiovascular, pulmonary, gi and gu systems are negative, except as otherwise noted.    OBJECTIVE:                                                    /72   Pulse 61   Temp 97.2  F (36.2  C) (Temporal)   Resp 18   Wt 104.3 kg (230 lb)   SpO2 94%   BMI 36.02 kg/m    Body mass index is 36.02 kg/m .    Well-appearing male no distress.  Usual health.  On his right wrist on the medial aspect there is a slightly raised ring shaped rash with a heaped up border and some scaling present, also nodular and some areas.  Appears much like tinea corporis, except for the nodularity.    After informed consent, I used 1/2 mL of 1% lidocaine with epinephrine on the medial distal aspect.  With good anesthesia then performed a 4 mm punch biopsy.  Sent for pathology.  Tolerated well.  Bandage applied.     ASSESSMENT/PLAN:                                                        ICD-10-CM    1. Rash R21 Dermatological path order and indications       Rash initially thought to be erythema migrans and consistent with Lyme's, now appearing more like tinea corporis, but not responding to steroid ointment and worsening.  Has not tried a antifungal, but the KOH was negative.  For aid with diagnosis is sent for pathology, may need dermatology referral next.  He will continue with the steroid on the for another week.  Then stop.        Asa Oliver MD  [facility]

## 2019-06-25 ENCOUNTER — TELEPHONE (OUTPATIENT)
Dept: FAMILY MEDICINE | Facility: CLINIC | Age: 79
End: 2019-06-25

## 2019-06-25 LAB — COPATH REPORT: NORMAL

## 2019-06-25 NOTE — TELEPHONE ENCOUNTER
Ingrid called to let you know the date of Duane's last colonoscopy was 7/6/16 and he had a benign tumor removed and that is why Ne Lino is recommending a repeat colonoscopy.

## 2019-06-25 NOTE — TELEPHONE ENCOUNTER
Reason for Call:  Request for results:    Name of test or procedure: Biopsy results from 6.18    Date of test of procedure: 6.18    Location of the test or procedure: [facility] [place]     OK to leave the result message on voice mail or with a family member? YES    Phone number Patient can be reached at:  [phone]     Additional comments: Pt's wife also wondering if Dr Oliver will be sending Colonoscopy prep to [facility] in [place]. Please advise.     Call taken on 6/25/2019 at 11:40 AM by Aniyah Perales

## 2019-06-25 NOTE — TELEPHONE ENCOUNTER
Per  he does not need to rx for his colonoscopy he can use the regular prep that they can get over the counter and will get in the mail from same day surgery. And his results are not back yet, once they are we will let him know.  Shavonne Calle MA

## 2019-06-25 NOTE — TELEPHONE ENCOUNTER
I called pt's wife and informed her that Dr. Oliver prefers this prep and that's its easier on him and is better for cleaning him out. She is ok with doing the normal prep.  Shavonne Calle MA

## 2019-06-25 NOTE — TELEPHONE ENCOUNTER
Ingrid wants to know if Dr. Oliver knows that this is a repeat colonoscopy so before the 3 years (first time having a repeat in the 3 years) and in the past has used the golytely prep.  [place]  Would like to stay on it and his wife Ingrid is stating they would like stay with what they know.  Please review/advise.  Gissell Ramsey, CMA

## 2019-07-01 ENCOUNTER — TELEPHONE (OUTPATIENT)
Dept: FAMILY MEDICINE | Facility: CLINIC | Age: 79
End: 2019-07-01

## 2019-07-01 DIAGNOSIS — B37.2 YEAST INFECTION OF THE SKIN: Primary | ICD-10-CM

## 2019-07-01 NOTE — TELEPHONE ENCOUNTER
Reason for Call:  Other call back    Detailed comments: Patients wife is wondering if the the results are in form the biopsy.     Phone Number Patient can be reached at: Home number on file [phone] (home)    Best Time: any     Can we leave a detailed message on this number? YES    Call taken on 7/1/2019 at 11:32 AM by Ester Flores

## 2019-07-01 NOTE — TELEPHONE ENCOUNTER
C2C on file so spoke with wife.  Yes, please send in Rx for pt, hasn't changed in appearance.  How long is it expected to be there before it clears?  Did take the doxycycline two separate times and it didn't seem to phase it, has had cream and didn't phase it either.  Would like to try the oral.  What kind of fungus was it, how long will it take to get rid of.  Hasn't spread.  It is itching.  Nothing is drying it up.    Pharmacy: [facility]    If it doesn't work or clear it up would you like to see pt again?  He does have an appt on 07/11/19 for pre op wife said.    Gissell Ramsey, CMA

## 2019-07-02 RX ORDER — TERBINAFINE HYDROCHLORIDE 250 MG/1
250 TABLET ORAL DAILY
Qty: 30 TABLET | Refills: 0 | Status: SHIPPED | OUTPATIENT
Start: 2019-07-02 | End: 2019-10-08

## 2019-07-02 NOTE — TELEPHONE ENCOUNTER
Please see message below.    Patient calling back regarding the fungal infection, there was supposed to be a medication called in, it hasn't been called in yet.     Patients wife states she is upset, there was a bx done and a nurse called with the results and all she said is they were starting an oral medication.    They have questions about the bx and the medication that was ordered as they were on a , medication before and it did not do anything, they would like records sent to infectious disease and states this is not a typical Dr. Godinez response to them as a patient and they are frustrated.    Requesting a call from Dr. Oliver or his nurse specifically.   Has a new spot on bottom of right leg on inside and on his hand. Patient has a hx of MRSA. Didn't notice it yesterday.

## 2019-07-02 NOTE — TELEPHONE ENCOUNTER
I called pt's wife and informed her that we sent in the med today and that we were waiting to hear back from them if they wanted to try it. And that we are not concerned about MRSA cause it would be raised and will have pus. And we will check it at his preop appt next week.  Shavonne Calle MA

## 2019-07-11 ENCOUNTER — OFFICE VISIT (OUTPATIENT)
Dept: FAMILY MEDICINE | Facility: CLINIC | Age: 79
End: 2019-07-11
Payer: COMMERCIAL

## 2019-07-11 VITALS
HEART RATE: 56 BPM | RESPIRATION RATE: 16 BRPM | BODY MASS INDEX: 36.18 KG/M2 | TEMPERATURE: 96.5 F | SYSTOLIC BLOOD PRESSURE: 156 MMHG | WEIGHT: 231 LBS | OXYGEN SATURATION: 95 % | DIASTOLIC BLOOD PRESSURE: 92 MMHG

## 2019-07-11 DIAGNOSIS — Z01.818 PREOP GENERAL PHYSICAL EXAM: Primary | ICD-10-CM

## 2019-07-11 PROCEDURE — 99214 OFFICE O/P EST MOD 30 MIN: CPT | Performed by: FAMILY MEDICINE

## 2019-07-11 ASSESSMENT — PAIN SCALES - GENERAL: PAINLEVEL: NO PAIN (0)

## 2019-07-11 NOTE — NURSING NOTE
Chief Complaint   Patient presents with     Pre-Op Exam     8/1/2019  and Bilateral direct brow lift, blepharoplasty, ptosis repair.     MP/MA

## 2019-07-11 NOTE — PROGRESS NOTES
[address] [address]  [phone]  Dept: [phone]    PRE-OP EVALUATION:  Today's date: 2019    Duane E Sogge (: 1940) presents for pre-operative evaluation assessment as requested by Dr. Gonzalez.  He requires evaluation and anesthesia risk assessment prior to undergoing surgery/procedure for treatment of Bilateral direct brow lift, blepharoplasty, ptosis repair. .    Fax number for surgical facility: [phone]  Primary Physician: Asa Oliver  Type of Anesthesia Anticipated: to be determined    Patient has a Health Care Directive or Living Will:  YES     Preop Questions 2019   Who is doing your surgery? Dr. Nima Gonzalez [facility]   What are you having done? Eyelids and brows are being lifted   Date of Surgery/Procedure: 2019   Facility or Hospital where procedure/surgery will be performed: [facility]   1.  Do you have a history of Heart attack, stroke, stent, coronary bypass surgery, or other heart surgery? No   2.  Do you ever have any pain or discomfort in your chest? No   3.  Do you have a history of  Heart Failure? No   4.   Are you troubled by shortness of breath when:  walking on a level surface, or up a slight hill, or at night? No   5.  Do you currently have a cold, bronchitis or other respiratory infection? No   6.  Do you have a cough, shortness of breath, or wheezing? No   7.  Do you sometimes get pains in the calves of your legs when you walk? No   8. Do you or anyone in your family have previous history of blood clots? No   9.  Do you or does anyone in your family have a serious bleeding problem such as prolonged bleeding following surgeries or cuts? No   10. Have you ever had problems with anemia or been told to take iron pills? No   11. Have you had any abnormal blood loss such as black, tarry or bloody stools? No   12. Have you ever had a  blood transfusion? No   13. Have you or any of your relatives ever had problems with anesthesia? No   14. Do you have sleep apnea, excessive snoring or daytime drowsiness? YES -    15. Do you have any prosthetic heart valves? No   16. Do you have prosthetic joints? YES -          HPI:     HPI related to upcoming procedure:   Having lid lifts  Feels well    Can get up a single flight of stairs without dyspnea. Estimated METS > 4.        See problem list for active medical problems.  Problems all longstanding and stable, except as noted/documented.  See ROS for pertinent symptoms related to these conditions.      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     Priority: Medium     Hyperlipidemia LDL goal <130 05/08/2017     Priority: Medium     Essential hypertension 05/08/2017     Priority: Medium     Obstructive sleep apnea 12/08/2014     Priority: Medium     Overview:   Setting: APAP 5-15  Supplied by: [facility]  PSG done: 11/25/14  AHI 6 (HTN)  RDI 24  Lowest O2 Sat: 87%  Ariel/Grey  1/2/15 ed ord  ; Obstructive sleep apnea (adult)        Sensorineural hearing loss, bilateral 06/19/2014     Priority: Medium     Panic disorder without agoraphobia 12/22/2011     Priority: Medium      Past Medical History:   Diagnosis Date     Anxiety      Hypertension      Past Surgical History:   Procedure Laterality Date     ORTHOPEDIC SURGERY       Current Outpatient Medications   Medication Sig Dispense Refill     allopurinol (ZYLOPRIM) 100 MG tablet Take 1 tablet (100 mg) by mouth daily 90  tablet 3     ALPRAZolam (XANAX) 0.5 MG tablet Take 1 tab up to 4x daily only if needed. Refill when due,no early refills  Indications: Feeling Anxious       BENZONATATE PO        lisinopril-hydrochlorothiazide (PRINZIDE/ZESTORETIC) 20-12.5 MG tablet Take 2 tablets by mouth daily 180 tablet 3     metoprolol succinate ER (TOPROL-XL) 50 MG 24 hr tablet Take 1 tablet (50 mg) by mouth daily 90 tablet 3     MIRTAZAPINE PO Take 15 mg by mouth At Bedtime       simvastatin (ZOCOR) 20 MG tablet Take 1 tablet (20 mg) by mouth At Bedtime 90 tablet 3     terbinafine (LAMISIL) 250 MG tablet Take 1 tablet (250 mg) by mouth daily 30 tablet 0     traZODone (DESYREL) 50 MG tablet Take  mg by mouth       triamcinolone (KENALOG) 0.1 % external cream Apply topically 2 times daily 45 g 0     venlafaxine (EFFEXOR-ER) 150 MG TB24 24 hr tablet Take 150 mg by mouth daily (with breakfast)       OTC products: None, except as noted above    Allergies   Allergen Reactions     Rifampin Anaphylaxis     Elevated lfts     Motrin [Ibuprofen] Itching      Latex Allergy: NO    Social History     Tobacco Use     Smoking status: Never Smoker     Smokeless tobacco: Current User     Types: Snuff   Substance Use Topics     Alcohol use: Yes     Alcohol/week: 3.0 - 3.6 oz     Types: 3 Cans of beer, 2 - 3 Shots of liquor per week     History   Drug Use No       REVIEW OF SYSTEMS:   CONSTITUTIONAL: NEGATIVE for fever, chills, change in weight  ENT/MOUTH: NEGATIVE for ear, mouth and throat problems  RESP: NEGATIVE for significant cough or SOB  CV: NEGATIVE for chest pain, palpitations or peripheral edema    EXAM:   BP (!) 156/92   Pulse 56   Temp 96.5  F (35.8  C) (Temporal)   Resp 16   Wt 104.8 kg (231 lb)   SpO2 95%   BMI 36.18 kg/m    GENERAL APPEARANCE: healthy, alert and no distress  HENT: ear canals and TM's normal and nose and mouth without ulcers or lesions  RESP: lungs clear to auscultation - no rales, rhonchi or wheezes  CV: regular rate  and rhythm, normal S1 S2, no S3 or S4 and no murmur, click or rub   ABDOMEN: soft, nontender, no HSM or masses and bowel sounds normal  NEURO: Normal strength and tone, sensory exam grossly normal, mentation intact and speech normal    DIAGNOSTICS:   EKG: Not indicated due to non-vascular surgery and low risk of event (age <65 and without cardiac risk factors)    Recent Labs   Lab Test 05/30/19  1156 12/06/18  1451 11/14/18  1143   HGB  --  14.6 14.5   PLT  --  244 223     --  136   POTASSIUM 3.8  --  4.1   CR 1.07  --  1.08        IMPRESSION:       The proposed surgical procedure is considered LOW risk.    REVISED CARDIAC RISK INDEX  The patient has the following serious cardiovascular risks for perioperative complications such as (MI, PE, VFib and 3  AV Block):  No serious cardiac risks  INTERPRETATION: 0 risks: Class I (very low risk - 0.4% complication rate)    The patient has the following additional risks for perioperative complications:  No identified additional risks      ICD-10-CM    1. Preop general physical exam Z01.818        RECOMMENDATIONS:             APPROVAL GIVEN to proceed with proposed procedure, without further diagnostic evaluation       Signed Electronically by: Asa Oliver MD    Copy of this evaluation report is provided to requesting physician.    [facility] Preop Guidelines    Revised Cardiac Risk Index

## 2019-07-15 ENCOUNTER — HOSPITAL ENCOUNTER (OUTPATIENT)
Facility: CLINIC | Age: 79
Discharge: HOME | End: 2019-07-15
Attending: FAMILY MEDICINE | Admitting: FAMILY MEDICINE
Payer: COMMERCIAL

## 2019-07-15 ENCOUNTER — ANESTHESIA EVENT (OUTPATIENT)
Dept: GASTROENTEROLOGY | Facility: CLINIC | Age: 79
End: 2019-07-15
Payer: COMMERCIAL

## 2019-07-15 ENCOUNTER — ANESTHESIA (OUTPATIENT)
Dept: GASTROENTEROLOGY | Facility: CLINIC | Age: 79
End: 2019-07-15
Payer: COMMERCIAL

## 2019-07-15 VITALS
DIASTOLIC BLOOD PRESSURE: 74 MMHG | RESPIRATION RATE: 11 BRPM | HEART RATE: 37 BPM | TEMPERATURE: 96.5 F | SYSTOLIC BLOOD PRESSURE: 149 MMHG | OXYGEN SATURATION: 95 %

## 2019-07-15 PROCEDURE — 25000125 ZZHC RX 250: Performed by: NURSE ANESTHETIST, CERTIFIED REGISTERED

## 2019-07-15 PROCEDURE — 93005 ELECTROCARDIOGRAM TRACING: CPT

## 2019-07-15 PROCEDURE — 45378 DIAGNOSTIC COLONOSCOPY: CPT | Performed by: FAMILY MEDICINE

## 2019-07-15 PROCEDURE — 37000008 ZZH ANESTHESIA TECHNICAL FEE, 1ST 30 MIN: Performed by: FAMILY MEDICINE

## 2019-07-15 PROCEDURE — 25800030 ZZH RX IP 258 OP 636: Performed by: NURSE ANESTHETIST, CERTIFIED REGISTERED

## 2019-07-15 RX ORDER — ONDANSETRON 2 MG/ML
4 INJECTION INTRAMUSCULAR; INTRAVENOUS
Status: DISCONTINUED | OUTPATIENT
Start: 2019-07-15 | End: 2019-07-15 | Stop reason: HOSPADM

## 2019-07-15 RX ORDER — NALOXONE HYDROCHLORIDE 0.4 MG/ML
.1-.4 INJECTION, SOLUTION INTRAMUSCULAR; INTRAVENOUS; SUBCUTANEOUS
Status: DISCONTINUED | OUTPATIENT
Start: 2019-07-15 | End: 2019-07-15 | Stop reason: HOSPADM

## 2019-07-15 RX ORDER — SODIUM CHLORIDE, SODIUM LACTATE, POTASSIUM CHLORIDE, CALCIUM CHLORIDE 600; 310; 30; 20 MG/100ML; MG/100ML; MG/100ML; MG/100ML
INJECTION, SOLUTION INTRAVENOUS CONTINUOUS
Status: DISCONTINUED | OUTPATIENT
Start: 2019-07-15 | End: 2019-07-15 | Stop reason: HOSPADM

## 2019-07-15 RX ORDER — ONDANSETRON 2 MG/ML
4 INJECTION INTRAMUSCULAR; INTRAVENOUS EVERY 30 MIN PRN
Status: DISCONTINUED | OUTPATIENT
Start: 2019-07-15 | End: 2019-07-15 | Stop reason: HOSPADM

## 2019-07-15 RX ORDER — ALBUTEROL SULFATE 0.83 MG/ML
2.5 SOLUTION RESPIRATORY (INHALATION) EVERY 4 HOURS PRN
Status: DISCONTINUED | OUTPATIENT
Start: 2019-07-15 | End: 2019-07-15 | Stop reason: HOSPADM

## 2019-07-15 RX ORDER — ONDANSETRON 2 MG/ML
4 INJECTION INTRAMUSCULAR; INTRAVENOUS EVERY 6 HOURS PRN
Status: DISCONTINUED | OUTPATIENT
Start: 2019-07-15 | End: 2019-07-15 | Stop reason: HOSPADM

## 2019-07-15 RX ORDER — FLUMAZENIL 0.1 MG/ML
0.2 INJECTION, SOLUTION INTRAVENOUS
Status: DISCONTINUED | OUTPATIENT
Start: 2019-07-15 | End: 2019-07-15 | Stop reason: HOSPADM

## 2019-07-15 RX ORDER — ONDANSETRON 4 MG/1
4 TABLET, ORALLY DISINTEGRATING ORAL EVERY 30 MIN PRN
Status: DISCONTINUED | OUTPATIENT
Start: 2019-07-15 | End: 2019-07-15 | Stop reason: HOSPADM

## 2019-07-15 RX ORDER — LIDOCAINE 40 MG/G
CREAM TOPICAL
Status: DISCONTINUED | OUTPATIENT
Start: 2019-07-15 | End: 2019-07-15 | Stop reason: HOSPADM

## 2019-07-15 RX ORDER — PROPOFOL 10 MG/ML
INJECTION, EMULSION INTRAVENOUS CONTINUOUS PRN
Status: DISCONTINUED | OUTPATIENT
Start: 2019-07-15 | End: 2019-07-15

## 2019-07-15 RX ORDER — ONDANSETRON 4 MG/1
4 TABLET, ORALLY DISINTEGRATING ORAL EVERY 6 HOURS PRN
Status: DISCONTINUED | OUTPATIENT
Start: 2019-07-15 | End: 2019-07-15 | Stop reason: HOSPADM

## 2019-07-15 RX ADMIN — LIDOCAINE HYDROCHLORIDE 1 ML: 10 INJECTION, SOLUTION EPIDURAL; INFILTRATION; INTRACAUDAL; PERINEURAL at 08:53

## 2019-07-15 RX ADMIN — PROPOFOL 150 MCG/KG/MIN: 10 INJECTION, EMULSION INTRAVENOUS at 09:14

## 2019-07-15 RX ADMIN — SODIUM CHLORIDE, POTASSIUM CHLORIDE, SODIUM LACTATE AND CALCIUM CHLORIDE: 600; 310; 30; 20 INJECTION, SOLUTION INTRAVENOUS at 08:53

## 2019-07-15 ASSESSMENT — LIFESTYLE VARIABLES: TOBACCO_USE: 1

## 2019-07-15 NOTE — OR NURSING
Stand by assist walking to bathroom.  Back to room and heart rate is 37-49.  No shortness of breath or chest pain with ambulation.

## 2019-07-15 NOTE — DISCHARGE INSTRUCTIONS
[facility]    Stop the Metoprolol and Terbinafine (Lamisil).  Monitor heart rate tomorrow and notify Dr. Oliver of the heart rates.

## 2019-07-15 NOTE — ANESTHESIA PREPROCEDURE EVALUATION
Anesthesia Pre-Procedure Evaluation    Patient: Duane E Sogge   MRN: [id number] : 1940          Preoperative Diagnosis: hx colonic polyps    Procedure(s):  COLONOSCOPY    Past Medical History:   Diagnosis Date     Anxiety      Hypertension      Past Surgical History:   Procedure Laterality Date     ORTHOPEDIC SURGERY         Anesthesia Evaluation     . Pt has had prior anesthetic. Type: Regional and General    No history of anesthetic complications          ROS/MED HX    ENT/Pulmonary:     (+)sleep apnea, tobacco use (chews), uses CPAP , . .    Neurologic: Comment: [place]      Cardiovascular:     (+) hypertension----. : . . . :. . Previous cardiac testing date:results:Stress Testdate: results:No ischemia date: results: date: results:          METS/Exercise Tolerance:     Hematologic:  - neg hematologic  ROS       Musculoskeletal:   (+) arthritis,  -       GI/Hepatic:  - neg GI/hepatic ROS      (-) GERD   Renal/Genitourinary:  - ROS Renal section negative       Endo:     (+) Obesity, .      Psychiatric:  - neg psychiatric ROS       Infectious Disease:   (+) MRSA,       Malignancy:      - no malignancy   Other:    - neg other ROS                      Physical Exam  Normal systems: pulmonary    Airway   Mallampati: II  TM distance: >3 FB  Neck ROM: limited    Dental   Comment: Pt denies implants but his upper front teeth appear capped    Cardiovascular   Rhythm and rate: regular and abnormal    PE comment: SB pre-op    Pulmonary    breath sounds clear to auscultation            Lab Results   Component Value Date    WBC 8.0 2018    HGB 14.6 2018    HCT 44.5 2018     2018    CRP <2.9 2018    SED 35 (H) 2018     2019    POTASSIUM 3.8 2019    CHLORIDE 103 2019    CO2 30 2019    BUN 16 2019    CR 1.07 2019     (H) 2019    JEFE 9.5 2019    ALBUMIN 3.8 2018    PROTTOTAL 8.0 2018    ALT 26 2018     "AST 18 11/14/2018    ALKPHOS 101 11/14/2018    BILITOTAL 0.7 11/14/2018    TSH 3.39 07/16/2018    TSH 3.39 07/16/2018       Preop Vitals  BP Readings from Last 3 Encounters:   07/15/19 164/82   07/11/19 (!) 156/92   06/18/19 120/72    Pulse Readings from Last 3 Encounters:   07/15/19 (!) 41   07/11/19 56   06/18/19 61      Resp Readings from Last 3 Encounters:   07/15/19 16   07/11/19 16   06/18/19 18    SpO2 Readings from Last 3 Encounters:   07/15/19 97%   07/11/19 95%   06/18/19 94%      Temp Readings from Last 1 Encounters:   07/15/19 96.5  F (35.8  C) (Oral)    Ht Readings from Last 1 Encounters:   05/15/19 1.702 m (5' 7\")      Wt Readings from Last 1 Encounters:   07/11/19 104.8 kg (231 lb)    Estimated body mass index is 36.18 kg/m  as calculated from the following:    Height as of 5/15/19: 1.702 m (5' 7\").    Weight as of 7/11/19: 104.8 kg (231 lb).       Anesthesia Plan      History & Physical Review  History and physical reviewed and following examination; no interval change.    ASA Status:  3 .    NPO Status:  > 8 hours    Plan for MAC with Intravenous and Propofol induction. Maintenance will be TIVA.  Reason for MAC:  Deep or markedly invasive procedure (G8)    Dr Oliver performed the H&P pre-op.  Some SB, pt in the 40's when 50's normally      Postoperative Care      Consents  Anesthetic plan, risks, benefits and alternatives discussed with:  Patient.  Use of blood products discussed: No .   .                 EPIFANIO Cash CRNA  "

## 2019-07-15 NOTE — OR NURSING
S: Patient return to Room 1, awake and alert.  B: Scheduled for EKG with Dr. Oliver  A: Patient bradycardic, rates in mid-30's.  Dr. Oliver has requested a Cardiology consult.  Colonoscopy procedure on hold currently.  Patient's wife Ingrid has been called and will return to hospital in approx. 30 minutes.  R: Will continue to monitor.

## 2019-07-15 NOTE — ANESTHESIA CARE TRANSFER NOTE
Patient: Duane E Sogge    Procedure(s):  COLONOSCOPY    Diagnosis: hx colonic polyps  Diagnosis Additional Information: No value filed.    Anesthesia Type:   No value filed.     Note:  Airway :Room Air  Patient transferred to:Phase II  Handoff Report: Identifed the Patient, Identified the Reponsible Provider, Reviewed the pertinent medical history, Discussed the surgical course, Reviewed Intra-OP anesthesia mangement and issues during anesthesia, Set expectations for post-procedure period and Allowed opportunity for questions and acknowledgement of understanding      Vitals: (Last set prior to Anesthesia Care Transfer)    CRNA VITALS  7/15/2019 0905 - 7/15/2019 1005      7/15/2019             Pulse:  38  (Abnormal)     SpO2:  94 %    EKG:  Sinus bradycardia                Electronically Signed By: EPIFANIO Cash CRNA  July 15, 2019  10:16 AM

## 2019-07-15 NOTE — ANESTHESIA POSTPROCEDURE EVALUATION
Patient: Duane E Sogge    Procedure(s):  COLONOSCOPY    Diagnosis:hx colonic polyps  Diagnosis Additional Information: No value filed.    Anesthesia Type:  No value filed.    Note:  Anesthesia Post Evaluation    Patient location during evaluation: Phase 2  Patient participation: Able to fully participate in evaluation  Level of consciousness: awake  Pain management: adequate  Airway patency: patent  Cardiovascular status: acceptable, hemodynamically stable and bradycardic  Respiratory status: acceptable, room air and nonlabored ventilation  Hydration status: stable  PONV: none     Anesthetic complications: None    Comments: Patient case was canceled.  His Cardiologist advised waiting on the procedure.  Pt is stable with a HR in the 40's and 30's now but BP is higher with a MAP close to 100.  I will follow up with the patient again if it is needed.        Last vitals:  Vitals:    07/15/19 0953 07/15/19 1000 07/15/19 1015   BP: 140/79 135/72 141/73   Pulse: (!) 37  (!) 38   Resp: 15 13 14   Temp:      SpO2: 95% 94% 94%         Electronically Signed By: EPIFANIO Cash CRNA  July 15, 2019  10:25 AM

## 2019-07-19 ENCOUNTER — MYC MEDICAL ADVICE (OUTPATIENT)
Dept: FAMILY MEDICINE | Facility: CLINIC | Age: 79
End: 2019-07-19

## 2019-07-19 ENCOUNTER — TELEPHONE (OUTPATIENT)
Dept: FAMILY MEDICINE | Facility: CLINIC | Age: 79
End: 2019-07-19

## 2019-07-19 DIAGNOSIS — B37.2 YEAST INFECTION OF THE SKIN: Primary | ICD-10-CM

## 2019-07-19 NOTE — TELEPHONE ENCOUNTER
Wife Alfonzo called to mehdi scopes    Date of colonoscopy/EGD: 8/26  Surgeon: Dr. Oliver  Prep:Miralax  Packet:wife states they have instructions   Date: 7/19/2019      Surgery Scheduler

## 2019-07-19 NOTE — TELEPHONE ENCOUNTER
Dermatology scheduled for October 29th 3:00 pm, G[employer] message sent to the patient. Michelle MARX

## 2019-07-29 ENCOUNTER — TELEPHONE (OUTPATIENT)
Dept: FAMILY MEDICINE | Facility: CLINIC | Age: 79
End: 2019-07-29

## 2019-07-29 NOTE — LETTER
[address] [address]  [phone]        July 30, 2019    Duane E Sogge  [address] [facility] [address]

## 2019-07-30 NOTE — TELEPHONE ENCOUNTER
Patient doesn't want to use [employer]. Discard the refill requests.    Marisol De Leon MA 7/30/2019

## 2019-07-31 ENCOUNTER — TELEPHONE (OUTPATIENT)
Dept: DERMATOLOGY | Facility: CLINIC | Age: 79
End: 2019-07-31

## 2019-07-31 ENCOUNTER — OFFICE VISIT (OUTPATIENT)
Dept: DERMATOLOGY | Facility: CLINIC | Age: 79
End: 2019-07-31
Attending: FAMILY MEDICINE
Payer: COMMERCIAL

## 2019-07-31 VITALS — HEART RATE: 39 BPM | SYSTOLIC BLOOD PRESSURE: 145 MMHG | DIASTOLIC BLOOD PRESSURE: 77 MMHG

## 2019-07-31 DIAGNOSIS — B35.2 TINEA MANUUM: ICD-10-CM

## 2019-07-31 DIAGNOSIS — B35.8 MAJOCCHI'S GRANULOMA: Primary | ICD-10-CM

## 2019-07-31 RX ORDER — ITRACONAZOLE 100 MG/1
CAPSULE ORAL
Qty: 28 CAPSULE | Refills: 0 | Status: SHIPPED | OUTPATIENT
Start: 2019-07-31 | End: 2019-10-08

## 2019-07-31 ASSESSMENT — PAIN SCALES - GENERAL: PAINLEVEL: NO PAIN (0)

## 2019-07-31 NOTE — LETTER
7/31/2019       RE: Duane E Sogge  [address] [facility] [address]     Dear Colleague,    Thank you for referring your patient, Duane E Sogge, to the [facility] DERMATOLOGY at [facility]. Please see a copy of my visit note below.    [facility] Dermatology Note      Dermatology Problem List:  1. Tinea manuum of the right wrist/hand, with Majocchi's granuloma  - Biopsy proven 6/18/19  - Completed 1 week of oral terbinafine, then had to stop due to bradycardia (interaction with his metoprolol)  - Start itraconazole 200 mg BID for 1 additional week    Encounter Date: Jul 31, 2019    CC:   Chief Complaint   Patient presents with     Derm Problem     Tinea manuum with follicular involvement, biopsy on 6/18. Was prescribe Terbinafine, but had to stop due to low pulse.     History of Present Illness:  Mr. Duane E Sogge is a 78 year old male who presents as a referral for biopsy-proven tinea manuum with Majocchi's granuloma of the right hand/wrist. He reports that he first developed a rash on this area in May, and it had many scattered pus-bumps throughout it at first and was quite itchy. His PCP started him on topical triamcinolone, which he applied for a few weeks with worsening/expansion of the rash. At that point, the biopsy was performed (read here at [facility] by derm path), and patient was started on terbinanfine 250 mg daily. However, he only took this for one week as he presented for a colonoscopy and the procedure was cancelled due to bradycardia; it was thought that this was due to interaction between terbinafine and metoprolol. Patient was continued on metoprolol but terbinafine was held and he was referred here. Today, reports the rash is better since completing the week of terbinafine, but has not completely resolved. Still fairly itchy. He denies any rash affecting the rest of his body, including his groin or feet. He has one dog, but otherwise denies any  contacts with animals.     Past Medical History:   Patient Active Problem List   Diagnosis     Hyperlipidemia LDL goal <130     Essential hypertension     Staphylococcal arthritis of left knee (H)     MRSA infection     Infection of prosthetic knee joint (H)     Obstructive sleep apnea     Panic disorder without agoraphobia     Sensorineural hearing loss, bilateral     Morbid obesity (H)     History of colonic polyps     Alcohol abuse     Grief- loss of teenage son     Past Medical History:   Diagnosis Date     Anxiety      Hypertension      Past Surgical History:   Procedure Laterality Date     ORTHOPEDIC SURGERY         Social History:  The patient is retired. The patient denies use of tanning beds.    Family History:  Not reviewed today.     Medications:  Current Outpatient Medications   Medication Sig Dispense Refill     allopurinol (ZYLOPRIM) 100 MG tablet Take 1 tablet (100 mg) by mouth daily 90 tablet 3     ALPRAZolam (XANAX) 0.5 MG tablet Take 1 tab up to 4x daily only if needed. Refill when due,no early refills  Indications: Feeling Anxious       BENZONATATE PO        itraconazole (SPORANOX) 100 MG capsule Take 200 mg (2 capsules) twice daily for 1 week. 28 capsule 0     lisinopril-hydrochlorothiazide (PRINZIDE/ZESTORETIC) 20-12.5 MG tablet Take 2 tablets by mouth daily 180 tablet 3     metoprolol succinate ER (TOPROL-XL) 50 MG 24 hr tablet Take 1 tablet (50 mg) by mouth daily 90 tablet 3     MIRTAZAPINE PO Take 15 mg by mouth At Bedtime       simvastatin (ZOCOR) 20 MG tablet Take 1 tablet (20 mg) by mouth At Bedtime 90 tablet 3     traZODone (DESYREL) 50 MG tablet Take  mg by mouth       triamcinolone (KENALOG) 0.1 % external cream Apply topically 2 times daily 45 g 0     venlafaxine (EFFEXOR-ER) 150 MG TB24 24 hr tablet Take 150 mg by mouth daily (with breakfast)       terbinafine (LAMISIL) 250 MG tablet Take 1 tablet (250 mg) by mouth daily (Patient not taking: Reported on 7/31/2019) 30 tablet 0         Allergies   Allergen Reactions     Rifampin Anaphylaxis     Elevated lfts     Motrin [Ibuprofen] Itching     Review of Systems:  -General: Otherwise feeling well, in baseline state of general health.   -Skin: As above in HPI. No additional skin concerns.    Physical exam:  Vitals: BP (!) 145/77   Pulse (!) 39   GEN: This is a well-developed, well-nourished male in no acute distress, in a pleasant mood.    SKIN: Focused examination of the face and hands was performed.  -Right dorsal wrist/hand with a large annular finely scaly plaque with central short hairs.   -No other lesions of concern on areas examined.     Impression/Plan:  1. Tinea manuum with Majocchi's granuloma.     Discussed etiology with patient and his wife, including the need for systemic antifungal therapy given the component of Majocchi's.     He was unable to tolerate terbinafine due to interaction with metoprolol. Completed one week of therapy.     Start itraconazole 200 mg BID for 1 additional week. This medication does not interact with metoprolol. Advised to follow up with PCP as his heart rate is still quite low today despite being off the terbinafine. He is asymptomatic.     Return to clinic as needed for consideration of additional antifungal therapy if the rash does not resolve.     Follow-up prn for new or changing lesions or if the rash does not resolve.     Dr. Butcher staffed the patient.    Staff Involved:  Resident(Esme Bentley)/Staff(as above)    Hilda Bentley MD  Dermatology Resident PGY4      I talked with and examined Duane E Sogge and I agree with the assessment and the plan. JENN Butcher MD.

## 2019-07-31 NOTE — PATIENT INSTRUCTIONS
Take itraconazole 200 mg (2 capsules) twice daily for 1 week.   If the rash is not resolved, call us and let us know so we can schedule a follow up. Otherwise, no need to return.

## 2019-07-31 NOTE — TELEPHONE ENCOUNTER
[facility] Call Center    Phone Message    May a detailed message be left on voicemail: yes    Reason for Call: Medication Question or concern regarding medication   Prescription Clarification  Name of Medication: itraconazole (SPORANOX) 100 MG capsule     Prescribing Provider: Esme Bentley MD   Pharmacy: [id number] - [place], [place] - [address]   What on the order needs clarification? Pharmacy called because insurance notified them of a possible drug interaction with this medication and Pt medications.  Please review and adjust as needed - notify the pharmacy if they can proceed or need to replace the medication.            Action Taken: Message routed to:  [facility] dermatology

## 2019-07-31 NOTE — PROGRESS NOTES
[facility] Dermatology Note      Dermatology Problem List:  1. Tinea manuum of the right wrist/hand, with Majocchi's granuloma  - Biopsy proven 6/18/19  - Completed 1 week of oral terbinafine, then had to stop due to bradycardia (interaction with his metoprolol)  - Start itraconazole 200 mg BID for 1 additional week    Encounter Date: Jul 31, 2019    CC:   Chief Complaint   Patient presents with     Derm Problem     Tinea manuum with follicular involvement, biopsy on 6/18. Was prescribe Terbinafine, but had to stop due to low pulse.     History of Present Illness:  Mr. Duane E Sogge is a 78 year old male who presents as a referral for biopsy-proven tinea manuum with Majocchi's granuloma of the right hand/wrist. He reports that he first developed a rash on this area in May, and it had many scattered pus-bumps throughout it at first and was quite itchy. His PCP started him on topical triamcinolone, which he applied for a few weeks with worsening/expansion of the rash. At that point, the biopsy was performed (read here at [facility] by derm path), and patient was started on terbinanfine 250 mg daily. However, he only took this for one week as he presented for a colonoscopy and the procedure was cancelled due to bradycardia; it was thought that this was due to interaction between terbinafine and metoprolol. Patient was continued on metoprolol but terbinafine was held and he was referred here. Today, reports the rash is better since completing the week of terbinafine, but has not completely resolved. Still fairly itchy. He denies any rash affecting the rest of his body, including his groin or feet. He has one dog, but otherwise denies any contacts with animals.     Past Medical History:   Patient Active Problem List   Diagnosis     Hyperlipidemia LDL goal <130     Essential hypertension     Staphylococcal arthritis of left knee (H)     MRSA infection     Infection of prosthetic knee joint (H)     Obstructive  sleep apnea     Panic disorder without agoraphobia     Sensorineural hearing loss, bilateral     Morbid obesity (H)     History of colonic polyps     Alcohol abuse     Grief- loss of teenage son     Past Medical History:   Diagnosis Date     Anxiety      Hypertension      Past Surgical History:   Procedure Laterality Date     ORTHOPEDIC SURGERY         Social History:  The patient is retired. The patient denies use of tanning beds.    Family History:  Not reviewed today.     Medications:  Current Outpatient Medications   Medication Sig Dispense Refill     allopurinol (ZYLOPRIM) 100 MG tablet Take 1 tablet (100 mg) by mouth daily 90 tablet 3     ALPRAZolam (XANAX) 0.5 MG tablet Take 1 tab up to 4x daily only if needed. Refill when due,no early refills  Indications: Feeling Anxious       BENZONATATE PO        itraconazole (SPORANOX) 100 MG capsule Take 200 mg (2 capsules) twice daily for 1 week. 28 capsule 0     lisinopril-hydrochlorothiazide (PRINZIDE/ZESTORETIC) 20-12.5 MG tablet Take 2 tablets by mouth daily 180 tablet 3     metoprolol succinate ER (TOPROL-XL) 50 MG 24 hr tablet Take 1 tablet (50 mg) by mouth daily 90 tablet 3     MIRTAZAPINE PO Take 15 mg by mouth At Bedtime       simvastatin (ZOCOR) 20 MG tablet Take 1 tablet (20 mg) by mouth At Bedtime 90 tablet 3     traZODone (DESYREL) 50 MG tablet Take  mg by mouth       triamcinolone (KENALOG) 0.1 % external cream Apply topically 2 times daily 45 g 0     venlafaxine (EFFEXOR-ER) 150 MG TB24 24 hr tablet Take 150 mg by mouth daily (with breakfast)       terbinafine (LAMISIL) 250 MG tablet Take 1 tablet (250 mg) by mouth daily (Patient not taking: Reported on 7/31/2019) 30 tablet 0        Allergies   Allergen Reactions     Rifampin Anaphylaxis     Elevated lfts     Motrin [Ibuprofen] Itching     Review of Systems:  -General: Otherwise feeling well, in baseline state of general health.   -Skin: As above in HPI. No additional skin concerns.    Physical  exam:  Vitals: BP (!) 145/77   Pulse (!) 39   GEN: This is a well-developed, well-nourished male in no acute distress, in a pleasant mood.    SKIN: Focused examination of the face and hands was performed.  -Right dorsal wrist/hand with a large annular finely scaly plaque with central short hairs.   -No other lesions of concern on areas examined.     Impression/Plan:  1. Tinea manuum with Majocchi's granuloma.     Discussed etiology with patient and his wife, including the need for systemic antifungal therapy given the component of Majocchi's.     He was unable to tolerate terbinafine due to interaction with metoprolol. Completed one week of therapy.     Start itraconazole 200 mg BID for 1 additional week. This medication does not interact with metoprolol. Advised to follow up with PCP as his heart rate is still quite low today despite being off the terbinafine. He is asymptomatic.     Return to clinic as needed for consideration of additional antifungal therapy if the rash does not resolve.     Follow-up prn for new or changing lesions or if the rash does not resolve.     Dr. Butcher staffed the patient.    Staff Involved:  Resident(Esme Bentley)/Staff(as above)    Hilda Bentley MD  Dermatology Resident PGY4

## 2019-07-31 NOTE — NURSING NOTE
Dermatology Rooming Note    Duane E Sogge's goals for this visit include:   Chief Complaint   Patient presents with     Derm Problem     Tinea manuum with follicular involvement, biopsy on 6/18. Was prescribe Terbinafine, but had to stop due to low pulse.     Amita Gauthier, CMA

## 2019-08-01 RX ORDER — GRISEOFULVIN 125 MG/1
750 TABLET ORAL DAILY
Qty: 42 TABLET | Refills: 0 | Status: SHIPPED | OUTPATIENT
Start: 2019-08-01 | End: 2019-10-08

## 2019-08-01 NOTE — PROGRESS NOTES
I talked with and examined Duane E Sogge and I agree with the assessment and the plan. JENN Butcher MD.

## 2019-08-01 NOTE — TELEPHONE ENCOUNTER
Notified as RACHAEL regarding medication interaction from patient's pharmacy. Itraconazole was prescribed today by Dr. Bentley and Dr. Butcher for tinea manuum. Called pharmacy to confirm which medication was the concern.    Notably itraconzole increases plasma levels of simvastatin with increased risk for myopathy and rhabdomyolysis.     Confirmed to hold on the prescription until we can touch base with patient's PCP.     Will route to Dr. Butcher and Dr. Bentley to address further.    Bentley Stewart MD  Dermatology Resident, PGY4

## 2019-08-02 NOTE — TELEPHONE ENCOUNTER
Discussed with Dr. Butcher, who would prefer to change treatment to griseofulvin ultramicrosize 750 mg daily for 2 weeks. Griseofulvin does not interact with his medications. Sent patient a [employer] message to inform him of the change, and sent the new prescription electronically to his pharmacy.     Hilda Bentley MD  Dermatology Resident PGY4

## 2019-08-03 NOTE — PROGRESS NOTES
Pulse was low at dermatologists office (39!). Please call and see what pulse has been running at home, thanks

## 2019-08-13 ENCOUNTER — TELEPHONE (OUTPATIENT)
Dept: FAMILY MEDICINE | Facility: CLINIC | Age: 79
End: 2019-08-13

## 2019-08-13 NOTE — TELEPHONE ENCOUNTER
I spoke to patient today and she wanted me to give [facility] FYI.    Wanted us to update you, they are still waiting for med for infection in pt's wrist but is has not gotten any worse.  They're just waiting.  Gissell Ramsey, CMA

## 2019-08-14 DIAGNOSIS — B35.8 MAJOCCHI'S GRANULOMA: Primary | ICD-10-CM

## 2019-08-14 RX ORDER — ITRACONAZOLE 100 MG/1
CAPSULE ORAL
Qty: 28 CAPSULE | Refills: 0 | Status: SHIPPED | OUTPATIENT
Start: 2019-08-14 | End: 2019-10-08

## 2019-08-14 NOTE — PROGRESS NOTES
Called patient to discuss recommended treatment for his fungal infection (itraconazole 200 mg twice daily for one week per month for two months). Our dermatology team in conjunction with Dr. Oliver, advise the patient to stop his simvastatin for the duration of treatment. I left a voicemail relaying this information at the patient's provided phone contact number, as previous [employer] messages have gone unseen, and the patient/wife have been contacting our department for fast resolution and prescription with some urgency.     Respectfully,    Roxann Puri  PGY-3 Dermatology Resident  [facility] Department of Dermatology

## 2019-08-15 ENCOUNTER — TELEPHONE (OUTPATIENT)
Dept: DERMATOLOGY | Facility: CLINIC | Age: 79
End: 2019-08-15

## 2019-08-15 ENCOUNTER — TELEPHONE (OUTPATIENT)
Dept: FAMILY MEDICINE | Facility: CLINIC | Age: 79
End: 2019-08-15

## 2019-08-15 NOTE — TELEPHONE ENCOUNTER
Prior Authorization Retail Medication Request    Medication/Dose: itraconazole 2  ICD code (if different than what is on RX):  B35.8  Previously Tried and Failed:    Rationale:      Insurance Name:  [employer]  Insurance ID:  [id number]       Pharmacy Information (if different than what is on RX)  Name:  [facility]   Phone:  [phone]

## 2019-08-15 NOTE — LETTER
[address] [address]  [phone]        Duane E Jaden  [address] [place] [address]      August 15, 2019      Dear Duane,    I care about your health and have reviewed your health plan, including your medical conditions, medication list, and lab results and am making recommendations based on this review, to better manage your health.    You are in particular need of attention regarding:  -High Blood Pressure    I am recommending that you:  -schedule a NURSE-ONLY APPOINTMENT within the next 1-4 weeks for blood pressure check .    If you've had the preventative screening completed at another facility or feel you're not due for this screening, please call our clinic at the number listed above or send us a My Chart message so we can update our records. We would like to thank you in advance for taking the time to take care of your health.  If you have any questions, please don t hesitate to contact our clinic.    Sincerely,       Your [facility] Team

## 2019-08-15 NOTE — TELEPHONE ENCOUNTER
Panel Management Review      Patient has the following on his problem list:     Hypertension   Last three blood pressure readings:  BP Readings from Last 3 Encounters:   07/31/19 (!) 145/77   07/15/19 149/74   07/11/19 (!) 156/92     Blood pressure: FAILED    HTN Guidelines:  Less than 140/90      Composite cancer screening  Chart review shows that this patient is due/due soon for the following None  Summary:    Patient is due/failing the following:   BP CHECK    Action needed:   Patient needs nurse only appointment.    Type of outreach:    Sent [employer] message.    Questions for provider review:    None                                                                                                                                    Shavonne Calle MA        Chart routed to Care Team .

## 2019-08-20 NOTE — TELEPHONE ENCOUNTER
Prior Authorization Approval    Authorization Effective Date: 8/20/2019  Authorization Expiration Date: 8/19/2020  Medication: itraconazole (SPORANOX) 100 MG capsule  Approved Dose/Quantity: 28  Reference #:     Insurance Company: [employer] - Phone [phone] Fax [phone]  Expected CoPay:       CoPay Card Available:      Foundation Assistance Needed:    Which Pharmacy is filling the prescription (Not needed for infusion/clinic administered): [facility] - [place], [place] - [address]  Pharmacy Notified: Yes - via voicemail  Patient Notified:

## 2019-08-20 NOTE — TELEPHONE ENCOUNTER
Central Prior Authorization Team   Phone: [phone]    PA Initiation    Medication: itraconazole   Insurance Company: [employer] - Phone [phone] Fax [phone]  Pharmacy Filling the Rx: [facility] - [place] - [address]  Filling Pharmacy Phone: [phone]  Filling Pharmacy Fax:    Start Date: 8/20/2019

## 2019-08-25 ENCOUNTER — ANESTHESIA EVENT (OUTPATIENT)
Dept: GASTROENTEROLOGY | Facility: CLINIC | Age: 79
End: 2019-08-25
Payer: COMMERCIAL

## 2019-08-25 ASSESSMENT — LIFESTYLE VARIABLES: TOBACCO_USE: 1

## 2019-08-26 ENCOUNTER — HOSPITAL ENCOUNTER (OUTPATIENT)
Facility: CLINIC | Age: 79
Discharge: HOME | End: 2019-08-26
Attending: FAMILY MEDICINE | Admitting: FAMILY MEDICINE
Payer: COMMERCIAL

## 2019-08-26 ENCOUNTER — SURGERY (OUTPATIENT)
Age: 79
End: 2019-08-26
Payer: COMMERCIAL

## 2019-08-26 ENCOUNTER — ANESTHESIA (OUTPATIENT)
Dept: GASTROENTEROLOGY | Facility: CLINIC | Age: 79
End: 2019-08-26
Payer: COMMERCIAL

## 2019-08-26 VITALS
TEMPERATURE: 96.4 F | SYSTOLIC BLOOD PRESSURE: 152 MMHG | OXYGEN SATURATION: 93 % | RESPIRATION RATE: 18 BRPM | DIASTOLIC BLOOD PRESSURE: 87 MMHG | HEART RATE: 53 BPM

## 2019-08-26 LAB — COLONOSCOPY: NORMAL

## 2019-08-26 PROCEDURE — 25000128 H RX IP 250 OP 636: Performed by: NURSE ANESTHETIST, CERTIFIED REGISTERED

## 2019-08-26 PROCEDURE — 25000125 ZZHC RX 250: Performed by: NURSE ANESTHETIST, CERTIFIED REGISTERED

## 2019-08-26 PROCEDURE — 37000008 ZZH ANESTHESIA TECHNICAL FEE, 1ST 30 MIN: Performed by: FAMILY MEDICINE

## 2019-08-26 PROCEDURE — 45378 DIAGNOSTIC COLONOSCOPY: CPT | Performed by: FAMILY MEDICINE

## 2019-08-26 PROCEDURE — 25800030 ZZH RX IP 258 OP 636: Performed by: NURSE ANESTHETIST, CERTIFIED REGISTERED

## 2019-08-26 PROCEDURE — G0105 COLORECTAL SCRN; HI RISK IND: HCPCS | Performed by: FAMILY MEDICINE

## 2019-08-26 RX ORDER — ONDANSETRON 2 MG/ML
4 INJECTION INTRAMUSCULAR; INTRAVENOUS
Status: DISCONTINUED | OUTPATIENT
Start: 2019-08-26 | End: 2019-08-26

## 2019-08-26 RX ORDER — LIDOCAINE 40 MG/G
CREAM TOPICAL
Status: DISCONTINUED | OUTPATIENT
Start: 2019-08-26 | End: 2019-08-26 | Stop reason: HOSPADM

## 2019-08-26 RX ORDER — FLUMAZENIL 0.1 MG/ML
0.2 INJECTION, SOLUTION INTRAVENOUS
Status: DISCONTINUED | OUTPATIENT
Start: 2019-08-26 | End: 2019-08-26 | Stop reason: HOSPADM

## 2019-08-26 RX ORDER — PROPOFOL 10 MG/ML
INJECTION, EMULSION INTRAVENOUS PRN
Status: DISCONTINUED | OUTPATIENT
Start: 2019-08-26 | End: 2019-08-26

## 2019-08-26 RX ORDER — PROPOFOL 10 MG/ML
INJECTION, EMULSION INTRAVENOUS CONTINUOUS PRN
Status: DISCONTINUED | OUTPATIENT
Start: 2019-08-26 | End: 2019-08-26

## 2019-08-26 RX ORDER — LIDOCAINE 40 MG/G
CREAM TOPICAL
Status: DISCONTINUED | OUTPATIENT
Start: 2019-08-26 | End: 2019-08-26

## 2019-08-26 RX ORDER — ONDANSETRON 2 MG/ML
4 INJECTION INTRAMUSCULAR; INTRAVENOUS EVERY 6 HOURS PRN
Status: DISCONTINUED | OUTPATIENT
Start: 2019-08-26 | End: 2019-08-26 | Stop reason: HOSPADM

## 2019-08-26 RX ORDER — NALOXONE HYDROCHLORIDE 0.4 MG/ML
.1-.4 INJECTION, SOLUTION INTRAMUSCULAR; INTRAVENOUS; SUBCUTANEOUS
Status: DISCONTINUED | OUTPATIENT
Start: 2019-08-26 | End: 2019-08-26

## 2019-08-26 RX ORDER — NALOXONE HYDROCHLORIDE 0.4 MG/ML
.1-.4 INJECTION, SOLUTION INTRAMUSCULAR; INTRAVENOUS; SUBCUTANEOUS
Status: DISCONTINUED | OUTPATIENT
Start: 2019-08-26 | End: 2019-08-26 | Stop reason: HOSPADM

## 2019-08-26 RX ORDER — LIDOCAINE HYDROCHLORIDE 20 MG/ML
INJECTION, SOLUTION INFILTRATION; PERINEURAL PRN
Status: DISCONTINUED | OUTPATIENT
Start: 2019-08-26 | End: 2019-08-26

## 2019-08-26 RX ORDER — SODIUM CHLORIDE, SODIUM LACTATE, POTASSIUM CHLORIDE, CALCIUM CHLORIDE 600; 310; 30; 20 MG/100ML; MG/100ML; MG/100ML; MG/100ML
INJECTION, SOLUTION INTRAVENOUS CONTINUOUS
Status: DISCONTINUED | OUTPATIENT
Start: 2019-08-26 | End: 2019-08-26 | Stop reason: HOSPADM

## 2019-08-26 RX ORDER — ONDANSETRON 4 MG/1
4 TABLET, ORALLY DISINTEGRATING ORAL EVERY 30 MIN PRN
Status: DISCONTINUED | OUTPATIENT
Start: 2019-08-26 | End: 2019-08-26

## 2019-08-26 RX ORDER — MEPERIDINE HYDROCHLORIDE 25 MG/ML
12.5 INJECTION INTRAMUSCULAR; INTRAVENOUS; SUBCUTANEOUS
Status: DISCONTINUED | OUTPATIENT
Start: 2019-08-26 | End: 2019-08-26 | Stop reason: HOSPADM

## 2019-08-26 RX ORDER — SODIUM CHLORIDE, SODIUM LACTATE, POTASSIUM CHLORIDE, CALCIUM CHLORIDE 600; 310; 30; 20 MG/100ML; MG/100ML; MG/100ML; MG/100ML
INJECTION, SOLUTION INTRAVENOUS CONTINUOUS
Status: DISCONTINUED | OUTPATIENT
Start: 2019-08-26 | End: 2019-08-26

## 2019-08-26 RX ORDER — ONDANSETRON 4 MG/1
4 TABLET, ORALLY DISINTEGRATING ORAL EVERY 6 HOURS PRN
Status: DISCONTINUED | OUTPATIENT
Start: 2019-08-26 | End: 2019-08-26 | Stop reason: HOSPADM

## 2019-08-26 RX ORDER — ONDANSETRON 2 MG/ML
4 INJECTION INTRAMUSCULAR; INTRAVENOUS EVERY 30 MIN PRN
Status: DISCONTINUED | OUTPATIENT
Start: 2019-08-26 | End: 2019-08-26

## 2019-08-26 RX ADMIN — LIDOCAINE HYDROCHLORIDE 0.1 ML: 10 INJECTION, SOLUTION EPIDURAL; INFILTRATION; INTRACAUDAL; PERINEURAL at 10:43

## 2019-08-26 RX ADMIN — PROPOFOL 150 MCG/KG/MIN: 10 INJECTION, EMULSION INTRAVENOUS at 11:51

## 2019-08-26 RX ADMIN — LIDOCAINE HYDROCHLORIDE 40 MG: 20 INJECTION, SOLUTION INFILTRATION; PERINEURAL at 11:51

## 2019-08-26 RX ADMIN — PROPOFOL 40 MG: 10 INJECTION, EMULSION INTRAVENOUS at 11:51

## 2019-08-26 RX ADMIN — SODIUM CHLORIDE, POTASSIUM CHLORIDE, SODIUM LACTATE AND CALCIUM CHLORIDE: 600; 310; 30; 20 INJECTION, SOLUTION INTRAVENOUS at 10:43

## 2019-08-26 NOTE — ANESTHESIA POSTPROCEDURE EVALUATION
Patient: Duane E Sogge    Procedure(s):  COLONOSCOPY    Diagnosis:History of colonic polyps  Diagnosis Additional Information: No value filed.    Anesthesia Type:  MAC    Note:  Anesthesia Post Evaluation    Patient location during evaluation: Phase 2  Patient participation: Able to fully participate in evaluation  Level of consciousness: awake and alert  Pain management: adequate  Airway patency: patent  Cardiovascular status: blood pressure returned to baseline  Respiratory status: spontaneous ventilation and room air  Hydration status: acceptable  PONV: none     Anesthetic complications: None    Comments: Patient was very pleased with his anesthetic today. No anesthesia concerns.         Last vitals:  Vitals:    08/26/19 1027   BP: (!) 150/86   Pulse: 50   Resp: 18   Temp: 96.4  F (35.8  C)         Electronically Signed By: EPIFANIO Lima CRNA  August 26, 2019  12:17 PM

## 2019-08-26 NOTE — ANESTHESIA PREPROCEDURE EVALUATION
Anesthesia Pre-Procedure Evaluation    Patient: Duane E Sogge   MRN: [id number] : 1940          Preoperative Diagnosis: History of colonic polyps    Procedure(s):  COLONOSCOPY    Past Medical History:   Diagnosis Date     Anxiety      Hypertension      Past Surgical History:   Procedure Laterality Date     ORTHOPEDIC SURGERY         Anesthesia Evaluation     . Pt has had prior anesthetic. Type: Regional and General    No history of anesthetic complications          ROS/MED HX    ENT/Pulmonary:     (+)sleep apnea, tobacco use (chews), uses CPAP , . .    Neurologic: Comment: [place]      Cardiovascular:     (+) hypertension----. : . . . :. . Previous cardiac testing date:results:Stress Testdate: results:No ischemia date: results: date: results:          METS/Exercise Tolerance:     Hematologic:  - neg hematologic  ROS       Musculoskeletal:   (+) arthritis,  -       GI/Hepatic: Comment: H/O colon polyps       (-) GERD   Renal/Genitourinary:  - ROS Renal section negative   (+) Pt has no history of transplant,       Endo:     (+) Obesity, .      Psychiatric: Comment: Panic disorder  Agoraphobia         Infectious Disease:   (+) MRSA,       Malignancy:      - no malignancy   Other: Comment: H/O ETOH abuse   (+) No chance of pregnancy C-spine cleared: N/A, no H/O Chronic Pain,                        Physical Exam  Normal systems: cardiovascular, pulmonary and dental    Airway   Mallampati: II  TM distance: >3 FB  Neck ROM: full    Dental     Cardiovascular   Rhythm and rate: regular and normal      Pulmonary    breath sounds clear to auscultation            Lab Results   Component Value Date    WBC 8.0 2018    HGB 14.6 2018    HCT 44.5 2018     2018    CRP <2.9 2018    SED 35 (H) 2018     2019    POTASSIUM 3.8 2019    CHLORIDE 103 2019    CO2 30 2019    BUN 16 2019    CR 1.07 2019     (H) 2019    JEFE 9.5 2019     "ALBUMIN 3.8 11/14/2018    PROTTOTAL 8.0 11/14/2018    ALT 26 11/14/2018    AST 18 11/14/2018    ALKPHOS 101 11/14/2018    BILITOTAL 0.7 11/14/2018    TSH 3.39 07/16/2018    TSH 3.39 07/16/2018       Preop Vitals  BP Readings from Last 3 Encounters:   07/31/19 (!) 145/77   07/15/19 149/74   07/11/19 (!) 156/92    Pulse Readings from Last 3 Encounters:   07/31/19 (!) 39   07/15/19 (!) 37   07/11/19 56      Resp Readings from Last 3 Encounters:   07/15/19 11   07/11/19 16   06/18/19 18    SpO2 Readings from Last 3 Encounters:   07/15/19 95%   07/11/19 95%   06/18/19 94%      Temp Readings from Last 1 Encounters:   07/15/19 96.5  F (35.8  C) (Oral)    Ht Readings from Last 1 Encounters:   05/15/19 1.702 m (5' 7\")      Wt Readings from Last 1 Encounters:   07/11/19 104.8 kg (231 lb)    Estimated body mass index is 36.18 kg/m  as calculated from the following:    Height as of 5/15/19: 1.702 m (5' 7\").    Weight as of 7/11/19: 104.8 kg (231 lb).       Anesthesia Plan      History & Physical Review  History and physical reviewed and following examination; no interval change.    ASA Status:  3 .    NPO Status:  > 8 hours    Plan for MAC with Intravenous and Propofol induction. Maintenance will be TIVA.  Reason for MAC:  Difficulty with conscious sedation (QS)         Postoperative Care  Postoperative pain management:  Oral pain medications.      Consents                 EPIFANIO Lima CRNA  "

## 2019-08-26 NOTE — DISCHARGE INSTRUCTIONS
[facility]    Home Care Following Endoscopy          Activity:    You have just undergone an endoscopic procedure usually performed with conscious sedation.  Do not work or operate machinery (including a car) for at least 12 hours.      I encourage you to walk and attempt to pass this air as soon as possible.    Diet:    Return to the diet you were on before your procedure but eat lightly for the first 12-24 hours.    Drink plenty of water.    Resume any regular medications unless otherwise advised by your physician.  Please begin any new medication prescribed as a result of your procedure as directed by your physician.     If you had any biopsy or polyp removed please refrain from aspirin or aspirin products for 2 days.  If on Coumadin please restart as instructed by your physician.   Pain:    You may take Tylenol as needed for pain.  Expected Recovery:    You can expect some mild abdominal fullness and/or discomfort due to the air used to inflate your intestinal tract. It is also normal to have a mild sore throat after upper endoscopy.    Call Your Physician if You Have:    After Colonoscopy:  o Worsening persisting abdominal pain which is worse with activity.  o Fevers (>101 degrees F), chills or shakes.  o Passage of continued blood with bowel movements.   Any questions or concerns about your recovery, please call [phone] or after hours [phone] Nurse Advice Line.    Follow-up Care:    You should receive a call or letter with your results within 1 week. Please call if you have not received a notification of your results.

## 2019-08-26 NOTE — ANESTHESIA CARE TRANSFER NOTE
Patient: Duane E Sogge    Procedure(s):  COLONOSCOPY    Diagnosis: History of colonic polyps  Diagnosis Additional Information: No value filed.    Anesthesia Type:   MAC     Note:  Airway :Room Air  Patient transferred to:Phase II  Handoff Report: Identifed the Patient, Identified the Reponsible Provider, Reviewed the pertinent medical history, Discussed the surgical course, Reviewed Intra-OP anesthesia mangement and issues during anesthesia, Set expectations for post-procedure period and Allowed opportunity for questions and acknowledgement of understanding      Vitals: (Last set prior to Anesthesia Care Transfer)    CRNA VITALS  8/26/2019 1140 - 8/26/2019 1216      8/26/2019             Resp Rate (observed):  13                Electronically Signed By: EPIFANIO Lima CRNA  August 26, 2019  12:16 PM

## 2019-08-26 NOTE — H&P
"Pre-Endoscopy History and Physical     Duane E Sogge MRN# [id number]   YOB: 1940 Age: 78 year old     Date of Procedure: 8/26/2019  Primary care provider: Asa Oliver  Type of Endoscopy: colonoscopy  Type of Anesthesia Anticipated: MAC     HPI:    Duane is a 78 year old male who will be undergoing a Screening or surveillance for colonic neoplasia:         Duane is feeling well today. Can get up a single flight of stairs without dyspnea. Estimated METS > 4.     Patient Active Problem List   Diagnosis     Hyperlipidemia LDL goal <130     Essential hypertension     Staphylococcal arthritis of left knee (H)     MRSA infection     Infection of prosthetic knee joint (H)     Obstructive sleep apnea     Panic disorder without agoraphobia     Sensorineural hearing loss, bilateral     Morbid obesity (H)     History of colonic polyps     Alcohol abuse     Grief- loss of teenage son          REVIEW OF SYSTEMS:     5 point ROS negative except as noted above in HPI, including Gen., Resp., CV, GI &  system review.      PHYSICAL EXAM:   BP (!) 150/86   Pulse 50   Temp 96.4  F (35.8  C) (Oral)   Resp 18    Estimated body mass index is 36.18 kg/m  as calculated from the following:    Height as of 5/15/19: 1.702 m (5' 7\").    Weight as of 7/11/19: 104.8 kg (231 lb).    GENERAL APPEARANCE: alert and no distress  RESP: lungs clear and unlabored  CV: regular  ABD: soft, nt/nd    DIAGNOSTICS:    Not indicated      IMPRESSION   ASA Class 3 - Severe systemic disease, but not incapacitating         PLAN:     Plan for colonoscopy. No medical contraindications to proceed, or further work up needed. The risks and benefits of the procedure and the sedation options and risks were discussed with the patient. These include infection, bleeding, and small risk of colon perforation (1/1000 to 1/94197 depending on patient characteristics and type of procedure). Duane was also explained to alternatives for colo-rectal " screening. All questions were answered and informed consent was obtained.      The above has been forwarded to the consulting provider.      Signed Electronically by: Asa Oliver MD  August 26, 2019

## 2019-10-03 ENCOUNTER — HEALTH MAINTENANCE LETTER (OUTPATIENT)
Age: 79
End: 2019-10-03

## 2019-10-08 ENCOUNTER — OFFICE VISIT (OUTPATIENT)
Dept: FAMILY MEDICINE | Facility: OTHER | Age: 79
End: 2019-10-08
Payer: COMMERCIAL

## 2019-10-08 VITALS
DIASTOLIC BLOOD PRESSURE: 88 MMHG | WEIGHT: 240 LBS | HEART RATE: 60 BPM | BODY MASS INDEX: 37.59 KG/M2 | RESPIRATION RATE: 18 BRPM | TEMPERATURE: 96.3 F | SYSTOLIC BLOOD PRESSURE: 128 MMHG | OXYGEN SATURATION: 95 %

## 2019-10-08 DIAGNOSIS — B35.4 TINEA CORPORIS: Primary | ICD-10-CM

## 2019-10-08 PROCEDURE — 99214 OFFICE O/P EST MOD 30 MIN: CPT | Performed by: FAMILY MEDICINE

## 2019-10-08 RX ORDER — ITRACONAZOLE 100 MG/1
200 CAPSULE ORAL DAILY
Qty: 28 CAPSULE | Refills: 0 | Status: SHIPPED | OUTPATIENT
Start: 2019-10-08 | End: 2019-11-06

## 2019-10-08 RX ORDER — TRIAMCINOLONE ACETONIDE 1 MG/G
OINTMENT TOPICAL 2 TIMES DAILY
Qty: 80 G | Refills: 0 | Status: SHIPPED | OUTPATIENT
Start: 2019-10-08

## 2019-10-08 ASSESSMENT — PAIN SCALES - GENERAL: PAINLEVEL: NO PAIN (0)

## 2019-10-08 NOTE — PROGRESS NOTES
Subjective     Rash  Onset: 3-4 weeks     Description:   Location: legs and back  Character: round, blotchy, painful, burning, draining, red  Itching (Pruritis): YES    Progression of Symptoms:  same    Accompanying Signs & Symptoms:  Fever: no   Body aches or joint pain: no   Sore throat symptoms: no   Recent cold symptoms: no     History:   Previous similar rash: no     Precipitating factors:   Exposure to similar rash: no   New exposures: None   Recent travel: no     Alleviating factors:  None     Therapies Tried and outcome: none     Duane is a 79 year old male who comes to clinic to discuss a new rash on his legs.  On the lateral aspect of his right and left legs he has itchiness and raised red rash.  This started about a month ago.  2 months prior to that, he was diagnosed with tinea man him and had a pretty significant raised red macule on his wrist that resolved with itraconazole.  He feels like this is similar.    Review of Systems   ROS COMP: Constitutional, HEENT, cardiovascular, pulmonary, gi and gu systems are negative, except as otherwise noted.      Objective    /88   Pulse 60   Temp 96.3  F (35.7  C) (Temporal)   Resp 18   Wt 108.9 kg (240 lb)   SpO2 95%   BMI 37.59 kg/m       Wt Readings from Last 2 Encounters:   10/08/19 108.9 kg (240 lb)   07/11/19 104.8 kg (231 lb)       Physical Exam   Well-appearing elderly male no distress.  He is old area of rash is resolved on his right forearm.  Now on the lateral aspect of his right and left lower extremities there is a raised firm, clear borders, nodular feeling with excoriation erythematous macule measuring 6 cm x 6 cm    Diagnostic Test Results:  Labs reviewed in Epic        Assessment & Plan       ICD-10-CM    1. Tinea corporis B35.4 itraconazole (SPORANOX) 100 MG capsule     triamcinolone (KENALOG) 0.1 % external ointment       Possibly more tinea.  He did not tolerate terbinafine due to interaction with his metoprolol leading to  bradycardia.  Today he is not bradycardic.  He tolerated itraconazole in the past.  Trial of itraconazole.  If not improved refer back to dermatology    Asa Oliver MD  [facility]

## 2019-10-22 ENCOUNTER — TELEPHONE (OUTPATIENT)
Dept: FAMILY MEDICINE | Facility: CLINIC | Age: 79
End: 2019-10-22

## 2019-10-23 NOTE — TELEPHONE ENCOUNTER
Prior Authorization Not Needed per Insurance    Medication: Simvastatin 20MG tablets - NOT NEEDED  Insurance Company: [employer] - Phone [phone] Fax [phone]  Expected CoPay:      [facility]illing the [facility]  Pharmacy Notified: Yes  Patient Notified: Yes    Called to verify with INS if pa needed - they stated there was a rejection for a drug drug interaction with the itraconazole - med paid as of today - they will be contacting PT for delivery date

## 2019-11-06 ENCOUNTER — OFFICE VISIT (OUTPATIENT)
Dept: DERMATOLOGY | Facility: CLINIC | Age: 79
End: 2019-11-06
Payer: COMMERCIAL

## 2019-11-06 ENCOUNTER — TELEPHONE (OUTPATIENT)
Dept: DERMATOLOGY | Facility: CLINIC | Age: 79
End: 2019-11-06

## 2019-11-06 VITALS — HEART RATE: 48 BPM | OXYGEN SATURATION: 97 % | SYSTOLIC BLOOD PRESSURE: 159 MMHG | DIASTOLIC BLOOD PRESSURE: 82 MMHG

## 2019-11-06 DIAGNOSIS — D23.9 DERMAL NEVUS: ICD-10-CM

## 2019-11-06 DIAGNOSIS — L30.0 NUMMULAR DERMATITIS: Primary | ICD-10-CM

## 2019-11-06 DIAGNOSIS — L81.4 LENTIGO: ICD-10-CM

## 2019-11-06 DIAGNOSIS — D18.01 ANGIOMA OF SKIN: ICD-10-CM

## 2019-11-06 DIAGNOSIS — L82.1 SEBORRHEIC KERATOSIS: ICD-10-CM

## 2019-11-06 PROCEDURE — 99204 OFFICE O/P NEW MOD 45 MIN: CPT | Performed by: DERMATOLOGY

## 2019-11-06 RX ORDER — CETIRIZINE HYDROCHLORIDE 10 MG/1
10 TABLET ORAL DAILY
COMMUNITY

## 2019-11-06 RX ORDER — CALCIUM CARBONATE 500(1250)
1 TABLET ORAL 2 TIMES DAILY
COMMUNITY

## 2019-11-06 RX ORDER — AMITRIPTYLINE HYDROCHLORIDE 10 MG/1
10 TABLET ORAL AT BEDTIME
Qty: 30 TABLET | Refills: 1 | Status: SHIPPED | OUTPATIENT
Start: 2019-11-06 | End: 2019-12-30

## 2019-11-06 RX ORDER — BETAMETHASONE DIPROPIONATE 0.5 MG/G
CREAM TOPICAL
Qty: 100 G | Refills: 3 | Status: SHIPPED | OUTPATIENT
Start: 2019-11-06 | End: 2020-03-18

## 2019-11-06 NOTE — LETTER
11/6/2019         RE: Duane E Sogge  [address] [place] [id number]        Dear Colleague,    Thank you for referring your patient, Duane E Sogge, to the [facility]. Please see a copy of my visit note below.    Duane E Sogge , a 79 year old year old male patient, I was asked to see by Dr. Oliver for rash.  Patient states this has been present for a while. Had bx and treated for fungus.  Very itchy. Using aveeno.  .  Patient reports the following symptoms:  itching .  Patient reports the following previous treatments tac.  .  Patient reports the following modifying factors none.  Associated symptoms: none.  Patient has no other skin complaints today.  Remainder of the HPI, Meds, PMH, Allergies, FH, and SH was reviewed in chart.      Past Medical History:   Diagnosis Date     Anxiety      Hypertension        Past Surgical History:   Procedure Laterality Date     COLONOSCOPY N/A 8/26/2019    Procedure: COLONOSCOPY;  Surgeon: Asa Oliver MD;  Location:  GI     ORTHOPEDIC SURGERY          Family History   Problem Relation Age of Onset     Hypertension Mother      Lung Cancer Sister      Thyroid Disease No family hx of      Diabetes No family hx of      Glaucoma No family hx of      Macular Degeneration No family hx of      Melanoma No family hx of      Skin Cancer No family hx of        Social History     Socioeconomic History     Marital status:      Spouse name: Not on file     Number of children: Not on file     Years of education: Not on file     Highest education level: Not on file   Occupational History     Not on file   Social Needs     Financial resource strain: Not on file     Food insecurity:     Worry: Not on file     Inability: Not on file     Transportation needs:     Medical: Not on file     Non-medical: Not on file   Tobacco Use     Smoking status: Never Smoker     Smokeless tobacco: Current User     Types: Snuff   Substance and Sexual Activity     Alcohol use:  Yes     Alcohol/week: 5.0 - 6.0 standard drinks     Types: 3 Cans of beer, 2 - 3 Shots of liquor per week     Comment:  1 daily     Drug use: No     Sexual activity: Yes     Partners: Female   Lifestyle     Physical activity:     Days per week: Not on file     Minutes per session: Not on file     Stress: Not on file   Relationships     Social connections:     Talks on phone: Not on file     Gets together: Not on file     Attends [employer] service: Not on file     Active member of club or organization: Not on file     Attends meetings of clubs or organizations: Not on file     Relationship status: Not on file     Intimate partner violence:     Fear of current or ex partner: Not on file     Emotionally abused: Not on file     Physically abused: Not on file     Forced sexual activity: Not on file   Other Topics Concern     Parent/sibling w/ CABG, MI or angioplasty before 65F 55M? Not Asked   Social History Narrative     Not on file       Outpatient Encounter Medications as of 2019   Medication Sig Dispense Refill     allopurinol (ZYLOPRIM) 100 MG tablet Take 1 tablet (100 mg) by mouth daily 90 tablet 3     ALPRAZolam (XANAX) 0.5 MG tablet Take 1 tab up to 4x daily only if needed. Refill when due,no early refills  Indications: Feeling Anxious       BENZONATATE PO        [] itraconazole (SPORANOX) 100 MG capsule Take 2 capsules (200 mg) by mouth daily for 14 days 28 capsule 0     lisinopril-hydrochlorothiazide (PRINZIDE/ZESTORETIC) 20-12.5 MG tablet Take 2 tablets by mouth daily 180 tablet 3     metoprolol succinate ER (TOPROL-XL) 50 MG 24 hr tablet Take 1 tablet (50 mg) by mouth daily 90 tablet 3     MIRTAZAPINE PO Take 15 mg by mouth At Bedtime       simvastatin (ZOCOR) 20 MG tablet Take 1 tablet (20 mg) by mouth At Bedtime 90 tablet 3     traZODone (DESYREL) 50 MG tablet Take  mg by mouth       triamcinolone (KENALOG) 0.1 % external ointment Apply topically 2 times daily 80 g 0     venlafaxine  (EFFEXOR-ER) 150 MG TB24 24 hr tablet Take 150 mg by mouth daily (with breakfast)       No facility-administered encounter medications on file as of 11/6/2019.              Review Of Systems  Skin: As above  Eyes: negative  Ears/Nose/Throat: negative  Respiratory: No shortness of breath, dyspnea on exertion, cough, or hemoptysis  Cardiovascular: negative  Gastrointestinal: negative  Genitourinary: negative  Musculoskeletal: negative  Neurologic: negative  Psychiatric: negative  Hematologic/Lymphatic/Immunologic: negative  Endocrine: negative      O:   NAD, WDWN, Alert & Oriented, Mood & Affect wnl, Vitals stable   Here today with wife    There were no vitals taken for this visit.   General appearance kelsey ii   Vitals stable   Alert, oriented and in no acute distress      Following lymph nodes palpated: Occipital, Cervical, Supraclavicular no lad   nummualr plaques with excoriations on trunk and lower legs   Stuck on papules and brown macules on trunk and ext    Red papules on trunk   Flesh colored papules on trunk      The remainder of expanded problem focused exam was unremarkable; the following areas were examined:  scalp/hair, conjunctiva/lids, face, neck, lips, back, ab, legs, , chest, digits/nails, RUE, LUE.      Eyes: Conjunctivae/lids:Normal     ENT: Lips, buccal mucosa, tongue: normal    MSK:Normal    Cardiovascular: peripheral edema none    Pulm: Breathing Normal    Lymph Nodes: No Head and Neck Lymphadenopathy     Neuro/Psych: Orientation:Normal; Mood/Affect:Normal      A/P:  1. Seborrheic keratosis, letnigo, angioma, dermal nevus  2. Nummular derm  Skin care discussed with patient   claritin in am  Zyrtec bedtime  Elavil bedtime  betamethaosne twice daily  Return to clinic 2 weeks  BENIGN LESIONS DISCUSSED WITH PATIENT:  I discussed the specifics of tumor, prognosis, and genetics of benign lesions.  I explained that treatment of these lesions would be purely cosmetic and not medically neccessary.  I  discussed with patient different removal options including excision, cautery and /or laser.      Nature and genetics of benign skin lesions dicussed with patient.  Signs and Symptoms of skin cancer discussed with patient.  Patient encouraged to perform monthly skin exams.  UV precautions reviewed with patient.    Skin care regimen reviewed with patient: Eliminate harsh soaps, i.e. Dial, zest, irsih spring; Mild soaps such as Cetaphil or Dove sensitive skin, avoid hot or cold showers, aggressive use of emollients including vanicream, cetaphil or cerave discussed with patient.    Risks of non-melanoma skin cancer discussed with patient   Return to clinic 2 weeks      Again, thank you for allowing me to participate in the care of your patient.        Sincerely,        Pierre Diggs MD

## 2019-11-06 NOTE — PROGRESS NOTES
Duane E Sogge , a 79 year old year old male patient, I was asked to see by Dr. Oliver for rash.  Patient states this has been present for a while. Had bx and treated for fungus.  Very itchy. Using aveeno.  .  Patient reports the following symptoms:  itching .  Patient reports the following previous treatments tac.  .  Patient reports the following modifying factors none.  Associated symptoms: none.  Patient has no other skin complaints today.  Remainder of the HPI, Meds, PMH, Allergies, FH, and SH was reviewed in chart.      Past Medical History:   Diagnosis Date     Anxiety      Hypertension        Past Surgical History:   Procedure Laterality Date     COLONOSCOPY N/A 8/26/2019    Procedure: COLONOSCOPY;  Surgeon: Asa Oliver MD;  Location:  GI     ORTHOPEDIC SURGERY          Family History   Problem Relation Age of Onset     Hypertension Mother      Lung Cancer Sister      Thyroid Disease No family hx of      Diabetes No family hx of      Glaucoma No family hx of      Macular Degeneration No family hx of      Melanoma No family hx of      Skin Cancer No family hx of        Social History     Socioeconomic History     Marital status:      Spouse name: Not on file     Number of children: Not on file     Years of education: Not on file     Highest education level: Not on file   Occupational History     Not on file   Social Needs     Financial resource strain: Not on file     Food insecurity:     Worry: Not on file     Inability: Not on file     Transportation needs:     Medical: Not on file     Non-medical: Not on file   Tobacco Use     Smoking status: Never Smoker     Smokeless tobacco: Current User     Types: Snuff   Substance and Sexual Activity     Alcohol use: Yes     Alcohol/week: 5.0 - 6.0 standard drinks     Types: 3 Cans of beer, 2 - 3 Shots of liquor per week     Comment:  1 daily     Drug use: No     Sexual activity: Yes     Partners: Female   Lifestyle     Physical activity:     Days  per week: Not on file     Minutes per session: Not on file     Stress: Not on file   Relationships     Social connections:     Talks on phone: Not on file     Gets together: Not on file     Attends Evangelical service: Not on file     Active member of club or organization: Not on file     Attends meetings of clubs or organizations: Not on file     Relationship status: Not on file     Intimate partner violence:     Fear of current or ex partner: Not on file     Emotionally abused: Not on file     Physically abused: Not on file     Forced sexual activity: Not on file   Other Topics Concern     Parent/sibling w/ CABG, MI or angioplasty before 65F 55M? Not Asked   Social History Narrative     Not on file       Outpatient Encounter Medications as of 2019   Medication Sig Dispense Refill     allopurinol (ZYLOPRIM) 100 MG tablet Take 1 tablet (100 mg) by mouth daily 90 tablet 3     ALPRAZolam (XANAX) 0.5 MG tablet Take 1 tab up to 4x daily only if needed. Refill when due,no early refills  Indications: Feeling Anxious       BENZONATATE PO        [] itraconazole (SPORANOX) 100 MG capsule Take 2 capsules (200 mg) by mouth daily for 14 days 28 capsule 0     lisinopril-hydrochlorothiazide (PRINZIDE/ZESTORETIC) 20-12.5 MG tablet Take 2 tablets by mouth daily 180 tablet 3     metoprolol succinate ER (TOPROL-XL) 50 MG 24 hr tablet Take 1 tablet (50 mg) by mouth daily 90 tablet 3     MIRTAZAPINE PO Take 15 mg by mouth At Bedtime       simvastatin (ZOCOR) 20 MG tablet Take 1 tablet (20 mg) by mouth At Bedtime 90 tablet 3     traZODone (DESYREL) 50 MG tablet Take  mg by mouth       triamcinolone (KENALOG) 0.1 % external ointment Apply topically 2 times daily 80 g 0     venlafaxine (EFFEXOR-ER) 150 MG TB24 24 hr tablet Take 150 mg by mouth daily (with breakfast)       No facility-administered encounter medications on file as of 2019.              Review Of Systems  Skin: As above  Eyes: negative  Ears/Nose/Throat:  negative  Respiratory: No shortness of breath, dyspnea on exertion, cough, or hemoptysis  Cardiovascular: negative  Gastrointestinal: negative  Genitourinary: negative  Musculoskeletal: negative  Neurologic: negative  Psychiatric: negative  Hematologic/Lymphatic/Immunologic: negative  Endocrine: negative      O:   NAD, WDWN, Alert & Oriented, Mood & Affect wnl, Vitals stable   Here today with wife    There were no vitals taken for this visit.   General appearance kelsey ii   Vitals stable   Alert, oriented and in no acute distress      Following lymph nodes palpated: Occipital, Cervical, Supraclavicular no lad   nummualr plaques with excoriations on trunk and lower legs   Stuck on papules and brown macules on trunk and ext    Red papules on trunk   Flesh colored papules on trunk      The remainder of expanded problem focused exam was unremarkable; the following areas were examined:  scalp/hair, conjunctiva/lids, face, neck, lips, back, ab, legs, , chest, digits/nails, RUE, LUE.      Eyes: Conjunctivae/lids:Normal     ENT: Lips, buccal mucosa, tongue: normal    MSK:Normal    Cardiovascular: peripheral edema none    Pulm: Breathing Normal    Lymph Nodes: No Head and Neck Lymphadenopathy     Neuro/Psych: Orientation:Normal; Mood/Affect:Normal      A/P:  1. Seborrheic keratosis, letnigo, angioma, dermal nevus  2. Nummular derm  Skin care discussed with patient   claritin in am  Zyrtec bedtime  Elavil bedtime  betamethaosne twice daily  Return to clinic 2 weeks  BENIGN LESIONS DISCUSSED WITH PATIENT:  I discussed the specifics of tumor, prognosis, and genetics of benign lesions.  I explained that treatment of these lesions would be purely cosmetic and not medically neccessary.  I discussed with patient different removal options including excision, cautery and /or laser.      Nature and genetics of benign skin lesions dicussed with patient.  Signs and Symptoms of skin cancer discussed with patient.  Patient encouraged to  perform monthly skin exams.  UV precautions reviewed with patient.    Skin care regimen reviewed with patient: Eliminate harsh soaps, i.e. Dial, zest, irsih spring; Mild soaps such as Cetaphil or Dove sensitive skin, avoid hot or cold showers, aggressive use of emollients including vanicream, cetaphil or cerave discussed with patient.    Risks of non-melanoma skin cancer discussed with patient   Return to clinic 2 weeks

## 2019-11-06 NOTE — TELEPHONE ENCOUNTER
Prior Authorization Retail Medication Request    Medication/Dose: amitriptyline  ICD code (if different than what is on RX):    Nummular dermatitis [L30.0]  - Primary       Lentigo [L81.4]       Seborrheic keratosis [L82.1]       Angioma of skin [D18.01]       Dermal nevus [D23.9]           Previously Tried and Failed:    Rationale:      Insurance Name:  [employer]  Insurance ID:  [id number]      Pharmacy Information (if different than what is on RX)  Name:  [facility]  Phone:  [phone]  [facility] [place]: [id number]

## 2019-11-06 NOTE — NURSING NOTE
"Initial BP (!) 159/82   Pulse (!) 48   SpO2 97%  Estimated body mass index is 37.59 kg/m  as calculated from the following:    Height as of 5/15/19: 1.702 m (5' 7\").    Weight as of 10/8/19: 108.9 kg (240 lb). .    Jerica Boateng LPN    "

## 2019-11-06 NOTE — PATIENT INSTRUCTIONS
Start taking 2 Claritin in the morning and 2 Zyrtec at bedtime.  Also start taking the hydroxyzine (prescription from Dr. Diggs) at bedtime.  Use the new prescription cream to itchy areas for the next 2 weeks.  Continue to moisturize your skin with the Aveeno moisturizer 2 times a day.  Recheck with Dr. Diggs in 2 weeks.     Proper skin care from Dr. Diggs- [facility]     Eliminate harsh soaps, i.e. Dial, Zest, Sirena Spring;   Use mild soaps such as Cetaphil or Dove Sensitive Skin   Avoid hot or cold showers   After showering, lightly dry off.    Aggressive use of a moisturizer (including Vanicream, Cetaphil, Aquaphor or Cerave)   We recommend using a tub that needs to be scooped out, not a pump. This has more of an oil base. It will hold moisture in your skin much better than a water base moisturizer. The ones recommended are non- pore clogging.       If you have any questions call [phone] and follow the prompts to Dr. Diggs's office.

## 2019-11-07 NOTE — TELEPHONE ENCOUNTER
Central Prior Authorization Team   Phone: [phone]      PA Initiation    Medication: amitriptyline-Initiated  Insurance Company: [employer] - Phone [phone] Fax [phone]  Pharmacy Filling the Rx: [facility] - [place] - [address]  Filling Pharmacy Phone: [phone]  Filling Pharmacy Fax:    Start Date: 11/7/2019

## 2019-11-07 NOTE — TELEPHONE ENCOUNTER
Prior Authorization Approval    Authorization Effective Date: 11/7/2019  Authorization Expiration Date: 12/31/2020  Medication: amitriptyline-APPROVED  Approved Dose/Quantity:   Reference #:     Insurance Company: [employer] - Phone [phone] Fax [phone]  Expected CoPay:       CoPay Card Available:      Foundation Assistance Needed:    Which Pharmacy is filling the prescription (Not needed for infusion/clinic administered): [facility] - [place], [place] - [address]  Pharmacy Notified: Yes  Patient Notified: No    Pharmacy will notify patient when medication is ready.

## 2019-11-11 ENCOUNTER — NURSE TRIAGE (OUTPATIENT)
Dept: FAMILY MEDICINE | Facility: CLINIC | Age: 79
End: 2019-11-11

## 2019-11-11 NOTE — TELEPHONE ENCOUNTER
Reason for Call:  Same Day Appointment, Requested Provider:  Asa Oliver MD    PCP: Asa Oliver    Reason for visit: Ingrid states she noticed blood on Duane penis this morning, looks like possibly there was a large vein where blood was coming from.   Ingrid states when she asked Duane about it he stated it has happened previously but she never knew about it.    Duration of symptoms: Unsure    Have you been treated for this in the past? No    Additional comments: Ingrid states she would like Duane to see Dr Oliver because that is his PCP.    Can we leave a detailed message on this number? YES    Phone number patient can be reached at: Cell number on file:    Telephone Information:   Mobile [phone]       Best Time: any    Call taken on 11/11/2019 at 3:30 PM by Shanna Hernandes

## 2019-11-12 NOTE — TELEPHONE ENCOUNTER
Spoke to Ingrid.     Ingrid and patient think he neglected cleaning under foreskin, and when he did, it caused bleeding.   Unsure of amount of bleeding. States it is no longer bleeding.   States it did look like there was a larger vein, quite bluish in color. Not caused by sex. Ingrid states the patient said this has happened before and healed.   States she isn't sure     RN advised to have patient seen if it bleeds again, if there are signs of infection, if patient develops a fever, or if there are any changes with urination. RN also advised to be seen if there are any changes to the large vein (color, size). Ingrid states understanding.    FYI: After visit with Dr. Diggs, that rash is clearing and looks 100% better.    Liza Carter RN BSN    Additional Information    Negative: Pain or burning with passing urine (urination) is main symptom    Negative: Pubic lice suspected    Negative: STD exposure and prevention, question about    Negative: Pain in scrotum or testicle is main symptom    Negative: Swollen scrotum OR lump in the scrotum/groin area    Negative: Large amount of blood from end of penis    Negative: Foreskin pulled back and stuck (not circumcised)    Negative: Fever > 100.5 F (38.1 C)    Negative: Unable to urinate (or only a few drops) and bladder feels very full    Negative: Painful erection present > 2 hours    Negative: Patient sounds very sick or weak to the triager    Negative: SEVERE pain or burning with passing urine (urination)    Negative: Entire penis is swollen (i.e., edema)    Negative: Looks infected (e.g., draining sore, spreading redness)    Negative: Pain or burning with passing urine (urination)    Negative: Blood in urine (red, pink, or tea-colored)    Negative: Pus (white, yellow) or bloody discharge from end of penis    Negative: Swollen foreskin (not circumcised)    Negative: Tiny water blisters rash, 3 or more    Negative: Antibiotic treatment > 3 days for STD (e.g., penile discharge  from gonorrhea, chlamydia) and painful urination not improved    Negative: Patient wants to be seen    Negative: SEVERE itching (i.e., interferes with work or school)    Negative: Painless rash (e.g., redness, tiny bumps, sore) present > 24 hours    Negative: Patient is worried about a sexually transmitted disease (STD)    Negative: ALL other penis - scrotum symptoms  (Exception: painless rash < 24 hours duration)    Negative: Painless rash (e.g., mild redness, tiny bumps, small sore) present < 24 hours    Protocols used: PENIS AND SCROTUM SYMPTOMS-A-OH

## 2019-11-12 NOTE — TELEPHONE ENCOUNTER
ED / Discharge Outreach Protocol    Patient Contact    Attempt # 1    Was call answered?  No.  Left message on voicemail with information to call me back.      Needs RN triage.     Liza Carter, RN BSN

## 2019-11-20 ENCOUNTER — OFFICE VISIT (OUTPATIENT)
Dept: DERMATOLOGY | Facility: CLINIC | Age: 79
End: 2019-11-20
Payer: COMMERCIAL

## 2019-11-20 VITALS — DIASTOLIC BLOOD PRESSURE: 74 MMHG | HEART RATE: 52 BPM | OXYGEN SATURATION: 97 % | SYSTOLIC BLOOD PRESSURE: 147 MMHG

## 2019-11-20 DIAGNOSIS — L30.0 NUMMULAR DERMATITIS: Primary | ICD-10-CM

## 2019-11-20 PROCEDURE — 99212 OFFICE O/P EST SF 10 MIN: CPT | Performed by: DERMATOLOGY

## 2019-11-20 NOTE — PROGRESS NOTES
Duane E Sogge is a 79 year old year old male patient here today for recheck itching and nummular derm, clear on topicals and elavil, no issues. Patient reports the following modifying factors none.  Associated symptoms: none.  Patient has no other skin complaints today.  Remainder of the HPI, Meds, PMH, Allergies, FH, and SH was reviewed in chart.      Past Medical History:   Diagnosis Date     Anxiety      Hypertension        Past Surgical History:   Procedure Laterality Date     COLONOSCOPY N/A 8/26/2019    Procedure: COLONOSCOPY;  Surgeon: Asa Oliver MD;  Location:  GI     ORTHOPEDIC SURGERY          Family History   Problem Relation Age of Onset     Hypertension Mother      Lung Cancer Sister      Thyroid Disease No family hx of      Diabetes No family hx of      Glaucoma No family hx of      Macular Degeneration No family hx of      Melanoma No family hx of      Skin Cancer No family hx of        Social History     Socioeconomic History     Marital status:      Spouse name: Not on file     Number of children: Not on file     Years of education: Not on file     Highest education level: Not on file   Occupational History     Not on file   Social Needs     Financial resource strain: Not on file     Food insecurity:     Worry: Not on file     Inability: Not on file     Transportation needs:     Medical: Not on file     Non-medical: Not on file   Tobacco Use     Smoking status: Never Smoker     Smokeless tobacco: Current User     Types: Snuff   Substance and Sexual Activity     Alcohol use: Yes     Alcohol/week: 5.0 - 6.0 standard drinks     Types: 3 Cans of beer, 2 - 3 Shots of liquor per week     Comment:  1 daily     Drug use: No     Sexual activity: Yes     Partners: Female   Lifestyle     Physical activity:     Days per week: Not on file     Minutes per session: Not on file     Stress: Not on file   Relationships     Social connections:     Talks on phone: Not on file     Gets together: Not  on file     Attends Restorationist service: Not on file     Active member of club or organization: Not on file     Attends meetings of clubs or organizations: Not on file     Relationship status: Not on file     Intimate partner violence:     Fear of current or ex partner: Not on file     Emotionally abused: Not on file     Physically abused: Not on file     Forced sexual activity: Not on file   Other Topics Concern     Parent/sibling w/ CABG, MI or angioplasty before 65F 55M? Not Asked   Social History Narrative     Not on file       Outpatient Encounter Medications as of 11/20/2019   Medication Sig Dispense Refill     allopurinol (ZYLOPRIM) 100 MG tablet Take 1 tablet (100 mg) by mouth daily 90 tablet 3     ALPRAZolam (XANAX) 0.5 MG tablet Take 1 tab up to 4x daily only if needed. Refill when due,no early refills  Indications: Feeling Anxious       amitriptyline (ELAVIL) 10 MG tablet Take 1 tablet (10 mg) by mouth At Bedtime 30 tablet 1     augmented betamethasone dipropionate (DIPROLENE-AF) 0.05 % external cream Apply sparingly to affected area twice daily as needed.  Do not apply to face. 100 g 3     calcium carbonate (OS-JEFE) 500 MG tablet Take 1 tablet by mouth 2 times daily       cetirizine (ZYRTEC) 10 MG tablet Take 10 mg by mouth daily       lisinopril-hydrochlorothiazide (PRINZIDE/ZESTORETIC) 20-12.5 MG tablet Take 2 tablets by mouth daily 180 tablet 3     metoprolol succinate ER (TOPROL-XL) 50 MG 24 hr tablet Take 1 tablet (50 mg) by mouth daily 90 tablet 3     MIRTAZAPINE PO Take 15 mg by mouth At Bedtime       simvastatin (ZOCOR) 20 MG tablet Take 1 tablet (20 mg) by mouth At Bedtime 90 tablet 3     traZODone (DESYREL) 50 MG tablet Take  mg by mouth       triamcinolone (KENALOG) 0.1 % external ointment Apply topically 2 times daily 80 g 0     venlafaxine (EFFEXOR-ER) 150 MG TB24 24 hr tablet Take 150 mg by mouth daily (with breakfast)       No facility-administered encounter medications on file as of  11/20/2019.              Review Of Systems  Skin: As above  Eyes: negative  Ears/Nose/Throat: negative  Respiratory: No shortness of breath, dyspnea on exertion, cough, or hemoptysis  Cardiovascular: negative  Gastrointestinal: negative  Genitourinary: negative  Musculoskeletal: negative  Neurologic: negative  Psychiatric: negative  Hematologic/Lymphatic/Immunologic: negative  Endocrine: negative      O:   NAD, WDWN, Alert & Oriented, Mood & Affect wnl, Vitals stable   Here today alone   BP (!) 147/74   Pulse 52   SpO2 97%    General appearance normal   Vitals stable   Alert, oriented and in no acute distress     pih on arms nad legs  Eyes: Conjunctivae/lids:Normal     ENT: Lips, buccal mucosa, tongue: normal    MSK:Normal    Cardiovascular: peripheral edema none    Pulm: Breathing Normal    Neuro/Psych: Orientation:Normal; Mood/Affect:Normal      A/P:  1. Nummular derm clear  Topicals prn   Skin care regimen reviewed with patient: Eliminate harsh soaps, i.e. Dial, zest, irsih spring; Mild soaps such as Cetaphil or Dove sensitive skin, avoid hot or cold showers, aggressive use of emollients including vanicream, cetaphil or cerave discussed with patient.    6 months

## 2019-11-20 NOTE — LETTER
11/20/2019         RE: Duane E Sogge  [address] [address]        Dear Colleague,    Thank you for referring your patient, Duane E Sogge, to the [facility]. Please see a copy of my visit note below.    Duane E Sogge is a 79 year old year old male patient here today for recheck itching and nummular derm, clear on topicals and elavil, no issues. Patient reports the following modifying factors none.  Associated symptoms: none.  Patient has no other skin complaints today.  Remainder of the HPI, Meds, PMH, Allergies, FH, and SH was reviewed in chart.      Past Medical History:   Diagnosis Date     Anxiety      Hypertension        Past Surgical History:   Procedure Laterality Date     COLONOSCOPY N/A 8/26/2019    Procedure: COLONOSCOPY;  Surgeon: Asa Oliver MD;  Location:  GI     ORTHOPEDIC SURGERY          Family History   Problem Relation Age of Onset     Hypertension Mother      Lung Cancer Sister      Thyroid Disease No family hx of      Diabetes No family hx of      Glaucoma No family hx of      Macular Degeneration No family hx of      Melanoma No family hx of      Skin Cancer No family hx of        Social History     Socioeconomic History     Marital status:      Spouse name: Not on file     Number of children: Not on file     Years of education: Not on file     Highest education level: Not on file   Occupational History     Not on file   Social Needs     Financial resource strain: Not on file     Food insecurity:     Worry: Not on file     Inability: Not on file     Transportation needs:     Medical: Not on file     Non-medical: Not on file   Tobacco Use     Smoking status: Never Smoker     Smokeless tobacco: Current User     Types: Snuff   Substance and Sexual Activity     Alcohol use: Yes     Alcohol/week: 5.0 - 6.0 standard drinks     Types: 3 Cans of beer, 2 - 3 Shots of liquor per week     Comment:  1 daily     Drug use: No     Sexual activity: Yes      Partners: Female   Lifestyle     Physical activity:     Days per week: Not on file     Minutes per session: Not on file     Stress: Not on file   Relationships     Social connections:     Talks on phone: Not on file     Gets together: Not on file     Attends Nondenominational service: Not on file     Active member of club or organization: Not on file     Attends meetings of clubs or organizations: Not on file     Relationship status: Not on file     Intimate partner violence:     Fear of current or ex partner: Not on file     Emotionally abused: Not on file     Physically abused: Not on file     Forced sexual activity: Not on file   Other Topics Concern     Parent/sibling w/ CABG, MI or angioplasty before 65F 55M? Not Asked   Social History Narrative     Not on file       Outpatient Encounter Medications as of 11/20/2019   Medication Sig Dispense Refill     allopurinol (ZYLOPRIM) 100 MG tablet Take 1 tablet (100 mg) by mouth daily 90 tablet 3     ALPRAZolam (XANAX) 0.5 MG tablet Take 1 tab up to 4x daily only if needed. Refill when due,no early refills  Indications: Feeling Anxious       amitriptyline (ELAVIL) 10 MG tablet Take 1 tablet (10 mg) by mouth At Bedtime 30 tablet 1     augmented betamethasone dipropionate (DIPROLENE-AF) 0.05 % external cream Apply sparingly to affected area twice daily as needed.  Do not apply to face. 100 g 3     calcium carbonate (OS-JEFE) 500 MG tablet Take 1 tablet by mouth 2 times daily       cetirizine (ZYRTEC) 10 MG tablet Take 10 mg by mouth daily       lisinopril-hydrochlorothiazide (PRINZIDE/ZESTORETIC) 20-12.5 MG tablet Take 2 tablets by mouth daily 180 tablet 3     metoprolol succinate ER (TOPROL-XL) 50 MG 24 hr tablet Take 1 tablet (50 mg) by mouth daily 90 tablet 3     MIRTAZAPINE PO Take 15 mg by mouth At Bedtime       simvastatin (ZOCOR) 20 MG tablet Take 1 tablet (20 mg) by mouth At Bedtime 90 tablet 3     traZODone (DESYREL) 50 MG tablet Take  mg by mouth        triamcinolone (KENALOG) 0.1 % external ointment Apply topically 2 times daily 80 g 0     venlafaxine (EFFEXOR-ER) 150 MG TB24 24 hr tablet Take 150 mg by mouth daily (with breakfast)       No facility-administered encounter medications on file as of 11/20/2019.              Review Of Systems  Skin: As above  Eyes: negative  Ears/Nose/Throat: negative  Respiratory: No shortness of breath, dyspnea on exertion, cough, or hemoptysis  Cardiovascular: negative  Gastrointestinal: negative  Genitourinary: negative  Musculoskeletal: negative  Neurologic: negative  Psychiatric: negative  Hematologic/Lymphatic/Immunologic: negative  Endocrine: negative      O:   NAD, WDWN, Alert & Oriented, Mood & Affect wnl, Vitals stable   Here today alone   BP (!) 147/74   Pulse 52   SpO2 97%    General appearance normal   Vitals stable   Alert, oriented and in no acute distress     pih on arms nad legs  Eyes: Conjunctivae/lids:Normal     ENT: Lips, buccal mucosa, tongue: normal    MSK:Normal    Cardiovascular: peripheral edema none    Pulm: Breathing Normal    Neuro/Psych: Orientation:Normal; Mood/Affect:Normal      A/P:  1. Nummular derm clear  Topicals prn   Skin care regimen reviewed with patient: Eliminate harsh soaps, i.e. Dial, zest, irsih spring; Mild soaps such as Cetaphil or Dove sensitive skin, avoid hot or cold showers, aggressive use of emollients including vanicream, cetaphil or cerave discussed with patient.    6 months      Again, thank you for allowing me to participate in the care of your patient.        Sincerely,        Pierre Diggs MD

## 2019-11-20 NOTE — NURSING NOTE
Chief Complaint   Patient presents with     Rash     fu       Vitals:    11/20/19 1047   BP: (!) 147/74   Pulse: 52   SpO2: 97%     Wt Readings from Last 1 Encounters:   10/08/19 108.9 kg (240 lb)       Anca Stewart LPN.................11/20/2019

## 2019-11-22 NOTE — PATIENT INSTRUCTIONS
Registered Dietitian Follow-Up     Patient Profile Reviewed                           Yes [x]   No []     Nutrition History Previously Obtained        Yes [x]  No []       Pertinent Subjective Information: Pt. transferred to CCU from . Currently NPO, s/p colonoscopy. Previously on DASH/TLC with 50-75% PO intake, and 2 Ensure compact daily (likes vanilla flavor only).      Pertinent Medical Interventions:  Rectal bleeding, Hemorrhoids. Colonoscopy showed large amount of maroon colored blood throughout the colon and small intestine. CTA colon was negative. S/P 2 units of PRBC'S. Follow H/H. HFpEF: Check i's and o's and daily wt. B/L pleural effusions. Care D/W the pulmonary. No need for thoracentesis.      Diet order: NPO     Anthropometrics:  - Ht. 172.7cm   - Wt. 82.9kg on 11/22 vs. 86.5kg on 11/19 vs. 93.5kg on 11/12- ?trending down, noted pt. on Lasix, will continue to monitor wt trends during LOS   - %wt change  - BMI 27.3  - IBW 154lbs      Pertinent Lab Data: (11/22) RBC 3.00, Hg 8.4, Hct 27.3, creat 0.6, glu 108, AST 75, ALT 99     Pertinent Meds: Nystatin, Lasix, Protonix, Magox, Atorvastatin, Metoprolol, Carafate      Physical Findings:  - Appearance: AAOx4  - GI function: liquid stools, last BM 11/21  - Tubes: none noted  - Oral/Mouth cavity: NPO  - Skin: incision (BS 15)       Nutrition Requirements (from RD note 11/15)   Weight Used: ideal 70kg     Estimated Energy Needs    Continue [x]  Adjust [] 1750 - 2100 kcals/day (25-30 gm/kg IBW for obesity)  Adjusted Energy Recommendations:   kcal/day        Estimated Protein Needs    Continue [x]  Adjust []  70 - 84 gms/day (1.0 - 1.2 gm/kg IBW for obesity)  Adjusted Protein Recommendations:   gm/day        Estimated Fluid Needs        Continue [x]  Adjust [] 1.2 L per LIP  Adjusted Fluid Recommendations:   mL/day     Nutrient Intake: NPO        [] Previous Nutrition Diagnosis:  Inadequate Oral Intake            [x] Ongoing          [] Resolved       Nutrition Intervention: meals and snacks    Rec: When medically feasible advance diet per surgery recs, add DASH/TLC modifier     Goal/Expected Outcome: In 4 days pt. to resume solid diet with at least 50-75% PO at meals      Indicator/Monitoring: diet order, energy intake, body composition, NFPF Tri Lock ankle brace is a reliable and sturdy ankle brace. A figure of 8 ankle brace, Procare double strap ankle support or lace up ankle gauntlet or similar brand are most readily available on line, WorldMate, or Extole delivered for around $20-40.  Health insurance may not pay for these.

## 2019-12-10 ENCOUNTER — OFFICE VISIT (OUTPATIENT)
Dept: FAMILY MEDICINE | Facility: OTHER | Age: 79
End: 2019-12-10
Payer: COMMERCIAL

## 2019-12-10 VITALS
RESPIRATION RATE: 18 BRPM | BODY MASS INDEX: 37.28 KG/M2 | WEIGHT: 238 LBS | TEMPERATURE: 96.1 F | DIASTOLIC BLOOD PRESSURE: 88 MMHG | HEART RATE: 60 BPM | OXYGEN SATURATION: 94 % | SYSTOLIC BLOOD PRESSURE: 138 MMHG

## 2019-12-10 DIAGNOSIS — I10 ESSENTIAL HYPERTENSION: Primary | ICD-10-CM

## 2019-12-10 DIAGNOSIS — F10.10 ALCOHOL ABUSE: ICD-10-CM

## 2019-12-10 DIAGNOSIS — E66.01 MORBID OBESITY (H): ICD-10-CM

## 2019-12-10 DIAGNOSIS — M10.00 IDIOPATHIC GOUT, UNSPECIFIED CHRONICITY, UNSPECIFIED SITE: ICD-10-CM

## 2019-12-10 DIAGNOSIS — R73.9 HYPERGLYCEMIA: ICD-10-CM

## 2019-12-10 LAB
ALBUMIN SERPL-MCNC: 3.8 G/DL (ref 3.4–5)
ALP SERPL-CCNC: 77 U/L (ref 40–150)
ALT SERPL W P-5'-P-CCNC: 43 U/L (ref 0–70)
ANION GAP SERPL CALCULATED.3IONS-SCNC: 3 MMOL/L (ref 3–14)
AST SERPL W P-5'-P-CCNC: 30 U/L (ref 0–45)
BILIRUB SERPL-MCNC: 0.3 MG/DL (ref 0.2–1.3)
BUN SERPL-MCNC: 19 MG/DL (ref 7–30)
CALCIUM SERPL-MCNC: 9.3 MG/DL (ref 8.5–10.1)
CHLORIDE SERPL-SCNC: 103 MMOL/L (ref 94–109)
CO2 SERPL-SCNC: 30 MMOL/L (ref 20–32)
CREAT SERPL-MCNC: 1.05 MG/DL (ref 0.66–1.25)
ERYTHROCYTE [DISTWIDTH] IN BLOOD BY AUTOMATED COUNT: 11.8 % (ref 10–15)
GFR SERPL CREATININE-BSD FRML MDRD: 67 ML/MIN/{1.73_M2}
GLUCOSE SERPL-MCNC: 131 MG/DL (ref 70–99)
HCT VFR BLD AUTO: 40.3 % (ref 40–53)
HGB BLD-MCNC: 13.9 G/DL (ref 13.3–17.7)
MCH RBC QN AUTO: 34.7 PG (ref 26.5–33)
MCHC RBC AUTO-ENTMCNC: 34.5 G/DL (ref 31.5–36.5)
MCV RBC AUTO: 101 FL (ref 78–100)
PLATELET # BLD AUTO: 201 10E9/L (ref 150–450)
POTASSIUM SERPL-SCNC: 4.1 MMOL/L (ref 3.4–5.3)
PROT SERPL-MCNC: 7.3 G/DL (ref 6.8–8.8)
RBC # BLD AUTO: 4.01 10E12/L (ref 4.4–5.9)
SODIUM SERPL-SCNC: 136 MMOL/L (ref 133–144)
URATE SERPL-MCNC: 7.1 MG/DL (ref 3.5–7.2)
WBC # BLD AUTO: 5.9 10E9/L (ref 4–11)

## 2019-12-10 PROCEDURE — 80053 COMPREHEN METABOLIC PANEL: CPT | Performed by: FAMILY MEDICINE

## 2019-12-10 PROCEDURE — 99214 OFFICE O/P EST MOD 30 MIN: CPT | Performed by: FAMILY MEDICINE

## 2019-12-10 PROCEDURE — 84550 ASSAY OF BLOOD/URIC ACID: CPT | Performed by: FAMILY MEDICINE

## 2019-12-10 PROCEDURE — 36415 COLL VENOUS BLD VENIPUNCTURE: CPT | Performed by: FAMILY MEDICINE

## 2019-12-10 PROCEDURE — 85027 COMPLETE CBC AUTOMATED: CPT | Performed by: FAMILY MEDICINE

## 2019-12-10 ASSESSMENT — PAIN SCALES - GENERAL: PAINLEVEL: NO PAIN (0)

## 2019-12-10 NOTE — PROGRESS NOTES
Subjective     Recheck Meds     Duane is a 79 year old male who comes to clinic   Gout - no attacks, last 1 yrs ago, usually r great toe  etoh - 2- 4 drinks daily. No more confusion  No psych med changes    Review of Systems   ROS COMP: Constitutional, HEENT, cardiovascular, pulmonary, gi and gu systems are negative, except as otherwise noted.      Objective    /88   Pulse 60   Temp 96.1  F (35.6  C) (Temporal)   Resp 18   Wt 108 kg (238 lb)   SpO2 94%   BMI 37.28 kg/m       Wt Readings from Last 2 Encounters:   12/10/19 108 kg (238 lb)   10/08/19 108.9 kg (240 lb)       Physical Exam   GENERAL: healthy, alert and no distress  NECK: no adenopathy, no asymmetry, masses, or scars and thyroid normal to palpation  RESP: lungs clear to auscultation - no rales, rhonchi or wheezes  CV: regular rate and rhythm, normal S1 S2, no S3 or S4, no murmur, click or rub, no peripheral edema and peripheral pulses strong  ABDOMEN: soft, nontender, no hepatosplenomegaly, no masses and bowel sounds normal  MS: no gross musculoskeletal defects noted, no edema    Diagnostic Test Results:  Labs reviewed in Epic        Assessment & Plan       ICD-10-CM    1. Essential hypertension I10 Comprehensive metabolic panel     CANCELED: Basic metabolic panel   2. Morbid obesity (H) E66.01    3. Alcohol abuse F10.10 CBC with platelets   4. Idiopathic gout, unspecified chronicity, unspecified site M10.00 Uric acid       Returns for routine recheck.  Continues to consume a fair amount of alcohol, despite known risks.  Especially given that he continues to take a large amount of benzodiazepines.  Previously had gotten in trouble with confusion.  But does not want to cut back at this time.  Continue to take his medications without difficulty.  Recheck gout levels today.  Other routine blood work obtained.  I will get back to him with further recommendations to otherwise see him back in 3 months, sooner as needed    Asa Oliver,  MD  [facility]

## 2019-12-11 ENCOUNTER — TELEPHONE (OUTPATIENT)
Dept: FAMILY MEDICINE | Facility: CLINIC | Age: 79
End: 2019-12-11

## 2019-12-11 NOTE — TELEPHONE ENCOUNTER
I left a message asking patient to return our call.  Please inform patient of the message below.  Gissell Ramsey, CMA

## 2019-12-11 NOTE — TELEPHONE ENCOUNTER
----- Message from Asa Oliver MD sent at 12/11/2019  1:03 PM CST -----  Should return for A1c due to elevated blood sugar

## 2019-12-13 DIAGNOSIS — R73.9 HYPERGLYCEMIA: ICD-10-CM

## 2019-12-13 LAB — HBA1C MFR BLD: 5.5 % (ref 0–5.6)

## 2019-12-13 PROCEDURE — 36415 COLL VENOUS BLD VENIPUNCTURE: CPT | Performed by: FAMILY MEDICINE

## 2019-12-13 PROCEDURE — 83036 HEMOGLOBIN GLYCOSYLATED A1C: CPT | Performed by: FAMILY MEDICINE

## 2019-12-16 ENCOUNTER — HEALTH MAINTENANCE LETTER (OUTPATIENT)
Age: 79
End: 2019-12-16

## 2019-12-19 ENCOUNTER — MYC MEDICAL ADVICE (OUTPATIENT)
Dept: FAMILY MEDICINE | Facility: OTHER | Age: 79
End: 2019-12-19

## 2019-12-20 ENCOUNTER — IMMUNIZATION (OUTPATIENT)
Dept: FAMILY MEDICINE | Facility: CLINIC | Age: 79
End: 2019-12-20
Payer: COMMERCIAL

## 2019-12-20 PROCEDURE — G0008 ADMIN INFLUENZA VIRUS VAC: HCPCS

## 2019-12-20 PROCEDURE — 90662 IIV NO PRSV INCREASED AG IM: CPT

## 2019-12-30 DIAGNOSIS — L82.1 SEBORRHEIC KERATOSIS: ICD-10-CM

## 2019-12-30 DIAGNOSIS — D18.01 ANGIOMA OF SKIN: ICD-10-CM

## 2019-12-30 DIAGNOSIS — D23.9 DERMAL NEVUS: ICD-10-CM

## 2019-12-30 DIAGNOSIS — L81.4 LENTIGO: ICD-10-CM

## 2019-12-30 DIAGNOSIS — L30.0 NUMMULAR DERMATITIS: ICD-10-CM

## 2019-12-30 NOTE — TELEPHONE ENCOUNTER
LOV stated to continue topicals. Please advise if pt is to continue the Elavil. If so please send script pended.   Patti WESTON RN BSN PHN  [facility]

## 2019-12-30 NOTE — LETTER
[facility]  [address] [address]  Phone: [phone]    December 31, 2019    Duane E Sogge                                                                                                                [address] [place] [address]            Dear Mr. Stanley,    This is a reminder letter that you are due to be seen for a Dermatology follow up appointment in May 2020. Per 11- Dermatology office visit dictation, you were to return in 6 months for a recheck Dermatitis appointment.     We are currently booking appointments 6-8 weeks in advance, so this letter is sent as a courtesy so you may obtain a follow up appointment on a date and time that work best for your schedule.      Thank you,      Lor Strong RN

## 2019-12-31 RX ORDER — AMITRIPTYLINE HYDROCHLORIDE 10 MG/1
10 TABLET ORAL AT BEDTIME
Qty: 30 TABLET | Refills: 5 | Status: SHIPPED | OUTPATIENT
Start: 2019-12-31 | End: 2020-07-14

## 2020-01-08 ENCOUNTER — HOSPITAL ENCOUNTER (EMERGENCY)
Facility: CLINIC | Age: 80
Discharge: HOME | End: 2020-01-08
Attending: FAMILY MEDICINE | Admitting: FAMILY MEDICINE
Payer: COMMERCIAL

## 2020-01-08 VITALS
TEMPERATURE: 98.3 F | BODY MASS INDEX: 37.75 KG/M2 | OXYGEN SATURATION: 95 % | SYSTOLIC BLOOD PRESSURE: 157 MMHG | RESPIRATION RATE: 20 BRPM | DIASTOLIC BLOOD PRESSURE: 77 MMHG | HEART RATE: 59 BPM | WEIGHT: 241 LBS

## 2020-01-08 DIAGNOSIS — T14.8XXA SKIN ABRASION: ICD-10-CM

## 2020-01-08 PROCEDURE — 99282 EMERGENCY DEPT VISIT SF MDM: CPT | Performed by: FAMILY MEDICINE

## 2020-01-08 PROCEDURE — 99281 EMR DPT VST MAYX REQ PHY/QHP: CPT | Mod: Z6 | Performed by: FAMILY MEDICINE

## 2020-01-08 RX ORDER — BACITRACIN ZINC 500 [USP'U]/G
OINTMENT TOPICAL ONCE
Status: DISCONTINUED | OUTPATIENT
Start: 2020-01-08 | End: 2020-01-08 | Stop reason: HOSPADM

## 2020-01-08 NOTE — ED TRIAGE NOTES
Patient is concerned about a wound to his left hand at the base of his thumb. He states he had a bandage over a dog bite and pulled the skin off the wound.

## 2020-01-08 NOTE — DISCHARGE INSTRUCTIONS
1.  Apply antibacterial ointment to the wound for the next week or so, once or twice a day.  Do not cover the wound.

## 2020-01-08 NOTE — ED PROVIDER NOTES
History     Chief Complaint   Patient presents with     Wound Check     The history is provided by the patient.     Duane E Sogge is a 79 year old male who is presenting to the emergency department with complaints of a wound check. The patient states that he has a wound on his left hand. About one week ago he was scratched by his dog. He notes that he was not bitten by his dog. He says he taped the scratch, then after about two days he pulled the tape off. He claims the tape pilled the skin off as well. Nothing has been draining from the wound. He has no pain in the thumb joint or around the wound. He has been putting a cream from his dermatologist on the wound. The wound is scabbed over.     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     Priority: Medium     Hyperlipidemia LDL goal <130 05/08/2017     Priority: Medium     Essential hypertension 05/08/2017     Priority: Medium     Obstructive sleep apnea 12/08/2014     Priority: Medium     Overview:   Setting: APAP 5-15  Supplied by: [facility]  PSG done: 11/25/14  AHI 6 (HTN)  RDI 24  Lowest O2 Sat: 87%  Ariel/Grey  1/2/15 ed ord  ; Obstructive sleep apnea (adult)        Sensorineural hearing loss, bilateral 06/19/2014     Priority: Medium     Panic disorder without agoraphobia 12/22/2011     Priority: Medium        Past Medical History:    Past Medical History:   Diagnosis Date     Anxiety      Hypertension         Past Surgical History:    Past Surgical History:   Procedure Laterality Date     COLONOSCOPY N/A 8/26/2019    Procedure: COLONOSCOPY;  Surgeon: Asa Olivre MD;  Location:  GI     ORTHOPEDIC SURGERY         Family History:    Family History   Problem Relation Age of Onset     Hypertension Mother      Lung Cancer Sister      Thyroid Disease No family hx of      Diabetes No family hx of      Glaucoma No family hx of      Macular Degeneration No family hx of      Melanoma No family hx of      Skin Cancer No family hx of        Social History:  Marital Status:   [2]  Social History     Tobacco Use     Smoking status: Never Smoker     Smokeless tobacco: Current User     Types: Snuff   Substance Use Topics     Alcohol use: Yes     Alcohol/week: 5.0 - 6.0 standard drinks     Types: 3 Cans of beer, 2 - 3 Shots of liquor per week     Comment:  1 daily     Drug use: No        Medications:    allopurinol (ZYLOPRIM) 100 MG tablet  ALPRAZolam (XANAX) 0.5 MG tablet  amitriptyline (ELAVIL) 10 MG tablet  augmented betamethasone dipropionate (DIPROLENE-AF) 0.05 % external cream  calcium carbonate (OS-JEFE) 500 MG tablet  cetirizine (ZYRTEC) 10 MG tablet  lisinopril-hydrochlorothiazide (PRINZIDE/ZESTORETIC) 20-12.5 MG tablet  metoprolol succinate ER (TOPROL-XL) 50 MG 24 hr tablet  MIRTAZAPINE PO  simvastatin (ZOCOR) 20 MG tablet  traZODone (DESYREL) 50 MG tablet  triamcinolone (KENALOG) 0.1 % external ointment  venlafaxine (EFFEXOR-ER) 150 MG TB24 24 hr tablet          Review of Systems   All other systems reviewed and are negative.      Physical Exam   BP: (!) 157/77  Pulse: 59  Temp: 98.3  F (36.8  C)  Resp: 20  Weight: 109.3 kg (241 lb)  SpO2: 95 %      Physical Exam  Vitals signs and nursing note reviewed.   Constitutional:       General: He is not in acute distress.     Appearance: Normal appearance. He is not diaphoretic.   HENT:      Head: Normocephalic and atraumatic.      Right Ear: Tympanic membrane  and external ear normal.      Left Ear: Tympanic membrane and external ear normal.      Nose: Nose normal. No congestion or rhinorrhea.      Mouth/Throat:      Mouth: Mucous membranes are moist.      Pharynx: Oropharynx is clear. No oropharyngeal exudate or posterior oropharyngeal erythema.   Eyes:      General: No scleral icterus.     Extraocular Movements: Extraocular movements intact.      Conjunctiva/sclera: Conjunctivae normal.      Pupils: Pupils are equal, round, and reactive to light.   Neck:      Musculoskeletal: Normal range of motion and neck supple. No neck rigidity or muscular tenderness.   Cardiovascular:      Rate and Rhythm: Normal rate and regular rhythm.      Pulses: Normal pulses.      Heart sounds: Normal heart sounds. No murmur.   Pulmonary:      Effort: Pulmonary effort is normal. No respiratory distress.      Breath sounds: Normal breath sounds. No wheezing.   Chest:      Chest wall: No tenderness.   Abdominal:      General: Bowel sounds are normal.      Palpations: Abdomen is soft.      Tenderness: There is no abdominal tenderness. There is no guarding or rebound.   Musculoskeletal: Normal range of motion.         General: No tenderness, deformity or signs of injury.   Lymphadenopathy:      Cervical: No cervical adenopathy.   Skin:     General: Skin is warm and dry.      Capillary Refill: Capillary refill takes less than 2 seconds.      Coloration: Skin is not pale.      Findings: Abrasion present. No erythema or rash.             Comments: Oval shaped healing abrasion on his left hand. No drainage or fluctuance.    Neurological:      General: No focal deficit present.      Mental Status: He is alert and oriented to person, place, and time.      Cranial Nerves: No cranial nerve deficit.      Sensory: No sensory deficit.   Psychiatric:         Thought Content: Thought content normal.         Judgment: Judgment normal.         ED Course        Procedures           No results found for this or  any previous visit (from the past 24 hour(s)).    Medications - No data to display     Patient appears to be having a healing abrasion over his left thumb most likely from the skin being pulled off with the bandage change.  There does not appear to be any surrounding infection.  This can be treated conservatively with antibacterial ointment.  There is no indication for oral antibiotics.  Patient is safe to be discharged home    Assessments & Plan (with Medical Decision Making)  Skin abrasion     I have reviewed the nursing notes.    I have reviewed the findings, diagnosis, plan and need for follow up with the patient.      Discharge Medication List as of 1/8/2020  3:17 PM          Final diagnoses:   Skin abrasion         This document serves as a record of services personally performed by Jase Booth, *. It was created on their behalf by Jackie Johnson, a trained medical scribe. The creation of this record is based on the provider's personal observations and the statements of the patient. This document has been checked and approved by the attending provider.  Note: Chart documentation done in part with Dragon Voice Recognition software. Although reviewed after completion, some word and grammatical errors may remain.  1/8/2020   [facility] EMERGENCY DEPARTMENT     Jase Booth MD  01/10/20 1495

## 2020-01-08 NOTE — ED AVS SNAPSHOT
[facility] Emergency Department  [address] [place] [address]  Phone:  [phone]  Fax:  [phone]                                    Duane E Sogge   MRN: [id number]    Department:  [facility] Emergency Department   Date of Visit:  1/8/2020           After Visit Summary Signature Page    I have received my discharge instructions, and my questions have been answered. I have discussed any challenges I see with this plan with the nurse or doctor.    ..........................................................................................................................................  Patient/Patient Representative Signature      ..........................................................................................................................................  Patient Representative Print Name and Relationship to Patient    ..................................................               ................................................  Date                                   Time    ..........................................................................................................................................  Reviewed by Signature/Title    ...................................................              ..............................................  Date                                               Time          22EPIC Rev 08/18

## 2020-02-29 NOTE — TELEPHONE ENCOUNTER
Duane E Sogge is a 78 year old male who calls with swelling concerns.    NURSING ASSESSMENT:  Description:  Patient's wife, Ingrid, is calling and states patient was in to this clinic yesterday to see Dr. Oliver.  Patient's wife states she wanted to make sure Dr. Oliver was informed by patient that his legs were swollen.  This RN investigated through 12/6/18 visit with Dr. Oliver, and it does state the noted swelling on providers notes with recommendations on patient health.  Patient's wife denies patient having severe respiratory difficulty, wheezing or cough, swelling of throat, rapid swelling, coughing up pink tinged sputum, red streaks on legs, area cold or blue, area warm, red or tender.  This RN advised patient's wife to have patient continue on diet as directed, elevate legs, reduce salt, and to bring patient to ED for worsening symptoms, fever, difficulty breathing, red streaks on legs. Patient's wife states understanding.  Onset/duration:  Patient was seen in clinic yesterday 12/6/2018  Precip. factors:  Knee surgery  Associated symptoms:  As stated above  Improves/worsens symptoms:  NA  Pain scale (0-10)   0/10  Last exam/Treatment:  12/6/2018  Allergies:   Allergies   Allergen Reactions     Rifampin Anaphylaxis     Elevated lfts     Motrin [Ibuprofen] Itching       NURSING PLAN: Nursing advice to patient Home care advice    RECOMMENDED DISPOSITION:  Home care advice -   Will comply with recommendation: Yes  If further questions/concerns or if symptoms do not improve, worsen or new symptoms develop, call your PCP or [facility] as soon as possible.      Guideline used: Swelling  Telephone Triage Protocols for Nurses, Fifth Edition, Delilah Warren RN    
Please triage.  Shavonne Calle MA     
Reason for call:  Patient reporting a symptom    Symptom or request: Wife calling to state that they are concerned about patients legs & the swelling, is there a medication that patient could be taking?   Wife is concerned and asking for a call back.  Or should they be going to a specialist?  Just asking for more information.  She states his behavior is also not normal.    Duration (how long have symptoms been present): ongoing    Have you been treated for this before? Yes    Additional comments:     Phone Number patient can be reached at:  Home number on file [phone]    Best Time:      Can we leave a detailed message on this number:  YES    Call taken on 12/7/2018 at 1:18 PM by Renetta Yi    
Routing to triage as stated below, was sent to the schedulers.  Thank you,  Renetta Yi  Patient Representative    
(3) no apparent problem

## 2020-03-11 DIAGNOSIS — I10 ESSENTIAL HYPERTENSION: ICD-10-CM

## 2020-03-12 RX ORDER — METOPROLOL SUCCINATE 50 MG/1
TABLET, EXTENDED RELEASE ORAL
Qty: 90 TABLET | Refills: 3 | OUTPATIENT
Start: 2020-03-12

## 2020-03-12 NOTE — TELEPHONE ENCOUNTER
"Metoprolol Succ  Last Written Prescription Date:  05/16/2019  Last Fill Quantity: 90,  # refills: 3   Last office visit: 12/10/2019 with prescribing provider:  Melody   Future Office Visit:   Next 5 appointments (look out 90 days)    Apr 14, 2020 10:20 AM CDT  SHORT with Asa Oliver MD  [facility] ([facility]) [address] [facility] [id number] [phone]   Apr 15, 2020 11:00 AM CDT  Return Visit with Pierre Diggs MD  [facility] ([facility]) [address] [facility] [address]  [phone]      Medication denied, year supply given on 05/16/2019.    Requested Prescriptions   Pending Prescriptions Disp Refills     metoprolol succinate ER (TOPROL-XL) 50 MG 24 hr tablet [Pharmacy Med Name: METOPROLOL SUCCINATE ER 50 MG Tablet Extended Release 24 Hour] 90 tablet 3     Sig: TAKE 1 TABLET EVERY DAY       Beta-Blockers Protocol Failed - 3/11/2020  7:58 PM        Failed - Blood pressure under 140/90 in past 12 months     BP Readings from Last 3 Encounters:   01/08/20 (!) 157/77   12/10/19 138/88   11/20/19 (!) 147/74           Passed - Patient is age 6 or older        Passed - Recent (12 mo) or future (30 days) visit within the authorizing provider's specialty     Patient has had an office visit with the authorizing provider or a provider within the authorizing providers department within the previous 12 mos or has a future within next 30 days. See \"Patient Info\" tab in inbasket, or \"Choose Columns\" in Meds & Orders section of the refill encounter.          Passed - Medication is active on med list         Maggie Ames RN  "

## 2020-03-18 DIAGNOSIS — L82.1 SEBORRHEIC KERATOSIS: ICD-10-CM

## 2020-03-18 DIAGNOSIS — L81.4 LENTIGO: ICD-10-CM

## 2020-03-18 DIAGNOSIS — L30.0 NUMMULAR DERMATITIS: ICD-10-CM

## 2020-03-18 DIAGNOSIS — D18.01 ANGIOMA OF SKIN: ICD-10-CM

## 2020-03-18 DIAGNOSIS — D23.9 DERMAL NEVUS: ICD-10-CM

## 2020-03-18 RX ORDER — BETAMETHASONE DIPROPIONATE 0.5 MG/G
CREAM TOPICAL
Qty: 100 G | Refills: 0 | Status: SHIPPED | OUTPATIENT
Start: 2020-03-18 | End: 2020-04-15

## 2020-03-18 NOTE — TELEPHONE ENCOUNTER
Requested Prescriptions   Pending Prescriptions Disp Refills     augmented betamethasone dipropionate (DIPROLENE-AF) 0.05 % external cream 100 g 3     Sig: Apply sparingly to affected area twice daily as needed.  Do not apply to face.       There is no refill protocol information for this order        Last Written Prescription Date:    Last Fill Quantity: ,  # refills:    Last office visit: 11/20/2019 with prescribing provider:  Dontae   Future Office Visit:   Next 5 appointments (look out 90 days)    Apr 14, 2020 10:20 AM CDT  SHORT with Asa Oliver MD  [facility] ([facility]) [address] [facility] [address]  [phone]   Apr 15, 2020 11:00 AM CDT  Return Visit with Pierre Diggs MD  [facility] ([facility]) [address] [place] [address]  [phone]

## 2020-03-29 DIAGNOSIS — M10.00 IDIOPATHIC GOUT, UNSPECIFIED CHRONICITY, UNSPECIFIED SITE: ICD-10-CM

## 2020-03-30 RX ORDER — ALLOPURINOL 100 MG/1
TABLET ORAL
Qty: 90 TABLET | Refills: 3 | Status: SHIPPED | OUTPATIENT
Start: 2020-03-30

## 2020-03-30 NOTE — TELEPHONE ENCOUNTER
Routing refill request to provider for review/approval because:  Labs out of range:  Uric acid    Michelle Hunt RN on 3/30/2020 at 12:51 PM

## 2020-03-30 NOTE — TELEPHONE ENCOUNTER
"Requested Prescriptions   Pending Prescriptions Disp Refills     allopurinol (ZYLOPRIM) 100 MG tablet [Pharmacy Med Name: ALLOPURINOL 100 MG Tablet] 90 tablet 3     Sig: TAKE 1 TABLET EVERY DAY   Last Written Prescription Date:  5/16/19  Last Fill Quantity: 90,  # refills: 3   Last office visit: 12/20/2019 with prescribing provider:  Melody   Future Office Visit:   Next 5 appointments (look out 90 days)    Apr 14, 2020 10:40 AM CDT  [facility] Visit [facility] MD  [facility] [address] [address] [facility] [address]  [phone]   Apr 15, 2020  2:00 PM CDT  Telephone Visit with Pierre Diggs MD  [facility] ([facility]) [address] [facility] [address]  [phone]             Gout Agents Protocol Failed - 3/29/2020  1:33 PM        Failed - Has Uric Acid on file in past 12 months and value is less than 6     Recent Labs   Lab Test 12/10/19  1112   URIC 7.1     If level is 6mg/dL or greater, ok to refill one time and refer to provider.           Passed - CBC on file in past 12 months     Recent Labs   Lab Test 12/10/19  1112   WBC 5.9   RBC 4.01*   HGB 13.9   HCT 40.3                Passed - ALT on file in past 12 months     Recent Labs   Lab Test 12/10/19  1112   ALT 43             Passed - Recent (12 mo) or future (30 days) visit within the authorizing provider's specialty     Patient has had an office visit with the authorizing provider or a provider within the authorizing providers department within the previous 12 mos or has a future within next 30 days. See \"Patient Info\" tab in inbasket, or \"Choose Columns\" in Meds & Orders section of the refill encounter.              Passed - Medication is active on med list        Passed - Patient is age 18 or older        Passed - Normal serum creatinine on file in the past 12 months     Recent Labs   Lab Test 12/10/19  1112   CR 1.05       Ok to " refill medication if creatinine is low

## 2020-03-31 ENCOUNTER — MYC MEDICAL ADVICE (OUTPATIENT)
Dept: DERMATOLOGY | Facility: CLINIC | Age: 80
End: 2020-03-31

## 2020-03-31 NOTE — TELEPHONE ENCOUNTER
Patient is scheduled for a Telephone office visit on 4-15-20 due to Covid-19 pandemic.     Do you want him to move this appointment up?...    Please advise. Lor Strong RN

## 2020-04-06 NOTE — TELEPHONE ENCOUNTER
Spoke to patient and he agreed to move appt up and handed phone to his wife to set up. Lor Strong RN

## 2020-04-08 DIAGNOSIS — I10 ESSENTIAL HYPERTENSION: ICD-10-CM

## 2020-04-08 RX ORDER — METOPROLOL SUCCINATE 50 MG/1
50 TABLET, EXTENDED RELEASE ORAL DAILY
Qty: 90 TABLET | Refills: 1 | Status: SHIPPED | OUTPATIENT
Start: 2020-04-08 | End: 2020-11-27

## 2020-04-08 RX ORDER — LISINOPRIL AND HYDROCHLOROTHIAZIDE 12.5; 2 MG/1; MG/1
2 TABLET ORAL DAILY
Qty: 180 TABLET | Refills: 1 | Status: SHIPPED | OUTPATIENT
Start: 2020-04-08 | End: 2020-09-04

## 2020-04-08 RX ORDER — SIMVASTATIN 20 MG
20 TABLET ORAL AT BEDTIME
Qty: 90 TABLET | Refills: 1 | Status: SHIPPED | OUTPATIENT
Start: 2020-04-08 | End: 2020-11-05

## 2020-04-09 ENCOUNTER — VIRTUAL VISIT (OUTPATIENT)
Dept: DERMATOLOGY | Facility: CLINIC | Age: 80
End: 2020-04-09
Payer: COMMERCIAL

## 2020-04-09 DIAGNOSIS — L30.0 NUMMULAR DERMATITIS: Primary | ICD-10-CM

## 2020-04-09 PROCEDURE — 99212 OFFICE O/P EST SF 10 MIN: CPT | Mod: TEL | Performed by: DERMATOLOGY

## 2020-04-09 NOTE — NURSING NOTE
Follow up rash. Patient feels that he is better. He is not completely cleared up but better then before. Legs are the worse. Still using topicals as needed, the 2 antihistamines and taking Amitriptyline.    Anca Stewart LPN.................4/9/2020

## 2020-04-09 NOTE — PROGRESS NOTES
Duane E Sogge is a 79 year old year old male patient here today for phone visit follow up nummular derm.  Things are getting better.  No real issues.  Patient has no other skin complaints today.  Remainder of the HPI, Meds, PMH, Allergies, FH, and SH was reviewed in chart.      Past Medical History:   Diagnosis Date     Anxiety      Hypertension        Past Surgical History:   Procedure Laterality Date     COLONOSCOPY N/A 8/26/2019    Procedure: COLONOSCOPY;  Surgeon: Asa Oliver MD;  Location:  GI     ORTHOPEDIC SURGERY          Family History   Problem Relation Age of Onset     Hypertension Mother      Lung Cancer Sister      Thyroid Disease No family hx of      Diabetes No family hx of      Glaucoma No family hx of      Macular Degeneration No family hx of      Melanoma No family hx of      Skin Cancer No family hx of        Social History     Socioeconomic History     Marital status:      Spouse name: Not on file     Number of children: Not on file     Years of education: Not on file     Highest education level: Not on file   Occupational History     Not on file   Social Needs     Financial resource strain: Not on file     Food insecurity     Worry: Not on file     Inability: Not on file     Transportation needs     Medical: Not on file     Non-medical: Not on file   Tobacco Use     Smoking status: Never Smoker     Smokeless tobacco: Current User     Types: Snuff   Substance and Sexual Activity     Alcohol use: Yes     Alcohol/week: 5.0 - 6.0 standard drinks     Types: 3 Cans of beer, 2 - 3 Shots of liquor per week     Comment:  1 daily     Drug use: No     Sexual activity: Yes     Partners: Female   Lifestyle     Physical activity     Days per week: Not on file     Minutes per session: Not on file     Stress: Not on file   Relationships     Social connections     Talks on phone: Not on file     Gets together: Not on file     Attends [employer] service: Not on file     Active member of club or  organization: Not on file     Attends meetings of clubs or organizations: Not on file     Relationship status: Not on file     Intimate partner violence     Fear of current or ex partner: Not on file     Emotionally abused: Not on file     Physically abused: Not on file     Forced sexual activity: Not on file   Other Topics Concern     Parent/sibling w/ CABG, MI or angioplasty before 65F 55M? Not Asked   Social History Narrative     Not on file       Outpatient Encounter Medications as of 4/9/2020   Medication Sig Dispense Refill     allopurinol (ZYLOPRIM) 100 MG tablet TAKE 1 TABLET EVERY DAY 90 tablet 3     ALPRAZolam (XANAX) 0.5 MG tablet Take 1 tab up to 4x daily only if needed. Refill when due,no early refills  Indications: Feeling Anxious       amitriptyline (ELAVIL) 10 MG tablet Take 1 tablet (10 mg) by mouth At Bedtime 30 tablet 5     augmented betamethasone dipropionate (DIPROLENE-AF) 0.05 % external cream Apply sparingly to affected area twice daily as needed.  Do not apply to face. 100 g 0     calcium carbonate (OS-JEFE) 500 MG tablet Take 1 tablet by mouth 2 times daily       cetirizine (ZYRTEC) 10 MG tablet Take 10 mg by mouth daily       lisinopril-hydrochlorothiazide (ZESTORETIC) 20-12.5 MG tablet Take 2 tablets by mouth daily 180 tablet 1     metoprolol succinate ER (TOPROL-XL) 50 MG 24 hr tablet Take 1 tablet (50 mg) by mouth daily 90 tablet 1     MIRTAZAPINE PO Take 15 mg by mouth At Bedtime       simvastatin (ZOCOR) 20 MG tablet Take 1 tablet (20 mg) by mouth At Bedtime 90 tablet 1     traZODone (DESYREL) 50 MG tablet Take  mg by mouth       triamcinolone (KENALOG) 0.1 % external ointment Apply topically 2 times daily 80 g 0     venlafaxine (EFFEXOR-ER) 150 MG TB24 24 hr tablet Take 150 mg by mouth daily (with breakfast)       No facility-administered encounter medications on file as of 4/9/2020.              Review Of Systems  Skin: As above  Eyes: negative  Ears/Nose/Throat:  negative  Respiratory: No shortness of breath, dyspnea on exertion, cough, or hemoptysis  Cardiovascular: negative  Gastrointestinal: negative  Genitourinary: negative  Musculoskeletal: negative  Neurologic: negative  Psychiatric: negative  Hematologic/Lymphatic/Immunologic: negative  Endocrine: negative      O:   Alert & Orientedx3, Mood & Affect wnl,    General appearance normal   Alert, oriented and in no acute distress     Photos:pih on legs, rare nummular plaque son legs and trunk       Pulm: Breathing Normal, talking in normal sentences, no shortness of breath during conversation    Lymph Nodes: No Head and Neck Lymphadenopathy     Neuro/Psych: Orientation:Alert and Orientedx3 ; Mood/Affect:normal ; no anxiety or depression       A/P:  1. Nummular derm   Cont topiclas doing well  Skin care regimen reviewed with patient: Eliminate harsh soaps, i.e. Dial, zest, irsih spring; Mild soaps such as Cetaphil or Dove sensitive skin, avoid hot or cold showers, aggressive use of emollients including vanicream, cetaphil or cerave discussed with patient.    Return to clinic 6 months    Teledermatology information:  - Location of patient: home  - Location of teledermatologist: [facility]    - Reason teledermatology is appropriate: of National Emergency Regarding Coronavirus disease (COVID 19) Outbreak  - The patient's condition can safely be assessed using telemedicine: yes  - Method of transmission: store and forward teledermatology  - Image quality and interpretability: acceptable  - Physician has received verbal consent for a Video/Photos Visit from the patient? Yes  - In-person dermatology visit recommendation: no  - Date of images: 4/7  - Service start time:1040am  - Service end time:1046am  - Date of report: 04/09/20    ~ 7 min call

## 2020-04-14 DIAGNOSIS — D18.01 ANGIOMA OF SKIN: ICD-10-CM

## 2020-04-14 DIAGNOSIS — L81.4 LENTIGO: ICD-10-CM

## 2020-04-14 DIAGNOSIS — L82.1 SEBORRHEIC KERATOSIS: ICD-10-CM

## 2020-04-14 DIAGNOSIS — D23.9 DERMAL NEVUS: ICD-10-CM

## 2020-04-14 DIAGNOSIS — L30.0 NUMMULAR DERMATITIS: ICD-10-CM

## 2020-04-14 NOTE — TELEPHONE ENCOUNTER
Requested Prescriptions   Pending Prescriptions Disp Refills     augmented betamethasone dipropionate (DIPROLENE-AF) 0.05 % external cream 100 g 0     Sig: Apply sparingly to affected area twice daily as needed.  Do not apply to face.       There is no refill protocol information for this order        Last Written Prescription Date:    Last Fill Quantity: ,  # refills:    Last office visit: 4/9/2020 with prescribing provider:  Aristeo Hess Office Visit:

## 2020-04-15 RX ORDER — BETAMETHASONE DIPROPIONATE 0.5 MG/G
CREAM TOPICAL
Qty: 100 G | Refills: 6 | Status: SHIPPED | OUTPATIENT
Start: 2020-04-15 | End: 2021-07-12

## 2020-05-19 ENCOUNTER — APPOINTMENT (OUTPATIENT)
Dept: CT IMAGING | Facility: CLINIC | Age: 80
End: 2020-05-19
Attending: PHYSICIAN ASSISTANT
Payer: COMMERCIAL

## 2020-05-19 ENCOUNTER — HOSPITAL ENCOUNTER (EMERGENCY)
Facility: CLINIC | Age: 80
Discharge: HOME | End: 2020-05-19
Attending: PHYSICIAN ASSISTANT | Admitting: PHYSICIAN ASSISTANT
Payer: COMMERCIAL

## 2020-05-19 VITALS
SYSTOLIC BLOOD PRESSURE: 130 MMHG | OXYGEN SATURATION: 99 % | DIASTOLIC BLOOD PRESSURE: 78 MMHG | RESPIRATION RATE: 20 BRPM | HEART RATE: 78 BPM | TEMPERATURE: 97.7 F

## 2020-05-19 DIAGNOSIS — F10.929 ALCOHOL INTOXICATION (H): ICD-10-CM

## 2020-05-19 LAB
ALBUMIN SERPL-MCNC: 3.7 G/DL (ref 3.4–5)
ALCOHOL BREATH TEST: 0.16 (ref 0–0.01)
ALCOHOL BREATH TEST: 0.16 (ref 0–0.01)
ALP SERPL-CCNC: 86 U/L (ref 40–150)
ALT SERPL W P-5'-P-CCNC: 40 U/L (ref 0–70)
AMPHETAMINES UR QL: NOT DETECTED NG/ML
ANION GAP SERPL CALCULATED.3IONS-SCNC: 10 MMOL/L (ref 3–14)
AST SERPL W P-5'-P-CCNC: 34 U/L (ref 0–45)
BARBITURATES UR QL SCN: NOT DETECTED NG/ML
BASOPHILS # BLD AUTO: 0 10E9/L (ref 0–0.2)
BASOPHILS NFR BLD AUTO: 0.4 %
BENZODIAZ UR QL SCN: ABNORMAL NG/ML
BILIRUB SERPL-MCNC: 0.2 MG/DL (ref 0.2–1.3)
BUN SERPL-MCNC: 18 MG/DL (ref 7–30)
BUPRENORPHINE UR QL: NOT DETECTED NG/ML
CALCIUM SERPL-MCNC: 8.4 MG/DL (ref 8.5–10.1)
CANNABINOIDS UR QL: NOT DETECTED NG/ML
CHLORIDE SERPL-SCNC: 98 MMOL/L (ref 94–109)
CO2 SERPL-SCNC: 27 MMOL/L (ref 20–32)
COCAINE UR QL SCN: NOT DETECTED NG/ML
CREAT SERPL-MCNC: 1.03 MG/DL (ref 0.66–1.25)
D-METHAMPHET UR QL: NOT DETECTED NG/ML
DIFFERENTIAL METHOD BLD: ABNORMAL
EOSINOPHIL NFR BLD AUTO: 4 %
ERYTHROCYTE [DISTWIDTH] IN BLOOD BY AUTOMATED COUNT: 11.7 % (ref 10–15)
ETHANOL SERPL-MCNC: 0.25 G/DL
GFR SERPL CREATININE-BSD FRML MDRD: 68 ML/MIN/{1.73_M2}
GLUCOSE SERPL-MCNC: 112 MG/DL (ref 70–99)
HCT VFR BLD AUTO: 40.9 % (ref 40–53)
HGB BLD-MCNC: 13.7 G/DL (ref 13.3–17.7)
IMM GRANULOCYTES # BLD: 0 10E9/L (ref 0–0.4)
IMM GRANULOCYTES NFR BLD: 0.3 %
LYMPHOCYTES # BLD AUTO: 2.2 10E9/L (ref 0.8–5.3)
LYMPHOCYTES NFR BLD AUTO: 32.2 %
MCH RBC QN AUTO: 33.4 PG (ref 26.5–33)
MCHC RBC AUTO-ENTMCNC: 33.5 G/DL (ref 31.5–36.5)
MCV RBC AUTO: 100 FL (ref 78–100)
METHADONE UR QL SCN: NOT DETECTED NG/ML
MONOCYTES # BLD AUTO: 0.8 10E9/L (ref 0–1.3)
MONOCYTES NFR BLD AUTO: 11 %
NEUTROPHILS # BLD AUTO: 3.6 10E9/L (ref 1.6–8.3)
NEUTROPHILS NFR BLD AUTO: 52.1 %
NRBC # BLD AUTO: 0 10*3/UL
NRBC BLD AUTO-RTO: 0 /100
OPIATES UR QL SCN: NOT DETECTED NG/ML
OXYCODONE UR QL SCN: NOT DETECTED NG/ML
PCP UR QL SCN: NOT DETECTED NG/ML
PLATELET # BLD AUTO: 215 10E9/L (ref 150–450)
POTASSIUM SERPL-SCNC: 3.5 MMOL/L (ref 3.4–5.3)
PROPOXYPH UR QL: NOT DETECTED NG/ML
PROT SERPL-MCNC: 7.4 G/DL (ref 6.8–8.8)
RBC # BLD AUTO: 4.1 10E12/L (ref 4.4–5.9)
SODIUM SERPL-SCNC: 135 MMOL/L (ref 133–144)
TRICYCLICS UR QL SCN: ABNORMAL NG/ML
WBC # BLD AUTO: 6.9 10E9/L (ref 4–11)

## 2020-05-19 PROCEDURE — 99285 EMERGENCY DEPT VISIT HI MDM: CPT | Mod: 25 | Performed by: PHYSICIAN ASSISTANT

## 2020-05-19 PROCEDURE — 70450 CT HEAD/BRAIN W/O DYE: CPT

## 2020-05-19 PROCEDURE — 72125 CT NECK SPINE W/O DYE: CPT

## 2020-05-19 PROCEDURE — 36415 COLL VENOUS BLD VENIPUNCTURE: CPT | Performed by: PHYSICIAN ASSISTANT

## 2020-05-19 PROCEDURE — 99285 EMERGENCY DEPT VISIT HI MDM: CPT | Mod: Z6 | Performed by: PHYSICIAN ASSISTANT

## 2020-05-19 PROCEDURE — 80306 DRUG TEST PRSMV INSTRMNT: CPT | Performed by: PHYSICIAN ASSISTANT

## 2020-05-19 PROCEDURE — 82075 ASSAY OF BREATH ETHANOL: CPT | Performed by: PHYSICIAN ASSISTANT

## 2020-05-19 PROCEDURE — 80053 COMPREHEN METABOLIC PANEL: CPT | Performed by: PHYSICIAN ASSISTANT

## 2020-05-19 PROCEDURE — 85025 COMPLETE CBC W/AUTO DIFF WBC: CPT | Performed by: PHYSICIAN ASSISTANT

## 2020-05-19 PROCEDURE — 80320 DRUG SCREEN QUANTALCOHOLS: CPT | Performed by: PHYSICIAN ASSISTANT

## 2020-05-19 PROCEDURE — 82075 ASSAY OF BREATH ETHANOL: CPT | Mod: 91 | Performed by: PHYSICIAN ASSISTANT

## 2020-05-19 NOTE — ED AVS SNAPSHOT
[facility] Emergency Department  [address] [place] [address]  Phone:  [phone]  Fax:  [phone]                                    Duane E Sogge   MRN: [id number]    Department:  [facility] Emergency Department   Date of Visit:  5/19/2020           After Visit Summary Signature Page    I have received my discharge instructions, and my questions have been answered. I have discussed any challenges I see with this plan with the nurse or doctor.    ..........................................................................................................................................  Patient/Patient Representative Signature      ..........................................................................................................................................  Patient Representative Print Name and Relationship to Patient    ..................................................               ................................................  Date                                   Time    ..........................................................................................................................................  Reviewed by Signature/Title    ...................................................              ..............................................  Date                                               Time          22EPIC Rev 08/18

## 2020-05-20 NOTE — ED NOTES
Doing range of motion on patient every 15 minutes. Pt at this time being verbally aggressive with police.

## 2020-05-20 NOTE — DISCHARGE INSTRUCTIONS
It was a pleasure working with you today!  I hope your condition improves rapidly!     Please rest.  Please make sure that you are drinking at least 8-10 glasses of water daily to maintain adequate hydration.  I would recommend that you not to drink alcohol while taking your current medication regimen, as combining these can be dangerous.
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
## 2020-05-20 NOTE — ED NOTES
Pt cooperative. This RN staying in room with patient. Pt drinking pop. Pt will have CT scan. Pt has been out of restraints since 2045

## 2020-05-20 NOTE — ED NOTES
Code 21 called. Pt aggressive and verbally abusive. Pt grabbing and pulling at this RN when trying to assist patient back to cart. Pt trying to get out of cart. Offered chair pt agreed and then declined.   in room. Pt in restraints.

## 2020-07-14 DIAGNOSIS — D23.9 DERMAL NEVUS: ICD-10-CM

## 2020-07-14 DIAGNOSIS — L30.0 NUMMULAR DERMATITIS: ICD-10-CM

## 2020-07-14 DIAGNOSIS — D18.01 ANGIOMA OF SKIN: ICD-10-CM

## 2020-07-14 DIAGNOSIS — L82.1 SEBORRHEIC KERATOSIS: ICD-10-CM

## 2020-07-14 DIAGNOSIS — L81.4 LENTIGO: ICD-10-CM

## 2020-07-14 RX ORDER — AMITRIPTYLINE HYDROCHLORIDE 10 MG/1
10 TABLET ORAL AT BEDTIME
Qty: 30 TABLET | Refills: 2 | Status: SHIPPED | OUTPATIENT
Start: 2020-07-14 | End: 2020-10-05

## 2020-07-14 NOTE — TELEPHONE ENCOUNTER
Requested Prescriptions   Pending Prescriptions Disp Refills     amitriptyline (ELAVIL) 10 MG tablet 30 tablet 5     Sig: Take 1 tablet (10 mg) by mouth At Bedtime       There is no refill protocol information for this order        Last office visit: 11/20/2019 with prescribing provider:  ERROL Almanza   Future Office Visit:          Denise Behrendt  Specialty CSS

## 2020-08-19 ENCOUNTER — TELEPHONE (OUTPATIENT)
Dept: PSYCHOLOGY | Facility: CLINIC | Age: 80
End: 2020-08-19

## 2020-08-20 ENCOUNTER — VIRTUAL VISIT (OUTPATIENT)
Dept: PSYCHOLOGY | Facility: CLINIC | Age: 80
End: 2020-08-20
Payer: COMMERCIAL

## 2020-08-20 DIAGNOSIS — F43.23 ADJUSTMENT DISORDER WITH MIXED ANXIETY AND DEPRESSED MOOD: Primary | ICD-10-CM

## 2020-08-20 PROCEDURE — 99207 ZZC NO BILLABLE SERVICE THIS VISIT: CPT | Mod: 95 | Performed by: SOCIAL WORKER

## 2020-08-20 ASSESSMENT — ANXIETY QUESTIONNAIRES
7. FEELING AFRAID AS IF SOMETHING AWFUL MIGHT HAPPEN: SEVERAL DAYS
2. NOT BEING ABLE TO STOP OR CONTROL WORRYING: SEVERAL DAYS
6. BECOMING EASILY ANNOYED OR IRRITABLE: SEVERAL DAYS
GAD7 TOTAL SCORE: 5
5. BEING SO RESTLESS THAT IT IS HARD TO SIT STILL: NOT AT ALL
3. WORRYING TOO MUCH ABOUT DIFFERENT THINGS: NOT AT ALL
IF YOU CHECKED OFF ANY PROBLEMS ON THIS QUESTIONNAIRE, HOW DIFFICULT HAVE THESE PROBLEMS MADE IT FOR YOU TO DO YOUR WORK, TAKE CARE OF THINGS AT HOME, OR GET ALONG WITH OTHER PEOPLE: SOMEWHAT DIFFICULT
1. FEELING NERVOUS, ANXIOUS, OR ON EDGE: SEVERAL DAYS

## 2020-08-20 ASSESSMENT — COLUMBIA-SUICIDE SEVERITY RATING SCALE - C-SSRS
5. HAVE YOU STARTED TO WORK OUT OR WORKED OUT THE DETAILS OF HOW TO KILL YOURSELF? DO YOU INTEND TO CARRY OUT THIS PLAN?: NO
TOTAL  NUMBER OF ABORTED OR SELF INTERRUPTED ATTEMPTS PAST 3 MONTHS: NO
ATTEMPT PAST THREE MONTHS: NO
1. IN THE PAST MONTH, HAVE YOU WISHED YOU WERE DEAD OR WISHED YOU COULD GO TO SLEEP AND NOT WAKE UP?: NO
4. HAVE YOU HAD THESE THOUGHTS AND HAD SOME INTENTION OF ACTING ON THEM?: NO
6. HAVE YOU EVER DONE ANYTHING, STARTED TO DO ANYTHING, OR PREPARED TO DO ANYTHING TO END YOUR LIFE?: NO
4. HAVE YOU HAD THESE THOUGHTS AND HAD SOME INTENTION OF ACTING ON THEM?: NO
1. IN THE PAST MONTH, HAVE YOU WISHED YOU WERE DEAD OR WISHED YOU COULD GO TO SLEEP AND NOT WAKE UP?: NO
6. HAVE YOU EVER DONE ANYTHING, STARTED TO DO ANYTHING, OR PREPARED TO DO ANYTHING TO END YOUR LIFE?: NO
3. HAVE YOU BEEN THINKING ABOUT HOW YOU MIGHT KILL YOURSELF?: NO
2. HAVE YOU ACTUALLY HAD ANY THOUGHTS OF KILLING YOURSELF?: NO
TOTAL  NUMBER OF ABORTED OR SELF INTERRUPTED ATTEMPTS PAST LIFETIME: NO
2. HAVE YOU ACTUALLY HAD ANY THOUGHTS OF KILLING YOURSELF LIFETIME?: NO
TOTAL  NUMBER OF INTERRUPTED ATTEMPTS LIFETIME: NO
5. HAVE YOU STARTED TO WORK OUT OR WORKED OUT THE DETAILS OF HOW TO KILL YOURSELF? DO YOU INTEND TO CARRY OUT THIS PLAN?: NO

## 2020-08-20 ASSESSMENT — PATIENT HEALTH QUESTIONNAIRE - PHQ9
5. POOR APPETITE OR OVEREATING: SEVERAL DAYS
SUM OF ALL RESPONSES TO PHQ QUESTIONS 1-9: 4

## 2020-08-20 NOTE — PROGRESS NOTES
Progress Note - Initial Session    Client Name:  Duane E Sogge Date: 8-20-20         Service Type: Individual     Session Start Time: 1:31  Session End Time: 2:16     Session Length: 45    Session #: 1    Attendees: Client and Spouse / Significant Other (Spouse joined at beginning of session)    Service Modality:  Video Visit:    Telemedicine Visit: The patient's condition can be safely assessed and treated via synchronous audio and visual telemedicine encounter.      Reason for Telemedicine Visit: Services only offered telehealth    Originating Site (Patient Location): Patient's home    Distant Site (Provider Location): [facility]: [place]    Consent:  The patient/guardian has verbally consented to: the potential risks and benefits of telemedicine (video visit) versus in person care; bill my insurance or make self-payment for services provided; and responsibility for payment of non-covered services.     Patient would like the video invitation sent by: Send to e-mail at: [email or web address]     Mode of Communication:  Video Conference via [employer]    As the provider I attest to compliance with applicable laws and regulations related to telemedicine.       DATA:  Diagnostic Assessment in progress.  Unable to complete documentation at the conclusion of the first session due to completion of screenings and social hx intake     Interactive Complexity: No  Crisis: No    Intervention:  Motivational Interviewing: [employer] [facility]    ASSESSMENT:  Mental Status Assessment:  Appearance:   Appropriate   Eye Contact:   Fair   Psychomotor Behavior: Retarded (Slowed)   Attitude:   Cooperative  Interested  Orientation:   All  Speech   Rate / Production: Monotone  Slow  Mumbled   Volume:  Loud   Mood:    Anxious  Depressed   Affect:    Blunted  Flat  Worrisome   Thought Content:  Clear   Thought Form:  Blocking   Insight:    Fair       Safety Issues and Plan for Safety and Risk  Management:     Pike Suicide Severity Rating Scale (Lifetime/Recent)  [place]ike Suicide Severity Rating (Lifetime/Recent) 8/20/2020   1. Wish to be Dead (Lifetime) No   1. Wish to be Dead (Recent) No   2. Non-Specific Active Suicidal Thoughts (Lifetime) No   2. Non-Specific Active Suicidal Thoughts (Recent) No   3. Active Suicidal Ideation with any Methods (Not Plan) Without Intent to Act (Lifetime) No   3. Active Sucidal Ideation with any Methods (Not Plan) Without Intent to Act (Recent) No   4. Active Suicidal Ideation with Some Intent to Act, Without Specific Plan (Lifetime) No   4. Active Suicidal Ideation with Some Intent to Act, Without Specific Plan (Recent) No   5. Active Suicidal Ideation with Specific Plan and Intent (Lifetime) No   5. Active Suicidal Ideation with Specific Plan and Intent (Recent) No   Most Severe Ideation Rating (Lifetime) NA   Frequency (Lifetime) NA   Duration (Lifetime) NA   Controllability (Lifetime) NA   Protective Factors  (Lifetime) NA   Most Severe Ideation Rating (Past Month) NA   Frequency (Past Month) NA   Duration (Past Month) NA   Controllability (Past Month) NA   Protective Factors (Past Month) NA   Reasons for Ideation (Past Month) NA   Actual Attempt (Past 3 Months) No   Has subject engaged in non-suicidal self-injurious behavior? (Lifetime) No   Has subject engaged in non-suicidal self-injurious behavior? (Past 3 Months) No   Interrupted Attempts (Lifetime) No   Aborted or Self-Interrupted Attempt (Lifetime) No   Aborted or Self-Interrupted Attempt (Past 3 Months) No   Preparatory Acts or Behavior (Lifetime) No   Preparatory Acts or Behavior (Past 3 Months) No   Most Recent Attempt Actual Lethality Code NA   Most Lethal Attempt Actual Lethality Code NA   Initial/First Attempt Actual Lethality Code NA     Patient denies current fears or concerns for personal safety.  Patient denies current or recent suicidal ideation or behaviors.  Patient denies current or recent  homicidal ideation or behaviors.  Patient denies current or recent self injurious behavior or ideation.  Patient denies other safety concerns.  Recommended that patient call 911 or go to the local ED should there be a change in any of these risk factors.  Patient reports there are no firearms in the house.     Diagnostic Criteria:  A. The development of emotional or behavioral symptoms in response to an identifiable stressor(s) occurring within 3 months of the onset of the stressor(s)  B. These symptoms or behaviors are clinically significant, as evidenced by one or both of the following:       - Marked distress that is out of proportion to the severity/intensity of the stressor (with consideration for external context & culture)       - Significant impairment in social, occupational, or other important areas of functioning  C. The stress-related disturbance does not meet criteria for another disorder & is not not an exacerbation of another mental disorder  D. The symptoms do not represent normal bereavement  E. 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        ERNESTO Plasencia  August 20, 2020

## 2020-08-21 ASSESSMENT — ANXIETY QUESTIONNAIRES: GAD7 TOTAL SCORE: 5

## 2020-09-04 DIAGNOSIS — I10 ESSENTIAL HYPERTENSION: ICD-10-CM

## 2020-09-04 RX ORDER — LISINOPRIL AND HYDROCHLOROTHIAZIDE 12.5; 2 MG/1; MG/1
TABLET ORAL
Qty: 180 TABLET | Refills: 0 | Status: SHIPPED | OUTPATIENT
Start: 2020-09-04 | End: 2020-11-27

## 2020-09-04 NOTE — TELEPHONE ENCOUNTER
Prescription approved per [facility].    Michelle Hunt RN on 9/4/2020 at 1:58 PM

## 2020-09-17 ENCOUNTER — HOSPITAL ENCOUNTER (EMERGENCY)
Facility: CLINIC | Age: 80
Discharge: HOME | End: 2020-09-17
Attending: NURSE PRACTITIONER | Admitting: NURSE PRACTITIONER
Payer: COMMERCIAL

## 2020-09-17 ENCOUNTER — TELEPHONE (OUTPATIENT)
Dept: FAMILY MEDICINE | Facility: CLINIC | Age: 80
End: 2020-09-17

## 2020-09-17 ENCOUNTER — APPOINTMENT (OUTPATIENT)
Dept: GENERAL RADIOLOGY | Facility: CLINIC | Age: 80
End: 2020-09-17
Attending: NURSE PRACTITIONER
Payer: COMMERCIAL

## 2020-09-17 VITALS
DIASTOLIC BLOOD PRESSURE: 80 MMHG | HEART RATE: 70 BPM | OXYGEN SATURATION: 98 % | BODY MASS INDEX: 34.86 KG/M2 | SYSTOLIC BLOOD PRESSURE: 155 MMHG | TEMPERATURE: 97.9 F | RESPIRATION RATE: 17 BRPM | WEIGHT: 230 LBS | HEIGHT: 68 IN

## 2020-09-17 DIAGNOSIS — S43.004A DISLOCATION OF RIGHT SHOULDER JOINT, INITIAL ENCOUNTER: ICD-10-CM

## 2020-09-17 PROCEDURE — 73030 X-RAY EXAM OF SHOULDER: CPT | Mod: TC,RT

## 2020-09-17 PROCEDURE — 25800030 ZZH RX IP 258 OP 636: Performed by: NURSE PRACTITIONER

## 2020-09-17 PROCEDURE — 96361 HYDRATE IV INFUSION ADD-ON: CPT | Performed by: NURSE PRACTITIONER

## 2020-09-17 PROCEDURE — 25000128 H RX IP 250 OP 636: Performed by: NURSE PRACTITIONER

## 2020-09-17 PROCEDURE — 23650 CLTX SHO DSLC W/MNPJ WO ANES: CPT | Mod: RT | Performed by: NURSE PRACTITIONER

## 2020-09-17 PROCEDURE — 99285 EMERGENCY DEPT VISIT HI MDM: CPT | Mod: 25 | Performed by: NURSE PRACTITIONER

## 2020-09-17 PROCEDURE — 93010 ELECTROCARDIOGRAM REPORT: CPT | Mod: 59 | Performed by: NURSE PRACTITIONER

## 2020-09-17 PROCEDURE — 96360 HYDRATION IV INFUSION INIT: CPT | Performed by: NURSE PRACTITIONER

## 2020-09-17 PROCEDURE — 40000986 XR SHOULDER 2 VIEW RIGHT: Mod: RT

## 2020-09-17 PROCEDURE — 25000132 ZZH RX MED GY IP 250 OP 250 PS 637: Performed by: NURSE PRACTITIONER

## 2020-09-17 PROCEDURE — 96374 THER/PROPH/DIAG INJ IV PUSH: CPT | Mod: 59 | Performed by: NURSE PRACTITIONER

## 2020-09-17 PROCEDURE — 93005 ELECTROCARDIOGRAM TRACING: CPT | Mod: 59 | Performed by: NURSE PRACTITIONER

## 2020-09-17 RX ORDER — SODIUM CHLORIDE 9 MG/ML
INJECTION, SOLUTION INTRAVENOUS CONTINUOUS
Status: DISCONTINUED | OUTPATIENT
Start: 2020-09-17 | End: 2020-09-18 | Stop reason: HOSPADM

## 2020-09-17 RX ORDER — OXYCODONE HYDROCHLORIDE 5 MG/1
5 TABLET ORAL EVERY 6 HOURS PRN
Qty: 20 TABLET | Refills: 0 | Status: SHIPPED | OUTPATIENT
Start: 2020-09-17 | End: 2020-09-24

## 2020-09-17 RX ORDER — OXYCODONE HYDROCHLORIDE 5 MG/1
5 TABLET ORAL ONCE
Status: COMPLETED | OUTPATIENT
Start: 2020-09-17 | End: 2020-09-17

## 2020-09-17 RX ORDER — PROPOFOL 10 MG/ML
100 INJECTION, EMULSION INTRAVENOUS ONCE
Status: COMPLETED | OUTPATIENT
Start: 2020-09-17 | End: 2020-09-17

## 2020-09-17 RX ADMIN — PROPOFOL 100 MG: 10 INJECTION, EMULSION INTRAVENOUS at 19:53

## 2020-09-17 RX ADMIN — OXYCODONE HYDROCHLORIDE 5 MG: 5 TABLET ORAL at 22:24

## 2020-09-17 RX ADMIN — SODIUM CHLORIDE: 9 INJECTION, SOLUTION INTRAVENOUS at 19:50

## 2020-09-17 ASSESSMENT — MIFFLIN-ST. JEOR: SCORE: 1727.77

## 2020-09-17 NOTE — TELEPHONE ENCOUNTER
Call placed, wife is bringing patient into urgent care due to shoulder pain.     Maggie Ames RN

## 2020-09-17 NOTE — ED AVS SNAPSHOT
[facility] Emergency Department  [address] [place] [address]  Phone:  [phone]  Fax:  [phone]                                    Duane E Sogge   MRN: [id number]    Department:  [facility] Emergency Department   Date of Visit:  9/17/2020           After Visit Summary Signature Page    I have received my discharge instructions, and my questions have been answered. I have discussed any challenges I see with this plan with the nurse or doctor.    ..........................................................................................................................................  Patient/Patient Representative Signature      ..........................................................................................................................................  Patient Representative Print Name and Relationship to Patient    ..................................................               ................................................  Date                                   Time    ..........................................................................................................................................  Reviewed by Signature/Title    ...................................................              ..............................................  Date                                               Time          22EPIC Rev 08/18

## 2020-09-17 NOTE — TELEPHONE ENCOUNTER
Reason for Call:  Same Day Appointment, Requested Provider:  Asa Oliver MD    PCP: Asa Oliver    Reason for visit: Patient had fall today and  Injured his right shoulder. Does not want to go to the ED. Asking if PCP can work him in today?    Duration of symptoms: today    Have you been treated for this in the past? No    Additional comments:     Can we leave a detailed message on this number? YES    Phone number patient can be reached at: Cell number on file:    Telephone Information:   Mobile [phone]     Best Time: any    Call taken on 9/17/2020 at 12:30 PM by Ester Rangel CNA

## 2020-09-17 NOTE — ED TRIAGE NOTES
R shoulder injury - 'dislocated' pain 9/10 right now. Sent from clinic.     Patient's airway, breathing, circulation, and disability/mental status (ABCDs) intact/WDL during triage.

## 2020-09-18 NOTE — ED PROVIDER NOTES
History     Chief Complaint   Patient presents with     Shoulder Injury     HPI  Duane E Sogge is a 79 year old male who presents to the emergency room today as directed from urgent care with an anterior shoulder dislocation.  Patient reports that he was in the garage and stumbled on some boards put his right hand out to stabilize his fall and dislocated her shoulder.  Patient denies any history of this in the past.  Patient reports they have attempted to relocate his shoulder urgent care but it was too painful so they sent him here for reduction.  Patient was given 1 Norco there and did have some pop about 1/2-hour prior to arrival here, he is otherwise had nothing to eat since this morning.  Patient does have a surgical history and denies any history of anesthesia complications.  Patient does have a history of obstructive sleep apnea.  Patient denies any other injuries.    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  PSG done: 11/25/14  AHI 6 (HTN)  RDI 24  Lowest O2 Sat: 87%  Ariel/Grye  1/2/15 ed ord  ; Obstructive sleep apnea (adult)        Sensorineural hearing loss, bilateral 06/19/2014      "Priority: Medium     Panic disorder without agoraphobia 12/22/2011     Priority: Medium        Past Medical History:    Past Medical History:   Diagnosis Date     Anxiety      Hypertension        Past Surgical History:    Past Surgical History:   Procedure Laterality Date     COLONOSCOPY N/A 8/26/2019    Procedure: COLONOSCOPY;  Surgeon: Asa Oilver MD;  Location:  GI     ORTHOPEDIC SURGERY         Family History:    Family History   Problem Relation Age of Onset     Hypertension Mother      Lung Cancer Sister      Thyroid Disease No family hx of      Diabetes No family hx of      Glaucoma No family hx of      Macular Degeneration No family hx of      Melanoma No family hx of      Skin Cancer No family hx of        Social History:  Marital Status:   [2]  Social History     Tobacco Use     Smoking status: Never Smoker     Smokeless tobacco: Current User     Types: Snuff     Tobacco comment: \"herbal snuff\" - tobacco free   Substance Use Topics     Alcohol use: Yes     Alcohol/week: 5.0 - 6.0 standard drinks     Types: 3 Cans of beer, 2 - 3 Shots of liquor per week     Comment:  1 daily     Drug use: No        Medications:    allopurinol (ZYLOPRIM) 100 MG tablet  ALPRAZolam (XANAX) 0.5 MG tablet  amitriptyline (ELAVIL) 10 MG tablet  augmented betamethasone dipropionate (DIPROLENE-AF) 0.05 % external cream  calcium carbonate (OS-JEFE) 500 MG tablet  cetirizine (ZYRTEC) 10 MG tablet  lisinopril-hydrochlorothiazide (ZESTORETIC) 20-12.5 MG tablet  metoprolol succinate ER (TOPROL-XL) 50 MG 24 hr tablet  MIRTAZAPINE PO  simvastatin (ZOCOR) 20 MG tablet  traZODone (DESYREL) 50 MG tablet  triamcinolone (KENALOG) 0.1 % external ointment  venlafaxine (EFFEXOR-ER) 150 MG TB24 24 hr tablet          Review of Systems   All other systems reviewed and are negative.      Physical Exam   BP: (!) 166/106  Pulse: 58  Temp: 97.9  F (36.6  C)  Resp: 18  Height: 172.7 cm (5' 8\")  Weight: 104.3 kg (230 lb)  SpO2: 99 " %      Physical Exam  Constitutional:       Appearance: He is obese.   HENT:      Head: Normocephalic and atraumatic.      Mouth/Throat:      Mouth: Mucous membranes are moist.   Eyes:      Extraocular Movements: Extraocular movements intact.   Neck:      Musculoskeletal: Normal range of motion.   Cardiovascular:      Rate and Rhythm: Bradycardia present.      Pulses: Normal pulses.   Pulmonary:      Effort: Pulmonary effort is normal.      Breath sounds: Normal breath sounds.   Abdominal:      General: Bowel sounds are normal.      Palpations: Abdomen is soft.   Musculoskeletal:         General: Swelling, tenderness, deformity and signs of injury present.      Comments: Right shoulder appears dislocated on exam with diffuse swelling throughout entire shoulder region, pain with any movement, distal pulses intact, cap refill intact   Skin:     General: Skin is warm.      Capillary Refill: Capillary refill takes less than 2 seconds.   Neurological:      General: No focal deficit present.      Mental Status: He is alert.   Psychiatric:         Mood and Affect: Mood normal.         ED Course        Procedures          EKG Interpretation:      Interpreted by EPIFANIO Escalante CNP  Time reviewed: 1940  Symptoms at time of EKG: None   Right bundle branch block, normal sinus rhythm with a rate of 62, no acute ischemic findings.      Results for orders placed or performed during the hospital encounter of 09/17/20 (from the past 24 hour(s))   XR Shoulder Right 2 Views    Narrative    SHOULDER TWO VIEWS RIGHT 9/17/2020 7:17 PM     HISTORY: Question dislocation    COMPARISON: None.      Impression    IMPRESSION: There is anterior-inferior dislocation of the greater  humeral joint. No fracture is seen.    MARLON GOLDBERG MD   XR Shoulder Right 2 Views    Narrative    EXAM: XR SHOULDER 2 VIEW RIGHT  LOCATION: [facility]  DATE/TIME: 9/17/2020 8:13 PM    INDICATION: Postreduction.  COMPARISON: 9/17/2020.       Impression    IMPRESSION: The humeral head has been successfully reduced into the glenoid fossa. There is arthritis in the AC joint and glenohumeral joint. No fracture.        Medications   sodium chloride 0.9% infusion ( Intravenous Stopped 9/17/20 2158)   propofol (DIPRIVAN) injection 10 mg/mL vial (100 mg Intravenous Given 9/17/20 1953)   oxyCODONE (ROXICODONE) tablet 5 mg (5 mg Oral Given 9/17/20 2224)       Assessments & Plan (with Medical Decision Making)  Right shoulder dislocation with reduction.  I reviewed postreduction images with our orthopedic on-call, Dr. Sanchez, he requested 2 additional images obtained, these did confirm successful reduction into the glenoid fossa.  Patient of course has persistent significant swelling to right shoulder, he was placed in a sling will be discharged home with orthopedic follow-up in the next week.  An orthopedic  referral was placed for this.  I did discuss home care with patient and his wife, Tylenol dosing and did send patient home with a prescription for oxycodone for severe pain, narcotic safety and side effects were discussed with patient and his wife as well as well as reasons to return to the emergency room.  Please refer to Dr. Booth's note for sedation documentation.  Patient and his wife are agreeable to plan of care patient was discharged in stable condition with his wife driving.     I have reviewed the nursing notes.    I have reviewed the findings, diagnosis, plan and need for follow up with the patient.    New Prescriptions    OXYCODONE (ROXICODONE) 5 MG TABLET    Take 1 tablet (5 mg) by mouth every 6 hours as needed for moderate to severe pain       Final diagnoses:   Dislocation of right shoulder joint, initial encounter       9/17/2020   [facility] EMERGENCY DEPARTMENT     Susan Dennison, EPIFANIO CNP  09/17/20 7190

## 2020-09-18 NOTE — DISCHARGE INSTRUCTIONS
Duane, make sure you wear the sling as much as possible, including at bedtime.  Ice for 20 minutes to the right shoulder every 2 hours for the first 3 days.  Tylenol, 650 mg every 4 hours for pain, I have also prescribed you oxycodone for severe pain.  This is a narcotic do not drive or drink alcohol if you take this, it may cause constipation and you may require over-the-counter stool softeners if this happens.  I have referred you to our orthopedic department for follow-up in the next week if you do not hear from our specialty scheduling department by Monday please call them at [phone].  Thank you for being so patient, I hope you heal quickly and feel better soon.

## 2020-09-18 NOTE — ED NOTES
Patient awake and talking. Patient very drowsy. Waiting for post-reduction imaging. Patient tolerated procedure well. Patient's airway, breathing, circulation, and disability/mental status (ABCDs) intact/WDL during triage.

## 2020-09-24 ENCOUNTER — OFFICE VISIT (OUTPATIENT)
Dept: ORTHOPEDICS | Facility: CLINIC | Age: 80
End: 2020-09-24
Payer: COMMERCIAL

## 2020-09-24 VITALS
HEART RATE: 66 BPM | SYSTOLIC BLOOD PRESSURE: 156 MMHG | WEIGHT: 241 LBS | BODY MASS INDEX: 36.64 KG/M2 | RESPIRATION RATE: 16 BRPM | DIASTOLIC BLOOD PRESSURE: 82 MMHG

## 2020-09-24 DIAGNOSIS — S46.011A TRAUMATIC COMPLETE TEAR OF RIGHT ROTATOR CUFF, INITIAL ENCOUNTER: ICD-10-CM

## 2020-09-24 DIAGNOSIS — S43.004A DISLOCATION OF RIGHT SHOULDER JOINT, INITIAL ENCOUNTER: ICD-10-CM

## 2020-09-24 PROCEDURE — 99204 OFFICE O/P NEW MOD 45 MIN: CPT | Performed by: ORTHOPAEDIC SURGERY

## 2020-09-24 RX ORDER — OXYCODONE HYDROCHLORIDE 5 MG/1
5 TABLET ORAL EVERY 6 HOURS PRN
Qty: 30 TABLET | Refills: 0 | Status: SHIPPED | OUTPATIENT
Start: 2020-09-24

## 2020-09-24 NOTE — PROGRESS NOTES
"Duane E Sogge is a 79 year old male who is seen in emergency room  follow-up for right shoulder dislocation.  He sustained this on 9/17/2020 when he tripped in his garage at home.  He and his wife were doing a project and he was trying to move a ladder.  He thinks he tripped on obstacles on the floor and fell into the wall and then down the wall.  He sustained anterior dislocation of the shoulder which was reduced in the emergency room.  He was placed into a sling and sent to us for evaluation.  He describes constant pain in the shoulder rated 5-6 out of 10.  He has been taking oxycodone and Tylenol for pain.    Past Medical History:   Diagnosis Date     Anxiety      Hypertension        Past Surgical History:   Procedure Laterality Date     COLONOSCOPY N/A 8/26/2019    Procedure: COLONOSCOPY;  Surgeon: Asa Oliver MD;  Location:  GI     ORTHOPEDIC SURGERY         Family History   Problem Relation Age of Onset     Hypertension Mother      Lung Cancer Sister      Thyroid Disease No family hx of      Diabetes No family hx of      Glaucoma No family hx of      Macular Degeneration No family hx of      Melanoma No family hx of      Skin Cancer No family hx of        Social History     Socioeconomic History     Marital status:      Spouse name: Not on file     Number of children: Not on file     Years of education: Not on file     Highest education level: Not on file   Occupational History     Not on file   Social Needs     Financial resource strain: Not on file     Food insecurity     Worry: Not on file     Inability: Not on file     Transportation needs     Medical: Not on file     Non-medical: Not on file   Tobacco Use     Smoking status: Never Smoker     Smokeless tobacco: Current User     Types: Snuff     Tobacco comment: \"herbal snuff\" - tobacco free   Substance and Sexual Activity     Alcohol use: Yes     Alcohol/week: 5.0 - 6.0 standard drinks     Types: 3 Cans of beer, 2 - 3 Shots of liquor " per week     Comment:  1 daily     Drug use: No     Sexual activity: Yes     Partners: Female   Lifestyle     Physical activity     Days per week: Not on file     Minutes per session: Not on file     Stress: Not on file   Relationships     Social connections     Talks on phone: Not on file     Gets together: Not on file     Attends Denominational service: Not on file     Active member of club or organization: Not on file     Attends meetings of clubs or organizations: Not on file     Relationship status: Not on file     Intimate partner violence     Fear of current or ex partner: Not on file     Emotionally abused: Not on file     Physically abused: Not on file     Forced sexual activity: Not on file   Other Topics Concern     Parent/sibling w/ CABG, MI or angioplasty before 65F 55M? Not Asked   Social History Narrative     Not on file       Current Outpatient Medications   Medication Sig Dispense Refill     allopurinol (ZYLOPRIM) 100 MG tablet TAKE 1 TABLET EVERY DAY 90 tablet 3     ALPRAZolam (XANAX) 0.5 MG tablet Take 1 tab up to 4x daily only if needed. Refill when due,no early refills  Indications: Feeling Anxious       amitriptyline (ELAVIL) 10 MG tablet Take 1 tablet (10 mg) by mouth At Bedtime 30 tablet 2     augmented betamethasone dipropionate (DIPROLENE-AF) 0.05 % external cream Apply sparingly to affected area twice daily as needed.  Do not apply to face. 100 g 6     calcium carbonate (OS-JEFE) 500 MG tablet Take 1 tablet by mouth 2 times daily       cetirizine (ZYRTEC) 10 MG tablet Take 10 mg by mouth daily       lisinopril-hydrochlorothiazide (ZESTORETIC) 20-12.5 MG tablet TAKE 2 TABLETS EVERY  tablet 0     metoprolol succinate ER (TOPROL-XL) 50 MG 24 hr tablet Take 1 tablet (50 mg) by mouth daily 90 tablet 1     MIRTAZAPINE PO Take 15 mg by mouth At Bedtime       oxyCODONE (ROXICODONE) 5 MG tablet Take 1 tablet (5 mg) by mouth every 6 hours as needed for moderate to severe pain 20 tablet 0      simvastatin (ZOCOR) 20 MG tablet Take 1 tablet (20 mg) by mouth At Bedtime 90 tablet 1     traZODone (DESYREL) 50 MG tablet Take  mg by mouth       triamcinolone (KENALOG) 0.1 % external ointment Apply topically 2 times daily 80 g 0     venlafaxine (EFFEXOR-ER) 150 MG TB24 24 hr tablet Take 150 mg by mouth daily (with breakfast)         Allergies   Allergen Reactions     Rifampin Anaphylaxis     Elevated lfts     Motrin [Ibuprofen] Itching       REVIEW OF SYSTEMS:  CONSTITUTIONAL:  NEGATIVE for fever, chills, change in weight, not feeling tired  SKIN:  NEGATIVE for worrisome rashes, no skin lumps, no skin ulcers and no non-healing wounds  EYES:  NEGATIVE for vision changes or irritation.  ENT/MOUTH:  NEGATIVE.  No hearing loss, no hoarseness, no difficulty swallowing.  RESP:  NEGATIVE. No cough or shortness of breath.  CV:  NEGATIVE for chest pain, palpitations or peripheral edema  GI:  NEGATIVE for nausea, abdominal pain, heartburn, or change in bowel habits  :  Negative. No dysuria, no hematuria  MUSCULOSKELETAL:  See HPI above  NEURO:  NEGATIVE . No headaches, no dizziness,  no numbness  ENDOCRINE:  NEGATIVE for temperature intolerance, skin/hair changes  HEME/ALLERGY/IMMUNE:  NEGATIVE for bleeding problems  PSYCHIATRIC:  NEGATIVE. no anxiety, no depression.     Exam:  Vitals: BP (!) 156/82   Pulse 66   Resp 16   Wt 109.3 kg (241 lb)   BMI 36.64 kg/m    BMI= Body mass index is 36.64 kg/m .  Constitutional:  healthy, alert and no distress  Neuro: Alert and Oriented x 3, Sensation grossly WNL.  Psych: Affect normal   Respiratory: Breathing not labored.  Cardiovascular: normal peripheral pulses  Lymph: no adenopathy  Skin: He has diffuse bruising and swelling of most of the right arm and forearm.  He has no pain in the shoulder with passive rotation from his waist out to 30 degrees external rotation.  He has no pain with resisted internal rotation of the shoulder.  He is unable to generate any force with  external rotation.  He cannot hold his arm in neutral external rotation.    Assessment:  Right shoulder dislocation reduced well 1 week ago.  He has rotator cuff dysfunction.  If it is torn, he would likely want repair, as his wife has done well with repair.  We will order MRI of right shoulder.    Oxycodone #30 written  Return to clinic 1 week or I can call him if he prefers.

## 2020-09-24 NOTE — LETTER
9/24/2020         RE: Duane E Sogge  [address] [place] [id number]        Dear Colleague,    Thank you for referring your patient, Duane E Sogge, to the [facility]. Please see a copy of my visit note below.    Duane E Sogge is a 79 year old male who is seen in emergency room  follow-up for right shoulder dislocation.  He sustained this on 9/17/2020 when he tripped in his garage at home.  He and his wife were doing a project and he was trying to move a ladder.  He thinks he tripped on obstacles on the floor and fell into the wall and then down the wall.  He sustained anterior dislocation of the shoulder which was reduced in the emergency room.  He was placed into a sling and sent to us for evaluation.  He describes constant pain in the shoulder rated 5-6 out of 10.  He has been taking oxycodone and Tylenol for pain.    Past Medical History:   Diagnosis Date     Anxiety      Hypertension        Past Surgical History:   Procedure Laterality Date     COLONOSCOPY N/A 8/26/2019    Procedure: COLONOSCOPY;  Surgeon: Asa Oliver MD;  Location:  GI     ORTHOPEDIC SURGERY         Family History   Problem Relation Age of Onset     Hypertension Mother      Lung Cancer Sister      Thyroid Disease No family hx of      Diabetes No family hx of      Glaucoma No family hx of      Macular Degeneration No family hx of      Melanoma No family hx of      Skin Cancer No family hx of        Social History     Socioeconomic History     Marital status:      Spouse name: Not on file     Number of children: Not on file     Years of education: Not on file     Highest education level: Not on file   Occupational History     Not on file   Social Needs     Financial resource strain: Not on file     Food insecurity     Worry: Not on file     Inability: Not on file     Transportation needs     Medical: Not on file     Non-medical: Not on file   Tobacco Use     Smoking status: Never Smoker     Smokeless  "tobacco: Current User     Types: Snuff     Tobacco comment: \"herbal snuff\" - tobacco free   Substance and Sexual Activity     Alcohol use: Yes     Alcohol/week: 5.0 - 6.0 standard drinks     Types: 3 Cans of beer, 2 - 3 Shots of liquor per week     Comment:  1 daily     Drug use: No     Sexual activity: Yes     Partners: Female   Lifestyle     Physical activity     Days per week: Not on file     Minutes per session: Not on file     Stress: Not on file   Relationships     Social connections     Talks on phone: Not on file     Gets together: Not on file     Attends Taoism service: Not on file     Active member of club or organization: Not on file     Attends meetings of clubs or organizations: Not on file     Relationship status: Not on file     Intimate partner violence     Fear of current or ex partner: Not on file     Emotionally abused: Not on file     Physically abused: Not on file     Forced sexual activity: Not on file   Other Topics Concern     Parent/sibling w/ CABG, MI or angioplasty before 65F 55M? Not Asked   Social History Narrative     Not on file       Current Outpatient Medications   Medication Sig Dispense Refill     allopurinol (ZYLOPRIM) 100 MG tablet TAKE 1 TABLET EVERY DAY 90 tablet 3     ALPRAZolam (XANAX) 0.5 MG tablet Take 1 tab up to 4x daily only if needed. Refill when due,no early refills  Indications: Feeling Anxious       amitriptyline (ELAVIL) 10 MG tablet Take 1 tablet (10 mg) by mouth At Bedtime 30 tablet 2     augmented betamethasone dipropionate (DIPROLENE-AF) 0.05 % external cream Apply sparingly to affected area twice daily as needed.  Do not apply to face. 100 g 6     calcium carbonate (OS-JEFE) 500 MG tablet Take 1 tablet by mouth 2 times daily       cetirizine (ZYRTEC) 10 MG tablet Take 10 mg by mouth daily       lisinopril-hydrochlorothiazide (ZESTORETIC) 20-12.5 MG tablet TAKE 2 TABLETS EVERY  tablet 0     metoprolol succinate ER (TOPROL-XL) 50 MG 24 hr tablet Take 1 " tablet (50 mg) by mouth daily 90 tablet 1     MIRTAZAPINE PO Take 15 mg by mouth At Bedtime       oxyCODONE (ROXICODONE) 5 MG tablet Take 1 tablet (5 mg) by mouth every 6 hours as needed for moderate to severe pain 20 tablet 0     simvastatin (ZOCOR) 20 MG tablet Take 1 tablet (20 mg) by mouth At Bedtime 90 tablet 1     traZODone (DESYREL) 50 MG tablet Take  mg by mouth       triamcinolone (KENALOG) 0.1 % external ointment Apply topically 2 times daily 80 g 0     venlafaxine (EFFEXOR-ER) 150 MG TB24 24 hr tablet Take 150 mg by mouth daily (with breakfast)         Allergies   Allergen Reactions     Rifampin Anaphylaxis     Elevated lfts     Motrin [Ibuprofen] Itching       REVIEW OF SYSTEMS:  CONSTITUTIONAL:  NEGATIVE for fever, chills, change in weight, not feeling tired  SKIN:  NEGATIVE for worrisome rashes, no skin lumps, no skin ulcers and no non-healing wounds  EYES:  NEGATIVE for vision changes or irritation.  ENT/MOUTH:  NEGATIVE.  No hearing loss, no hoarseness, no difficulty swallowing.  RESP:  NEGATIVE. No cough or shortness of breath.  CV:  NEGATIVE for chest pain, palpitations or peripheral edema  GI:  NEGATIVE for nausea, abdominal pain, heartburn, or change in bowel habits  :  Negative. No dysuria, no hematuria  MUSCULOSKELETAL:  See HPI above  NEURO:  NEGATIVE . No headaches, no dizziness,  no numbness  ENDOCRINE:  NEGATIVE for temperature intolerance, skin/hair changes  HEME/ALLERGY/IMMUNE:  NEGATIVE for bleeding problems  PSYCHIATRIC:  NEGATIVE. no anxiety, no depression.     Exam:  Vitals: BP (!) 156/82   Pulse 66   Resp 16   Wt 109.3 kg (241 lb)   BMI 36.64 kg/m    BMI= Body mass index is 36.64 kg/m .  Constitutional:  healthy, alert and no distress  Neuro: Alert and Oriented x 3, Sensation grossly WNL.  Psych: Affect normal   Respiratory: Breathing not labored.  Cardiovascular: normal peripheral pulses  Lymph: no adenopathy  Skin: He has diffuse bruising and swelling of most of the  right arm and forearm.  He has no pain in the shoulder with passive rotation from his waist out to 30 degrees external rotation.  He has no pain with resisted internal rotation of the shoulder.  He is unable to generate any force with external rotation.  He cannot hold his arm in neutral external rotation.    Assessment:  Right shoulder dislocation reduced well 1 week ago.  He has rotator cuff dysfunction.  If it is torn, he would likely want repair, as his wife has done well with repair.  We will order MRI of right shoulder.    Return to clinic 1 week or I can call him if he prefers.    Again, thank you for allowing me to participate in the care of your patient.        Sincerely,        Juaquin Sanchez MD

## 2020-09-29 ENCOUNTER — TELEPHONE (OUTPATIENT)
Dept: ORTHOPEDICS | Facility: CLINIC | Age: 80
End: 2020-09-29

## 2020-09-29 DIAGNOSIS — S43.004A DISLOCATION OF RIGHT SHOULDER JOINT, INITIAL ENCOUNTER: Primary | ICD-10-CM

## 2020-09-29 DIAGNOSIS — S46.011A TRAUMATIC COMPLETE TEAR OF RIGHT ROTATOR CUFF, INITIAL ENCOUNTER: ICD-10-CM

## 2020-09-29 RX ORDER — HYDROMORPHONE HYDROCHLORIDE 2 MG/1
2 TABLET ORAL EVERY 6 HOURS PRN
Qty: 20 TABLET | Refills: 0 | Status: SHIPPED | OUTPATIENT
Start: 2020-09-29 | End: 2020-10-06

## 2020-09-29 NOTE — TELEPHONE ENCOUNTER
Reason for Call:  Other call back    Detailed comments: Pt stated that the Oxy is not working. Can you send over a new RX to [facility]. Please call pt and let him know.     Phone Number Patient can be reached at: [phone]    Best Time: Na    Can we leave a detailed message on this number? YES    Call taken on 9/29/2020 at 9:48 AM by Lay Johnson

## 2020-09-29 NOTE — TELEPHONE ENCOUNTER
Called patient to talk about the pain medication. Oxy and Tylenol are not helping. They are wanting something strong and specifically asked for Dilaudid. Wife states that he has had to take this before for pain control. Discussed with them that I would give Dr. Sanchez this information and it would be up to him to make the decision to prescribe Dilaudid. They voiced their understanding and had no further questions.

## 2020-09-29 NOTE — TELEPHONE ENCOUNTER
Returned call to pt he has c/o pain. He is taking oxy and alternating with tylenol. Allergy noted to motrin. He is also Icing and resting. I let him know I would sent the message to the provider.

## 2020-09-30 ENCOUNTER — HOSPITAL ENCOUNTER (OUTPATIENT)
Dept: MRI IMAGING | Facility: CLINIC | Age: 80
Discharge: HOME | End: 2020-09-30
Attending: PHYSICIAN ASSISTANT | Admitting: PHYSICIAN ASSISTANT
Payer: COMMERCIAL

## 2020-09-30 DIAGNOSIS — S43.004A DISLOCATION OF RIGHT SHOULDER JOINT, INITIAL ENCOUNTER: ICD-10-CM

## 2020-09-30 PROCEDURE — 73221 MRI JOINT UPR EXTREM W/O DYE: CPT | Mod: RT

## 2020-09-30 NOTE — PROGRESS NOTES
Appropriate assistive devices provided during their visit. yes (Yes, No, N/A) N/A (list device)    Exam table and/or cart  placed in the lowest position. yes (Yes, No, N/A)    Brakes on tables/carts/wheelchairs used at all times. yes (Yes, No, N/A)    Non slip footwear applied. N/A (Yes, No, NA)    Patient was accompanied by staff throughout visit. yes (Yes, No, N/A)    Equipment safety straps used. N/A (Yes, No, N/A)    Assist with toileting. N/A (Yes, No, N/A)

## 2020-10-05 ENCOUNTER — OFFICE VISIT (OUTPATIENT)
Dept: ORTHOPEDICS | Facility: CLINIC | Age: 80
End: 2020-10-05
Payer: COMMERCIAL

## 2020-10-05 VITALS
SYSTOLIC BLOOD PRESSURE: 153 MMHG | RESPIRATION RATE: 14 BRPM | HEIGHT: 68 IN | DIASTOLIC BLOOD PRESSURE: 77 MMHG | WEIGHT: 241 LBS | HEART RATE: 56 BPM | BODY MASS INDEX: 36.53 KG/M2

## 2020-10-05 DIAGNOSIS — S43.004D DISLOCATION OF RIGHT SHOULDER JOINT, SUBSEQUENT ENCOUNTER: Primary | ICD-10-CM

## 2020-10-05 DIAGNOSIS — L30.0 NUMMULAR DERMATITIS: ICD-10-CM

## 2020-10-05 DIAGNOSIS — L81.4 LENTIGO: ICD-10-CM

## 2020-10-05 DIAGNOSIS — D23.9 DERMAL NEVUS: ICD-10-CM

## 2020-10-05 DIAGNOSIS — L82.1 SEBORRHEIC KERATOSIS: ICD-10-CM

## 2020-10-05 DIAGNOSIS — S46.011D TRAUMATIC COMPLETE TEAR OF RIGHT ROTATOR CUFF, SUBSEQUENT ENCOUNTER: ICD-10-CM

## 2020-10-05 DIAGNOSIS — D18.01 ANGIOMA OF SKIN: ICD-10-CM

## 2020-10-05 PROCEDURE — 99213 OFFICE O/P EST LOW 20 MIN: CPT | Performed by: ORTHOPAEDIC SURGERY

## 2020-10-05 RX ORDER — AMITRIPTYLINE HYDROCHLORIDE 10 MG/1
10 TABLET ORAL AT BEDTIME
Qty: 30 TABLET | Refills: 0 | Status: SHIPPED | OUTPATIENT
Start: 2020-10-05 | End: 2020-11-17

## 2020-10-05 ASSESSMENT — MIFFLIN-ST. JEOR: SCORE: 1764.73

## 2020-10-05 NOTE — TELEPHONE ENCOUNTER
Due now for 6 month Derm f/u office visit.  Letter sent and note sent to pharmacy as well. Lor Strong RN

## 2020-10-05 NOTE — LETTER
[facility] [place]  [address] [facility] [address]  Phone: [phone]    October 5, 2020    Duane E Sogge                                                                                                                   [address] [facility] [address]            Dear Mr. Stanley,    We are concerned about your health care.  We recently provided you with a medication refill.  Many medications require routine follow-up with your Dermatology Provider.      At this time we ask that: You schedule a routine office visit with your physician to follow your Nummular dermatitis. Per 4-9-20 Virtual video Dermatology dictation, you were to return for follow up in 6 months.    Your prescription: Has been refilled for 1 month so you may have time for the above noted follow-up. Please be seen prior to needing your next refill of medication and you may be seen by virtual video office visit if you prefer due to the Covid-19 pandemic.     Thank you,      Mary Jo DOMINIQUE / hugh

## 2020-10-05 NOTE — TELEPHONE ENCOUNTER
Requested Prescriptions   Pending Prescriptions Disp Refills     amitriptyline (ELAVIL) 10 MG tablet 30 tablet 2     Sig: Take 1 tablet (10 mg) by mouth At Bedtime       There is no refill protocol information for this order        Last office visit: 11/20/2019 with prescribing provider:  ERROL Almanza   Future Office Visit:   Next 5 appointments (look out 90 days)    Oct 05, 2020  1:30 PM  Return Visit with Juaquin Sanchez MD  [facility] ([facility]) [address] [facility] [address]  [phone]               Denise Behrendt  Specialty CSS

## 2020-10-05 NOTE — LETTER
10/5/2020         RE: Duane E Sogge  [address] [place] [id number]        Dear Colleague,    Thank you for referring your patient, Duane E Sogge, to the [facility]. Please see a copy of my visit note below.    Duane E Sogge returns for his right shoulder MRI results.  MRI images were independently visualized with the patient.  These show massive 4 tendon rotator cuff tear with retraction, moderate acromio-clavicular hypertrophy, a type 2 acromion, biceps tendon dislocation , likely anterior and inferior glenoid labrum tear, no glenohumeral osteoarthritis.      Impression:  Massive right rotator cuff tear following shoulder dislocation.  I do not feel I can adequately repair this.  He would like to repair it so he can continue to hunt.    We discussed options, risks, benefits of conservative and surgical treatment.    I recommend [facility] referral to shoulder department for possible surgery.    Total time spent was 15 minutes; with 15 minutes spent face-to-face with patient discussing test results, treatment options, and estimated recovery time.              Again, thank you for allowing me to participate in the care of your patient.        Sincerely,        Juaquin Sanchez MD

## 2020-10-05 NOTE — PROGRESS NOTES
Duane E Sogge returns for his right shoulder MRI results.  MRI images were independently visualized with the patient.  These show massive 4 tendon rotator cuff tear with retraction, moderate acromio-clavicular hypertrophy, a type 2 acromion, biceps tendon dislocation , likely anterior and inferior glenoid labrum tear, no glenohumeral osteoarthritis.      Impression:  Massive right rotator cuff tear following shoulder dislocation.  I do not feel I can adequately repair this.  He would like to repair it so he can continue to hunt.    We discussed options, risks, benefits of conservative and surgical treatment.    I recommend [facility] referral to shoulder department for possible surgery.    Total time spent was 15 minutes; with 15 minutes spent face-to-face with patient discussing test results, treatment options, and estimated recovery time.

## 2020-10-08 ENCOUNTER — OFFICE VISIT (OUTPATIENT)
Dept: ORTHOPEDICS | Facility: CLINIC | Age: 80
End: 2020-10-08
Payer: COMMERCIAL

## 2020-10-08 VITALS — DIASTOLIC BLOOD PRESSURE: 79 MMHG | HEART RATE: 94 BPM | SYSTOLIC BLOOD PRESSURE: 154 MMHG

## 2020-10-08 DIAGNOSIS — S46.011D TRAUMATIC COMPLETE TEAR OF RIGHT ROTATOR CUFF, SUBSEQUENT ENCOUNTER: Primary | ICD-10-CM

## 2020-10-08 PROCEDURE — 99213 OFFICE O/P EST LOW 20 MIN: CPT | Performed by: ORTHOPAEDIC SURGERY

## 2020-10-08 ASSESSMENT — PAIN SCALES - GENERAL: PAINLEVEL: MILD PAIN (3)

## 2020-10-08 NOTE — PATIENT INSTRUCTIONS
[facility] (Physical Therapy):  [phone]    Thanks for coming today.  Ortho/Sports Medicine Clinic  [address] [place] [address]    To schedule future appointments in Ortho Clinic, you may call [phone].    To schedule ordered imaging by your provider:   Call Central Imaging [phone]    To schedule an injection ordered by your provider:  Call Central Imaging Injection scheduling line: [phone]  [facility] available online at:  [email or web address]    Please call if any further questions or concerns ([phone]).  Clinic hours 8 am to 5 pm.    Return to clinic (call) if symptoms worsen or fail to improve.

## 2020-10-08 NOTE — LETTER
10/8/2020         RE: Duane E Sogge  [address] [facility] [address]        Dear Colleague,    Thank you for referring your patient, Duane E Sogge, to the [facility]. Please see a copy of my visit note below.    CHIEF CONCERN:  Right shoulder pain, constant, numbness and tingling into hands and fingers.     HISTORY OF PRESENT ILLNESS:  Mr. Stanley is a 80 year old RHD man who sustained a injury at home on 9/17/20. He had a fall in the garage, tripped and hit shoulder on wall or something. Pt doesn't remember all of it, but knows that it hurt. He went to the ED thereafter and was found to have a dislocation. This was reduced. After following up with Dr. Sanchez an MRI was obtained and based on the cuff tears seen he was referred to me. He is accompanied by his wife.   The patient reports he has kept the arm close to his body since the injury due to pain. He has been using dilaudid po or oxycodone (either from the ED or Dr. Sanchez) for pain since the injury.     Past Medical History:   Diagnosis Date     Anxiety      Hypertension        Past Surgical History:   Procedure Laterality Date     COLONOSCOPY N/A 8/26/2019    Procedure: COLONOSCOPY;  Surgeon: Asa Oliver MD;  Location:  GI     ORTHOPEDIC SURGERY         Current Outpatient Medications   Medication Sig Dispense Refill     allopurinol (ZYLOPRIM) 100 MG tablet TAKE 1 TABLET EVERY DAY 90 tablet 3     ALPRAZolam (XANAX) 0.5 MG tablet Take 1 tab up to 4x daily only if needed. Refill when due,no early refills  Indications: Feeling Anxious       amitriptyline (ELAVIL) 10 MG tablet Take 1 tablet (10 mg) by mouth At Bedtime 30 tablet 0     augmented betamethasone dipropionate (DIPROLENE-AF) 0.05 % external cream Apply sparingly to affected area twice daily as needed.  Do not apply to face. 100 g 6     calcium carbonate (OS-JEFE) 500 MG tablet Take 1 tablet by mouth 2 times daily       cetirizine (ZYRTEC) 10 MG tablet Take  "10 mg by mouth daily       lisinopril-hydrochlorothiazide (ZESTORETIC) 20-12.5 MG tablet TAKE 2 TABLETS EVERY  tablet 0     metoprolol succinate ER (TOPROL-XL) 50 MG 24 hr tablet Take 1 tablet (50 mg) by mouth daily 90 tablet 1     MIRTAZAPINE PO Take 15 mg by mouth At Bedtime       oxyCODONE (ROXICODONE) 5 MG tablet Take 1 tablet (5 mg) by mouth every 6 hours as needed for moderate to severe pain 30 tablet 0     simvastatin (ZOCOR) 20 MG tablet Take 1 tablet (20 mg) by mouth At Bedtime 90 tablet 1     traZODone (DESYREL) 50 MG tablet Take  mg by mouth       triamcinolone (KENALOG) 0.1 % external ointment Apply topically 2 times daily 80 g 0     venlafaxine (EFFEXOR-ER) 150 MG TB24 24 hr tablet Take 150 mg by mouth daily (with breakfast)            Allergies   Allergen Reactions     Rifampin Anaphylaxis     Elevated lfts     Motrin [Ibuprofen] Itching       SOCIAL HISTORY:    Social History     Socioeconomic History     Marital status:      Spouse name: Not on file     Number of children: Not on file     Years of education: Not on file     Highest education level: Not on file   Occupational History     Not on file   Social Needs     Financial resource strain: Not on file     Food insecurity     Worry: Not on file     Inability: Not on file     Transportation needs     Medical: Not on file     Non-medical: Not on file   Tobacco Use     Smoking status: Never Smoker     Smokeless tobacco: Current User     Types: Snuff     Tobacco comment: \"herbal snuff\" - tobacco free   Substance and Sexual Activity     Alcohol use: Yes     Alcohol/week: 5.0 - 6.0 standard drinks     Types: 3 Cans of beer, 2 - 3 Shots of liquor per week     Comment:  1 daily     Drug use: No     Sexual activity: Yes     Partners: Female   Lifestyle     Physical activity     Days per week: Not on file     Minutes per session: Not on file     Stress: Not on file   Relationships     Social connections     Talks on phone: Not on file    "  Gets together: Not on file     Attends [employer] service: Not on file     Active member of club or organization: Not on file     Attends meetings of clubs or organizations: Not on file     Relationship status: Not on file     Intimate partner violence     Fear of current or ex partner: Not on file     Emotionally abused: Not on file     Physically abused: Not on file     Forced sexual activity: Not on file   Other Topics Concern     Parent/sibling w/ CABG, MI or angioplasty before 65F 55M? Not Asked   Social History Narrative     Not on file       FAMILY HISTORY: Reviewed in EMR      REVIEW OF SYSTEMS: Positive for that noted in past medical history and history of present illness and otherwise reviewed in EMR     PHYSICAL EXAM:    Adult male in no acute distress. Articulates and communicates with normal affect but quite hard of hearing. Accompanied by his wife.  Respirations even and unlabored  Focused upper extremity exam: Skin intact. No erythema. Sensation intact all dermatomes into the hand to light touch. EPL, FPL, and Intrinsics intact. Right shoulder active motion is FE to 15 (passive to 75), ER at side to 5 (passive to 65 so 60 degree lag), and IR to greater troch. Left shoulder active motion is FE to 145, ER to 40, and IR to GT. Skin intact.     IMAGING:  Right shoulder MRI 9/30/20 was reviewed and I agree with the impression below:  Impression:  1. Given the extent of soft tissue injury and relatively recent  anterior shoulder dislocation event, underlying detail anatomy cannot be well assessed due to heterogeneous, presumed various age blood products in subacromial/subdeltoid/subcoracoid bursae  obliterating/obscuring normal tissue planes.  2. Massive rotator cuff tear with essential complete tear of all four  rotator cuff tendons.  3. Small Hill-Sachs lesion. No large bony Bankart.    XR from the ED visit demonstrate a glenohumeral dislocation with post-reduction views demonstrating a concentric  reduction.    ASSESSMENT:    1. Status post recent right glenohumeral dislocation  2. Right massive rotator cuff tear, complete    PLAN:  I had an extensive discussion with the patient and his wife today. We discussed the various treatment measures/options for rotator cuff tears. The patient and his wife were most interested in and set on surgical repair. However, we discussed that the biology of such a massive (4 tendon) tear lends itself to poorer healing rates after attempted cuff repair. The healing rate for this such tear in a patient age 80 years would be quite low. Therefore I do not recommend acute repair for this patient. I discussed options for pain relief including injections, NSAIDs (the patient states he can and does take Naproxen without side effects but has not taken any since his fall). I would recommend gentle and progressive mobilization with PT. If non-surgical measures were not effective for him, I would likely favor reverse TSA over cuff repair. The patient would like to proceed with an injection and this will be arranged with Dr. Haddad. The patient was encouraged to begin gentle PT exercises and that referral was placed. He will follow up in 6 weeks to determine his progress in that time.     Anca Arauz MD      Again, thank you for allowing me to participate in the care of your patient.        Sincerely,        Anca Arauz MD

## 2020-10-08 NOTE — NURSING NOTE
Duane E Sogge's chief complaint for this visit includes:  Chief Complaint   Patient presents with     Right Shoulder - Consult     Right shoulder instability, dislocation on 9/17/20, MR 9/30/20, XR 9/17/20     PCP: Asa Oliver    Referring Provider:  Juan Choe PA-C  [facility]  [address]  [place] [address]    BP (!) 154/79   Pulse 94   Mild Pain (3)     Do you need any medication refills at today's visit? n/a

## 2020-10-08 NOTE — PROGRESS NOTES
CHIEF CONCERN:  Right shoulder pain, constant, numbness and tingling into hands and fingers.     HISTORY OF PRESENT ILLNESS:  Mr. Stanley is a 80 year old RHD man who sustained a injury at home on 9/17/20. He had a fall in the garage, tripped and hit shoulder on wall or something. Pt doesn't remember all of it, but knows that it hurt. He went to the ED thereafter and was found to have a dislocation. This was reduced. After following up with Dr. Sanchez an MRI was obtained and based on the cuff tears seen he was referred to me. He is accompanied by his wife.   The patient reports he has kept the arm close to his body since the injury due to pain. He has been using dilaudid po or oxycodone (either from the ED or Dr. Sanchez) for pain since the injury.     Past Medical History:   Diagnosis Date     Anxiety      Hypertension        Past Surgical History:   Procedure Laterality Date     COLONOSCOPY N/A 8/26/2019    Procedure: COLONOSCOPY;  Surgeon: Asa Oliver MD;  Location:  GI     ORTHOPEDIC SURGERY         Current Outpatient Medications   Medication Sig Dispense Refill     allopurinol (ZYLOPRIM) 100 MG tablet TAKE 1 TABLET EVERY DAY 90 tablet 3     ALPRAZolam (XANAX) 0.5 MG tablet Take 1 tab up to 4x daily only if needed. Refill when due,no early refills  Indications: Feeling Anxious       amitriptyline (ELAVIL) 10 MG tablet Take 1 tablet (10 mg) by mouth At Bedtime 30 tablet 0     augmented betamethasone dipropionate (DIPROLENE-AF) 0.05 % external cream Apply sparingly to affected area twice daily as needed.  Do not apply to face. 100 g 6     calcium carbonate (OS-JEFE) 500 MG tablet Take 1 tablet by mouth 2 times daily       cetirizine (ZYRTEC) 10 MG tablet Take 10 mg by mouth daily       lisinopril-hydrochlorothiazide (ZESTORETIC) 20-12.5 MG tablet TAKE 2 TABLETS EVERY  tablet 0     metoprolol succinate ER (TOPROL-XL) 50 MG 24 hr tablet Take 1 tablet (50 mg) by mouth daily 90 tablet 1      "MIRTAZAPINE PO Take 15 mg by mouth At Bedtime       oxyCODONE (ROXICODONE) 5 MG tablet Take 1 tablet (5 mg) by mouth every 6 hours as needed for moderate to severe pain 30 tablet 0     simvastatin (ZOCOR) 20 MG tablet Take 1 tablet (20 mg) by mouth At Bedtime 90 tablet 1     traZODone (DESYREL) 50 MG tablet Take  mg by mouth       triamcinolone (KENALOG) 0.1 % external ointment Apply topically 2 times daily 80 g 0     venlafaxine (EFFEXOR-ER) 150 MG TB24 24 hr tablet Take 150 mg by mouth daily (with breakfast)            Allergies   Allergen Reactions     Rifampin Anaphylaxis     Elevated lfts     Motrin [Ibuprofen] Itching       SOCIAL HISTORY:    Social History     Socioeconomic History     Marital status:      Spouse name: Not on file     Number of children: Not on file     Years of education: Not on file     Highest education level: Not on file   Occupational History     Not on file   Social Needs     Financial resource strain: Not on file     Food insecurity     Worry: Not on file     Inability: Not on file     Transportation needs     Medical: Not on file     Non-medical: Not on file   Tobacco Use     Smoking status: Never Smoker     Smokeless tobacco: Current User     Types: Snuff     Tobacco comment: \"herbal snuff\" - tobacco free   Substance and Sexual Activity     Alcohol use: Yes     Alcohol/week: 5.0 - 6.0 standard drinks     Types: 3 Cans of beer, 2 - 3 Shots of liquor per week     Comment:  1 daily     Drug use: No     Sexual activity: Yes     Partners: Female   Lifestyle     Physical activity     Days per week: Not on file     Minutes per session: Not on file     Stress: Not on file   Relationships     Social connections     Talks on phone: Not on file     Gets together: Not on file     Attends Anabaptist service: Not on file     Active member of club or organization: Not on file     Attends meetings of clubs or organizations: Not on file     Relationship status: Not on file     Intimate " partner violence     Fear of current or ex partner: Not on file     Emotionally abused: Not on file     Physically abused: Not on file     Forced sexual activity: Not on file   Other Topics Concern     Parent/sibling w/ CABG, MI or angioplasty before 65F 55M? Not Asked   Social History Narrative     Not on file       FAMILY HISTORY: Reviewed in EMR      REVIEW OF SYSTEMS: Positive for that noted in past medical history and history of present illness and otherwise reviewed in EMR     PHYSICAL EXAM:    Adult male in no acute distress. Articulates and communicates with normal affect but quite hard of hearing. Accompanied by his wife.  Respirations even and unlabored  Focused upper extremity exam: Skin intact. No erythema. Sensation intact all dermatomes into the hand to light touch. EPL, FPL, and Intrinsics intact. Right shoulder active motion is FE to 15 (passive to 75), ER at side to 5 (passive to 65 so 60 degree lag), and IR to greater troch. Left shoulder active motion is FE to 145, ER to 40, and IR to GT. Skin intact.     IMAGING:  Right shoulder MRI 9/30/20 was reviewed and I agree with the impression below:  Impression:  1. Given the extent of soft tissue injury and relatively recent  anterior shoulder dislocation event, underlying detail anatomy cannot be well assessed due to heterogeneous, presumed various age blood products in subacromial/subdeltoid/subcoracoid bursae  obliterating/obscuring normal tissue planes.  2. Massive rotator cuff tear with essential complete tear of all four  rotator cuff tendons.  3. Small Hill-Sachs lesion. No large bony Bankart.    XR from the ED visit demonstrate a glenohumeral dislocation with post-reduction views demonstrating a concentric reduction.    ASSESSMENT:    1. Status post recent right glenohumeral dislocation  2. Right massive rotator cuff tear, complete    PLAN:  I had an extensive discussion with the patient and his wife today. We discussed the various treatment  measures/options for rotator cuff tears. The patient and his wife were most interested in and set on surgical repair. However, we discussed that the biology of such a massive (4 tendon) tear lends itself to poorer healing rates after attempted cuff repair. The healing rate for this such tear in a patient age 80 years would be quite low. Therefore I do not recommend acute repair for this patient. I discussed options for pain relief including injections, NSAIDs (the patient states he can and does take Naproxen without side effects but has not taken any since his fall). I would recommend gentle and progressive mobilization with PT. If non-surgical measures were not effective for him, I would likely favor reverse TSA over cuff repair. The patient would like to proceed with an injection and this will be arranged with Dr. Haddad. The patient was encouraged to begin gentle PT exercises and that referral was placed. He will follow up in 6 weeks to determine his progress in that time.     Anca Arauz MD

## 2020-10-12 ENCOUNTER — OFFICE VISIT (OUTPATIENT)
Dept: ORTHOPEDICS | Facility: CLINIC | Age: 80
End: 2020-10-12
Payer: COMMERCIAL

## 2020-10-12 VITALS — BODY MASS INDEX: 36.53 KG/M2 | HEIGHT: 68 IN | WEIGHT: 241 LBS

## 2020-10-12 DIAGNOSIS — G89.29 CHRONIC RIGHT SHOULDER PAIN: Primary | ICD-10-CM

## 2020-10-12 DIAGNOSIS — M25.511 CHRONIC RIGHT SHOULDER PAIN: Primary | ICD-10-CM

## 2020-10-12 PROCEDURE — 76942 ECHO GUIDE FOR BIOPSY: CPT | Performed by: FAMILY MEDICINE

## 2020-10-12 PROCEDURE — 64418 NJX AA&/STRD SPRSCAP NRV: CPT | Mod: RT | Performed by: FAMILY MEDICINE

## 2020-10-12 PROCEDURE — 99207 PR DROP WITH A PROCEDURE: CPT | Performed by: FAMILY MEDICINE

## 2020-10-12 RX ORDER — TRIAMCINOLONE ACETONIDE 40 MG/ML
40 INJECTION, SUSPENSION INTRA-ARTICULAR; INTRAMUSCULAR
Status: SHIPPED | OUTPATIENT
Start: 2020-10-12

## 2020-10-12 RX ADMIN — TRIAMCINOLONE ACETONIDE 40 MG: 40 INJECTION, SUSPENSION INTRA-ARTICULAR; INTRAMUSCULAR at 10:25

## 2020-10-12 ASSESSMENT — MIFFLIN-ST. JEOR: SCORE: 1764.73

## 2020-10-12 NOTE — LETTER
"    10/12/2020         RE: Duane E Sogge  [address] [place] [id number]        Dear Colleague,    Thank you for referring your patient, Duane E Sogge, to the [facility] [place]. Please see a copy of my visit note below.    Duane has severe right shoulder pain after complete tears of the rotator cuff, dislocation of the biceps tendon, and a recent dislocation of the shoulder.  He is here for a suprascapular nerve block.      Scribed by Carol Schulz ATC for Dr. Haddad on 10/12/20 at 10:20 AM, based on the providers statements to me.         [facility] FOLLOW-UP VISIT 10/12/2020    Duane E Sogge's chief complaint for this visit includes:  Chief Complaint   Patient presents with     Consult     suprascapular nerve blcok, right shoulder pain      PCP: Aas Oliver    Referring Provider:  No referring provider defined for this encounter.    Ht 1.715 m (5' 7.5\")   Wt 109.3 kg (241 lb)   BMI 37.19 kg/m    Data Unavailable       Interval History:     Follow up reason: right shoulder     Pain: Unchanged    Function: Unchanged      Medical History:    Any recent changes to your medical history? No    Any new medication prescribed since last visit? No    Review of Systems:    Do you have fever, chills, weight loss? No    Do you have any vision problems? No    Do you have any chest pain or edema? No    Do you have any shortness of breath or wheezing?  No    Do you have stomach problems? No    Do you have any urinary track issues? No    Do you have any numbness or focal weakness? No    Do you have diabetes? No    Do you have problems with bleeding or clotting? No    Do you have an rashes or other skin lesions? No    Hand / Upper Extremity Injection/Arthrocentesis    Date/Time: 10/12/2020 10:25 AM  Performed by: Juaquin Haddad DO  Authorized by: Juaquin Haddad DO     Indications:  Pain  Needle Size:  25 G  Guidance: ultrasound     Condition comment:  " Suprascapular nerve block     Medications:  40 mg triamcinolone 40 MG/ML  Outcome:  Tolerated well, no immediate complications  Procedure discussed: discussed risks, benefits, and alternatives    Consent Given by:  Patient  Timeout: timeout called immediately prior to procedure    Prep: patient was prepped and draped in usual sterile fashion       Suprascapular Nerve Injection - Ultrasound Guided    The patient was informed of the risks and the benefits of the procedure and a written consent was signed.    The patient s right scapula was prepped with chlorhexidine in sterile fashion.   40 mg of triamcinolone suspension was drawn up into a 5 mL syringe with 4 mL of 1% lidocaine.  Injection was performed using sterile technique.  Under ultrasound guidance a 3.5-inch 22-gauge needle was used to visualize the suprascapular nerve at the spinoglenoid notch.  Injection performed in long axis to the probe with a medial to lateral approach.    There were no complications. The patient tolerated the procedure well. There was negligible bleeding.   The patient was instructed to ice the shoulder upon leaving clinic and refrain from overuse over the next 3 days.   The patient was instructed to call or go to the emergency room with any unusual pain, swelling, redness, or if otherwise concerned.                Again, thank you for allowing me to participate in the care of your patient.        Sincerely,        Juaquin Haddad DO

## 2020-10-12 NOTE — PROGRESS NOTES
"Duane has severe right shoulder pain after complete tears of the rotator cuff, dislocation of the biceps tendon, and a recent dislocation of the shoulder.  He is here for a suprascapular nerve block.      Scribed by Carol Schulz ATC for Dr. Haddad on 10/12/20 at 10:20 AM, based on the providers statements to me.         [facility] FOLLOW-UP VISIT 10/12/2020    Duane E Sogge's chief complaint for this visit includes:  Chief Complaint   Patient presents with     Consult     suprascapular nerve blcok, right shoulder pain      PCP: Asa Oliver    Referring Provider:  No referring provider defined for this encounter.    Ht 1.715 m (5' 7.5\")   Wt 109.3 kg (241 lb)   BMI 37.19 kg/m    Data Unavailable       Interval History:     Follow up reason: right shoulder     Pain: Unchanged    Function: Unchanged      Medical History:    Any recent changes to your medical history? No    Any new medication prescribed since last visit? No    Review of Systems:    Do you have fever, chills, weight loss? No    Do you have any vision problems? No    Do you have any chest pain or edema? No    Do you have any shortness of breath or wheezing?  No    Do you have stomach problems? No    Do you have any urinary track issues? No    Do you have any numbness or focal weakness? No    Do you have diabetes? No    Do you have problems with bleeding or clotting? No    Do you have an rashes or other skin lesions? No    Hand / Upper Extremity Injection/Arthrocentesis    Date/Time: 10/12/2020 10:25 AM  Performed by: Juaquin Haddad DO  Authorized by: Juaquin Haddad DO     Indications:  Pain  Needle Size:  25 G  Guidance: ultrasound     Condition comment:  Suprascapular nerve block     Medications:  40 mg triamcinolone 40 MG/ML  Outcome:  Tolerated well, no immediate complications  Procedure discussed: discussed risks, benefits, and alternatives    Consent Given by:  Patient  Timeout: timeout called immediately prior to " procedure    Prep: patient was prepped and draped in usual sterile fashion       Suprascapular Nerve Injection - Ultrasound Guided    The patient was informed of the risks and the benefits of the procedure and a written consent was signed.    The patient s right scapula was prepped with chlorhexidine in sterile fashion.   40 mg of triamcinolone suspension was drawn up into a 5 mL syringe with 4 mL of 1% lidocaine.  Injection was performed using sterile technique.  Under ultrasound guidance a 3.5-inch 22-gauge needle was used to visualize the suprascapular nerve at the spinoglenoid notch.  Injection performed in long axis to the probe with a medial to lateral approach.    There were no complications. The patient tolerated the procedure well. There was negligible bleeding.   The patient was instructed to ice the shoulder upon leaving clinic and refrain from overuse over the next 3 days.   The patient was instructed to call or go to the emergency room with any unusual pain, swelling, redness, or if otherwise concerned.

## 2020-10-13 ENCOUNTER — VIRTUAL VISIT (OUTPATIENT)
Dept: PSYCHOLOGY | Facility: CLINIC | Age: 80
End: 2020-10-13
Payer: COMMERCIAL

## 2020-10-13 DIAGNOSIS — F34.1: Primary | ICD-10-CM

## 2020-10-13 DIAGNOSIS — F41.1 GAD (GENERALIZED ANXIETY DISORDER): ICD-10-CM

## 2020-10-13 PROCEDURE — 90791 PSYCH DIAGNOSTIC EVALUATION: CPT | Mod: 95 | Performed by: SOCIAL WORKER

## 2020-10-13 NOTE — PROGRESS NOTES
"  [facility]  Evaluator Name:  Gokul Colmenares,     Credentials: YONI, ERNESTO    PATIENT'S NAME: Duane E Sogge  PREFERRED NAME: Duane  PRONOUNS:   He/Him    MRN:   [id number]  :   1940   ACCT. NUMBER: [id number]  DATE OF SERVICE: 10/13/20  START TIME: 4:06  END TIME: 4:51  PREFERRED PHONE: [phone]  May we leave a program related message: Yes  SERVICE MODALITY:  Video Visit:    Telemedicine Visit: The patient's condition can be safely assessed and treated via synchronous audio and visual telemedicine encounter.      Reason for Telemedicine Visit: Services only offered telehealth    Originating Site (Patient Location): Patient's home    Distant Site (Provider Location): [facility]    Consent:  The patient/guardian has verbally consented to: the potential risks and benefits of telemedicine (video visit) versus in person care; bill my insurance or make self-payment for services provided; and responsibility for payment of non-covered services.     Patient would like the video invitation sent by: Send to e-mail at: [email or web address]    Mode of Communication:  Video Conference via [employer]    As the provider I attest to compliance with applicable laws and regulations related to telemedicine.    UNIVERSAL ADULT DIAGNOSTIC ASSESSMENT      Identifying Information:  Patient is a 80 year old, .  The pronoun use throughout this assessment reflects the patient's chosen pronoun.  Patient was referred for an assessment by primary care provider.  Patient attended the session alone.     Chief Complaint:   The reason for seeking services at this time is: \" long term depression and anxiety issues, recent DUI \"   The problem(s) began over several years and more intensely in last few months. Patient has attempted to resolve these concerns in the past through counseling, psychiatry and hospitalizations throughout his " life.    Social/Family History:  Patient reported they grew up in [place], [place].  They were raised by biological parents.  Parents stayed ..   Patient reported that their childhood was very good, loving and supportive.  Patient described their current relationships with family of origin as Parents are  and patient has one sister good relationship.       The patient describes their cultural background as [place].  Cultural influences and impact on patient's life structure, values, norms, and healthcare: None identified.  Contextual influences on patient's health include: Family Factors Son passed away from car accident at age 19. .    These factors will be addressed in the Preliminary Treatment plan.  Patient identified their preferred language to be English. Patient reported they do not need the assistance of an  or other support involved in therapy.     Patient reported had no significant delays in developmental tasks.   Patient's highest education level was high school graduate. Patient identified the following learning problems: none reported.  Modifications will not be used to assist communication in therapy.   Patient reports they are  able to understand written materials.    Patient reported the following relationship history; currently .  Patient's current relationship status is  for 45 years.   Patient identified their sexual orientation as heterosexual.  Patient reported having two child(nichelle). Son passed away at age 19. Patient identified spouse and daughter and family as part of their support system.  Patient identified the quality of these relationships as stable and meaningful.      Patient's current living/housing situation involves staying in own home/apartment.  They live with spouse and they report that housing is stable.     Patient is currently retired.  Patient reports their finances are obtained through spouse and long term funds.  Patient does not identify  finances as a current stressor.      Patient reported that they have been involved with the legal system.  2 DWI's in last seven years, most recent in May 2020. Patient denies being on probation / parole / under the jurisdiction of the court.    Patient's Strengths and Limitations:  Patient identified the following strengths or resources that will help them succeed in treatment: family support and enjoys outdoor activities, hunting and fishing and spending time withhis grand children and daughter. Things that may interfere with the patient's success in treatment include: None identified.     Personal and Family Medical History:   Patient does not report a family history of mental health concerns.  Patient reports family history includes Hypertension in his mother; Lung Cancer in his sister..     Patient does report Mental Health Diagnosis and/or Treatment.   Patient reported the following previous diagnoses which include(s): an Anxiety Disorder and Depression.  Patient reported symptoms began when he was 22 years old.   Patient has received mental health services in the past: psychiatry, counseling and inpatient hospitalization.  Psychiatric Hospitalizations: Inpatient hospitalization for 1.5 weeks due to depression at age 22.  .  Patient denies a history of civil commitment.  Currently, patient is receiving other mental health services.  These include psychiatry.           Patient has not had a physical exam to rule out medical causes for current symptoms.  Date of last physical exam was greater than a year ago and client was encouraged to schedule an exam with PCP. The patient has a [facility] Provider, who is named Asa Oliver..  Patient reports the following current medical concerns: See list in EHR.  There are not significant appetite / nutritional concerns / weight changes. Patient would like to lose weight.   Patient does not report a history of head injury / trauma / cognitive  impairment.      Patient reports current meds as:   No outpatient medications have been marked as taking for the 10/13/20 encounter (Virtual Visit) with Gokul Colmenares LICSW.     Current Facility-Administered Medications for the 10/13/20 encounter (Virtual Visit) with Gokul Colmenares LICSW   Medication     triamcinolone (KENALOG-40) injection 40 mg       Medication Adherence:  Patient reports taking prescribed medications as prescribed.    Patient Allergies:    Allergies   Allergen Reactions     Rifampin Anaphylaxis     Elevated lfts     Motrin [Ibuprofen] Itching       Medical History:    Past Medical History:   Diagnosis Date     Anxiety      Hypertension          Current Mental Status Exam:   Appearance:  Could not assess due to telephone session    Eye Contact:  Could not assess due to telephone session   Psychomotor:  Restless       Gait / station:  unsteady  Attitude / Demeanor: Cooperative  Friendly Pleasant  Speech      Rate / Production: Slow       Volume:  Normal  volume      Language:  undefined difficulty  Mood:   Anxious  Depressed   Affect:   Blunted  Worrisome    Thought Content: Rumination   Thought Process: Blocking       Associations: Would need to be assessed  Insight:   Fair   Judgment:  Poor  Orientation:  All  Attention/concentration: Fair    Rating Scales:    PHQ9:    PHQ-9 SCORE 6/12/2019 8/20/2020   PHQ-9 Total Score 2 4   ;    GAD7:    EDILSON-7 SCORE 8/20/2020   Total Score 5     CGI:     First:Considering your total clinical experience with this particular patient population, how severe are the patient's symptoms at this time?: 3 (8/20/2020  2:22 PM)  ;    Most recentCompared to the patient's condition at the START of treatment, this patient's condition is: 4 (8/20/2020  2:22 PM)      Substance Use:  Patient did not report a family history of substance use concerns; see medical history section for details.  Patient has received chemical dependency treatment in the past at unknown facility:   CD evaluation and 2 day alcohol awareness class after first DUI. .  Patient has not ever been to detox.      Patient is not currently receiving any chemical dependency treatment. Patient reported the following problems as a result of their substance use: DUI.    Patient reports using alcohol 2 times per week and has 2 to 4 beers at a time. Patient first started drinking at age 9.  Patient reported date of last use was last week.  Patient reports heaviest use was younger.  Patient denies using tobacco.  Patient denies using marijuana.  Patient reports using caffeine 1 times per day and drinks 1 at a time. Patient started using caffeine at age 5.  Patient reports using/abusing the following substance(s). Patient reported no other substance use.     CAGE- AID:    CAGE-AID Total Score 10/14/2020   Total Score 1       Substance Use: driving under the influence    Based on the negative CAGE score and clinical interview there  there ar not current issues based on patient responses but due to two DUI's in last 7 years it would be recommended that he look int further evaluation of alcohol abuse and possible Alcohol tx. .      Significant Losses / Trauma / Abuse / Neglect Issues:   Patient did serve in the .  There are indications or report of significant loss, trauma, abuse or neglect issues related to: death of son.  Concerns for possible neglect are not present.     Safety Assessment:   Current Safety Concerns:  [place] Suicide Severity Rating Scale (Lifetime/Recent)  [place] Suicide Severity Rating (Lifetime/Recent) 8/20/2020   1. Wish to be Dead (Lifetime) No   1. Wish to be Dead (Recent) No   2. Non-Specific Active Suicidal Thoughts (Lifetime) No   2. Non-Specific Active Suicidal Thoughts (Recent) No   3. Active Suicidal Ideation with any Methods (Not Plan) Without Intent to Act (Lifetime) No   3. Active Sucidal Ideation with any Methods (Not Plan) Without Intent to Act (Recent) No   4. Active Suicidal  Ideation with Some Intent to Act, Without Specific Plan (Lifetime) No   4. Active Suicidal Ideation with Some Intent to Act, Without Specific Plan (Recent) No   5. Active Suicidal Ideation with Specific Plan and Intent (Lifetime) No   5. Active Suicidal Ideation with Specific Plan and Intent (Recent) No   Most Severe Ideation Rating (Lifetime) NA   Frequency (Lifetime) NA   Duration (Lifetime) NA   Controllability (Lifetime) NA   Protective Factors  (Lifetime) NA   Most Severe Ideation Rating (Past Month) NA   Frequency (Past Month) NA   Duration (Past Month) NA   Controllability (Past Month) NA   Protective Factors (Past Month) NA   Reasons for Ideation (Past Month) NA   Actual Attempt (Past 3 Months) No   Has subject engaged in non-suicidal self-injurious behavior? (Lifetime) No   Has subject engaged in non-suicidal self-injurious behavior? (Past 3 Months) No   Interrupted Attempts (Lifetime) No   Aborted or Self-Interrupted Attempt (Lifetime) No   Aborted or Self-Interrupted Attempt (Past 3 Months) No   Preparatory Acts or Behavior (Lifetime) No   Preparatory Acts or Behavior (Past 3 Months) No   Most Recent Attempt Actual Lethality Code NA   Most Lethal Attempt Actual Lethality Code NA   Initial/First Attempt Actual Lethality Code NA     Patient denies current homicidal ideation and behaviors.  Patient denies current self-injurious ideation and behaviors.    Patient reported unsafe motor vehicle operation associated with substance use.  Patient denies any high risk behaviors associated with mental health symptoms.  Patient reports the following current concerns for their personal safety: None.  Patient reports there are  firearms in the house. The firearms are not secured in a locked space. Client was advised to secure all firearms.     History of Safety Concerns:  Patient denied a history of homicidal ideation.     Patient denied a history of personal safety concerns.    Patient denied a history of assaultive  behaviors.    Patient denied a history of sexual assault behaviors.     Patient reported a history unsafe motor vehicle operation associated with substance use.  Patient denies any history of high risk behaviors associated with mental health symptoms.  Patient reports the following protective factors: dedication to family/friends, safe and stable environment, adherence with prescribed medication and living with other people    Risk Plan:  See Recommendations for Safety and Risk Management Plan    Review of Symptoms per patient report:  Depression: Change in sleep, Excessive or inappropriate guilt, Difficulties concentrating, Ruminations and Feeling sad, down, or depressed  Samia:  No Symptoms  Psychosis: No Symptoms  Anxiety: Excessive worry, Nervousness, Sleep disturbance, Psychomotor agitation, Ruminations and Poor concentration  Panic:  None identified  Post Traumatic Stress Disorder:  No Symptoms   Eating Disorder: No Symptoms  ADD / ADHD:  Inattentive, Distractibility, Forgetful and Restlessness/fidgety  Conduct Disorder: No symptoms  Autism Spectrum Disorder: No symptoms  Obsessive Compulsive Disorder: No Symptoms    Patient reports the following compulsive behaviors and treatment history: None identified.      Diagnostic Criteria:   A. Excessive anxiety and worry about a number of events or activities (such as work or school performance).   B. The person finds it difficult to control the worry.  C. Select 3 or more symptoms (required for diagnosis). Only one item is required in children.   - Restlessness or feeling keyed up or on edge.    - Being easily fatigued.    - Difficulty concentrating or mind going blank.    - Irritability.    - Muscle tension.    - Sleep disturbance (difficulty falling or staying asleep, or restless unsatisfying sleep).   D. The focus of the anxiety and worry is not confined to features of an Axis I disorder.  E. The anxiety, worry, or physical symptoms cause clinically significant  distress or impairment in social, occupational, or other important areas of functioning.   F. The disturbance is not due to the direct physiological effects of a substance (e.g., a drug of abuse, a medication) or a general medical condition (e.g., hyperthyroidism) and does not occur exclusively during a Mood Disorder, a Psychotic Disorder, or a Pervasive Developmental Disorder.  A. Depressed mood for most of the day, for more days than not, as indicated either by subjective account or observation by others, for at least 2 years. Note: In children and adolescents, mood can be irritable and duration must be at least 1 year.   B. Presence, while depressed, of two (or more) of the following:        - poor appetite or overeating        - insomnia or hypersomnia       - low energy or fatigue        - low self-esteem        - poor concentration or difficulty making decisions   C. During the 2-year period (1 year for children or adolescents) of the disturbance, the person has never been without the symptoms in Criteria A and B for more than 2 months at a time.  D. Criteria for a major depressive disorder may be continously present for 2 years  E. There has never been a Manic Episode, a Mixed Episode, or a Hypomanic Episode, and criteria have never been met for Cyclothymic Disorder.   F. The disturbance is not better explained by a persistent schizoaffective disorder, schizophrenia, delusional disorder, or other specified or unspecified schizophrenia spectrum and other psychotic disorder  G. The symptoms are not attributable to the physiological effects of a substance (e.g., a drug of abuse, a medication) or another medical condition (e.g., hypothyroidism).   H. The symptoms cause clinically significant distress or impairment in social, occupational, or other important areas of functioning.        - Late Onset: if onset is age 21 years or older     Functional Status:  Patient reports the following functional impairments:  health maintenance and management of the household and or completion of tasks.     WHODAS:   WHODAS 2.0 Total Score 8/20/2020   Total Score 21       Clinical Summary:  1. Reason for assessment:  suggested that he engage in counseling after recent DUI  .  2. Psychosocial, Cultural and Contextual Factors: Lost son when he was 18 years old  .  3. 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  4. Other Diagnoses that is relevant to services: Diagnosed with Panic Disorder in the past   5. Provisional Diagnosis:  Substance-Related & Addictive Disorders Alcohol Use Disorder   303.90 (F10.20) Moderate continues to drink as evidenced by self report .  6. Prognosis: Expect Improvement and Relieve Acute Symptoms.  7. Likely consequences of symptoms if not treated: continued mood issues and possible legal issues if sustained alcohol abuse.  8. Client strengths include:  caring, has a previous history of therapy, open to learning, support of family, friends and providers and supportive .     Recommendations:     1. Plan for Safety and Risk Management:   Recommended that patient call 911 or go to the local ED should there be a change in any of these risk factors..          Report to child / adult protection services was NA.     2. Patient's identified no cultural issues or concerns.     3. Initial Treatment will focus on:    Depressed Mood - decrease depression symptoms and improve overall daily functioning  Anxiety - alleviate anxiety and return to normal daily functioning  Alcohol / Substance Use - moderation management or consider alcohol tx options. .     4. Resources/Service Plan:    services are not indicated.   Modifications to assist communication are not indicated.   Additional disability accommodations are not indicated.      5.  Collaboration:   Collaboration / coordination of treatment will be initiated with the following  support professionals: collaborate with medical providers as needed.      6.  Referrals:   The following referral(s) will be initiated: Discuss with client options for possible alcohol tx. . Next Scheduled Appointment: 11-16-20.     A Release of Information has been obtained for the following: None requested at this time.    7. MICHAEL:    MICHAEL:  Discussed the general effects of drugs and alcohol on health and well-being.   8. Records:    were reviewed at time of assessment.   Information in this assessment was obtained from the medical record and provided by patient and family who is a fair historian.    Patient will have open access to their mental health medical record.      Eval type:  Mental Health    Provider Name/ Credentials:  Gokul VALLEJO,  LICSW  October 13, 2020

## 2020-10-15 ENCOUNTER — TELEPHONE (OUTPATIENT)
Dept: ORTHOPEDICS | Facility: CLINIC | Age: 80
End: 2020-10-15

## 2020-10-15 NOTE — TELEPHONE ENCOUNTER
[facility] Call Center    Phone Message    May a detailed message be left on voicemail: yes     Reason for Call: Patient called requesting prescription for marijuana pills in place of the narcotics he has been taking. Please advise. Thank you.    Action Taken: Message routed to:  Adult Clinics: Surgery, General p [phone]    Travel Screening: Not Applicable

## 2020-10-16 NOTE — TELEPHONE ENCOUNTER
Discussed with Pt and wife speaker phone yesterday. Dr. Arauz is not able to prescribe this. Advised that we can sent the pain management referral to any clinic of their choosing. Advised that we typically send these to [facility]. Wife was agreeable to this for now, they will check with their insurance as to what is allowed. Referral faxed.    Andi Chapman RN

## 2020-10-20 ENCOUNTER — OFFICE VISIT (OUTPATIENT)
Dept: DERMATOLOGY | Facility: CLINIC | Age: 80
End: 2020-10-20
Payer: COMMERCIAL

## 2020-10-20 VITALS — HEART RATE: 49 BPM | OXYGEN SATURATION: 97 % | DIASTOLIC BLOOD PRESSURE: 78 MMHG | SYSTOLIC BLOOD PRESSURE: 145 MMHG

## 2020-10-20 DIAGNOSIS — L30.0 NUMMULAR DERMATITIS: Primary | ICD-10-CM

## 2020-10-20 PROCEDURE — 99212 OFFICE O/P EST SF 10 MIN: CPT | Performed by: DERMATOLOGY

## 2020-10-20 NOTE — PROGRESS NOTES
"Duane E Sogge is a 80 year old year old male patient here today for f/u nummular derm all clear. Patient has no other skin complaints today.  Remainder of the HPI, Meds, PMH, Allergies, FH, and SH was reviewed in chart.      Past Medical History:   Diagnosis Date     Anxiety      Hypertension        Past Surgical History:   Procedure Laterality Date     COLONOSCOPY N/A 8/26/2019    Procedure: COLONOSCOPY;  Surgeon: Asa Oliver MD;  Location:  GI     ORTHOPEDIC SURGERY          Family History   Problem Relation Age of Onset     Hypertension Mother      Lung Cancer Sister      Thyroid Disease No family hx of      Diabetes No family hx of      Glaucoma No family hx of      Macular Degeneration No family hx of      Melanoma No family hx of      Skin Cancer No family hx of        Social History     Socioeconomic History     Marital status:      Spouse name: Not on file     Number of children: Not on file     Years of education: Not on file     Highest education level: Not on file   Occupational History     Not on file   Social Needs     Financial resource strain: Not on file     Food insecurity     Worry: Not on file     Inability: Not on file     Transportation needs     Medical: Not on file     Non-medical: Not on file   Tobacco Use     Smoking status: Never Smoker     Smokeless tobacco: Current User     Types: Snuff     Tobacco comment: \"herbal snuff\" - tobacco free   Substance and Sexual Activity     Alcohol use: Yes     Alcohol/week: 5.0 - 6.0 standard drinks     Types: 3 Cans of beer, 2 - 3 Shots of liquor per week     Comment:  1 daily     Drug use: No     Sexual activity: Yes     Partners: Female   Lifestyle     Physical activity     Days per week: Not on file     Minutes per session: Not on file     Stress: Not on file   Relationships     Social connections     Talks on phone: Not on file     Gets together: Not on file     Attends Orthodoxy service: Not on file     Active member of club or " organization: Not on file     Attends meetings of clubs or organizations: Not on file     Relationship status: Not on file     Intimate partner violence     Fear of current or ex partner: Not on file     Emotionally abused: Not on file     Physically abused: Not on file     Forced sexual activity: Not on file   Other Topics Concern     Parent/sibling w/ CABG, MI or angioplasty before 65F 55M? Not Asked   Social History Narrative     Not on file       Outpatient Encounter Medications as of 10/20/2020   Medication Sig Dispense Refill     allopurinol (ZYLOPRIM) 100 MG tablet TAKE 1 TABLET EVERY DAY 90 tablet 3     ALPRAZolam (XANAX) 0.5 MG tablet Take 1 tab up to 4x daily only if needed. Refill when due,no early refills  Indications: Feeling Anxious       amitriptyline (ELAVIL) 10 MG tablet Take 1 tablet (10 mg) by mouth At Bedtime 30 tablet 0     augmented betamethasone dipropionate (DIPROLENE-AF) 0.05 % external cream Apply sparingly to affected area twice daily as needed.  Do not apply to face. 100 g 6     calcium carbonate (OS-JEFE) 500 MG tablet Take 1 tablet by mouth 2 times daily       cetirizine (ZYRTEC) 10 MG tablet Take 10 mg by mouth daily       lisinopril-hydrochlorothiazide (ZESTORETIC) 20-12.5 MG tablet TAKE 2 TABLETS EVERY  tablet 0     metoprolol succinate ER (TOPROL-XL) 50 MG 24 hr tablet Take 1 tablet (50 mg) by mouth daily 90 tablet 1     MIRTAZAPINE PO Take 15 mg by mouth At Bedtime       oxyCODONE (ROXICODONE) 5 MG tablet Take 1 tablet (5 mg) by mouth every 6 hours as needed for moderate to severe pain 30 tablet 0     simvastatin (ZOCOR) 20 MG tablet Take 1 tablet (20 mg) by mouth At Bedtime 90 tablet 1     traZODone (DESYREL) 50 MG tablet Take  mg by mouth       triamcinolone (KENALOG) 0.1 % external ointment Apply topically 2 times daily 80 g 0     venlafaxine (EFFEXOR-ER) 150 MG TB24 24 hr tablet Take 150 mg by mouth daily (with breakfast)       Facility-Administered Encounter  Medications as of 10/20/2020   Medication Dose Route Frequency Provider Last Rate Last Dose     triamcinolone (KENALOG-40) injection 40 mg  40 mg   Juaquin Haddad, DO   40 mg at 10/12/20 1025             Review Of Systems  Skin: As above  Eyes: negative  Ears/Nose/Throat: negative  Respiratory: No shortness of breath, dyspnea on exertion, cough, or hemoptysis  Cardiovascular: negative  Gastrointestinal: negative  Genitourinary: negative  Musculoskeletal: negative  Neurologic: negative  Psychiatric: negative  Hematologic/Lymphatic/Immunologic: negative  Endocrine: negative      O:   NAD, WDWN, Alert & Oriented, Mood & Affect wnl, Vitals stable   Here today alone   There were no vitals taken for this visit.   General appearance normal   Vitals stable   Alert, oriented and in no acute distress     Xerosis on legs      Eyes: Conjunctivae/lids:Normal     ENT: Lips, buccal mucosa, tongue: normal    MSK:Normal    Cardiovascular: peripheral edema none    Pulm: Breathing Normal    Neuro/Psych: Orientation:Alert and Orientedx3 ; Mood/Affect:normal       A/P:  1. Nummular derm stable  Emollient use discussed with patient   Skin care regimen reviewed with patient: Eliminate harsh soaps, i.e. Dial, zest, irsih spring; Mild soaps such as Cetaphil or Dove sensitive skin, avoid hot or cold showers, aggressive use of emollients including vanicream, cetaphil or cerave discussed with patient.    Topicals prn     Return to clinic 12 months

## 2020-10-20 NOTE — LETTER
"    10/20/2020         RE: Duane E Sogge  [address] [id number]        Dear Colleague,    Thank you for referring your patient, Duane E Sogge, to the [facility]. Please see a copy of my visit note below.    Duane E Sogge is a 80 year old year old male patient here today for f/u nummular derm all clear. Patient has no other skin complaints today.  Remainder of the HPI, Meds, PMH, Allergies, FH, and SH was reviewed in chart.      Past Medical History:   Diagnosis Date     Anxiety      Hypertension        Past Surgical History:   Procedure Laterality Date     COLONOSCOPY N/A 8/26/2019    Procedure: COLONOSCOPY;  Surgeon: Asa Oliver MD;  Location:  GI     ORTHOPEDIC SURGERY          Family History   Problem Relation Age of Onset     Hypertension Mother      Lung Cancer Sister      Thyroid Disease No family hx of      Diabetes No family hx of      Glaucoma No family hx of      Macular Degeneration No family hx of      Melanoma No family hx of      Skin Cancer No family hx of        Social History     Socioeconomic History     Marital status:      Spouse name: Not on file     Number of children: Not on file     Years of education: Not on file     Highest education level: Not on file   Occupational History     Not on file   Social Needs     Financial resource strain: Not on file     Food insecurity     Worry: Not on file     Inability: Not on file     Transportation needs     Medical: Not on file     Non-medical: Not on file   Tobacco Use     Smoking status: Never Smoker     Smokeless tobacco: Current User     Types: Snuff     Tobacco comment: \"herbal snuff\" - tobacco free   Substance and Sexual Activity     Alcohol use: Yes     Alcohol/week: 5.0 - 6.0 standard drinks     Types: 3 Cans of beer, 2 - 3 Shots of liquor per week     Comment:  1 daily     Drug use: No     Sexual activity: Yes     Partners: Female   Lifestyle     Physical activity     Days per week: Not on " file     Minutes per session: Not on file     Stress: Not on file   Relationships     Social connections     Talks on phone: Not on file     Gets together: Not on file     Attends Rastafarian service: Not on file     Active member of club or organization: Not on file     Attends meetings of clubs or organizations: Not on file     Relationship status: Not on file     Intimate partner violence     Fear of current or ex partner: Not on file     Emotionally abused: Not on file     Physically abused: Not on file     Forced sexual activity: Not on file   Other Topics Concern     Parent/sibling w/ CABG, MI or angioplasty before 65F 55M? Not Asked   Social History Narrative     Not on file       Outpatient Encounter Medications as of 10/20/2020   Medication Sig Dispense Refill     allopurinol (ZYLOPRIM) 100 MG tablet TAKE 1 TABLET EVERY DAY 90 tablet 3     ALPRAZolam (XANAX) 0.5 MG tablet Take 1 tab up to 4x daily only if needed. Refill when due,no early refills  Indications: Feeling Anxious       amitriptyline (ELAVIL) 10 MG tablet Take 1 tablet (10 mg) by mouth At Bedtime 30 tablet 0     augmented betamethasone dipropionate (DIPROLENE-AF) 0.05 % external cream Apply sparingly to affected area twice daily as needed.  Do not apply to face. 100 g 6     calcium carbonate (OS-JEFE) 500 MG tablet Take 1 tablet by mouth 2 times daily       cetirizine (ZYRTEC) 10 MG tablet Take 10 mg by mouth daily       lisinopril-hydrochlorothiazide (ZESTORETIC) 20-12.5 MG tablet TAKE 2 TABLETS EVERY  tablet 0     metoprolol succinate ER (TOPROL-XL) 50 MG 24 hr tablet Take 1 tablet (50 mg) by mouth daily 90 tablet 1     MIRTAZAPINE PO Take 15 mg by mouth At Bedtime       oxyCODONE (ROXICODONE) 5 MG tablet Take 1 tablet (5 mg) by mouth every 6 hours as needed for moderate to severe pain 30 tablet 0     simvastatin (ZOCOR) 20 MG tablet Take 1 tablet (20 mg) by mouth At Bedtime 90 tablet 1     traZODone (DESYREL) 50 MG tablet Take  mg by  mouth       triamcinolone (KENALOG) 0.1 % external ointment Apply topically 2 times daily 80 g 0     venlafaxine (EFFEXOR-ER) 150 MG TB24 24 hr tablet Take 150 mg by mouth daily (with breakfast)       Facility-Administered Encounter Medications as of 10/20/2020   Medication Dose Route Frequency Provider Last Rate Last Dose     triamcinolone (KENALOG-40) injection 40 mg  40 mg   Juaquin Haddad, DO   40 mg at 10/12/20 1025             Review Of Systems  Skin: As above  Eyes: negative  Ears/Nose/Throat: negative  Respiratory: No shortness of breath, dyspnea on exertion, cough, or hemoptysis  Cardiovascular: negative  Gastrointestinal: negative  Genitourinary: negative  Musculoskeletal: negative  Neurologic: negative  Psychiatric: negative  Hematologic/Lymphatic/Immunologic: negative  Endocrine: negative      O:   NAD, WDWN, Alert & Oriented, Mood & Affect wnl, Vitals stable   Here today alone   There were no vitals taken for this visit.   General appearance normal   Vitals stable   Alert, oriented and in no acute distress     Xerosis on legs      Eyes: Conjunctivae/lids:Normal     ENT: Lips, buccal mucosa, tongue: normal    MSK:Normal    Cardiovascular: peripheral edema none    Pulm: Breathing Normal    Neuro/Psych: Orientation:Alert and Orientedx3 ; Mood/Affect:normal       A/P:  1. Nummular derm stable  Emollient use discussed with patient   Skin care regimen reviewed with patient: Eliminate harsh soaps, i.e. Dial, zest, irsih spring; Mild soaps such as Cetaphil or Dove sensitive skin, avoid hot or cold showers, aggressive use of emollients including vanicream, cetaphil or cerave discussed with patient.    Topicals prn     Return to clinic 12 months        Again, thank you for allowing me to participate in the care of your patient.        Sincerely,        Pierre Diggs MD

## 2020-11-03 DIAGNOSIS — I10 ESSENTIAL HYPERTENSION: ICD-10-CM

## 2020-11-04 NOTE — TELEPHONE ENCOUNTER
Routing refill request to provider for review/approval because:  Labs not current:  ADIEL Ames RN

## 2020-11-05 RX ORDER — SIMVASTATIN 20 MG
20 TABLET ORAL AT BEDTIME
Qty: 90 TABLET | Refills: 1 | Status: SHIPPED | OUTPATIENT
Start: 2020-11-05

## 2020-11-12 ENCOUNTER — TELEPHONE (OUTPATIENT)
Dept: ORTHOPEDICS | Facility: CLINIC | Age: 80
End: 2020-11-12

## 2020-11-12 NOTE — TELEPHONE ENCOUNTER
Received a notice from [facility] that they have been unable to reach patient to schedule any visits.     Sent to scan.

## 2020-11-16 ENCOUNTER — VIRTUAL VISIT (OUTPATIENT)
Dept: PSYCHOLOGY | Facility: CLINIC | Age: 80
End: 2020-11-16
Payer: COMMERCIAL

## 2020-11-16 DIAGNOSIS — F34.1 PERSISTENT DEPRESSIVE DISORDER WITH ANXIOUS DISTRESS, CURRENTLY MILD: Primary | ICD-10-CM

## 2020-11-16 DIAGNOSIS — F41.1 GAD (GENERALIZED ANXIETY DISORDER): ICD-10-CM

## 2020-11-16 DIAGNOSIS — L30.0 NUMMULAR DERMATITIS: ICD-10-CM

## 2020-11-16 DIAGNOSIS — D23.9 DERMAL NEVUS: ICD-10-CM

## 2020-11-16 DIAGNOSIS — D18.01 ANGIOMA OF SKIN: ICD-10-CM

## 2020-11-16 DIAGNOSIS — L81.4 LENTIGO: ICD-10-CM

## 2020-11-16 DIAGNOSIS — L82.1 SEBORRHEIC KERATOSIS: ICD-10-CM

## 2020-11-16 PROCEDURE — 90834 PSYTX W PT 45 MINUTES: CPT | Mod: 95 | Performed by: SOCIAL WORKER

## 2020-11-16 ASSESSMENT — PATIENT HEALTH QUESTIONNAIRE - PHQ9
SUM OF ALL RESPONSES TO PHQ QUESTIONS 1-9: 3
5. POOR APPETITE OR OVEREATING: NOT AT ALL

## 2020-11-16 ASSESSMENT — ANXIETY QUESTIONNAIRES
5. BEING SO RESTLESS THAT IT IS HARD TO SIT STILL: SEVERAL DAYS
1. FEELING NERVOUS, ANXIOUS, OR ON EDGE: SEVERAL DAYS
IF YOU CHECKED OFF ANY PROBLEMS ON THIS QUESTIONNAIRE, HOW DIFFICULT HAVE THESE PROBLEMS MADE IT FOR YOU TO DO YOUR WORK, TAKE CARE OF THINGS AT HOME, OR GET ALONG WITH OTHER PEOPLE: SOMEWHAT DIFFICULT
2. NOT BEING ABLE TO STOP OR CONTROL WORRYING: SEVERAL DAYS
6. BECOMING EASILY ANNOYED OR IRRITABLE: NOT AT ALL
3. WORRYING TOO MUCH ABOUT DIFFERENT THINGS: SEVERAL DAYS
GAD7 TOTAL SCORE: 4
7. FEELING AFRAID AS IF SOMETHING AWFUL MIGHT HAPPEN: NOT AT ALL

## 2020-11-16 NOTE — PROGRESS NOTES
"                                           Progress Note    Patient Name: Duane E Sogge  Date: 20         Service Type: Individual      Session Start Time: 4:35  Session End Time: 5:20     Session Length: 45    Session #: 3    Attendees: Client    Service Modality:  Phone Visit:      Provider verified identity through the following two step process.  Patient provided:  Patient     The patient has been notified of the following:      \"We have found that certain health care needs can be provided without the need for a face to face visit.  This service lets us provide the care you need with a phone conversation.       I will have full access to your [facility] medical record during this entire phone call.   I will be taking notes for your medical record.      Since this is like an office visit, we will bill your insurance company for this service.       There are potential benefits and risks of telephone visits (e.g. limits to patient confidentiality) that differ from in-person visits.?  Confidentiality still applies for telephone services, and nobody will record the visit.  It is important to be in a quiet, private space that is free of distractions (including cell phone or other devices) during the visit.??      If during the course of the call I believe a telephone visit is not appropriate, you will not be charged for this service\"     Consent has been obtained for this service by care team member: Yes      Treatment Plan Last Reviewed: Started tx today  PHQ-9 / EDILSON-7 : Completed this session: Stable screening scores this session    DATA  Interactive Complexity: No  Crisis: No       Progress Since Last Session (Related to Symptoms / Goals / Homework):   Symptoms: No change Stable depression and anxiety symptoms this session    Homework: Partially completed Continuing with moderation management of alcohol      Episode of Care Goals: Minimal progress - PREPARATION (Decided to change - considering how); " Intervened by negotiating a change plan and determining options / strategies for behavior change, identifying triggers, exploring social supports, and working towards setting a date to begin behavior change     Current / Ongoing Stressors and Concerns:   Alcohol abuse, long term depression issues, loneliness, health concerns     Treatment Objective(s) Addressed in This Session:   Continue moderation management of alcohol  Worked on tx plan in session today     Intervention:   Motivational Interviewing: OARS        ASSESSMENT: Current Emotional / Mental Status (status of significant symptoms):   Risk status (Self / Other harm or suicidal ideation)   Patient denies current fears or concerns for personal safety.   Patient denies current or recent suicidal ideation or behaviors.   Patient denies current or recent homicidal ideation or behaviors.   Patient denies current or recent self injurious behavior or ideation.   Patient denies other safety concerns.   Patient reports there has been no change in risk factors since their last session.     Patient reports there has been no change in protective factors since their last session.     Recommended that patient call 911 or go to the local ED should there be a change in any of these risk factors.     Appearance:   Appropriate  Could not assess due to telephone visit    Eye Contact:   Could not assess due to telephone session    Psychomotor Behavior: Restless    Attitude:   Cooperative  Interested Pleasant   Orientation:   All   Speech    Rate / Production: Normal/ Responsive Normal     Volume:  Normal    Mood:    Anxious    Affect:    Appropriate    Thought Content:  Clear    Thought Form:  Coherent  Logical    Insight:    Fair      Medication Review:   No changes to current psychiatric medication(s)     Medication Compliance:   Yes     Changes in Health Issues:   None reported     Chemical Use Review:   Substance Use: decrease in alcohol .  Patient reports frequency of use  occasionally.  Provided support and affirmation for steps taken towards sobriety         Tobacco Use: No current tobacco use.      Diagnosis:  1. Persistent depressive disorder with anxious distress, currently mild    2. EDILSON (generalized anxiety disorder)        Collateral Reports Completed:   Not Applicable    PLAN: (Patient Tasks / Therapist Tasks / Other)  Worked on tx plan in session today.  Having surgery tomorrow on shoulder.  Concerned about the winter blues and being more depressed.  Is hopeful he can go to [place] after the New Year.  Also thinking about getting a stationary bike so he can exercise at home.          Gokul Colmenares, [facility]                                                         ______________________________________________________________________    Treatment Plan    Patient's Name: Duane E Sogge  YOB: 1940    Date: 11-16-20    DSM5 Diagnoses: 300.4 (F34.1) Persistent Depressive Disorder, Early onset and Mild or 300.02 (F41.1) Generalized Anxiety Disorder  Psychosocial / Contextual Factors:Alcohol abuse, long term depression issues, loneliness, health concerns    WHODAS: Completed first session    Referral / Collaboration:  Referral to another professional/service is not indicated at this time..    Anticipated number of session or this episode of care: 10      MeasurableTreatment Goal(s) related to diagnosis / functional impairment(s)  Goal 1: Patient will decrease depression and anxiety symptoms and return to normal daily functioning.    I will know I've met my goal when I feel better and remain active and feel more positive about self and the future.      Objective #A (Patient Action)    Patient will engage in exercise at least three times a week.  Status: Continued - Date(s): 11-16-20    Intervention(s)  Therapist will connect with patient on purchase of a stationary bicycle and continue exercise to improve his mood and health.    Objective #B  Patient will continue to  engage in moderation management of alcohol.  Status: Continued - Date(s): 11-16-20    Intervention(s)  Therapist will provide encouragement towards moderation management of alcohol use.    Objective #C  Patient will engage in positive distraction activities that improves his mood.   Status: Continued - Date(s): 11-16-20    Intervention(s)  Therapist will encourage writing a list of activities he can engage in that will brighten his mood and engage in these activities at least three times per week. .          Patient has reviewed and agreed to the above plan.      Gokul Colmenares, [facility]  November 16, 2020

## 2020-11-17 RX ORDER — AMITRIPTYLINE HYDROCHLORIDE 10 MG/1
10 TABLET ORAL AT BEDTIME
Qty: 30 TABLET | Refills: 11 | Status: SHIPPED | OUTPATIENT
Start: 2020-11-17 | End: 2021-01-14

## 2020-11-17 ASSESSMENT — ANXIETY QUESTIONNAIRES: GAD7 TOTAL SCORE: 4

## 2020-11-17 NOTE — TELEPHONE ENCOUNTER
Do you want him to continue on Amitriptyline?..    Please advise. Lor Strong RN    
Requested Prescriptions   Pending Prescriptions Disp Refills     amitriptyline (ELAVIL) 10 MG tablet 30 tablet 0     Sig: Take 1 tablet (10 mg) by mouth At Bedtime       There is no refill protocol information for this order        Last Written Prescription Date:    Last Fill Quantity: ,  # refills:    Last office visit: 10/20/2020 with prescribing provider:  Mary Jo Hess Office Visit:            
No

## 2020-11-25 ENCOUNTER — MYC MEDICAL ADVICE (OUTPATIENT)
Dept: FAMILY MEDICINE | Facility: CLINIC | Age: 80
End: 2020-11-25

## 2020-11-25 DIAGNOSIS — I10 ESSENTIAL HYPERTENSION: ICD-10-CM

## 2020-11-27 DIAGNOSIS — I10 ESSENTIAL HYPERTENSION: ICD-10-CM

## 2020-11-27 RX ORDER — METOPROLOL SUCCINATE 50 MG/1
50 TABLET, EXTENDED RELEASE ORAL DAILY
Qty: 90 TABLET | Refills: 1 | OUTPATIENT
Start: 2020-11-27

## 2020-11-27 RX ORDER — METOPROLOL SUCCINATE 50 MG/1
50 TABLET, EXTENDED RELEASE ORAL DAILY
Qty: 90 TABLET | Refills: 0 | Status: SHIPPED | OUTPATIENT
Start: 2020-11-27 | End: 2021-02-22

## 2020-11-27 RX ORDER — LISINOPRIL AND HYDROCHLOROTHIAZIDE 12.5; 2 MG/1; MG/1
TABLET ORAL
Qty: 180 TABLET | Refills: 0 | Status: SHIPPED | OUTPATIENT
Start: 2020-11-27 | End: 2020-11-27

## 2020-11-27 RX ORDER — LISINOPRIL AND HYDROCHLOROTHIAZIDE 12.5; 2 MG/1; MG/1
TABLET ORAL
Qty: 180 TABLET | Refills: 0 | OUTPATIENT
Start: 2020-11-27

## 2020-11-27 RX ORDER — LISINOPRIL AND HYDROCHLOROTHIAZIDE 12.5; 2 MG/1; MG/1
TABLET ORAL
Qty: 180 TABLET | Refills: 0 | Status: SHIPPED | OUTPATIENT
Start: 2020-11-27

## 2020-11-27 RX ORDER — METOPROLOL SUCCINATE 50 MG/1
50 TABLET, EXTENDED RELEASE ORAL DAILY
Qty: 90 TABLET | Refills: 0 | Status: SHIPPED | OUTPATIENT
Start: 2020-11-27 | End: 2020-11-27

## 2020-11-27 NOTE — TELEPHONE ENCOUNTER
Prescription approved per [facility] Refill Protocol.    Maggie Ames RN

## 2021-01-13 DIAGNOSIS — L81.4 LENTIGO: ICD-10-CM

## 2021-01-13 DIAGNOSIS — L82.1 SEBORRHEIC KERATOSIS: ICD-10-CM

## 2021-01-13 DIAGNOSIS — D18.01 ANGIOMA OF SKIN: ICD-10-CM

## 2021-01-13 DIAGNOSIS — D23.9 DERMAL NEVUS: ICD-10-CM

## 2021-01-13 DIAGNOSIS — L30.0 NUMMULAR DERMATITIS: ICD-10-CM

## 2021-01-13 NOTE — TELEPHONE ENCOUNTER
"Requested Prescriptions   Pending Prescriptions Disp Refills     amitriptyline (ELAVIL) 10 MG tablet 30 tablet 11     Sig: Take 1 tablet (10 mg) by mouth At Bedtime       Tricyclic Agents ( Annual appt and no PHQ9) Failed - 1/13/2021  8:12 AM        Failed - Blood Pressure under 140/90 in past 12 mos     BP Readings from Last 3 Encounters:   10/20/20 (!) 145/78   10/08/20 (!) 154/79   10/05/20 (!) 153/77                 Passed - Recent (12 mo) or future (30 days) visit within authorizing provider's specialty     Patient has had an office visit with the authorizing provider or a provider within the authorizing providers department within the previous 12 mos or has a future within next 30 days. See \"Patient Info\" tab in inbasket, or \"Choose Columns\" in Meds & Orders section of the refill encounter.              Passed - Medication is active on med list        Passed - Patient is age 18 or older             "

## 2021-01-14 RX ORDER — AMITRIPTYLINE HYDROCHLORIDE 10 MG/1
10 TABLET ORAL AT BEDTIME
Qty: 90 TABLET | Refills: 3 | Status: SHIPPED | OUTPATIENT
Start: 2021-01-14 | End: 2022-03-03

## 2021-01-15 ENCOUNTER — HEALTH MAINTENANCE LETTER (OUTPATIENT)
Age: 81
End: 2021-01-15

## 2021-02-22 DIAGNOSIS — I10 ESSENTIAL HYPERTENSION: ICD-10-CM

## 2021-02-22 RX ORDER — METOPROLOL SUCCINATE 50 MG/1
50 TABLET, EXTENDED RELEASE ORAL DAILY
Qty: 90 TABLET | Refills: 0 | Status: SHIPPED | OUTPATIENT
Start: 2021-02-22

## 2021-02-22 NOTE — TELEPHONE ENCOUNTER
Routing to covering provider as PCP is currently out of office.    Routing refill request to provider for review/approval because:  Patient needs to be seen because it has been more than 1 year since last office visit.  Elevated BP on file.     Maggie Ames RN

## 2021-02-23 NOTE — TELEPHONE ENCOUNTER
I called pt's wife and she stated that he doesn't come here anymore since his insurance doesn't cover him up here anymore.  Shavonne Calle MA

## 2021-07-12 DIAGNOSIS — L81.4 LENTIGO: ICD-10-CM

## 2021-07-12 DIAGNOSIS — D23.9 DERMAL NEVUS: ICD-10-CM

## 2021-07-12 DIAGNOSIS — L30.0 NUMMULAR DERMATITIS: ICD-10-CM

## 2021-07-12 DIAGNOSIS — L82.1 SEBORRHEIC KERATOSIS: ICD-10-CM

## 2021-07-12 DIAGNOSIS — D18.01 ANGIOMA OF SKIN: ICD-10-CM

## 2021-07-14 RX ORDER — BETAMETHASONE DIPROPIONATE 0.5 MG/G
CREAM TOPICAL
Qty: 100 G | Refills: 6 | Status: SHIPPED | OUTPATIENT
Start: 2021-07-14

## 2021-09-05 ENCOUNTER — HEALTH MAINTENANCE LETTER (OUTPATIENT)
Age: 81
End: 2021-09-05

## 2022-02-20 ENCOUNTER — HEALTH MAINTENANCE LETTER (OUTPATIENT)
Age: 82
End: 2022-02-20

## 2022-03-03 DIAGNOSIS — D18.01 ANGIOMA OF SKIN: ICD-10-CM

## 2022-03-03 DIAGNOSIS — L81.4 LENTIGO: ICD-10-CM

## 2022-03-03 DIAGNOSIS — D23.9 DERMAL NEVUS: ICD-10-CM

## 2022-03-03 DIAGNOSIS — L30.0 NUMMULAR DERMATITIS: ICD-10-CM

## 2022-03-03 DIAGNOSIS — L82.1 SEBORRHEIC KERATOSIS: ICD-10-CM

## 2022-03-03 RX ORDER — AMITRIPTYLINE HYDROCHLORIDE 10 MG/1
10 TABLET ORAL AT BEDTIME
Qty: 90 TABLET | Refills: 0 | Status: SHIPPED | OUTPATIENT
Start: 2022-03-03

## 2022-03-03 NOTE — TELEPHONE ENCOUNTER
"Last Written Prescription Date:  1/14/21  Last Fill Quantity: 90,  # refills: 3   Last office visit: 10/20/2020 with prescribing provider:  Dontae   Future Office Visit:  None pending    Requested Prescriptions   Pending Prescriptions Disp Refills     amitriptyline (ELAVIL) 10 MG tablet 90 tablet 3     Sig: Take 1 tablet (10 mg) by mouth At Bedtime       Tricyclic Agents ( Annual appt and no PHQ9) Failed - 3/3/2022 12:36 PM        Failed - Blood Pressure under 140/90 in past 12 mos     BP Readings from Last 3 Encounters:   10/20/20 (!) 145/78   10/08/20 (!) 154/79   10/05/20 (!) 153/77                 Failed - Recent (12 mo) or future (30 days) visit within authorizing provider's specialty     Patient has had an office visit with the authorizing provider or a provider within the authorizing providers department within the previous 12 mos or has a future within next 30 days. See \"Patient Info\" tab in inbasket, or \"Choose Columns\" in Meds & Orders section of the refill encounter.              Passed - Medication is active on med list        Passed - Patient is age 18 or older           Failed protocol.  Routed to physician to review and advise.    Lay Nguyen RN on 3/3/2022 at 12:59 PM            "

## 2022-03-03 NOTE — TELEPHONE ENCOUNTER
Requested Prescriptions   Pending Prescriptions Disp Refills     amitriptyline (ELAVIL) 10 MG tablet 90 tablet 3     Sig: Take 1 tablet (10 mg) by mouth At Bedtime       There is no refill protocol information for this order        Last office visit: 10/20/2020 with prescribing provider:  Dr. Diggs   Future Office Visit:          Avery Sanchezs  [facility]

## 2022-03-03 NOTE — LETTER
[facility] [place]  [address] [facility] [address] [facility] [address]  [facility] [address]  Phone: [phone]    March 3, 2022    Duane E Sogge                                                                                                                 [address] [place] [id number]            Dear Mr. Stanley,    We recently provided you with a medication refill.  Per [facility] medication refill protocol, you do need to be seen at least annually to renew a prescription while on prescribed medication(s). A prescription is valid for only one year before it expires.     At this time we ask that: You schedule a routine follow up office visit with your Dermatology Provider to follow your Dermatitis.     Your prescription for Amitriptyline: Has  as it is over 1 year old, you will need to be seen to renew this prescription. You have been given a one time 90 day fly refill so you may have time for the above noted follow-up.    Please schedule follow up appointment as soon as possible as we are currently booking Dermatology appointments into Mid May with a waiting list of > [phone] patients for a sooner than available Dermatology appointment.       Thank you,      Pierre Diggs MD/ [facility]

## 2022-10-06 DIAGNOSIS — L81.4 LENTIGO: ICD-10-CM

## 2022-10-06 DIAGNOSIS — L82.1 SEBORRHEIC KERATOSIS: ICD-10-CM

## 2022-10-06 DIAGNOSIS — L30.0 NUMMULAR DERMATITIS: ICD-10-CM

## 2022-10-06 DIAGNOSIS — D18.01 ANGIOMA OF SKIN: ICD-10-CM

## 2022-10-06 DIAGNOSIS — D23.9 DERMAL NEVUS: ICD-10-CM

## 2022-10-06 RX ORDER — BETAMETHASONE DIPROPIONATE 0.5 MG/G
CREAM TOPICAL
Qty: 100 G | Refills: 6 | OUTPATIENT
Start: 2022-10-06

## 2022-10-06 NOTE — TELEPHONE ENCOUNTER
Requested Prescriptions   Pending Prescriptions Disp Refills     augmented betamethasone dipropionate (DIPROLENE AF) 0.05 % external cream 100 g 6     Sig: Apply sparingly to affected area twice daily as needed.  Do not apply to face.       There is no refill protocol information for this order        Last office visit: 10/20/2020 with prescribing provider:  Dr. Diggs    Future Office Visit:        Avery Kim  [facility]

## 2022-10-06 NOTE — TELEPHONE ENCOUNTER
Last seen 2 yrs ago and letter sent to notify of need to be seen April 2022, but has not scheduled appt. Denied. Lor Strong RN

## 2022-10-23 ENCOUNTER — HEALTH MAINTENANCE LETTER (OUTPATIENT)
Age: 82
End: 2022-10-23

## 2023-04-02 ENCOUNTER — HEALTH MAINTENANCE LETTER (OUTPATIENT)
Age: 83
End: 2023-04-02

## 2023-08-12 NOTE — ADDENDUM NOTE
Addended by: DOROTHEA BEDOLLA on: 8/14/2018 04:06 PM     Modules accepted: Orders    
(2) Patient Placed in Bed

## 2024-01-01 NOTE — TELEPHONE ENCOUNTER
"Patient's wife called and states that this is urgent and if Dr. Choe \"can't see him then they need to know ASAP so they can see someone that can see him\" she said that [facility] said he needs to be seen to check for liver failure.     Thank you  Eliezer Hart  Patient Representative    "
Dr. Oliver has graciously accepted to see this patient tomorrow.   
Hilda from [facility] called and is requesting that Dr. Choe please contact Dr. Suntharam RE Duane. It isn't an emergency. Would just like to touch base with you. Phone number is [phone]  
Per Dr. Choe: We can repeat LFT's. See if anyone else has any openings tomorrow as Dr. Choe is full.   
Reason for Call:  Same Day Appointment, Requested Provider:  Nima Choe M.D.    PCP: Saqib Chavarria    Reason for visit: elevated liver function tests    Duration of symptoms: ongoing    Have you been treated for this in the past? Yes    Additional comments: has been seen by Dr Jewell for this, is asking to see Dr Choe tomorrow and discuss lab work, and can conference Dr Jewell with infectious disease.     Can we leave a detailed message on this number? YES    Phone number patient can be reached at: Home number on file [phone] (home)    Best Time: any, wife called in request.     Call taken on 3/29/2018 at 1:57 PM by Noy Croft    
Statement Selected

## 2024-06-02 ENCOUNTER — HEALTH MAINTENANCE LETTER (OUTPATIENT)
Age: 84
End: 2024-06-02

## 2024-12-10 NOTE — TELEPHONE ENCOUNTER
1st attempt- [facility] to schedule cpx appt for future med refills   Medication refilled per [facility] RN protocol.    Patti WESTON RN BSN PHN  [facility]

## (undated) RX ORDER — LIDOCAINE HYDROCHLORIDE 10 MG/ML
INJECTION, SOLUTION EPIDURAL; INFILTRATION; INTRACAUDAL; PERINEURAL
Status: DISPENSED
Start: 2019-08-26

## (undated) RX ORDER — LIDOCAINE HYDROCHLORIDE 10 MG/ML
INJECTION, SOLUTION EPIDURAL; INFILTRATION; INTRACAUDAL; PERINEURAL
Status: DISPENSED
Start: 2019-07-15